# Patient Record
Sex: MALE | Race: WHITE | NOT HISPANIC OR LATINO | Employment: OTHER | URBAN - METROPOLITAN AREA
[De-identification: names, ages, dates, MRNs, and addresses within clinical notes are randomized per-mention and may not be internally consistent; named-entity substitution may affect disease eponyms.]

---

## 2017-10-02 ENCOUNTER — TRANSCRIBE ORDERS (OUTPATIENT)
Dept: ADMINISTRATIVE | Facility: HOSPITAL | Age: 67
End: 2017-10-02

## 2017-10-02 ENCOUNTER — HOSPITAL ENCOUNTER (OUTPATIENT)
Dept: RADIOLOGY | Facility: HOSPITAL | Age: 67
Discharge: HOME | End: 2017-10-02
Attending: INTERNAL MEDICINE
Payer: COMMERCIAL

## 2017-10-02 DIAGNOSIS — J40 BRONCHITIS: Primary | ICD-10-CM

## 2017-10-02 DIAGNOSIS — J18.9 PNEUMONIA OF BOTH LOWER LOBES DUE TO INFECTIOUS ORGANISM: ICD-10-CM

## 2017-10-02 PROCEDURE — 71020 HB CHEST X-RAY 2VW FRONTAL&LATL: CPT

## 2018-08-30 ENCOUNTER — OFFICE VISIT (OUTPATIENT)
Dept: HEMATOLOGY ONCOLOGY | Facility: MEDICAL CENTER | Age: 68
End: 2018-08-30
Payer: COMMERCIAL

## 2018-08-30 VITALS
SYSTOLIC BLOOD PRESSURE: 136 MMHG | HEART RATE: 67 BPM | HEIGHT: 75 IN | OXYGEN SATURATION: 97 % | WEIGHT: 266 LBS | TEMPERATURE: 97.7 F | RESPIRATION RATE: 17 BRPM | BODY MASS INDEX: 33.07 KG/M2 | DIASTOLIC BLOOD PRESSURE: 78 MMHG

## 2018-08-30 DIAGNOSIS — D69.6 THROMBOCYTOPENIA (HCC): Primary | ICD-10-CM

## 2018-08-30 PROCEDURE — 99204 OFFICE O/P NEW MOD 45 MIN: CPT | Performed by: INTERNAL MEDICINE

## 2018-08-30 RX ORDER — CHLORAL HYDRATE 500 MG
1000 CAPSULE ORAL EVERY MORNING
COMMUNITY

## 2018-08-30 RX ORDER — MULTIVIT-MIN/IRON/FOLIC ACID/K 18-600-40
CAPSULE ORAL DAILY
COMMUNITY
End: 2018-10-12

## 2018-08-30 NOTE — PROGRESS NOTES
Amy Murillo  1950  [facility] [place] [facility] [phone]  HEMATOLOGY/ONCOLOGY CONSULTATION REPORT    DISCUSSION/SUMMARY:    49-year-old male with relatively good past medical history recently found to have thrombocytopenia  The decreased platelet counts go back approximately 2 years  White count, hemoglobin level and differential have been relatively normal   Mr Sherri Stoll feels well and has no bleeding issues  Occasionally patient has more bruising than usual but otherwise patient is asymptomatic  Patient has never had a bone marrow biopsy  Previously patient had been seen by Dr Mindi Tena who felt that 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  I do not believe that any additional hematology workup is presently indicated  We discussed what patient needs to monitor for in regard to excessive bruising/bleeding  Mr Sherri Stoll will call the office if he is in need of any surgery or invasive procedure  Four days of Decadron can be tried if necessary to raise the platelet count  Patient will make sure that routine health maintenance and medical care is up-to-date  Patient is to return in 6 months with blood work before  Patient knows to call the hematology/oncology office if there are any other questions or concerns  Carefully review your medication list and verify that the list is accurate and up-to-date  Please call the hematology/oncology office if there are medications missing from the list, medications on the list that you are not currently taking or if there is a dosage or instruction that is different from how you're taking that medication      Patient goals and areas of care:  Monitor for excessive bruising/bleeding, monitor platelet count  Patient is able to self-care   ______________________________________________________________________________________    Chief Complaint   Patient presents with   Learta January Consult Thrombocytopenia     History of Present Illness:    68-year-old male referred for evaluation of thrombocytopenia  Mr Jenny Mays was unaware of any CBC abnormalities up until recently when he needed left shoulder surgery  Patient has been seen by a number of physicians to trying clarify the etiology for the thrombocytopenia  Although the specifics are not entirely clear, 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  Presently Mr Summers states feeling okay/baseline  Left shoulder pain is controlled, range of motion is okay/good  Patient denies any problems with excessive bleeding - patient believes that he gets bruises more often than before but they resolve relatively quickly  Specifically no GI or  bleeding  Appetite is good, weight is stable  No fevers, chills or sweats  No pulmonary issues  Patient continues to be otherwise active  Routine health maintenance and medical care is up-to-date  Review of Systems   Constitutional: Negative  HENT: Negative  Eyes: Negative  Respiratory: Negative  Cardiovascular: Negative  Gastrointestinal: Negative  Endocrine: Negative  Genitourinary: Negative  Musculoskeletal: Negative  Skin: Negative  Allergic/Immunologic: Negative  Neurological: Negative  Hematological: Bruises/bleeds easily  Psychiatric/Behavioral: The patient is nervous/anxious  All other systems reviewed and are negative  There is no problem list on file for this patient  No past medical history on file  Past Surgical History:   Procedure Laterality Date    APPENDECTOMY      HERNIA REPAIR Right 2013    inguinal    JOINT REPLACEMENT Right     knee    [place] REMV CATARACT EXTRACAP,INSERT LENS Left 5/4/2016    Procedure: EXTRACTION EXTRACAPSULAR CATARACT PHACO INTRAOCULAR LENS (IOL);   Surgeon: Georgina Workman MD;  Location: [facility] MAIN OR;  Service: Ophthalmology    SHOULDER ARTHROSCOPY Left     tendon repair    TONSILLECTOMY  age 1 Family History   Problem Relation Age of Onset    Heart disease Father 67        massive MI    Heart disease Sister         MI    Heart disease Brother         MI     Social History     Social History    Marital status: /Civil Union     Spouse name: N/A    Number of children: N/A    Years of education: N/A     Occupational History    Not on file  Social History Main Topics    Smoking status: Current Every Day Smoker     Packs/day: 0 50     Years: 22 00     Types: Cigarettes    Smokeless tobacco: Not on file    Alcohol use Not on file      Comment: rarely    Drug use: No    Sexual activity: Not on file     Other Topics Concern    Not on file     Social History Narrative    No narrative on file       Current Outpatient Prescriptions:     ascorbic acid (VITAMIN C) 500 mg tablet, Take 500 mg by mouth every morning , Disp: , Rfl:     b complex vitamins tablet, Take 1 tablet by mouth every morning , Disp: , Rfl:     Cholecalciferol (VITAMIN D) 2000 units CAPS, Take by mouth daily, Disp: , Rfl:     multivitamin (THERAGRAN) TABS, Take 1 tablet by mouth every morning , Disp: , Rfl:     Omega-3 Fatty Acids (FISH OIL) 1,000 mg, Take 1,000 mg by mouth daily, Disp: , Rfl:     Allergies   Allergen Reactions    Dust Mite Extract        Vitals:    08/30/18 1515   BP: 136/78   Pulse: 67   Resp: 17   Temp: 97 7 °F (36 5 °C)   SpO2: 97%     Physical Exam   Constitutional: He is oriented to person, place, and time  He appears well-developed and well-nourished  Well-nourished male, no respiratory distress   HENT:   Head: Normocephalic and atraumatic  Right Ear: External ear normal    Left Ear: External ear normal    Nose: Nose normal    Mouth/Throat: Oropharynx is clear and moist    Eyes: Conjunctivae and EOM are normal  Pupils are equal, round, and reactive to light  Neck: Normal range of motion  Neck supple     Cardiovascular: Normal rate, regular rhythm, normal heart sounds and intact distal pulses  Pulmonary/Chest: Effort normal and breath sounds normal    Abdominal: Soft  Bowel sounds are normal    +bowel sounds, nontender, slightly obese, cannot palpate liver or spleen, no rigidity or rebound   Musculoskeletal: Normal range of motion  Neurological: He is alert and oriented to person, place, and time  He has normal reflexes  Skin: Skin is warm  Good color, warm, moist, no petechiae or ecchymoses   Psychiatric: He has a normal mood and affect   His behavior is normal  Judgment and thought content normal    Extremities:   No lower extremity edema, no cords, pulses are 1+ bilaterally, no upper extremity edema, good range of motion in both upper extremities  Lymphatics:   No adenopathy in the neck, supraclavicular region, axilla and groin bilaterally    Labs:    04/27/2018 WBC = 7 0 hemoglobin = 15 6 hematocrit = 45 6 platelet = 82  38/17/9294 platelet = 466  98/60/9397 WBC = 7 7 hemoglobin = 13 9 hematocrit = 41 platelet count = 71 neutrophil = 59% lymphocytes = 29% atypical lymphocytes = 3% monocyte = 6%  03/27/2017 WBC = 7 5 hemoglobin = 14 1 hematocrit = 42 3 platelet = 58  61/14/3478 BUN = 18 creatinine = 1 05 ALT = 15 AST = 18 total bilirubin = 0 3 alkaline phosphatase = 88  One/2/17 platelet = 266  68/84/7629 MIGUEL ANGEL IFA = positive  11/24/2016 direct platelet antibody:  Anti IgG = positive  11/21/2016 platelet antibody IIB/IIIA, IA/IIA, IB/IX and HLA class I were all negative  11/21/2016 platelet = 52

## 2018-10-01 ENCOUNTER — TRANSCRIBE ORDERS (OUTPATIENT)
Dept: ADMINISTRATIVE | Facility: HOSPITAL | Age: 68
End: 2018-10-01

## 2018-10-01 ENCOUNTER — HOSPITAL ENCOUNTER (OUTPATIENT)
Dept: RADIOLOGY | Facility: HOSPITAL | Age: 68
Discharge: HOME | End: 2018-10-01
Attending: INTERNAL MEDICINE
Payer: COMMERCIAL

## 2018-10-01 ENCOUNTER — APPOINTMENT (OUTPATIENT)
Dept: LAB | Facility: HOSPITAL | Age: 68
End: 2018-10-01
Attending: INTERNAL MEDICINE
Payer: COMMERCIAL

## 2018-10-01 DIAGNOSIS — R10.32 ABDOMINAL PAIN, LEFT LOWER QUADRANT: ICD-10-CM

## 2018-10-01 DIAGNOSIS — R16.1 SPLENOMEGALY: Primary | ICD-10-CM

## 2018-10-01 DIAGNOSIS — R16.1 SPLENOMEGALY: ICD-10-CM

## 2018-10-01 LAB
ALBUMIN SERPL BCP-MCNC: 3.6 G/DL (ref 3.5–5)
ALP SERPL-CCNC: 92 U/L (ref 46–116)
ALT SERPL W P-5'-P-CCNC: 29 U/L (ref 12–78)
ANION GAP SERPL CALCULATED.3IONS-SCNC: 10 MMOL/L (ref 4–13)
AST SERPL W P-5'-P-CCNC: 22 U/L (ref 5–45)
BASOPHILS # BLD AUTO: 0.04 THOUSANDS/ΜL (ref 0–0.1)
BASOPHILS NFR BLD AUTO: 1 % (ref 0–1)
BILIRUB SERPL-MCNC: 0.9 MG/DL (ref 0.2–1)
BUN SERPL-MCNC: 21 MG/DL (ref 5–25)
CALCIUM SERPL-MCNC: 8.9 MG/DL (ref 8.3–10.1)
CHLORIDE SERPL-SCNC: 103 MMOL/L (ref 100–108)
CO2 SERPL-SCNC: 24 MMOL/L (ref 21–32)
CREAT SERPL-MCNC: 1.27 MG/DL (ref 0.6–1.3)
EOSINOPHIL # BLD AUTO: 0.16 THOUSAND/ΜL (ref 0–0.61)
EOSINOPHIL NFR BLD AUTO: 2 % (ref 0–6)
ERYTHROCYTE [DISTWIDTH] IN BLOOD BY AUTOMATED COUNT: 13.1 % (ref 11.6–15.1)
GFR SERPL CREATININE-BSD FRML MDRD: 57 ML/MIN/1.73SQ M
GLUCOSE SERPL-MCNC: 84 MG/DL (ref 65–140)
HCT VFR BLD AUTO: 46.3 % (ref 36.5–49.3)
HGB BLD-MCNC: 15.5 G/DL (ref 12–17)
IMM GRANULOCYTES # BLD AUTO: 0.03 THOUSAND/UL (ref 0–0.2)
IMM GRANULOCYTES NFR BLD AUTO: 0 % (ref 0–2)
LYMPHOCYTES # BLD AUTO: 2.97 THOUSANDS/ΜL (ref 0.6–4.47)
LYMPHOCYTES NFR BLD AUTO: 34 % (ref 14–44)
MCH RBC QN AUTO: 30.3 PG (ref 26.8–34.3)
MCHC RBC AUTO-ENTMCNC: 33.5 G/DL (ref 31.4–37.4)
MCV RBC AUTO: 90 FL (ref 82–98)
MONOCYTES # BLD AUTO: 0.86 THOUSAND/ΜL (ref 0.17–1.22)
MONOCYTES NFR BLD AUTO: 10 % (ref 4–12)
NEUTROPHILS # BLD AUTO: 4.62 THOUSANDS/ΜL (ref 1.85–7.62)
NEUTS SEG NFR BLD AUTO: 53 % (ref 43–75)
NRBC BLD AUTO-RTO: 0 /100 WBCS
PLATELET # BLD AUTO: 69 THOUSANDS/UL (ref 149–390)
PMV BLD AUTO: 10.2 FL (ref 8.9–12.7)
POTASSIUM SERPL-SCNC: 4.3 MMOL/L (ref 3.5–5.3)
PROT SERPL-MCNC: 7.7 G/DL (ref 6.4–8.2)
RBC # BLD AUTO: 5.12 MILLION/UL (ref 3.88–5.62)
SODIUM SERPL-SCNC: 137 MMOL/L (ref 136–145)
WBC # BLD AUTO: 8.68 THOUSAND/UL (ref 4.31–10.16)

## 2018-10-01 PROCEDURE — 80053 COMPREHEN METABOLIC PANEL: CPT | Performed by: INTERNAL MEDICINE

## 2018-10-01 PROCEDURE — 85025 COMPLETE CBC W/AUTO DIFF WBC: CPT | Performed by: INTERNAL MEDICINE

## 2018-10-01 PROCEDURE — 36415 COLL VENOUS BLD VENIPUNCTURE: CPT | Performed by: INTERNAL MEDICINE

## 2018-10-01 PROCEDURE — 74177 CT ABD & PELVIS W/CONTRAST: CPT

## 2018-10-01 RX ADMIN — IOHEXOL 50 ML: 240 INJECTION, SOLUTION INTRATHECAL; INTRAVASCULAR; INTRAVENOUS; ORAL at 17:03

## 2018-10-01 RX ADMIN — IOHEXOL 100 ML: 350 INJECTION, SOLUTION INTRAVENOUS at 17:03

## 2018-10-12 RX ORDER — MELATONIN
1000 EVERY MORNING
COMMUNITY

## 2018-10-12 NOTE — PRE-PROCEDURE INSTRUCTIONS
Pre-Surgery Instructions:   Medication Instructions    ascorbic acid (VITAMIN C) 500 mg tablet Patient was instructed by Physician and understands   b complex vitamins tablet Patient was instructed by Physician and understands   cholecalciferol (VITAMIN D3) 1,000 units tablet Patient was instructed by Physician and understands   Ipratropium-Albuterol (COMBIVENT RESPIMAT IN) Patient was instructed by Physician and understands   multivitamin [facility]) TABS Patient was instructed by Physician and understands   Omega-3 Fatty Acids (FISH OIL) 1,000 mg Patient was instructed by Physician and understands  Pt to follow Dr Maribeth Nolasco instructions    wife Naomie Parmardequan

## 2018-10-15 ENCOUNTER — TELEPHONE (OUTPATIENT)
Dept: HEMATOLOGY ONCOLOGY | Facility: MEDICAL CENTER | Age: 68
End: 2018-10-15

## 2018-10-15 NOTE — TELEPHONE ENCOUNTER
Platelets on 85/9/11 were 69  At last office visit in August, patient was advised to call the office if he is in need of any surgery or invasive procedure where Dr Coco Adhikari suggested four days of Decadron can be tried if necessary to raise the platelet count  Patient is having colonoscopy in 2 days  Please advise  Thanks

## 2018-10-15 NOTE — TELEPHONE ENCOUNTER
Platelets are above 50  No need for steroids  He should make GI dr aware  He is to call with any black stool/blood in stool following procedure  Also, call if increase in bruising

## 2018-10-16 ENCOUNTER — ANESTHESIA EVENT (OUTPATIENT)
Dept: GASTROENTEROLOGY | Facility: AMBULARY SURGERY CENTER | Age: 68
End: 2018-10-16
Payer: COMMERCIAL

## 2018-10-17 ENCOUNTER — HOSPITAL ENCOUNTER (OUTPATIENT)
Facility: AMBULARY SURGERY CENTER | Age: 68
Setting detail: OUTPATIENT SURGERY
Discharge: HOME | End: 2018-10-17
Attending: INTERNAL MEDICINE | Admitting: INTERNAL MEDICINE
Payer: COMMERCIAL

## 2018-10-17 ENCOUNTER — ANESTHESIA (OUTPATIENT)
Dept: GASTROENTEROLOGY | Facility: AMBULARY SURGERY CENTER | Age: 68
End: 2018-10-17
Payer: COMMERCIAL

## 2018-10-17 VITALS
OXYGEN SATURATION: 95 % | TEMPERATURE: 96.7 F | RESPIRATION RATE: 18 BRPM | WEIGHT: 260 LBS | HEART RATE: 78 BPM | BODY MASS INDEX: 31.66 KG/M2 | DIASTOLIC BLOOD PRESSURE: 67 MMHG | HEIGHT: 76 IN | SYSTOLIC BLOOD PRESSURE: 127 MMHG

## 2018-10-17 DIAGNOSIS — R19.4 CHANGE IN BOWEL HABITS: ICD-10-CM

## 2018-10-17 PROCEDURE — 88305 TISSUE EXAM BY PATHOLOGIST: CPT | Performed by: PATHOLOGY

## 2018-10-17 RX ORDER — SODIUM CHLORIDE 9 MG/ML
75 INJECTION, SOLUTION INTRAVENOUS CONTINUOUS
Status: DISCONTINUED | OUTPATIENT
Start: 2018-10-17 | End: 2018-10-17 | Stop reason: HOSPADM

## 2018-10-17 RX ORDER — PROPOFOL 10 MG/ML
INJECTION, EMULSION INTRAVENOUS CONTINUOUS PRN
Status: DISCONTINUED | OUTPATIENT
Start: 2018-10-17 | End: 2018-10-17 | Stop reason: SURG

## 2018-10-17 RX ORDER — PROPOFOL 10 MG/ML
INJECTION, EMULSION INTRAVENOUS AS NEEDED
Status: DISCONTINUED | OUTPATIENT
Start: 2018-10-17 | End: 2018-10-17 | Stop reason: SURG

## 2018-10-17 RX ADMIN — SODIUM CHLORIDE 75 ML/HR: 0.9 INJECTION, SOLUTION INTRAVENOUS at 10:11

## 2018-10-17 RX ADMIN — PROPOFOL 100 MG: 10 INJECTION, EMULSION INTRAVENOUS at 10:21

## 2018-10-17 RX ADMIN — PROPOFOL 110 MCG/KG/MIN: 10 INJECTION, EMULSION INTRAVENOUS at 10:21

## 2018-10-17 NOTE — H&P
History and Physical -  [facility]  Kellie Moore 77 y o  male MRN: [id number]    HPI: Kellie Moore is a 77y o  year old male who presents with change in bowel habit with constipation      Review of Systems    Historical Information   Past Medical History:   Diagnosis Date    Thrombocytopenia ([address] )     platelet count maintained around 90-Dr Kimberly Bansal Wears partial dentures     upper     Past Surgical History:   Procedure Laterality Date    APPENDECTOMY      CATARACT EXTRACTION Left     COLONOSCOPY      HERNIA REPAIR Right 2013    inguinal    JOINT REPLACEMENT Right     knee, left shoulder    [place] REMV CATARACT EXTRACAP,INSERT LENS Left 5/4/2016    Procedure: EXTRACTION EXTRACAPSULAR CATARACT PHACO INTRAOCULAR LENS (IOL); Surgeon: Burton August MD;  Location: [facility] MAIN OR;  Service: Ophthalmology    SHOULDER ARTHROSCOPY Left     tendon repair    TONSILLECTOMY  age 1     Social History   History   Alcohol Use No     History   Drug Use No     History   Smoking Status    Former Smoker    Packs/day: 0 50    Years: 22 00    Types: Cigarettes    Quit date: 2016   Smokeless Tobacco    Never Used     Family History   Problem Relation Age of Onset    Heart disease Father 67        massive MI    Heart disease Sister         MI    Heart disease Brother         MI    No Known Problems Mother        Meds/Allergies     Prescriptions Prior to Admission   Medication    ascorbic acid (VITAMIN C) 500 mg tablet    b complex vitamins tablet    cholecalciferol (VITAMIN D3) 1,000 units tablet    multivitamin (THERAGRAN) TABS    Omega-3 Fatty Acids (FISH OIL) 1,000 mg    Ipratropium-Albuterol (COMBIVENT RESPIMAT IN)       Allergies   Allergen Reactions    Dust Mite Extract        Objective     Blood pressure 162/78, pulse 67, temperature (!) 96 7 °F (35 9 °C), temperature source Tympanic, resp  rate 18, height 6' 4" (1 93 m), weight 118 kg (260 lb), SpO2 97 %        PHYSICAL EXAM    Gen: NAD  CV: RRR  CHEST: Clear  ABD: soft, NT/ND  EXT: no edema  Neuro: AAO      ASSESSMENT/PLAN:  This is a 77y o  year old male here for elective colonoscopy    PLAN:   Procedure:  Risk and benefit of the procedure was discussed and will proceed under conscious sedation

## 2018-10-17 NOTE — OP NOTE
OPERATIVE REPORT  PATIENT NAME: Charles Sy    :  1950  MRN: [id number]  Pt Location: [place] [address] GI ROOM 01    SURGERY DATE: 10/17/2018    Surgeon(s) and Role:     * Fanny Duenas MD - Primary    Preop Diagnosis:  Change in bowel habits [R19 4]    Post-Op Diagnosis Codes:     * Colon polyps [K63 5]     * Diverticulosis [K57 90]     * Internal hemorrhoid [K64 8]    Procedure(s) (LRB):  COLONOSCOPY (N/A) with cold snare and cold biopsy polypectomies    Last colonoscopy was 8-10 years ago    Specimen(s):  ID Type Source Tests Collected by Time Destination   1 : cold snare polyp sigmoid Tissue Large Intestine, Sigmoid Colon TISSUE EXAM Fanny Duenas MD 10/17/2018 1024    2 :  bx hepatic flexure polyp x3 Tissue Large Intestine, Hepatic Flexure TISSUE EXAM Fanny Duenas MD 10/17/2018 1030    3 : bx polyp ascending colon Tissue Large Intestine, Right/Ascending Colon TISSUE EXAM Fanny Duenas MD 10/17/2018 1036        Estimated Blood Loss:   Minimal       Anesthesia Type:   IV Sedation with Anesthesia    Operative Indications:  Change in bowel habits [R19 4]      Operative Findings:  Colonoscopy-under conscious sedation, digital rectal exam and perianal examination was done  Pediatric colonoscope was passed from anus and reach all way up to the cecum  Cecum landmark was identified with ileocecal wall and appendiceal orifice  Quality of prep was suboptimal with liquid stool throughout the colon  Lot of flushing was done  There was extensive diverticulosis with tortuous left colon  Cecum appeared normal   In the ascending colon small polyp which was removed with cold biopsy polypectomy  In the hepatic flexure, there is a 3 small to medium size polyp which I removed with cold snare and cold biopsy polypectomy  In the sigmoid colon, there is a 6 mm size sessile polyp which was removed with cold snare polypectomy  In the rectum on retroflexion there is a medium to large size internal hemorrhoid    Impression-1  Colon polyps x 5 removed with cold snare and cold biopsy polypectomies  2  Extensive diverticulosis  3  Medium to large size internal hemorrhoid    Recommendation- 1  Follow-up biopsies  2  High-fiber diet  3  Repeat colonoscopy in 3-5 years  4  Start Linzess 145 mcg p o   Q day    Complications:   None      Patient Disposition:  PACU     SIGNATURE: Donald Peres MD  DATE: October 17, 2018  TIME: 10:47 AM

## 2018-10-17 NOTE — ANESTHESIA PREPROCEDURE EVALUATION
Review of Systems/Medical History  Patient summary reviewed  Chart reviewed  No history of anesthetic complications     Cardiovascular   Pulmonary  Smoker ex-smoker  ,        GI/Hepatic            Endo/Other    Obesity    GYN       Hematology    Thrombocytopenia,    Musculoskeletal    Comment: S/p left shoulder replacement and right knee replacement      Neurology   Psychology           Physical Exam    Airway    Mallampati score: II  TM Distance: >3 FB  Neck ROM: full     Dental   upper dentures,     Cardiovascular  Rhythm: regular, Rate: normal,     Pulmonary      Other Findings  Partial upper denture      Anesthesia Plan  ASA Score- 2     Anesthesia Type- IV sedation with anesthesia with ASA Monitors  Additional Monitors:   Airway Plan:         Plan Factors-    Induction- intravenous  Postoperative Plan-     Informed Consent- Anesthetic plan and risks discussed with patient

## 2018-10-17 NOTE — ANESTHESIA POSTPROCEDURE EVALUATION
Post-Op Assessment Note      CV Status:  Stable    Mental Status:  Awake    Hydration Status:  Stable    PONV Controlled:  None    Airway Patency:  Patent    Post Op Vitals Reviewed: Yes          Staff: Anesthesiologist           BP      Temp     Pulse     Resp      SpO2

## 2019-03-20 ENCOUNTER — OFFICE VISIT (OUTPATIENT)
Dept: HEMATOLOGY ONCOLOGY | Facility: MEDICAL CENTER | Age: 69
End: 2019-03-20
Payer: COMMERCIAL

## 2019-03-20 VITALS
RESPIRATION RATE: 18 BRPM | DIASTOLIC BLOOD PRESSURE: 78 MMHG | BODY MASS INDEX: 32.51 KG/M2 | HEIGHT: 76 IN | WEIGHT: 267 LBS | TEMPERATURE: 98.6 F | SYSTOLIC BLOOD PRESSURE: 138 MMHG | HEART RATE: 70 BPM | OXYGEN SATURATION: 96 %

## 2019-03-20 DIAGNOSIS — D69.6 THROMBOCYTOPENIA (HCC): Primary | ICD-10-CM

## 2019-03-20 PROCEDURE — 99213 OFFICE O/P EST LOW 20 MIN: CPT | Performed by: INTERNAL MEDICINE

## 2019-03-20 NOTE — PROGRESS NOTES
Jordan Rothman  1950  [facility] [place] [facility] [phone]  HEMATOLOGY/ONCOLOGY CONSULTATION REPORT    DISCUSSION/SUMMARY:    20-year-old male with relatively good past medical history recently found to have thrombocytopenia  The decreased platelet counts go back approximately 2 + years  White count, hemoglobin level and differential have been relatively normal   Mr Adelbert Ormond feels well and has no bleeding issues  Occasionally patient has more bruising than usual but otherwise patient is asymptomatic  Patient has never had a bone marrow biopsy  Previously patient had been seen by Dr Valentine Brewer who felt that Mr Adelbert Ormond likely had immune thrombocytopenia  I do not believe that any additional hematology workup is presently indicated  We discussed what patient needs to monitor for in regard to excessive bruising/bleeding  Mr Adelbert Ormond will call the office if he is in need of any surgery or invasive procedure  Four days of Decadron can be tried if necessary to raise the platelet count  Patient will make sure that routine health maintenance and medical care is up-to-date  Patient is to return in 12 months with blood work before  Patient knows to call the hematology/oncology office if there are any other questions or concerns  Carefully review your medication list and verify that the list is accurate and up-to-date  Please call the hematology/oncology office if there are medications missing from the list, medications on the list that you are not currently taking or if there is a dosage or instruction that is different from how you're taking that medication      Patient goals and areas of care:  Monitor for excessive bruising/bleeding, monitor platelet count  Barriers to care:  None  Patient is able to self-care   ______________________________________________________________________________________    Chief Complaint   Patient presents with    Follow-up     Thrombocytopenia     History of Present Illness:    69-year-old male referred for evaluation of thrombocytopenia  Mr Aruna Michel was unaware of any CBC abnormalities up until recently when he needed left shoulder surgery  Patient has been seen by a number of physicians to trying clarify the etiology for the thrombocytopenia  Although the specifics are not entirely clear, 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  Presently Mr Kwaku Tai states feeling well  Patient continues to have left shoulder issues and may need additional surgery  Range of motion is decreased  No problems with bleeding although patient bruises easily  Appetite is good, weight is stable  No other GI or  issues  Patient continues to be very active  Routine health maintenance and medical care is up-to-date  Review of Systems   Constitutional: Negative  HENT: Negative  Eyes: Negative  Respiratory: Negative  Cardiovascular: Negative  Gastrointestinal: Negative  Endocrine: Negative  Genitourinary: Negative  Musculoskeletal: Negative  Skin: Negative  Allergic/Immunologic: Negative  Neurological: Negative  Hematological: Bruises/bleeds easily  Psychiatric/Behavioral: The patient is not nervous/anxious  All other systems reviewed and are negative  Patient Active Problem List   Diagnosis    Thrombocytopenia ([address] )     Past Medical History:   Diagnosis Date    Thrombocytopenia ([address] )     platelet count maintained around 90-Dr Alcides Waller Wears partial dentures     upper     Past Surgical History:   Procedure Laterality Date    APPENDECTOMY      CATARACT EXTRACTION Left     COLONOSCOPY      COLONOSCOPY N/A 10/17/2018    Procedure: COLONOSCOPY;  Surgeon: Verdell Bernheim, MD;  [facility] GI LAB;   Service: Gastroenterology    HERNIA REPAIR Right 2013    inguinal    JOINT REPLACEMENT Right     knee, left shoulder    [place] REMV CATARACT EXTRACAP,INSERT LENS Left 5/4/2016    Procedure: EXTRACTION EXTRACAPSULAR CATARACT PHACO INTRAOCULAR LENS (IOL);   Surgeon: Leonor Gillette MD;  Location: [facility] MAIN OR;  Service: Ophthalmology    SHOULDER ARTHROSCOPY Left     tendon repair    TONSILLECTOMY  age 1     Family History   Problem Relation Age of Onset    Heart disease Father 67        massive MI    Heart disease Sister         MI    Heart disease Brother         MI    No Known Problems Mother      Social History     Socioeconomic History    Marital status: /Civil Union     Spouse name: Not on file    Number of children: Not on file    Years of education: Not on file    Highest education level: Not on file   Occupational History    Not on file   Social Needs    Financial resource strain: Not on file    Food insecurity:     Worry: Not on file     Inability: Not on file    Transportation needs:     Medical: Not on file     Non-medical: Not on file   Tobacco Use    Smoking status: Former Smoker     Packs/day: 0 50     Years: 22 00     Pack years: 11 00     Types: Cigarettes     Last attempt to quit: 2016     Years since quitting: 3 2    Smokeless tobacco: Never Used   Substance and Sexual Activity    Alcohol use: No    Drug use: No    Sexual activity: Not on file   Lifestyle    Physical activity:     Days per week: Not on file     Minutes per session: Not on file    Stress: Not on file   Relationships    Social connections:     Talks on phone: Not on file     Gets together: Not on file     Attends [employer] service: Not on file     Active member of club or organization: Not on file     Attends meetings of clubs or organizations: Not on file     Relationship status: Not on file    Intimate partner violence:     Fear of current or ex partner: Not on file     Emotionally abused: Not on file     Physically abused: Not on file     Forced sexual activity: Not on file   Other Topics Concern    Not on file   Social History Narrative  Not on file       Current Outpatient Medications:     ascorbic acid (VITAMIN C) 500 mg tablet, Take 500 mg by mouth every morning , Disp: , Rfl:     b complex vitamins tablet, Take 1 tablet by mouth every morning , Disp: , Rfl:     cholecalciferol (VITAMIN D3) 1,000 units tablet, Take 1,000 Units by mouth every morning, Disp: , Rfl:     Ipratropium-Albuterol (COMBIVENT RESPIMAT IN), Inhale as needed, Disp: , Rfl:     multivitamin (THERAGRAN) TABS, Take 1 tablet by mouth every morning , Disp: , Rfl:     Omega-3 Fatty Acids (FISH OIL) 1,000 mg, Take 1,000 mg by mouth every morning  , Disp: , Rfl:     Allergies   Allergen Reactions    Dust Mite Extract        Vitals:    03/20/19 1405   BP: 138/78   Pulse: 70   Resp: 18   Temp: 98 6 °F (37 °C)   SpO2: 96%     Physical Exam   Constitutional: He is oriented to person, place, and time  He appears well-developed and well-nourished  Well-nourished male, no respiratory distress   HENT:   Head: Normocephalic and atraumatic  Right Ear: External ear normal    Left Ear: External ear normal    Nose: Nose normal    Mouth/Throat: Oropharynx is clear and moist    Eyes: Pupils are equal, round, and reactive to light  Conjunctivae and EOM are normal    Neck: Normal range of motion  Neck supple  Cardiovascular: Normal rate, regular rhythm, normal heart sounds and intact distal pulses  Pulmonary/Chest: Effort normal and breath sounds normal    Abdominal: Soft  Bowel sounds are normal    +bowel sounds, nontender, slightly obese, cannot palpate liver or spleen, no rigidity or rebound   Musculoskeletal: Normal range of motion  Neurological: He is alert and oriented to person, place, and time  He has normal reflexes  Skin: Skin is warm  Good color, warm, moist, no petechiae or ecchymoses   Psychiatric: He has a normal mood and affect   His behavior is normal  Judgment and thought content normal    Extremities:   No lower extremity edema bilaterally, no cords, pulses are 1+ bilaterally, no upper extremity edema, good range of motion in both upper extremities  Lymphatics:   No adenopathy in the neck, supraclavicular region, axilla and groin bilaterally    Labs    03/06/2019 WBC = 8 0 hemoglobin = 15 1 hematocrit = 45 MCV = 87 platelet = 96 neutrophil = 46% lymphocytes = 45% monocyte = 9%    04/27/2018 WBC = 7 0 hemoglobin = 15 6 hematocrit = 45 6 platelet = 82  89/20/1858 platelet = 328  83/96/4584 WBC = 7 7 hemoglobin = 13 9 hematocrit = 41 platelet count = 71 neutrophil = 59% lymphocytes = 29% atypical lymphocytes = 3% monocyte = 6%  03/27/2017 WBC = 7 5 hemoglobin = 14 1 hematocrit = 42 3 platelet = 58  54/91/5714 BUN = 18 creatinine = 1 05 ALT = 15 AST = 18 total bilirubin = 0 3 alkaline phosphatase = 88  01/2/17 platelet = 682  92/88/3136 MIGUEL ANGEL IFA = positive  11/21/2016 platelet = 52    Pathology    11/24/2016 direct platelet antibody:  Anti IgG = positive  11/21/2016 platelet antibody IIB/IIIA, IA/IIA, IB/IX and HLA class I were all negative

## 2019-04-12 ENCOUNTER — OFFICE VISIT (OUTPATIENT)
Dept: FAMILY MEDICINE CLINIC | Facility: CLINIC | Age: 69
End: 2019-04-12
Payer: COMMERCIAL

## 2019-04-12 VITALS
WEIGHT: 262 LBS | RESPIRATION RATE: 16 BRPM | HEART RATE: 80 BPM | SYSTOLIC BLOOD PRESSURE: 134 MMHG | HEIGHT: 76 IN | DIASTOLIC BLOOD PRESSURE: 76 MMHG | TEMPERATURE: 97.2 F | BODY MASS INDEX: 31.9 KG/M2

## 2019-04-12 DIAGNOSIS — Z13.6 SCREENING FOR CARDIOVASCULAR CONDITION: ICD-10-CM

## 2019-04-12 DIAGNOSIS — D69.6 THROMBOCYTOPENIA (HCC): Primary | ICD-10-CM

## 2019-04-12 DIAGNOSIS — Z11.59 NEED FOR HEPATITIS C SCREENING TEST: ICD-10-CM

## 2019-04-12 DIAGNOSIS — Z12.5 SCREENING FOR PROSTATE CANCER: ICD-10-CM

## 2019-04-12 DIAGNOSIS — Z23 NEED FOR VACCINATION: ICD-10-CM

## 2019-04-12 DIAGNOSIS — E66.9 OBESITY (BMI 30.0-34.9): ICD-10-CM

## 2019-04-12 PROCEDURE — 3725F SCREEN DEPRESSION PERFORMED: CPT | Performed by: FAMILY MEDICINE

## 2019-04-12 PROCEDURE — 1101F PT FALLS ASSESS-DOCD LE1/YR: CPT | Performed by: FAMILY MEDICINE

## 2019-04-12 PROCEDURE — 1160F RVW MEDS BY RX/DR IN RCRD: CPT | Performed by: FAMILY MEDICINE

## 2019-04-12 PROCEDURE — 90714 TD VACC NO PRESV 7 YRS+ IM: CPT

## 2019-04-12 PROCEDURE — 90471 IMMUNIZATION ADMIN: CPT

## 2019-04-12 PROCEDURE — 3008F BODY MASS INDEX DOCD: CPT | Performed by: FAMILY MEDICINE

## 2019-04-12 PROCEDURE — 99203 OFFICE O/P NEW LOW 30 MIN: CPT | Performed by: FAMILY MEDICINE

## 2019-04-12 PROCEDURE — 1036F TOBACCO NON-USER: CPT | Performed by: FAMILY MEDICINE

## 2019-04-30 ENCOUNTER — APPOINTMENT (OUTPATIENT)
Dept: LAB | Facility: HOSPITAL | Age: 69
End: 2019-04-30
Attending: FAMILY MEDICINE
Payer: COMMERCIAL

## 2019-04-30 ENCOUNTER — TRANSCRIBE ORDERS (OUTPATIENT)
Dept: ADMINISTRATIVE | Facility: HOSPITAL | Age: 69
End: 2019-04-30

## 2019-04-30 DIAGNOSIS — Z11.59 SCREENING EXAMINATION FOR POLIOMYELITIS: ICD-10-CM

## 2019-04-30 DIAGNOSIS — Z13.6 SCREENING FOR CARDIOVASCULAR CONDITION: ICD-10-CM

## 2019-04-30 DIAGNOSIS — Z12.5 SCREENING FOR PROSTATE CANCER: ICD-10-CM

## 2019-04-30 DIAGNOSIS — Z11.59 SCREENING EXAMINATION FOR POLIOMYELITIS: Primary | ICD-10-CM

## 2019-04-30 DIAGNOSIS — D69.6 THROMBOCYTOPENIA (HCC): ICD-10-CM

## 2019-04-30 LAB
ALBUMIN SERPL BCP-MCNC: 3.3 G/DL (ref 3.5–5)
ALP SERPL-CCNC: 88 U/L (ref 46–116)
ALT SERPL W P-5'-P-CCNC: 26 U/L (ref 12–78)
ANION GAP SERPL CALCULATED.3IONS-SCNC: 10 MMOL/L (ref 4–13)
AST SERPL W P-5'-P-CCNC: 19 U/L (ref 5–45)
BILIRUB SERPL-MCNC: 1 MG/DL (ref 0.2–1)
BUN SERPL-MCNC: 21 MG/DL (ref 5–25)
CALCIUM SERPL-MCNC: 8.7 MG/DL (ref 8.3–10.1)
CHLORIDE SERPL-SCNC: 103 MMOL/L (ref 100–108)
CHOLEST SERPL-MCNC: 180 MG/DL (ref 50–200)
CO2 SERPL-SCNC: 23 MMOL/L (ref 21–32)
CREAT SERPL-MCNC: 1.18 MG/DL (ref 0.6–1.3)
ERYTHROCYTE [DISTWIDTH] IN BLOOD BY AUTOMATED COUNT: 13.9 % (ref 11.6–15.1)
GFR SERPL CREATININE-BSD FRML MDRD: 62 ML/MIN/1.73SQ M
GLUCOSE P FAST SERPL-MCNC: 93 MG/DL (ref 65–99)
HCT VFR BLD AUTO: 46.5 % (ref 36.5–49.3)
HCV AB SER QL: NORMAL
HDLC SERPL-MCNC: 34 MG/DL (ref 40–60)
HGB BLD-MCNC: 15.5 G/DL (ref 12–17)
LDLC SERPL CALC-MCNC: 116 MG/DL (ref 0–100)
MCH RBC QN AUTO: 29.6 PG (ref 26.8–34.3)
MCHC RBC AUTO-ENTMCNC: 33.3 G/DL (ref 31.4–37.4)
MCV RBC AUTO: 89 FL (ref 82–98)
PLATELET # BLD AUTO: 74 THOUSANDS/UL (ref 149–390)
PMV BLD AUTO: 10.8 FL (ref 8.9–12.7)
POTASSIUM SERPL-SCNC: 4.3 MMOL/L (ref 3.5–5.3)
PROT SERPL-MCNC: 7.2 G/DL (ref 6.4–8.2)
PSA SERPL-MCNC: 0.5 NG/ML (ref 0–4)
RBC # BLD AUTO: 5.24 MILLION/UL (ref 3.88–5.62)
SODIUM SERPL-SCNC: 136 MMOL/L (ref 136–145)
TRIGL SERPL-MCNC: 150 MG/DL
WBC # BLD AUTO: 8.05 THOUSAND/UL (ref 4.31–10.16)

## 2019-04-30 PROCEDURE — 86803 HEPATITIS C AB TEST: CPT | Performed by: FAMILY MEDICINE

## 2019-04-30 PROCEDURE — 80061 LIPID PANEL: CPT

## 2019-04-30 PROCEDURE — 80053 COMPREHEN METABOLIC PANEL: CPT

## 2019-04-30 PROCEDURE — 36415 COLL VENOUS BLD VENIPUNCTURE: CPT | Performed by: FAMILY MEDICINE

## 2019-04-30 PROCEDURE — 85027 COMPLETE CBC AUTOMATED: CPT

## 2019-04-30 PROCEDURE — G0103 PSA SCREENING: HCPCS

## 2019-05-15 ENCOUNTER — OFFICE VISIT (OUTPATIENT)
Dept: FAMILY MEDICINE CLINIC | Facility: CLINIC | Age: 69
End: 2019-05-15
Payer: COMMERCIAL

## 2019-05-15 VITALS
HEIGHT: 76 IN | WEIGHT: 266 LBS | TEMPERATURE: 98.4 F | RESPIRATION RATE: 18 BRPM | DIASTOLIC BLOOD PRESSURE: 80 MMHG | HEART RATE: 66 BPM | SYSTOLIC BLOOD PRESSURE: 130 MMHG | BODY MASS INDEX: 32.39 KG/M2

## 2019-05-15 DIAGNOSIS — Z00.00 MEDICARE ANNUAL WELLNESS VISIT, INITIAL: Primary | ICD-10-CM

## 2019-05-15 DIAGNOSIS — D69.6 THROMBOCYTOPENIA (HCC): ICD-10-CM

## 2019-05-15 DIAGNOSIS — Z13.6 ENCOUNTER FOR ABDOMINAL AORTIC ANEURYSM (AAA) SCREENING: ICD-10-CM

## 2019-05-15 DIAGNOSIS — E78.00 ELEVATED LOW-DENSITY LIPOPROTEIN LEVEL: ICD-10-CM

## 2019-05-15 DIAGNOSIS — Z13.5 SCREENING FOR GLAUCOMA: ICD-10-CM

## 2019-05-15 PROCEDURE — G0438 PPPS, INITIAL VISIT: HCPCS | Performed by: FAMILY MEDICINE

## 2019-05-15 PROCEDURE — 1125F AMNT PAIN NOTED PAIN PRSNT: CPT | Performed by: FAMILY MEDICINE

## 2019-05-15 PROCEDURE — 3008F BODY MASS INDEX DOCD: CPT | Performed by: FAMILY MEDICINE

## 2019-05-15 PROCEDURE — 1036F TOBACCO NON-USER: CPT | Performed by: FAMILY MEDICINE

## 2019-05-15 PROCEDURE — 1170F FXNL STATUS ASSESSED: CPT | Performed by: FAMILY MEDICINE

## 2019-05-15 PROCEDURE — 1160F RVW MEDS BY RX/DR IN RCRD: CPT | Performed by: FAMILY MEDICINE

## 2019-06-18 ENCOUNTER — TELEPHONE (OUTPATIENT)
Dept: FAMILY MEDICINE CLINIC | Facility: CLINIC | Age: 69
End: 2019-06-18

## 2019-10-14 ENCOUNTER — TELEPHONE (OUTPATIENT)
Dept: HEMATOLOGY ONCOLOGY | Facility: MEDICAL CENTER | Age: 69
End: 2019-10-14

## 2019-10-14 DIAGNOSIS — D69.6 THROMBOCYTOPENIA (HCC): Primary | ICD-10-CM

## 2019-10-14 NOTE — TELEPHONE ENCOUNTER
Patient will need visit with Dr Sterling Pereira with blood work prior  I will put order in for cbc, cmp  Thanks

## 2019-10-16 ENCOUNTER — APPOINTMENT (OUTPATIENT)
Dept: LAB | Facility: HOSPITAL | Age: 69
End: 2019-10-16
Attending: INTERNAL MEDICINE
Payer: COMMERCIAL

## 2019-10-16 ENCOUNTER — TRANSCRIBE ORDERS (OUTPATIENT)
Dept: ADMINISTRATIVE | Facility: HOSPITAL | Age: 69
End: 2019-10-16

## 2019-10-16 DIAGNOSIS — D69.6 THROMBOCYTOPENIA (HCC): ICD-10-CM

## 2019-10-16 LAB
ALBUMIN SERPL BCP-MCNC: 3.4 G/DL (ref 3.5–5)
ALP SERPL-CCNC: 87 U/L (ref 46–116)
ALT SERPL W P-5'-P-CCNC: 25 U/L (ref 12–78)
ANION GAP SERPL CALCULATED.3IONS-SCNC: 8 MMOL/L (ref 4–13)
AST SERPL W P-5'-P-CCNC: 26 U/L (ref 5–45)
BASOPHILS # BLD AUTO: 0.05 THOUSANDS/ΜL (ref 0–0.1)
BASOPHILS NFR BLD AUTO: 1 % (ref 0–1)
BILIRUB SERPL-MCNC: 0.9 MG/DL (ref 0.2–1)
BUN SERPL-MCNC: 21 MG/DL (ref 5–25)
CALCIUM SERPL-MCNC: 8.7 MG/DL (ref 8.3–10.1)
CHLORIDE SERPL-SCNC: 106 MMOL/L (ref 100–108)
CO2 SERPL-SCNC: 26 MMOL/L (ref 21–32)
CREAT SERPL-MCNC: 1.13 MG/DL (ref 0.6–1.3)
EOSINOPHIL # BLD AUTO: 0 THOUSAND/ΜL (ref 0–0.61)
EOSINOPHIL NFR BLD AUTO: 0 % (ref 0–6)
ERYTHROCYTE [DISTWIDTH] IN BLOOD BY AUTOMATED COUNT: 13.8 % (ref 11.6–15.1)
GFR SERPL CREATININE-BSD FRML MDRD: 65 ML/MIN/1.73SQ M
GLUCOSE P FAST SERPL-MCNC: 90 MG/DL (ref 65–99)
HCT VFR BLD AUTO: 45.8 % (ref 36.5–49.3)
HGB BLD-MCNC: 15 G/DL (ref 12–17)
IMM GRANULOCYTES # BLD AUTO: 0.02 THOUSAND/UL (ref 0–0.2)
IMM GRANULOCYTES NFR BLD AUTO: 0 % (ref 0–2)
LYMPHOCYTES # BLD AUTO: 2.42 THOUSANDS/ΜL (ref 0.6–4.47)
LYMPHOCYTES NFR BLD AUTO: 37 % (ref 14–44)
MCH RBC QN AUTO: 29.5 PG (ref 26.8–34.3)
MCHC RBC AUTO-ENTMCNC: 32.8 G/DL (ref 31.4–37.4)
MCV RBC AUTO: 90 FL (ref 82–98)
MONOCYTES # BLD AUTO: 0.57 THOUSAND/ΜL (ref 0.17–1.22)
MONOCYTES NFR BLD AUTO: 9 % (ref 4–12)
NEUTROPHILS # BLD AUTO: 3.47 THOUSANDS/ΜL (ref 1.85–7.62)
NEUTS SEG NFR BLD AUTO: 53 % (ref 43–75)
NRBC BLD AUTO-RTO: 0 /100 WBCS
PLATELET # BLD AUTO: 72 THOUSANDS/UL (ref 149–390)
PMV BLD AUTO: 11 FL (ref 8.9–12.7)
POTASSIUM SERPL-SCNC: 4.6 MMOL/L (ref 3.5–5.3)
PROT SERPL-MCNC: 7 G/DL (ref 6.4–8.2)
RBC # BLD AUTO: 5.09 MILLION/UL (ref 3.88–5.62)
SODIUM SERPL-SCNC: 140 MMOL/L (ref 136–145)
WBC # BLD AUTO: 6.53 THOUSAND/UL (ref 4.31–10.16)

## 2019-10-16 PROCEDURE — 80053 COMPREHEN METABOLIC PANEL: CPT

## 2019-10-16 PROCEDURE — 36415 COLL VENOUS BLD VENIPUNCTURE: CPT

## 2019-10-16 PROCEDURE — 85025 COMPLETE CBC W/AUTO DIFF WBC: CPT

## 2019-10-18 ENCOUNTER — OFFICE VISIT (OUTPATIENT)
Dept: LAB | Facility: HOSPITAL | Age: 69
End: 2019-10-18
Payer: COMMERCIAL

## 2019-10-18 ENCOUNTER — TRANSCRIBE ORDERS (OUTPATIENT)
Dept: ADMINISTRATIVE | Facility: HOSPITAL | Age: 69
End: 2019-10-18

## 2019-10-18 ENCOUNTER — APPOINTMENT (OUTPATIENT)
Dept: LAB | Facility: HOSPITAL | Age: 69
End: 2019-10-18
Payer: COMMERCIAL

## 2019-10-18 DIAGNOSIS — Z01.818 PREOP TESTING: Primary | ICD-10-CM

## 2019-10-18 DIAGNOSIS — Z01.818 PREOP TESTING: ICD-10-CM

## 2019-10-18 LAB
ALBUMIN SERPL BCP-MCNC: 3.5 G/DL (ref 3.5–5)
ALP SERPL-CCNC: 93 U/L (ref 46–116)
ALT SERPL W P-5'-P-CCNC: 21 U/L (ref 12–78)
ANION GAP SERPL CALCULATED.3IONS-SCNC: 9 MMOL/L (ref 4–13)
APTT PPP: 29 SECONDS (ref 23–37)
AST SERPL W P-5'-P-CCNC: 22 U/L (ref 5–45)
BILIRUB SERPL-MCNC: 1 MG/DL (ref 0.2–1)
BUN SERPL-MCNC: 21 MG/DL (ref 5–25)
CALCIUM SERPL-MCNC: 8.4 MG/DL (ref 8.3–10.1)
CHLORIDE SERPL-SCNC: 104 MMOL/L (ref 100–108)
CO2 SERPL-SCNC: 25 MMOL/L (ref 21–32)
CREAT SERPL-MCNC: 1.17 MG/DL (ref 0.6–1.3)
ERYTHROCYTE [DISTWIDTH] IN BLOOD BY AUTOMATED COUNT: 13.9 % (ref 11.6–15.1)
GFR SERPL CREATININE-BSD FRML MDRD: 62 ML/MIN/1.73SQ M
GLUCOSE SERPL-MCNC: 93 MG/DL (ref 65–140)
HCT VFR BLD AUTO: 47.1 % (ref 36.5–49.3)
HGB BLD-MCNC: 15.5 G/DL (ref 12–17)
INR PPP: 0.96 (ref 0.91–1.09)
MCH RBC QN AUTO: 29.8 PG (ref 26.8–34.3)
MCHC RBC AUTO-ENTMCNC: 32.9 G/DL (ref 31.4–37.4)
MCV RBC AUTO: 91 FL (ref 82–98)
PLATELET # BLD AUTO: 62 THOUSANDS/UL (ref 149–390)
PMV BLD AUTO: 11.4 FL (ref 8.9–12.7)
POTASSIUM SERPL-SCNC: 3.9 MMOL/L (ref 3.5–5.3)
PROT SERPL-MCNC: 7.3 G/DL (ref 6.4–8.2)
PROTHROMBIN TIME: 10.4 SECONDS (ref 9.8–12)
RBC # BLD AUTO: 5.2 MILLION/UL (ref 3.88–5.62)
SODIUM SERPL-SCNC: 138 MMOL/L (ref 136–145)
WBC # BLD AUTO: 9.42 THOUSAND/UL (ref 4.31–10.16)

## 2019-10-18 PROCEDURE — 85610 PROTHROMBIN TIME: CPT

## 2019-10-18 PROCEDURE — 86850 RBC ANTIBODY SCREEN: CPT

## 2019-10-18 PROCEDURE — 80053 COMPREHEN METABOLIC PANEL: CPT | Performed by: SPECIALIST

## 2019-10-18 PROCEDURE — 93005 ELECTROCARDIOGRAM TRACING: CPT

## 2019-10-18 PROCEDURE — 85027 COMPLETE CBC AUTOMATED: CPT

## 2019-10-18 PROCEDURE — 86900 BLOOD TYPING SEROLOGIC ABO: CPT

## 2019-10-18 PROCEDURE — 86901 BLOOD TYPING SEROLOGIC RH(D): CPT

## 2019-10-18 PROCEDURE — 36415 COLL VENOUS BLD VENIPUNCTURE: CPT

## 2019-10-18 PROCEDURE — 85730 THROMBOPLASTIN TIME PARTIAL: CPT

## 2019-10-21 LAB
ABO GROUP BLD: NORMAL
BLD GP AB SCN SERPL QL: NEGATIVE
RH BLD: POSITIVE
SPECIMEN EXPIRATION DATE: NORMAL

## 2019-10-24 ENCOUNTER — OFFICE VISIT (OUTPATIENT)
Dept: HEMATOLOGY ONCOLOGY | Facility: MEDICAL CENTER | Age: 69
End: 2019-10-24
Payer: COMMERCIAL

## 2019-10-24 VITALS
DIASTOLIC BLOOD PRESSURE: 70 MMHG | HEIGHT: 76 IN | RESPIRATION RATE: 16 BRPM | OXYGEN SATURATION: 96 % | WEIGHT: 252 LBS | SYSTOLIC BLOOD PRESSURE: 138 MMHG | BODY MASS INDEX: 30.69 KG/M2 | HEART RATE: 66 BPM | TEMPERATURE: 96.7 F

## 2019-10-24 DIAGNOSIS — D69.6 THROMBOCYTOPENIA (HCC): Primary | ICD-10-CM

## 2019-10-24 LAB
ATRIAL RATE: 59 BPM
P AXIS: 10 DEGREES
PR INTERVAL: 174 MS
QRS AXIS: 40 DEGREES
QRSD INTERVAL: 94 MS
QT INTERVAL: 420 MS
QTC INTERVAL: 415 MS
T WAVE AXIS: 50 DEGREES
VENTRICULAR RATE: 59 BPM

## 2019-10-24 PROCEDURE — 99214 OFFICE O/P EST MOD 30 MIN: CPT | Performed by: INTERNAL MEDICINE

## 2019-10-24 PROCEDURE — 93010 ELECTROCARDIOGRAM REPORT: CPT | Performed by: INTERNAL MEDICINE

## 2019-10-24 RX ORDER — DIPHENHYDRAMINE HCL 25 MG
25 TABLET ORAL ONCE
Status: CANCELLED | OUTPATIENT
Start: 2019-11-07

## 2019-10-24 RX ORDER — ACETAMINOPHEN 325 MG/1
650 TABLET ORAL ONCE
Status: CANCELLED | OUTPATIENT
Start: 2019-11-07

## 2019-10-24 RX ORDER — SODIUM CHLORIDE 9 MG/ML
20 INJECTION, SOLUTION INTRAVENOUS ONCE
Status: CANCELLED | OUTPATIENT
Start: 2019-11-07

## 2019-10-24 NOTE — PROGRESS NOTES
Wilian Martins  1950  [address] [facility] Arndt [facility] [id number]    DISCUSSION/SUMMARY:    64-year-old male with relatively good past medical history found to have thrombocytopenia a few years ago  The decreased platelet counts go back approximately 3 + years  White count, hemoglobin level and differential have been relatively normal   Mr Serafin Yung feels well and has no bleeding issues  Occasionally patient has more bruising than usual but otherwise patient is asymptomatic  Patient has never had a bone marrow biopsy  We discussed options as far as the pending surgery  Surgery has been scheduled for November 8 2019 at [facility] (different institution)  Because the most recent platelet count is borderline, we agreed that to be more certain, patient should receive another unit of platelets (just like the prior surgical procedure)  This will be given at [facility] the afternoon before the procedure (stat CBC will be checked at [facility] prior to administering the platelets)  I feel it best that patient have a stat CBC repeated at [facility] before the procedure to demonstrate an elevated/satisfactory platelet count  Afterwards patient will follow up with Dr Jay Bradford for routine postoperative surveillance  I strongly feel it is important that patient have a preoperative repeat platelet count performed at [facility]  As discussed before, the etiology for the thrombocytopenia has never been clear  Platelet transfusions can sometimes worsen (decrease) the platelet count in patients with ITP  Otherwise, patient will continue to monitor for any signs of excessive bruising or bleeding  Patient knows to call if he winds up needing additional surgery or invasive procedures between now and next year  Patient is to return in 1 year with a CBC before        Previously we had discussed the possibility of using 4 days of Decadron to try in raise the platelet count  Even though the platelet antibodies in 2016 were positive, there is not absolute guarantee that the pulse Decadron treatment will be effective  I would not want the Decadron treatment to fail and then have to have the surgery canceled  As long as there are no complications with the platelet transfusions at [address] [place] [address], no issues with excessive bruising or bleeding and that the stat CBC checked preoperatively at [place] demonstrates a good/acceptable platelet count (> 58-56Y/EG), patient is cleared for surgery from a hematology standpoint  Mr Rj Stevenson is otherwise to return to the Hematology office in 12 months with a repeat CBC before  Patient knows to call the hematology/oncology office if there are any other questions or concerns  Carefully review your medication list and verify that the list is accurate and up-to-date  Please call the hematology/oncology office if there are medications missing from the list, medications on the list that you are not currently taking or if there is a dosage or instruction that is different from how you're taking that medication  Patient goals and areas of care:  Preop platelets, monitor for excessive bruising/bleeding  Barriers to care:  None  Patient is able to self-care   ______________________________________________________________________________________    Chief Complaint   Patient presents with    Follow-up     Thrombocytopenia     History of Present Illness:    22-year-old male previously referred for evaluation of thrombocytopenia  Mr Rj Stevenson was unaware of any CBC abnormalities up until recently when he needed left shoulder surgery  Patient has been seen by a number of physicians to trying clarify the etiology for the thrombocytopenia  Although the specifics are not entirely clear, 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 (previously seen by Dr Sonal Thompson in [place])      Mr Cash Worthington previously underwent left shoulder surgery  Patient received 1 unit of platelets before because the platelet count was borderline  The platelets shyla significantly, no surgical complications including bruising or bleeding issues  Unfortunately patient has had additional issues with the left shoulder, torn tendon and is in need of additional surgery  Patient states that he occasionally wakes up at night with severe pain and a popped left biceps muscle  Mr Michelle Mckeon states feeling otherwise okay  No problems with excessive bruising or bleeding  Patient does bruise more than what he would consider normal but bruises are not persistent and there are no signs of any bleeding  Appetite is good, weight is stable  Activities are baseline other than the left shoulder  No headaches, blurred vision or dizziness  No other GI or  issues  Routine health maintenance and medical care is up-to-date  Review of Systems   Constitutional: Negative  HENT: Negative  Eyes: Negative  Respiratory: Negative  Cardiovascular: Negative  Gastrointestinal: Negative  Endocrine: Negative  Genitourinary: Negative  Musculoskeletal: Negative  Skin: Negative  Allergic/Immunologic: Negative  Neurological: Negative  Hematological: Does not bruise/bleed easily  Bruises easily, no significant bruising, no persistent bruising, no bleeding   Psychiatric/Behavioral: The patient is not nervous/anxious  All other systems reviewed and are negative       Patient Active Problem List   Diagnosis    Thrombocytopenia ([address] )    Elevated low-density lipoprotein level     Past Medical History:   Diagnosis Date    Thrombocytopenia ([address] )     platelet count maintained around 90-Dr Ninfa Nissen Wears partial dentures     upper     Past Surgical History:   Procedure Laterality Date    APPENDECTOMY      CATARACT EXTRACTION Left     COLONOSCOPY      COLONOSCOPY N/A 10/17/2018    Procedure: COLONOSCOPY; Surgeon: Brian Israel MD;  Location: [facility]; Service: Gastroenterology    HERNIA REPAIR Right 2013    inguinal    JOINT REPLACEMENT Right     knee, left shoulder    VA REMV CATARACT EXTRACAP,INSERT LENS Left 5/4/2016    Procedure: EXTRACTION EXTRACAPSULAR CATARACT PHACO INTRAOCULAR LENS (IOL);   Surgeon: Tova Hull MD;  Location: [facility] MAIN OR;  Service: Ophthalmology    SHOULDER ARTHROSCOPY Left     tendon repair    TONSILLECTOMY  age 1     Family History   Problem Relation Age of Onset    Heart disease Father 67        massive MI    Heart disease Sister         MI    Heart disease Brother         MI    No Known Problems Mother     Mental illness Neg Hx      Social History     Socioeconomic History    Marital status: /Civil Union     Spouse name: Not on file    Number of children: Not on file    Years of education: Not on file    Highest education level: Not on file   Occupational History    Not on file   Social Needs    Financial resource strain: Not on file    Food insecurity:     Worry: Not on file     Inability: Not on file    Transportation needs:     Medical: Not on file     Non-medical: Not on file   Tobacco Use    Smoking status: Former Smoker     Packs/day: 0 50     Years: 22 00     Pack years: 11 00     Types: Cigarettes     Last attempt to quit: 2016     Years since quitting: 3 8    Smokeless tobacco: Never Used   Substance and Sexual Activity    Alcohol use: No    Drug use: No    Sexual activity: Not on file   Lifestyle    Physical activity:     Days per week: Not on file     Minutes per session: Not on file    Stress: Not on file   Relationships    Social connections:     Talks on phone: Not on file     Gets together: Not on file     Attends Yazidi service: Not on file     Active member of club or organization: Not on file     Attends meetings of clubs or organizations: Not on file     Relationship status: Not on file    Intimate partner violence: Fear of current or ex partner: Not on file     Emotionally abused: Not on file     Physically abused: Not on file     Forced sexual activity: Not on file   Other Topics Concern    Not on file   Social History Narrative    Not on file       Current Outpatient Medications:     ascorbic acid (VITAMIN C) 500 mg tablet, Take 500 mg by mouth every morning , Disp: , Rfl:     b complex vitamins tablet, Take 1 tablet by mouth every morning , Disp: , Rfl:     cholecalciferol (VITAMIN D3) 1,000 units tablet, Take 1,000 Units by mouth every morning, Disp: , Rfl:     Ipratropium-Albuterol (COMBIVENT RESPIMAT IN), Inhale as needed, Disp: , Rfl:     multivitamin (THERAGRAN) TABS, Take 1 tablet by mouth every morning , Disp: , Rfl:     Omega-3 Fatty Acids (FISH OIL) 1,000 mg, Take 1,000 mg by mouth every morning  , Disp: , Rfl:     Allergies   Allergen Reactions    Dust Mite Extract        Vitals:    10/24/19 1038   BP: 138/70   Pulse: 66   Resp: 16   Temp: (!) 96 7 °F (35 9 °C)   SpO2: 96%     Physical Exam   Constitutional: He is oriented to person, place, and time  He appears well-developed and well-nourished  Well-nourished male, no respiratory distress   HENT:   Head: Normocephalic and atraumatic  Right Ear: External ear normal    Left Ear: External ear normal    Nose: Nose normal    Mouth/Throat: Oropharynx is clear and moist    Eyes: Pupils are equal, round, and reactive to light  Conjunctivae and EOM are normal    Neck: Normal range of motion  Neck supple  Cardiovascular: Normal rate, regular rhythm, normal heart sounds and intact distal pulses  Pulmonary/Chest: Effort normal and breath sounds normal    Abdominal: Soft  Bowel sounds are normal    +bowel sounds, nontender, slightly obese, cannot palpate liver or spleen, no rigidity or rebound   Musculoskeletal: Normal range of motion  Neurological: He is alert and oriented to person, place, and time  He has normal reflexes  Skin: Skin is warm     Good color, warm, moist, no petechiae or ecchymoses   Psychiatric: He has a normal mood and affect   His behavior is normal  Judgment and thought content normal    Extremities:   No lower extremity edema bilaterally, no cords, pulses are 1+ bilaterally, no upper extremity edema, good range of motion in both upper extremities  Lymphatics:   No adenopathy in the neck, supraclavicular region, axilla and groin bilaterally    Labs            03/06/2019 WBC = 8 0 hemoglobin = 15 1 hematocrit = 45 MCV = 87 platelet = 96 neutrophil = 46% lymphocytes = 45% monocyte = 9%    04/27/2018 WBC = 7 0 hemoglobin = 15 6 hematocrit = 45 6 platelet = 82  83/36/4739 platelet = 772  86/62/9123 WBC = 7 7 hemoglobin = 13 9 hematocrit = 41 platelet count = 71 neutrophil = 59% lymphocytes = 29% atypical lymphocytes = 3% monocyte = 6%  03/27/2017 WBC = 7 5 hemoglobin = 14 1 hematocrit = 42 3 platelet = 58  63/52/0085 BUN = 18 creatinine = 1 05 ALT = 15 AST = 18 total bilirubin = 0 3 alkaline phosphatase = 88  01/2/17 platelet = 880  99/16/8036 MIGUEL ANGEL IFA = positive  11/21/2016 platelet = 52    Pathology    11/24/2016 direct platelet antibody:  Anti IgG = positive  11/21/2016 platelet antibody IIB/IIIA, IA/IIA, IB/IX and HLA class I were all negative

## 2019-10-31 ENCOUNTER — OFFICE VISIT (OUTPATIENT)
Dept: FAMILY MEDICINE CLINIC | Facility: CLINIC | Age: 69
End: 2019-10-31
Payer: COMMERCIAL

## 2019-10-31 VITALS
WEIGHT: 250 LBS | DIASTOLIC BLOOD PRESSURE: 90 MMHG | RESPIRATION RATE: 16 BRPM | SYSTOLIC BLOOD PRESSURE: 146 MMHG | OXYGEN SATURATION: 98 % | TEMPERATURE: 98.4 F | BODY MASS INDEX: 30.44 KG/M2 | HEART RATE: 78 BPM | HEIGHT: 76 IN

## 2019-10-31 DIAGNOSIS — Z01.818 PREOP GENERAL PHYSICAL EXAM: Primary | ICD-10-CM

## 2019-10-31 PROCEDURE — 99214 OFFICE O/P EST MOD 30 MIN: CPT | Performed by: FAMILY MEDICINE

## 2019-10-31 NOTE — PROGRESS NOTES
Subjective:     Monalisa Zendejas is a 68 y o  male who presents to the office today for a preoperative consultation at the request of surgeon Dr Sarabjit Rosales who plans on performing tendon repair of left shoulder on November 8  This consultation is requested for the specific conditions prompting preoperative evaluation (i e  because of potential affect on operative risk): thrombocytopenia  Planned anesthesia: general  The patient has the following known anesthesia issues: none  Patients bleeding risk: pt has hx of unspecified thrombocytopenia, but no current bleeding/bruising  Patient does not have objections to receiving blood products if needed      The following portions of the patient's history were reviewed and updated as appropriate: allergies, current medications, past family history, past medical history, past social history, past surgical history and problem list     Review of Systems   A comprehensive review of systems was negative except for: Musculoskeletal: positive for  left arm pain     Objective:     /90   Pulse 78   Temp 98 4 °F (36 9 °C)   Resp 16   Ht 6' 4" (1 93 m)   Wt 113 kg (250 lb)   SpO2 98%   BMI 30 43 kg/m²     General Appearance:    Alert, cooperative, no distress, appears stated age   Head:    Normocephalic, without obvious abnormality, atraumatic   Eyes:    PERRL, conjunctiva/corneas clear, EOM's intact, fundi     benign, both eyes        Ears:    Normal TM's and external ear canals, both ears   Nose:   Nares normal, septum midline, mucosa normal, no drainage    or sinus tenderness   Throat:   Lips, mucosa, and tongue normal; teeth and gums normal   Neck:   Supple, symmetrical, trachea midline, no adenopathy;        thyroid:  No enlargement/tenderness/nodules; no carotid    bruit or JVD   Back:     Symmetric, no curvature, ROM normal, no CVA tenderness   Lungs:     Clear to auscultation bilaterally, respirations unlabored   Chest wall:    No tenderness or deformity   Heart: Regular rate and rhythm, S1 and S2 normal, no murmur, rub   or gallop   Abdomen:     Soft, non-tender, bowel sounds active all four quadrants,     no masses, no organomegaly   Extremities:   Extremities normal, atraumatic, no cyanosis or edema   Pulses:   2+ and symmetric all extremities   Skin:   Skin color, texture, turgor normal, no rashes or lesions   Lymph nodes:   Cervical, supraclavicular, and axillary nodes normal   Neurologic:   AAO x 3       Predictors of intubation difficulty:   Morbid obesity? no   Anatomically abnormal facies? no   Prominent incisors? no   Receding mandible? no   Short, thick neck? no   Neck range of motion: normal    Cardiographics  ECG: sinus bradycardia, no change from prior  Echocardiogram: not done    Imaging  Chest x-ray: not needed       Lab Review   Office Visit on 10/18/2019   Component Date Value    Ventricular Rate 10/18/2019 59     Atrial Rate 10/18/2019 59     VT Interval 10/18/2019 174     QRSD Interval 10/18/2019 94     QT Interval 10/18/2019 420     QTC Interval 10/18/2019 415     P Axis 10/18/2019 10     QRS Axis 10/18/2019 40     T Wave Axis 10/18/2019 50    Appointment on 10/18/2019   Component Date Value    PTT 10/18/2019 29     Protime 10/18/2019 10 4     INR 10/18/2019 0 96     ABO Grouping 10/18/2019 A     Rh Factor 10/18/2019 Positive     Antibody Screen 10/18/2019 Negative     Specimen Expiration Date 10/18/2019 [id number]     WBC 10/18/2019 9 42     RBC 10/18/2019 5 20     Hemoglobin 10/18/2019 15 5     Hematocrit 10/18/2019 47 1     MCV 10/18/2019 91     MCH 10/18/2019 29 8     MCHC 10/18/2019 32 9     RDW 10/18/2019 13 9     Platelets 16/46/8248 62*    MPV 10/18/2019 11 4    Transcribe Orders on 10/18/2019   Component Date Value    Sodium 10/18/2019 138     Potassium 10/18/2019 3 9     Chloride 10/18/2019 104     CO2 10/18/2019 25     ANION GAP 10/18/2019 9     BUN 10/18/2019 21     Creatinine 10/18/2019 1 17     Glucose 10/18/2019 93     Calcium 10/18/2019 8 4     AST 10/18/2019 22     ALT 10/18/2019 21     Alkaline Phosphatase 10/18/2019 93     Total Protein 10/18/2019 7 3     Albumin 10/18/2019 3 5     Total Bilirubin 10/18/2019 1 00     eGFR 10/18/2019 62    Appointment on 10/16/2019   Component Date Value    WBC 10/16/2019 6 53     RBC 10/16/2019 5 09     Hemoglobin 10/16/2019 15 0     Hematocrit 10/16/2019 45 8     MCV 10/16/2019 90     MCH 10/16/2019 29 5     MCHC 10/16/2019 32 8     RDW 10/16/2019 13 8     MPV 10/16/2019 11 0     Platelets 14/84/6664 72*    nRBC 10/16/2019 0     Neutrophils Relative 10/16/2019 53     Immat GRANS % 10/16/2019 0     Lymphocytes Relative 10/16/2019 37     Monocytes Relative 10/16/2019 9     Eosinophils Relative 10/16/2019 0     Basophils Relative 10/16/2019 1     Neutrophils Absolute 10/16/2019 3 47     Immature Grans Absolute 10/16/2019 0 02     Lymphocytes Absolute 10/16/2019 2 42     Monocytes Absolute 10/16/2019 0 57     Eosinophils Absolute 10/16/2019 0 00     Basophils Absolute 10/16/2019 0 05     Sodium 10/16/2019 140     Potassium 10/16/2019 4 6     Chloride 10/16/2019 106     CO2 10/16/2019 26     ANION GAP 10/16/2019 8     BUN 10/16/2019 21     Creatinine 10/16/2019 1 13     Glucose, Fasting 10/16/2019 90     Calcium 10/16/2019 8 7     AST 10/16/2019 26     ALT 10/16/2019 25     Alkaline Phosphatase 10/16/2019 87     Total Protein 10/16/2019 7 0     Albumin 10/16/2019 3 4*    Total Bilirubin 10/16/2019 0 90     eGFR 10/16/2019 65         Assessment:     68 y o  male with planned surgery as above  Known risk factors for perioperative complications: thrombocytopenia    Difficulty with intubation is not anticipated  Pt is medically cleared for orthopedic surgery with management of thrombocytopenia as noted below  Plan:     1  EKG, CBC, CMP reviewed     2  Thrombocytopenia: Pt had visit with his heme-onc, Dr Mayo Amin, on 10/24/19 for history of thrombocytopenia  Recent platelet level at 72  Per Dr Jason Sapp, pt to have one unit of platelets one day prior to surgery at [place] [address]  A stat CBC to be checked prior to administering platelets  Then he should also have a stat CBC repeated on the day of surgery prior to procedure to demonstrate satisfactory platelet count (> 74-60D/NG)  As long as there are no complications with the platelet transfusion the day prior, and no issues with bleeding/bruising, patient is cleared from heme-onc standpoint  3  Change in medication regimen before surgery: not applicable, not on any medications  Hold vitamins for one week prior to surgery  4  Invasive hemodynamic monitoring perioperatively: not indicated    5  Deep vein thrombosis prophylaxis postoperatively:regimen to be chosen by surgical team

## 2019-11-06 ENCOUNTER — TRANSCRIBE ORDERS (OUTPATIENT)
Dept: HEMATOLOGY ONCOLOGY | Facility: MEDICAL CENTER | Age: 69
End: 2019-11-06

## 2019-11-06 DIAGNOSIS — D69.6 THROMBOCYTOPENIA (HCC): Primary | ICD-10-CM

## 2019-11-06 RX ORDER — SODIUM CHLORIDE 9 MG/ML
20 INJECTION, SOLUTION INTRAVENOUS ONCE
Status: CANCELLED | OUTPATIENT
Start: 2019-11-06

## 2019-11-06 RX ORDER — DIPHENHYDRAMINE HCL 25 MG
25 TABLET ORAL ONCE
Status: CANCELLED | OUTPATIENT
Start: 2019-11-06

## 2019-11-06 RX ORDER — ACETAMINOPHEN 325 MG/1
650 TABLET ORAL ONCE
Status: CANCELLED | OUTPATIENT
Start: 2019-11-06

## 2019-11-07 ENCOUNTER — HOSPITAL ENCOUNTER (OUTPATIENT)
Dept: INFUSION CENTER | Facility: HOSPITAL | Age: 69
Discharge: HOME | End: 2019-11-07
Payer: COMMERCIAL

## 2019-11-07 VITALS
DIASTOLIC BLOOD PRESSURE: 65 MMHG | RESPIRATION RATE: 20 BRPM | HEART RATE: 51 BPM | TEMPERATURE: 96.2 F | SYSTOLIC BLOOD PRESSURE: 133 MMHG | OXYGEN SATURATION: 98 %

## 2019-11-07 DIAGNOSIS — D69.6 THROMBOCYTOPENIA (HCC): Primary | ICD-10-CM

## 2019-11-07 PROCEDURE — P9035 PLATELET PHERES LEUKOREDUCED: HCPCS

## 2019-11-07 PROCEDURE — 36430 TRANSFUSION BLD/BLD COMPNT: CPT

## 2019-11-07 RX ORDER — ACETAMINOPHEN 325 MG/1
650 TABLET ORAL ONCE
Status: COMPLETED | OUTPATIENT
Start: 2019-11-07 | End: 2019-11-07

## 2019-11-07 RX ORDER — DIPHENHYDRAMINE HCL 25 MG
25 TABLET ORAL ONCE
Status: COMPLETED | OUTPATIENT
Start: 2019-11-07 | End: 2019-11-07

## 2019-11-07 RX ORDER — SODIUM CHLORIDE 9 MG/ML
20 INJECTION, SOLUTION INTRAVENOUS ONCE
Status: COMPLETED | OUTPATIENT
Start: 2019-11-07 | End: 2019-11-07

## 2019-11-07 RX ADMIN — DIPHENHYDRAMINE HCL 25 MG: 25 TABLET, COATED ORAL at 11:39

## 2019-11-07 RX ADMIN — SODIUM CHLORIDE 20 ML/HR: 0.9 INJECTION, SOLUTION INTRAVENOUS at 11:46

## 2019-11-07 RX ADMIN — ACETAMINOPHEN 650 MG: 325 TABLET, FILM COATED ORAL at 11:38

## 2019-11-07 NOTE — PLAN OF CARE
Problem: Potential for Falls  Goal: Patient will remain free of falls  Description  INTERVENTIONS:  - Assess patient frequently for physical needs  -  Identify cognitive and physical deficits and behaviors that affect risk of falls    -  Coventry fall precautions as indicated by assessment   - Educate patient/family on patient safety including physical limitations  - Instruct patient to call for assistance with activity based on assessment  - Modify environment to reduce risk of injury    Outcome: Progressing     Problem: SAFETY ADULT  Goal: Maintain or return to baseline ADL function  Description  INTERVENTIONS:  -  Assess patient's ability to carry out ADLs; assess patient's baseline for ADL function and identify physical deficits which impact ability to perform ADLs (bathing, care of mouth/teeth, toileting, grooming, dressing, etc )  - Assess/evaluate cause of self-care deficits   - Assess range of motion  - Assess patient's mobility; develop plan if impaired  - Assess patient's need for assistive devices and provide as appropriate  - Encourage maximum independence but intervene and supervise when necessary  - Involve family in performance of ADLs  - Assess for home care needs following discharge   - Consider OT consult to assist with ADL evaluation and planning for discharge  - Provide patient education as appropriate  Outcome: Progressing  Goal: Maintain or return mobility status to optimal level  Description  INTERVENTIONS:  - Assess patient's baseline mobility status (ambulation, transfers, stairs, etc )    - Identify cognitive and physical deficits and behaviors that affect mobility  - Identify mobility aids required to assist with transfers and/or ambulation (gait belt, sit-to-stand, lift, walker, cane, etc )  - Coventry fall precautions as indicated by assessment  - Record patient progress and toleration of activity level on Mobility SBAR; progress patient to next Phase/Stage  - Instruct patient to call for assistance with activity based on assessment  - Consider rehabilitation consult to assist with strengthening/weightbearing, etc   Outcome: Progressing     Problem: Knowledge Deficit  Goal: Patient/family/caregiver demonstrates understanding of disease process, treatment plan, medications, and discharge instructions  Description  Complete learning assessment and assess knowledge base    Interventions:  - Provide teaching at level of understanding  - Provide teaching via preferred learning methods  Outcome: Progressing

## 2019-11-08 ENCOUNTER — APPOINTMENT (OUTPATIENT)
Dept: LAB | Facility: HOSPITAL | Age: 69
End: 2019-11-08
Attending: INTERNAL MEDICINE
Payer: COMMERCIAL

## 2019-11-08 ENCOUNTER — TRANSCRIBE ORDERS (OUTPATIENT)
Dept: ADMINISTRATIVE | Facility: HOSPITAL | Age: 69
End: 2019-11-08

## 2019-11-08 DIAGNOSIS — D69.6 THROMBOCYTOPENIA, UNSPECIFIED (HCC): Primary | ICD-10-CM

## 2019-11-08 LAB
ABO GROUP BLD BPU: NORMAL
BASOPHILS # BLD AUTO: 0.06 THOUSANDS/ΜL (ref 0–0.1)
BASOPHILS NFR BLD AUTO: 1 % (ref 0–1)
BPU ID: NORMAL
EOSINOPHIL # BLD AUTO: 0 THOUSAND/ΜL (ref 0–0.61)
EOSINOPHIL NFR BLD AUTO: 0 % (ref 0–6)
ERYTHROCYTE [DISTWIDTH] IN BLOOD BY AUTOMATED COUNT: 13.8 % (ref 11.6–15.1)
HCT VFR BLD AUTO: 46.5 % (ref 36.5–49.3)
HGB BLD-MCNC: 15.3 G/DL (ref 12–17)
IMM GRANULOCYTES # BLD AUTO: 0.01 THOUSAND/UL (ref 0–0.2)
IMM GRANULOCYTES NFR BLD AUTO: 0 % (ref 0–2)
LYMPHOCYTES # BLD AUTO: 2.16 THOUSANDS/ΜL (ref 0.6–4.47)
LYMPHOCYTES NFR BLD AUTO: 32 % (ref 14–44)
MCH RBC QN AUTO: 30.2 PG (ref 26.8–34.3)
MCHC RBC AUTO-ENTMCNC: 32.9 G/DL (ref 31.4–37.4)
MCV RBC AUTO: 92 FL (ref 82–98)
MONOCYTES # BLD AUTO: 0.6 THOUSAND/ΜL (ref 0.17–1.22)
MONOCYTES NFR BLD AUTO: 9 % (ref 4–12)
NEUTROPHILS # BLD AUTO: 3.95 THOUSANDS/ΜL (ref 1.85–7.62)
NEUTS SEG NFR BLD AUTO: 58 % (ref 43–75)
NRBC BLD AUTO-RTO: 0 /100 WBCS
PLATELET # BLD AUTO: 105 THOUSANDS/UL (ref 149–390)
PMV BLD AUTO: 10.1 FL (ref 8.9–12.7)
RBC # BLD AUTO: 5.07 MILLION/UL (ref 3.88–5.62)
UNIT DISPENSE STATUS: NORMAL
UNIT PRODUCT CODE: NORMAL
UNIT RH: NORMAL
WBC # BLD AUTO: 6.78 THOUSAND/UL (ref 4.31–10.16)

## 2019-11-08 PROCEDURE — 36415 COLL VENOUS BLD VENIPUNCTURE: CPT | Performed by: INTERNAL MEDICINE

## 2019-11-08 PROCEDURE — 85025 COMPLETE CBC W/AUTO DIFF WBC: CPT | Performed by: INTERNAL MEDICINE

## 2020-06-24 ENCOUNTER — OFFICE VISIT (OUTPATIENT)
Dept: PODIATRY | Facility: CLINIC | Age: 70
End: 2020-06-24
Payer: COMMERCIAL

## 2020-06-24 VITALS
SYSTOLIC BLOOD PRESSURE: 136 MMHG | DIASTOLIC BLOOD PRESSURE: 82 MMHG | WEIGHT: 250 LBS | BODY MASS INDEX: 30.44 KG/M2 | HEIGHT: 76 IN | HEART RATE: 74 BPM | RESPIRATION RATE: 16 BRPM

## 2020-06-24 DIAGNOSIS — L60.0 INGROWN TOENAIL: ICD-10-CM

## 2020-06-24 DIAGNOSIS — L03.031 PARONYCHIA OF TOENAIL OF RIGHT FOOT: ICD-10-CM

## 2020-06-24 DIAGNOSIS — B35.1 ONYCHOMYCOSIS: ICD-10-CM

## 2020-06-24 DIAGNOSIS — M79.671 RIGHT FOOT PAIN: Primary | ICD-10-CM

## 2020-06-24 PROCEDURE — 99202 OFFICE O/P NEW SF 15 MIN: CPT | Performed by: PODIATRIST

## 2020-06-30 ENCOUNTER — PROCEDURE VISIT (OUTPATIENT)
Dept: PODIATRY | Facility: CLINIC | Age: 70
End: 2020-06-30
Payer: COMMERCIAL

## 2020-06-30 VITALS
DIASTOLIC BLOOD PRESSURE: 82 MMHG | RESPIRATION RATE: 16 BRPM | HEIGHT: 76 IN | SYSTOLIC BLOOD PRESSURE: 136 MMHG | HEART RATE: 74 BPM | WEIGHT: 250 LBS | BODY MASS INDEX: 30.44 KG/M2

## 2020-06-30 DIAGNOSIS — L03.031 PARONYCHIA OF TOENAIL OF RIGHT FOOT: Primary | ICD-10-CM

## 2020-06-30 DIAGNOSIS — M79.671 RIGHT FOOT PAIN: ICD-10-CM

## 2020-06-30 DIAGNOSIS — L60.0 INGROWN TOENAIL: ICD-10-CM

## 2020-06-30 PROCEDURE — 11750 EXCISION NAIL&NAIL MATRIX: CPT | Performed by: PODIATRIST

## 2020-06-30 RX ORDER — SULFAMETHOXAZOLE AND TRIMETHOPRIM 800; 160 MG/1; MG/1
1 TABLET ORAL EVERY 12 HOURS SCHEDULED
Qty: 20 TABLET | Refills: 0 | Status: SHIPPED | OUTPATIENT
Start: 2020-06-30 | End: 2020-07-10

## 2020-07-14 ENCOUNTER — OFFICE VISIT (OUTPATIENT)
Dept: PODIATRY | Facility: CLINIC | Age: 70
End: 2020-07-14
Payer: COMMERCIAL

## 2020-07-14 VITALS
HEIGHT: 76 IN | RESPIRATION RATE: 17 BRPM | DIASTOLIC BLOOD PRESSURE: 82 MMHG | BODY MASS INDEX: 30.44 KG/M2 | WEIGHT: 250 LBS | HEART RATE: 74 BPM | SYSTOLIC BLOOD PRESSURE: 136 MMHG

## 2020-07-14 DIAGNOSIS — M79.672 PAIN IN BOTH FEET: Primary | ICD-10-CM

## 2020-07-14 DIAGNOSIS — B35.1 ONYCHOMYCOSIS: ICD-10-CM

## 2020-07-14 DIAGNOSIS — S91.109A OPEN WOUND OF TOE OF RIGHT FOOT: ICD-10-CM

## 2020-07-14 DIAGNOSIS — M79.671 PAIN IN BOTH FEET: Primary | ICD-10-CM

## 2020-07-14 PROCEDURE — 99212 OFFICE O/P EST SF 10 MIN: CPT | Performed by: PODIATRIST

## 2020-07-14 RX ORDER — TERBINAFINE HYDROCHLORIDE 250 MG/1
TABLET ORAL
Qty: 15 TABLET | Refills: 0 | Status: SHIPPED | OUTPATIENT
Start: 2020-07-14 | End: 2020-08-03 | Stop reason: SDUPTHER

## 2020-07-14 NOTE — PROGRESS NOTES
Assessment/Plan:  Open wound of right toe  Healing nail avulsion site  Mycosis of left hallux nail  Pain upon ambulation  Plan  Patient educated on condition  Patient continue wound care  He will be started on empiric course of terbinafine  Diagnoses and all orders for this visit:    Pain in both feet    Onychomycosis  -     terbinafine (LamISIL) 250 mg tablet; 1 tab p o  every other day  Open wound of toe of right foot          Subjective:  Patient is doing well status post procedure  His biggest concern now is the toenail the left big toe  It hurts when he wears shoes  No history of trauma    Allergies   Allergen Reactions    Dust Mite Extract          Current Outpatient Medications:     ascorbic acid (VITAMIN C) 500 mg tablet, Take 500 mg by mouth every morning , Disp: , Rfl:     b complex vitamins tablet, Take 1 tablet by mouth every morning , Disp: , Rfl:     cholecalciferol (VITAMIN D3) 1,000 units tablet, Take 1,000 Units by mouth every morning, Disp: , Rfl:     Ipratropium-Albuterol (COMBIVENT RESPIMAT IN), Inhale as needed, Disp: , Rfl:     multivitamin (THERAGRAN) TABS, Take 1 tablet by mouth every morning , Disp: , Rfl:     Omega-3 Fatty Acids (FISH OIL) 1,000 mg, Take 1,000 mg by mouth every morning  , Disp: , Rfl:     silver sulfadiazine (SILVADENE,SSD) 1 % cream, Apply topically 2 (two) times a day, Disp: 50 g, Rfl: 1    terbinafine (LamISIL) 250 mg tablet, 1 tab p o  every other day , Disp: 15 tablet, Rfl: 0    Patient Active Problem List   Diagnosis    Thrombocytopenia (HCC)    Elevated low-density lipoprotein level          Patient ID: Kana Koch is a 65 y o  male  HPI    The following portions of the patient's history were reviewed and updated as appropriate:     family history includes Heart disease in his brother and sister; Heart disease (age of onset: 67) in his father; No Known Problems in his mother  reports that he quit smoking about 4 years ago  His smoking use included cigarettes  He has a 11 00 pack-year smoking history  He has never used smokeless tobacco  He reports that he does not drink alcohol or use drugs  Vitals:    07/14/20 1334   BP: 136/82   Pulse: 74   Resp: 17       Review of Systems      Objective:  Patient's shoes and socks removed  Foot ExamPhysical Exam   Constitutional: He is oriented to person, place, and time  He appears well-developed and well-nourished  Cardiovascular: Normal rate and regular rhythm  Musculoskeletal: He exhibits tenderness and deformity  Neurological: He is alert and oriented to person, place, and time  Skin: Skin is warm  Capillary refill takes less than 2 seconds  Right hallux demonstrates ulcerated nail bed  No evidence of infection  No evidence of nail recurrence  Left hallux demonstrates distal mycosis  Is subungual   Positive malodor noted   Psychiatric: He has a normal mood and affect  His behavior is normal  Judgment and thought content normal    Vitals reviewed

## 2020-08-03 ENCOUNTER — OFFICE VISIT (OUTPATIENT)
Dept: PODIATRY | Facility: CLINIC | Age: 70
End: 2020-08-03
Payer: COMMERCIAL

## 2020-08-03 VITALS
WEIGHT: 250 LBS | SYSTOLIC BLOOD PRESSURE: 136 MMHG | DIASTOLIC BLOOD PRESSURE: 82 MMHG | HEIGHT: 76 IN | HEART RATE: 74 BPM | RESPIRATION RATE: 17 BRPM | BODY MASS INDEX: 30.44 KG/M2

## 2020-08-03 DIAGNOSIS — S91.109A OPEN WOUND OF TOE OF RIGHT FOOT: Primary | ICD-10-CM

## 2020-08-03 DIAGNOSIS — M79.671 PAIN IN BOTH FEET: ICD-10-CM

## 2020-08-03 DIAGNOSIS — M79.672 PAIN IN BOTH FEET: ICD-10-CM

## 2020-08-03 DIAGNOSIS — B35.1 ONYCHOMYCOSIS: ICD-10-CM

## 2020-08-03 PROCEDURE — 99213 OFFICE O/P EST LOW 20 MIN: CPT | Performed by: PODIATRIST

## 2020-08-03 RX ORDER — TERBINAFINE HYDROCHLORIDE 250 MG/1
TABLET ORAL
Qty: 15 TABLET | Refills: 0 | Status: SHIPPED | OUTPATIENT
Start: 2020-08-03 | End: 2020-09-03

## 2020-08-03 NOTE — PROGRESS NOTES
Assessment/Plan:  Open wound right hallux  Status post procedure  Paronychia left hallux  Mycosis of nail  Pain upon ambulation  Plan  Foot exam performed  Patient educated on condition  Right hallux debrided  Gentian violet applied  Patient will change to Betadine wet to dry  Diagnoses and all orders for this visit:    Open wound of toe of right foot    Onychomycosis  -     terbinafine (LamISIL) 250 mg tablet; 1 tab p o  every other day  Pain in both feet          Subjective:  Patient is doing well  Only has minor ache in the right big toe  He is concerned about fungus of the left big toe  Allergies   Allergen Reactions    Dust Mite Extract          Current Outpatient Medications:     ascorbic acid (VITAMIN C) 500 mg tablet, Take 500 mg by mouth every morning , Disp: , Rfl:     b complex vitamins tablet, Take 1 tablet by mouth every morning , Disp: , Rfl:     cholecalciferol (VITAMIN D3) 1,000 units tablet, Take 1,000 Units by mouth every morning, Disp: , Rfl:     Ipratropium-Albuterol (COMBIVENT RESPIMAT IN), Inhale as needed, Disp: , Rfl:     multivitamin (THERAGRAN) TABS, Take 1 tablet by mouth every morning , Disp: , Rfl:     Omega-3 Fatty Acids (FISH OIL) 1,000 mg, Take 1,000 mg by mouth every morning  , Disp: , Rfl:     terbinafine (LamISIL) 250 mg tablet, 1 tab p o  every other day , Disp: 15 tablet, Rfl: 0    Patient Active Problem List   Diagnosis    Thrombocytopenia (HCC)    Elevated low-density lipoprotein level          Patient ID: Lynn Lee is a 65 y o  male  HPI    The following portions of the patient's history were reviewed and updated as appropriate:     family history includes Heart disease in his brother and sister; Heart disease (age of onset: 67) in his father; No Known Problems in his mother  reports that he quit smoking about 4 years ago  His smoking use included cigarettes  He has a 11 00 pack-year smoking history   He has never used smokeless tobacco  He reports that he does not drink alcohol or use drugs  Vitals:    08/03/20 0859   BP: 136/82   Pulse: 74   Resp: 17       Review of Systems      Objective:  Patient's shoes and socks removed  Foot ExamPhysical Exam        Constitutional: He is oriented to person, place, and time  He appears well-developed and well-nourished  Cardiovascular: Normal rate and regular rhythm  Musculoskeletal: He exhibits tenderness and deformity  Neurological: He is alert and oriented to person, place, and time  Skin: Skin is warm  Capillary refill takes less than 2 seconds  Right hallux demonstrates ulcerated nail bed  No evidence of infection  No evidence of nail recurrence  Left hallux demonstrates distal mycosis  Is subungual   Positive malodor noted   Psychiatric: He has a normal mood and affect  His behavior is normal  Judgment and thought content normal    Vitals reviewed

## 2020-08-31 ENCOUNTER — OFFICE VISIT (OUTPATIENT)
Dept: PODIATRY | Facility: CLINIC | Age: 70
End: 2020-08-31
Payer: COMMERCIAL

## 2020-08-31 VITALS
WEIGHT: 250 LBS | DIASTOLIC BLOOD PRESSURE: 82 MMHG | HEIGHT: 76 IN | SYSTOLIC BLOOD PRESSURE: 136 MMHG | BODY MASS INDEX: 30.44 KG/M2 | RESPIRATION RATE: 17 BRPM | HEART RATE: 74 BPM

## 2020-08-31 DIAGNOSIS — M79.672 PAIN IN BOTH FEET: Primary | ICD-10-CM

## 2020-08-31 DIAGNOSIS — S91.109A OPEN WOUND OF TOE OF RIGHT FOOT: ICD-10-CM

## 2020-08-31 DIAGNOSIS — M79.671 PAIN IN BOTH FEET: Primary | ICD-10-CM

## 2020-08-31 DIAGNOSIS — L03.031 PARONYCHIA OF TOENAIL OF RIGHT FOOT: ICD-10-CM

## 2020-08-31 DIAGNOSIS — B35.1 ONYCHOMYCOSIS: ICD-10-CM

## 2020-08-31 PROCEDURE — 99213 OFFICE O/P EST LOW 20 MIN: CPT | Performed by: PODIATRIST

## 2020-08-31 NOTE — PROGRESS NOTES
Assessment/Plan:  Open wound right hallux  Status post procedure  Paronychia left hallux  Mycosis of nail  Pain upon ambulation      Plan  Foot exam performed  Patient educated on condition  Right hallux debrided  Gentian violet applied  Patient will change to Betadine wet to dry             Diagnoses and all orders for this visit:     Open wound of toe of right foot     Onychomycosis  -     terbinafine (LamISIL) 250 mg tablet; 1 tab p o  every other day      Pain in both feet            Subjective:  Patient is doing well  Only has minor ache in the right big toe  He is concerned about fungus of the left big toe           Allergies   Allergen Reactions    Dust Mite Extract              Current Outpatient Medications:     ascorbic acid (VITAMIN C) 500 mg tablet, Take 500 mg by mouth every morning , Disp: , Rfl:     b complex vitamins tablet, Take 1 tablet by mouth every morning , Disp: , Rfl:     cholecalciferol (VITAMIN D3) 1,000 units tablet, Take 1,000 Units by mouth every morning, Disp: , Rfl:     Ipratropium-Albuterol (COMBIVENT RESPIMAT IN), Inhale as needed, Disp: , Rfl:     multivitamin (THERAGRAN) TABS, Take 1 tablet by mouth every morning , Disp: , Rfl:     Omega-3 Fatty Acids (FISH OIL) 1,000 mg, Take 1,000 mg by mouth every morning  , Disp: , Rfl:     terbinafine (LamISIL) 250 mg tablet, 1 tab p o  every other day , Disp: 15 tablet, Rfl: 0         Patient Active Problem List   Diagnosis    Thrombocytopenia (HCC)    Elevated low-density lipoprotein level             Patient ID: Sera Jonas is a 65 y o  male      HPI     The following portions of the patient's history were reviewed and updated as appropriate:      family history includes Heart disease in his brother and sister; Heart disease (age of onset: 67) in his father; No Known Problems in his mother        reports that he quit smoking about 4 years ago  His smoking use included cigarettes   He has a 11 00 pack-year smoking history  He has never used smokeless tobacco  He reports that he does not drink alcohol or use drugs          Vitals:     08/03/20 0859   BP: 136/82   Pulse: 74   Resp: 17         Review of Systems       Objective:  Patient's shoes and socks removed  Foot ExamPhysical Exam          Constitutional: He is oriented to person, place, and time  He appears well-developed and well-nourished  Cardiovascular: Normal rate and regular rhythm  Musculoskeletal: He exhibits tenderness and deformity  Neurological: He is alert and oriented to person, place, and time  Skin: Skin is warm  Capillary refill takes less than 2 seconds    Right hallux demonstrates ulcerated nail bed   No evidence of infection   No evidence of nail recurrence  Left hallux demonstrates distal mycosis   Is subungual   Positive malodor noted   Psychiatric: He has a normal mood and affect   His behavior is normal  Judgment and thought content normal    Vitals reviewed

## 2020-10-20 ENCOUNTER — TRANSCRIBE ORDERS (OUTPATIENT)
Dept: ADMINISTRATIVE | Facility: HOSPITAL | Age: 70
End: 2020-10-20

## 2020-10-20 ENCOUNTER — APPOINTMENT (OUTPATIENT)
Dept: LAB | Facility: HOSPITAL | Age: 70
End: 2020-10-20
Attending: INTERNAL MEDICINE
Payer: COMMERCIAL

## 2020-10-20 DIAGNOSIS — D69.6 THROMBOCYTOPENIA (HCC): ICD-10-CM

## 2020-10-20 LAB
BASOPHILS # BLD AUTO: 0.07 THOUSANDS/ΜL (ref 0–0.1)
BASOPHILS NFR BLD AUTO: 1 % (ref 0–1)
EOSINOPHIL # BLD AUTO: 0.19 THOUSAND/ΜL (ref 0–0.61)
EOSINOPHIL NFR BLD AUTO: 3 % (ref 0–6)
ERYTHROCYTE [DISTWIDTH] IN BLOOD BY AUTOMATED COUNT: 13.1 % (ref 11.6–15.1)
HCT VFR BLD AUTO: 47.2 % (ref 36.5–49.3)
HGB BLD-MCNC: 15.4 G/DL (ref 12–17)
IMM GRANULOCYTES # BLD AUTO: 0.02 THOUSAND/UL (ref 0–0.2)
IMM GRANULOCYTES NFR BLD AUTO: 0 % (ref 0–2)
LYMPHOCYTES # BLD AUTO: 2.21 THOUSANDS/ΜL (ref 0.6–4.47)
LYMPHOCYTES NFR BLD AUTO: 30 % (ref 14–44)
MCH RBC QN AUTO: 30 PG (ref 26.8–34.3)
MCHC RBC AUTO-ENTMCNC: 32.6 G/DL (ref 31.4–37.4)
MCV RBC AUTO: 92 FL (ref 82–98)
MONOCYTES # BLD AUTO: 0.78 THOUSAND/ΜL (ref 0.17–1.22)
MONOCYTES NFR BLD AUTO: 11 % (ref 4–12)
NEUTROPHILS # BLD AUTO: 4.13 THOUSANDS/ΜL (ref 1.85–7.62)
NEUTS SEG NFR BLD AUTO: 55 % (ref 43–75)
NRBC BLD AUTO-RTO: 0 /100 WBCS
PLATELET # BLD AUTO: 62 THOUSANDS/UL (ref 149–390)
PMV BLD AUTO: 10.3 FL (ref 8.9–12.7)
RBC # BLD AUTO: 5.14 MILLION/UL (ref 3.88–5.62)
WBC # BLD AUTO: 7.4 THOUSAND/UL (ref 4.31–10.16)

## 2020-10-20 PROCEDURE — 36415 COLL VENOUS BLD VENIPUNCTURE: CPT

## 2020-10-20 PROCEDURE — 85025 COMPLETE CBC W/AUTO DIFF WBC: CPT

## 2020-10-26 ENCOUNTER — OFFICE VISIT (OUTPATIENT)
Dept: PODIATRY | Facility: CLINIC | Age: 70
End: 2020-10-26
Payer: COMMERCIAL

## 2020-10-26 VITALS
BODY MASS INDEX: 30.44 KG/M2 | SYSTOLIC BLOOD PRESSURE: 130 MMHG | WEIGHT: 250 LBS | RESPIRATION RATE: 17 BRPM | DIASTOLIC BLOOD PRESSURE: 85 MMHG | HEIGHT: 76 IN

## 2020-10-26 DIAGNOSIS — M79.672 PAIN IN BOTH FEET: ICD-10-CM

## 2020-10-26 DIAGNOSIS — I70.209 PERIPHERAL ARTERIOSCLEROSIS (HCC): Primary | ICD-10-CM

## 2020-10-26 DIAGNOSIS — B35.1 ONYCHOMYCOSIS: ICD-10-CM

## 2020-10-26 DIAGNOSIS — M79.671 PAIN IN BOTH FEET: ICD-10-CM

## 2020-10-26 PROCEDURE — 11721 DEBRIDE NAIL 6 OR MORE: CPT | Performed by: PODIATRIST

## 2020-10-28 ENCOUNTER — DOCUMENTATION (OUTPATIENT)
Dept: HEMATOLOGY ONCOLOGY | Facility: MEDICAL CENTER | Age: 70
End: 2020-10-28

## 2020-10-28 ENCOUNTER — TELEPHONE (OUTPATIENT)
Dept: HEMATOLOGY ONCOLOGY | Facility: CLINIC | Age: 70
End: 2020-10-28

## 2020-10-29 ENCOUNTER — OFFICE VISIT (OUTPATIENT)
Dept: HEMATOLOGY ONCOLOGY | Facility: MEDICAL CENTER | Age: 70
End: 2020-10-29
Payer: COMMERCIAL

## 2020-10-29 VITALS
WEIGHT: 256 LBS | HEIGHT: 76 IN | TEMPERATURE: 96.6 F | RESPIRATION RATE: 18 BRPM | DIASTOLIC BLOOD PRESSURE: 80 MMHG | BODY MASS INDEX: 31.17 KG/M2 | SYSTOLIC BLOOD PRESSURE: 144 MMHG | OXYGEN SATURATION: 97 % | HEART RATE: 71 BPM

## 2020-10-29 DIAGNOSIS — D69.6 THROMBOCYTOPENIA (HCC): Primary | ICD-10-CM

## 2020-10-29 PROCEDURE — 99213 OFFICE O/P EST LOW 20 MIN: CPT | Performed by: INTERNAL MEDICINE

## 2020-11-24 ENCOUNTER — OFFICE VISIT (OUTPATIENT)
Dept: FAMILY MEDICINE CLINIC | Facility: CLINIC | Age: 70
End: 2020-11-24
Payer: COMMERCIAL

## 2020-11-24 ENCOUNTER — TELEPHONE (OUTPATIENT)
Dept: FAMILY MEDICINE CLINIC | Facility: CLINIC | Age: 70
End: 2020-11-24

## 2020-11-24 VITALS
HEART RATE: 71 BPM | HEIGHT: 76 IN | SYSTOLIC BLOOD PRESSURE: 160 MMHG | DIASTOLIC BLOOD PRESSURE: 90 MMHG | RESPIRATION RATE: 16 BRPM | OXYGEN SATURATION: 98 % | TEMPERATURE: 96.8 F | BODY MASS INDEX: 31.9 KG/M2 | WEIGHT: 262 LBS

## 2020-11-24 DIAGNOSIS — E66.9 OBESITY (BMI 30-39.9): ICD-10-CM

## 2020-11-24 DIAGNOSIS — I10 ESSENTIAL HYPERTENSION: Primary | ICD-10-CM

## 2020-11-24 PROCEDURE — 3008F BODY MASS INDEX DOCD: CPT | Performed by: FAMILY MEDICINE

## 2020-11-24 PROCEDURE — 1101F PT FALLS ASSESS-DOCD LE1/YR: CPT | Performed by: FAMILY MEDICINE

## 2020-11-24 PROCEDURE — 1160F RVW MEDS BY RX/DR IN RCRD: CPT | Performed by: FAMILY MEDICINE

## 2020-11-24 PROCEDURE — 1036F TOBACCO NON-USER: CPT | Performed by: FAMILY MEDICINE

## 2020-11-24 PROCEDURE — 3725F SCREEN DEPRESSION PERFORMED: CPT | Performed by: FAMILY MEDICINE

## 2020-11-24 PROCEDURE — 99214 OFFICE O/P EST MOD 30 MIN: CPT | Performed by: FAMILY MEDICINE

## 2020-11-24 PROCEDURE — 3288F FALL RISK ASSESSMENT DOCD: CPT | Performed by: FAMILY MEDICINE

## 2020-11-24 PROCEDURE — 3080F DIAST BP >= 90 MM HG: CPT | Performed by: FAMILY MEDICINE

## 2020-11-24 PROCEDURE — 3077F SYST BP >= 140 MM HG: CPT | Performed by: FAMILY MEDICINE

## 2020-11-24 RX ORDER — AMLODIPINE BESYLATE 5 MG/1
5 TABLET ORAL DAILY
Qty: 90 TABLET | Refills: 0 | Status: SHIPPED | OUTPATIENT
Start: 2020-11-24 | End: 2020-12-08 | Stop reason: SDUPTHER

## 2020-11-24 RX ORDER — FAMOTIDINE 10 MG
10 TABLET ORAL 2 TIMES DAILY
COMMUNITY
End: 2021-09-22

## 2020-11-24 RX ORDER — PENICILLIN V POTASSIUM 250 MG/1
TABLET ORAL
COMMUNITY
Start: 2020-11-16 | End: 2021-09-22

## 2020-12-08 ENCOUNTER — APPOINTMENT (OUTPATIENT)
Dept: RADIOLOGY | Facility: CLINIC | Age: 70
End: 2020-12-08
Payer: COMMERCIAL

## 2020-12-08 ENCOUNTER — OFFICE VISIT (OUTPATIENT)
Dept: FAMILY MEDICINE CLINIC | Facility: CLINIC | Age: 70
End: 2020-12-08
Payer: COMMERCIAL

## 2020-12-08 VITALS
BODY MASS INDEX: 31.78 KG/M2 | HEIGHT: 76 IN | HEART RATE: 71 BPM | RESPIRATION RATE: 16 BRPM | WEIGHT: 261 LBS | SYSTOLIC BLOOD PRESSURE: 140 MMHG | DIASTOLIC BLOOD PRESSURE: 80 MMHG | TEMPERATURE: 98.5 F | OXYGEN SATURATION: 98 %

## 2020-12-08 DIAGNOSIS — M25.511 CHRONIC RIGHT SHOULDER PAIN: ICD-10-CM

## 2020-12-08 DIAGNOSIS — E78.00 ELEVATED LOW-DENSITY LIPOPROTEIN LEVEL: ICD-10-CM

## 2020-12-08 DIAGNOSIS — I10 ESSENTIAL HYPERTENSION: Primary | ICD-10-CM

## 2020-12-08 DIAGNOSIS — G89.29 CHRONIC RIGHT SHOULDER PAIN: ICD-10-CM

## 2020-12-08 DIAGNOSIS — N52.9 VASCULOGENIC ERECTILE DYSFUNCTION, UNSPECIFIED VASCULOGENIC ERECTILE DYSFUNCTION TYPE: ICD-10-CM

## 2020-12-08 PROCEDURE — 3008F BODY MASS INDEX DOCD: CPT | Performed by: FAMILY MEDICINE

## 2020-12-08 PROCEDURE — 99214 OFFICE O/P EST MOD 30 MIN: CPT | Performed by: FAMILY MEDICINE

## 2020-12-08 PROCEDURE — 3077F SYST BP >= 140 MM HG: CPT | Performed by: FAMILY MEDICINE

## 2020-12-08 PROCEDURE — 3725F SCREEN DEPRESSION PERFORMED: CPT | Performed by: FAMILY MEDICINE

## 2020-12-08 PROCEDURE — 3288F FALL RISK ASSESSMENT DOCD: CPT | Performed by: FAMILY MEDICINE

## 2020-12-08 PROCEDURE — 1160F RVW MEDS BY RX/DR IN RCRD: CPT | Performed by: FAMILY MEDICINE

## 2020-12-08 PROCEDURE — 1036F TOBACCO NON-USER: CPT | Performed by: FAMILY MEDICINE

## 2020-12-08 PROCEDURE — 3079F DIAST BP 80-89 MM HG: CPT | Performed by: FAMILY MEDICINE

## 2020-12-08 PROCEDURE — 1101F PT FALLS ASSESS-DOCD LE1/YR: CPT | Performed by: FAMILY MEDICINE

## 2020-12-08 PROCEDURE — 73030 X-RAY EXAM OF SHOULDER: CPT

## 2020-12-08 RX ORDER — SILDENAFIL 100 MG/1
100 TABLET, FILM COATED ORAL DAILY PRN
Qty: 10 TABLET | Refills: 3 | Status: SHIPPED | OUTPATIENT
Start: 2020-12-08 | End: 2022-04-14 | Stop reason: SDUPTHER

## 2020-12-08 RX ORDER — AMLODIPINE BESYLATE 5 MG/1
5 TABLET ORAL DAILY
Qty: 90 TABLET | Refills: 1 | Status: SHIPPED | OUTPATIENT
Start: 2020-12-08 | End: 2021-03-01 | Stop reason: SDUPTHER

## 2020-12-10 ENCOUNTER — TRANSCRIBE ORDERS (OUTPATIENT)
Dept: ADMINISTRATIVE | Facility: HOSPITAL | Age: 70
End: 2020-12-10

## 2020-12-10 ENCOUNTER — APPOINTMENT (OUTPATIENT)
Dept: LAB | Facility: HOSPITAL | Age: 70
End: 2020-12-10
Attending: FAMILY MEDICINE
Payer: COMMERCIAL

## 2020-12-10 ENCOUNTER — TELEPHONE (OUTPATIENT)
Dept: FAMILY MEDICINE CLINIC | Facility: CLINIC | Age: 70
End: 2020-12-10

## 2020-12-10 DIAGNOSIS — I10 ESSENTIAL HYPERTENSION: ICD-10-CM

## 2020-12-10 DIAGNOSIS — E78.00 ELEVATED LOW-DENSITY LIPOPROTEIN LEVEL: ICD-10-CM

## 2020-12-10 PROBLEM — M19.011 PRIMARY OSTEOARTHRITIS OF RIGHT SHOULDER: Status: ACTIVE | Noted: 2020-12-10

## 2020-12-10 LAB
ALBUMIN SERPL BCP-MCNC: 3.4 G/DL (ref 3.5–5)
ALP SERPL-CCNC: 87 U/L (ref 46–116)
ALT SERPL W P-5'-P-CCNC: 19 U/L (ref 12–78)
ANION GAP SERPL CALCULATED.3IONS-SCNC: 9 MMOL/L (ref 4–13)
AST SERPL W P-5'-P-CCNC: 24 U/L (ref 5–45)
BILIRUB SERPL-MCNC: 0.7 MG/DL (ref 0.2–1)
BUN SERPL-MCNC: 25 MG/DL (ref 5–25)
CALCIUM ALBUM COR SERPL-MCNC: 9.3 MG/DL (ref 8.3–10.1)
CALCIUM SERPL-MCNC: 8.8 MG/DL (ref 8.3–10.1)
CHLORIDE SERPL-SCNC: 105 MMOL/L (ref 100–108)
CHOLEST SERPL-MCNC: 162 MG/DL (ref 50–200)
CO2 SERPL-SCNC: 22 MMOL/L (ref 21–32)
CREAT SERPL-MCNC: 1.24 MG/DL (ref 0.6–1.3)
GFR SERPL CREATININE-BSD FRML MDRD: 58 ML/MIN/1.73SQ M
GLUCOSE P FAST SERPL-MCNC: 109 MG/DL (ref 65–99)
HDLC SERPL-MCNC: 39 MG/DL
LDLC SERPL CALC-MCNC: 103 MG/DL (ref 0–100)
POTASSIUM SERPL-SCNC: 4.3 MMOL/L (ref 3.5–5.3)
PROT SERPL-MCNC: 7.3 G/DL (ref 6.4–8.2)
SODIUM SERPL-SCNC: 136 MMOL/L (ref 136–145)
TRIGL SERPL-MCNC: 98 MG/DL

## 2020-12-10 PROCEDURE — 80053 COMPREHEN METABOLIC PANEL: CPT

## 2020-12-10 PROCEDURE — 36415 COLL VENOUS BLD VENIPUNCTURE: CPT

## 2020-12-10 PROCEDURE — 80061 LIPID PANEL: CPT

## 2021-01-19 ENCOUNTER — TELEPHONE (OUTPATIENT)
Dept: HEMATOLOGY ONCOLOGY | Facility: CLINIC | Age: 71
End: 2021-01-19

## 2021-01-19 NOTE — TELEPHONE ENCOUNTER
Returned call to patient  Reviewed ok for vaccine    Instructed to reach out to PCP as well  Patient verbalized understanding

## 2021-01-19 NOTE — TELEPHONE ENCOUNTER
[id number] Constance Kelley for patient to receive vaccine per Dr Breana Alcaraz, pt should also review with PCP

## 2021-01-19 NOTE — TELEPHONE ENCOUNTER
Patient asking to confirm he is ok to get the COVID vaccine  Patient has low platelets and is concerned     Will send to Rn to confirm with MD    Best call back number [phone]

## 2021-02-05 ENCOUNTER — OFFICE VISIT (OUTPATIENT)
Dept: OBGYN CLINIC | Facility: CLINIC | Age: 71
End: 2021-02-05
Payer: COMMERCIAL

## 2021-02-05 VITALS
HEART RATE: 73 BPM | SYSTOLIC BLOOD PRESSURE: 134 MMHG | BODY MASS INDEX: 32.1 KG/M2 | WEIGHT: 263.6 LBS | DIASTOLIC BLOOD PRESSURE: 78 MMHG | HEIGHT: 76 IN

## 2021-02-05 DIAGNOSIS — G89.29 CHRONIC RIGHT SHOULDER PAIN: ICD-10-CM

## 2021-02-05 DIAGNOSIS — M19.019 SHOULDER ARTHRITIS: Primary | ICD-10-CM

## 2021-02-05 DIAGNOSIS — M25.511 CHRONIC RIGHT SHOULDER PAIN: ICD-10-CM

## 2021-02-05 PROCEDURE — 99204 OFFICE O/P NEW MOD 45 MIN: CPT | Performed by: ORTHOPAEDIC SURGERY

## 2021-02-05 NOTE — PATIENT INSTRUCTIONS
Exercises for Shoulder Abduction and Adduction   AMBULATORY CARE:   Shoulder abduction and adduction exercises  work the muscles at the back of your shoulder and your upper back  Before you exercise:  Warm up and stretch before you exercise  Walk or ride a stationary bike for 5 to 10 minutes to help you warm up  Stretching helps increase range of motion  It may also decrease muscle soreness and help prevent another injury  Your healthcare provider will tell you which of the following stretches to do:  · Crossover arm stretch:  Relax your shoulders  Hold your upper arm with the opposite hand  Pull your arm across your chest until you feel a stretch  Hold the stretch for 30 seconds  Return to the starting position  · Shoulder flexion stretch:  Stand facing a wall  Slowly walk your fingers up the wall until you feel a stretch  Hold the stretch for 30 seconds  Return to the starting position  · Sleeper stretch:  Lie on your injured side on a firm, flat surface  Bend the elbow of your injured arm 90° with your hand facing up  Use your arm that is not injured to slowly push your injured arm down  Stop when you feel a stretch at the back of your injured shoulder  Hold the stretch for 30 seconds  Slowly return to the starting position  Contact your healthcare provider if:   · You have sharp or worsening pain during exercise or at rest     · You have questions or concerns about your shoulder exercises  How to exercise with a weight:  Your healthcare provider will tell you how much weight to use  · Shoulder abduction:  Stand and hold a weight in your hand with your palm facing your body  Slowly raise your arm to the side with your thumb pointing up  Then raise your arm over your head as far as you can without pain  Hold this position for as long as directed  Do not raise your arm over your head unless your healthcare provider says it is okay            · Shoulder adduction:  Lie on your back on a firm surface  Extend your arm out to a "T " Bend your elbow so your forearm in the air  Hold a weight in your hand  Slowly raise your arm toward the ceiling and straighten your elbow  Hold this position for as long as directed  Slowly return to the starting position  How to exercise with an exercise band:   · Shoulder abduction:  Wrap the exercise band around a heavy, stable object near your foot  Grab the band with the hand of your injured shoulder  Keep your arm straight  Slowly raise your arm to the side with your thumb pointing up  Then, slowly pull the band over your head as far as you can without pain  Do not raise your arm over your head unless your healthcare provider says it is okay  Do not let your shoulder shrug  Hold this position for as long as directed  Slowly return to the starting position  · Shoulder adduction:  Wrap the exercise band around a heavy, stable object  Stand and face away from where the band is anchored  Hold each end of the band in both hands with your elbows bent  Your elbows should not be behind your body  Keep your arms parallel to the floor and slowly straighten your elbows  Hold this position for as long as directed  Slowly return to the starting position  Follow up with your physical therapist as directed:  Write down your questions so you remember to ask them at your visits  © Copyright [address] Information is for End User's use only and may not be sold, redistributed or otherwise used for commercial purposes  All illustrations and images included in CareNotes® are the copyrighted property of A D A M , Inc  or [facility] [employer]   The above information is an  only  It is not intended as medical advice for individual conditions or treatments  Talk to your doctor, nurse or pharmacist before following any medical regimen to see if it is safe and effective for you    Exercises for Internal and External Shoulder Rotation   AMBULATORY CARE:   Muscles worked during internal and external shoulder exercises:  Exercises for internal shoulder rotation work the muscles in your chest and front of your shoulder  Exercises for external shoulder rotation work the muscles in the back of your shoulder and upper back  Contact your healthcare provider if:   · You have sharp or worsening pain during exercise or at rest     · You have questions or concerns about your shoulder exercises  Before you exercise:  Warm up and stretch before you exercise  Walk or ride a stationary bike for 5 to 10 minutes to help you warm up  Stretching helps increase range of motion  It may also decrease muscle soreness and help prevent another injury  Your healthcare provider will tell you which of the following stretches to do:  · Crossover arm stretch:  Relax your shoulders  Hold your upper arm with the opposite hand  Pull your arm across your chest until you feel a stretch  Hold the stretch for 30 seconds  Return to the starting position  · Shoulder flexion stretch:  Stand facing a wall  Slowly walk your fingers up the wall until you feel a stretch  Hold the stretch for 30 seconds  Return to the starting position  · Sleeper stretch:  Lie on your injured side on a firm, flat surface  Bend the elbow of your injured arm 90° with your hand facing up  Use your arm that is not injured to slowly push your injured arm down  Stop when you feel a stretch at the back of your injured shoulder  Hold the stretch for 30 seconds  Slowly return to the starting position  How to exercise with a weight:  Your healthcare provider will tell you how much weight to exercise with  · Shoulder internal rotation:  Sit in a chair  Place a rolled up towel between your elbow and your side  Bend your elbow to 90°  Gently squeeze the towel with your elbow to prevent it from falling out  Hold the weight with your thumb pointing up   Slowly move the weight across your chest  Stop when your hand reaches your opposite arm  Hold this position for as many seconds as directed  Slowly return to the starting position  · Shoulder external rotation:  Lie on your side with your injured shoulder facing up  Bend your elbow 90°  Place a rolled up towel between your elbow and your side  Hold a weight in your hand  Gently squeeze the towel with your elbow to prevent it from falling out  Slowly rotate your arm outward, but keep your elbow bent  Stop when you feel a stretch  Hold this position for 30 seconds or as directed  Slowly return to the starting position  How to exercise with an exercise band:   · Shoulder internal rotation:  Tie one end of the exercise band to a heavy, secure object  Sit in a chair  Place a rolled up towel between your elbow and your side  Bend your elbow to 90°  Gently squeeze the towel with your elbow to prevent it from falling out  Slowly pull the band across your chest  Stop when your hand reaches your opposite arm  Hold this position for as many seconds as directed  Slowly return to the starting position  · Shoulder external rotation:  Hold one end of the exercise band on the side that is not injured  Place a rolled up towel between your elbow and your side  Bend your elbow 90°  Squeeze the towel with your elbow  Grab the end of the band and slowly turn your arm outward, but keep your elbow bent  Stop when you feel a stretch  Hold this position for 30 seconds or as directed  Slowly return to the starting position  Follow up with your physical therapist as directed:  Write down your questions so you remember to ask them at your visits  © Copyright [address] Information is for End User's use only and may not be sold, redistributed or otherwise used for commercial purposes  All illustrations and images included in CareNotes® are the copyrighted property of [employer]  or [facility] [employer]   The above information is an  only   It is not intended as medical advice for individual conditions or treatments  Talk to your doctor, nurse or pharmacist before following any medical regimen to see if it is safe and effective for you

## 2021-02-05 NOTE — PROGRESS NOTES
Assessment/Plan:  1  Shoulder arthritis     2  Chronic right shoulder pain  Ambulatory referral to Orthopedic Surgery       The patient does have arthritis of his shoulder and may also have some rotator cuff pathology as well  We will start with conservative treatment for this  He was provided home exercises for this and we also gave him voltaren gel  We also advised ice for his shoulder  He will follow-up in 4 weeks for repeat evaluation  If he fails to improve we did discuss MRI of the shoulder to rule out RTC tear  Subjective:   Irivng Sanchez is a 65 y o  male who presents today for evaluation of his right shoulder  He notes that he tore his proximal biceps about a year ago, and since that time he has had worsening shoulder pain  He notes pain about the lateral shoulder, which is worse with activity and better with rest  He notes fair strength but some limitations in  ROM  He notes good sensation of the right upper extremity  He has done some ice and OTC medications for this  He did have 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 the past and has done well with that  He does got to he gym daily and does shoulder exercises while there  Review of Systems   Constitutional: Negative  Negative for chills and fever  HENT: Negative  Negative for ear pain and sore throat  Eyes: Negative  Negative for pain and redness  Respiratory: Negative  Negative for shortness of breath and wheezing  Cardiovascular: Negative for chest pain and palpitations  Gastrointestinal: Negative  Negative for abdominal pain and blood in stool  Endocrine: Negative  Negative for polydipsia and polyuria  Genitourinary: Negative  Negative for difficulty urinating and dysuria  Musculoskeletal:        As noted in HPI   Skin: Negative  Negative for pallor and rash  Neurological: Negative  Negative for dizziness and numbness  Hematological: Negative  Negative for adenopathy   Does not bruise/bleed easily  Psychiatric/Behavioral: Negative  Negative for confusion and suicidal ideas  Past Medical History:   Diagnosis Date    Thrombocytopenia ([address] )     platelet count maintained around 90-Dr Edgar Foley Wears partial dentures     upper       Past Surgical History:   Procedure Laterality Date    APPENDECTOMY      CATARACT EXTRACTION Left     COLONOSCOPY      COLONOSCOPY N/A 10/17/2018    Procedure: COLONOSCOPY;  Surgeon: Robert Lombardi MD;  Location: [facility]; Service: Gastroenterology    HERNIA REPAIR Right 2013    inguinal    JOINT REPLACEMENT Right     knee, left shoulder    WI XCAPSL CTRC RMVL INSJ IO LENS PROSTH W/O ECP Left 2016    Procedure: EXTRACTION EXTRACAPSULAR CATARACT PHACO INTRAOCULAR LENS (IOL);   Surgeon: Margarita Walker MD;  [facility] MA[facility];  Service: Ophthalmology    SHOULDER ARTHROSCOPY Left     tendon repair    TONSILLECTOMY  age 1       Family History   Problem Relation Age of Onset    Heart disease Father 67        massive MI    Heart disease Sister         MI    Heart disease Brother         MI    No Known Problems Mother     Mental illness Neg Hx        Social History     Occupational History    Not on file   Tobacco Use    Smoking status: Former Smoker     Packs/day: 0 50     Years: 22 00     Pack years: 11 00     Types: Cigarettes     Quit date:      Years since quittin 1    Smokeless tobacco: Never Used   Substance and Sexual Activity    Alcohol use: No    Drug use: No    Sexual activity: Not on file         Current Outpatient Medications:     amLODIPine (NORVASC) 5 mg tablet, Take 1 tablet (5 mg total) by mouth daily, Disp: 90 tablet, Rfl: 1    ascorbic acid (VITAMIN C) 500 mg tablet, Take 500 mg by mouth every morning , Disp: , Rfl:     b complex vitamins tablet, Take 1 tablet by mouth every morning , Disp: , Rfl:     cholecalciferol (VITAMIN D3) 1,000 units tablet, Take 1,000 Units by mouth every morning, Disp: , Rfl:     famotidine (PEPCID) 10 mg tablet, Take 10 mg by mouth 2 (two) times a day, Disp: , Rfl:     Ipratropium-Albuterol (COMBIVENT RESPIMAT IN), Inhale as needed, Disp: , Rfl:     multivitamin (THERAGRAN) TABS, Take 1 tablet by mouth every morning , Disp: , Rfl:     Omega-3 Fatty Acids (FISH OIL) 1,000 mg, Take 1,000 mg by mouth every morning  , Disp: , Rfl:     penicillin V potassium (VEETID) 250 mg tablet, , Disp: , Rfl:     sildenafil (VIAGRA) 100 mg tablet, Take 1 tablet (100 mg total) by mouth daily as needed for erectile dysfunction (Patient not taking: Reported on 2/5/2021), Disp: 10 tablet, Rfl: 3    Allergies   Allergen Reactions    Dust Mite Extract        Objective:  Vitals:    02/05/21 1145   BP: 134/78   Pulse: 73       Right Shoulder Exam     Tenderness   The patient is experiencing no tenderness  Range of Motion   Active abduction: 150   External rotation: 50   Forward flexion: 160   Internal rotation 0 degrees: L5   Internal rotation 90 degrees: 10     Muscle Strength   Abduction: 4/5   Internal rotation: 5/5   External rotation: 5/5     Tests   Drop arm: negative    Other   Erythema: absent  Sensation: normal  Pulse: present    Comments:  Humza deformity noted  Physical Exam  Constitutional:       General: He is not in acute distress  Appearance: He is well-developed  HENT:      Head: Normocephalic and atraumatic  Eyes:      General: No scleral icterus  Conjunctiva/sclera: Conjunctivae normal    Neck:      Vascular: No JVD  Cardiovascular:      Rate and Rhythm: Normal rate  Pulmonary:      Effort: Pulmonary effort is normal  No respiratory distress  Skin:     General: Skin is warm  Neurological:      Mental Status: He is alert and oriented to person, place, and time        Coordination: Coordination normal          I have personally reviewed pertinent films in PACS and my interpretation is as follows:  Xray right shoulder: Glenohumeral joint and AC joint arthritis

## 2021-03-01 DIAGNOSIS — I10 ESSENTIAL HYPERTENSION: ICD-10-CM

## 2021-03-01 RX ORDER — AMLODIPINE BESYLATE 5 MG/1
5 TABLET ORAL DAILY
Qty: 90 TABLET | Refills: 1 | Status: SHIPPED | OUTPATIENT
Start: 2021-03-01 | End: 2021-08-30 | Stop reason: SDUPTHER

## 2021-03-18 ENCOUNTER — IMMUNIZATIONS (OUTPATIENT)
Dept: FAMILY MEDICINE CLINIC | Facility: HOSPITAL | Age: 71
End: 2021-03-18

## 2021-03-18 DIAGNOSIS — Z23 ENCOUNTER FOR IMMUNIZATION: Primary | ICD-10-CM

## 2021-03-18 PROCEDURE — 0011A SARS-COV-2 / COVID-19 MRNA VACCINE (MODERNA) 100 MCG: CPT

## 2021-03-18 PROCEDURE — 91301 SARS-COV-2 / COVID-19 MRNA VACCINE (MODERNA) 100 MCG: CPT

## 2021-03-26 ENCOUNTER — OFFICE VISIT (OUTPATIENT)
Dept: OBGYN CLINIC | Facility: CLINIC | Age: 71
End: 2021-03-26
Payer: COMMERCIAL

## 2021-03-26 VITALS
WEIGHT: 263.2 LBS | HEART RATE: 73 BPM | BODY MASS INDEX: 32.05 KG/M2 | DIASTOLIC BLOOD PRESSURE: 68 MMHG | SYSTOLIC BLOOD PRESSURE: 121 MMHG | HEIGHT: 76 IN

## 2021-03-26 DIAGNOSIS — M19.011 PRIMARY OSTEOARTHRITIS OF RIGHT SHOULDER: Primary | ICD-10-CM

## 2021-03-26 DIAGNOSIS — M75.81 ROTATOR CUFF TENDINITIS, RIGHT: ICD-10-CM

## 2021-03-26 DIAGNOSIS — S46.211D RUPTURE OF RIGHT PROXIMAL BICEPS TENDON, SUBSEQUENT ENCOUNTER: ICD-10-CM

## 2021-03-26 PROCEDURE — 1036F TOBACCO NON-USER: CPT | Performed by: ORTHOPAEDIC SURGERY

## 2021-03-26 PROCEDURE — 3074F SYST BP LT 130 MM HG: CPT | Performed by: ORTHOPAEDIC SURGERY

## 2021-03-26 PROCEDURE — 3078F DIAST BP <80 MM HG: CPT | Performed by: ORTHOPAEDIC SURGERY

## 2021-03-26 PROCEDURE — 99213 OFFICE O/P EST LOW 20 MIN: CPT | Performed by: ORTHOPAEDIC SURGERY

## 2021-03-26 PROCEDURE — 3008F BODY MASS INDEX DOCD: CPT | Performed by: ORTHOPAEDIC SURGERY

## 2021-03-26 PROCEDURE — 1160F RVW MEDS BY RX/DR IN RCRD: CPT | Performed by: ORTHOPAEDIC SURGERY

## 2021-03-26 NOTE — PROGRESS NOTES
Assessment/Plan:  1  Primary osteoarthritis of right shoulder     2  Rotator cuff tendinitis, right     3  Rupture of right proximal biceps tendon, subsequent encounter         Scribe Attestation    I,:  Job Pitt am acting as a scribe while in the presence of the attending physician :       I,:  Marcelino Miles MD personally performed the services described in this documentation    as scribed in my presence :         Michaela Huff upon examination and review the x-rays of his right shoulder from 12/8/2020 does demonstrate moderate glenohumeral joint osteoarthritis with osteophyte formation at the inferior aspect of joint  There is severe acromioclavicular joint osteoarthritis with an undersurface osteophyte at the distal clavicle  He does demonstrate good strength in all planes however there is mild weakness into abduction  I do believe that non operative treatment is most appropriate for him especially since he is not interested in surgical intervention at all  I did provide him with a physician directed exercise program to strengthen the rotator cuff that he is to incorporate into his warmup for his upper extremities  I did also encourage him to utilize the [employer] for his flies as I remark that he has a mechanical block in the shoulder due to the arthritis that will likely exacerbate pain into her shoulder with dumbbell flies  Michaela Huff verbalize understanding of all information provided to him today had no further questions  I will see him back on an as-needed basis  Subjective:   June Read is a 65 y o  male who presents to the office today for follow-up evaluation of his right shoulder  He notes that he is experiencing intermittent and mild moderate aching pain at the lateral aspect of her shoulder while eats  He states that when his shoulder abducted at approximately 90 while eating he will experience aching pain to the anterior aspect the shoulder    He states that when he has his arm overhead or down as side he is asymptomatic  He denies any gross loss of strength into the shoulder  He does have a history of a chronically torn biceps long head tendon and states that he has a history of a total shoulder arthroplasty on the contralateral side  He states that he does go the gym often the exercise and states that he is able to do  presses however remarks that dumbbell flies are quite painful  He states that he has not participated in these exercises in quite some time with the free weights  He remarks that he does utilize the [employer] as it does provide him more stability throughout his movement  Today he denies any distal paresthesias  Review of Systems   Constitutional: Negative for chills, fever and unexpected weight change  HENT: Negative for hearing loss, nosebleeds and sore throat  Eyes: Negative for pain, redness and visual disturbance  Respiratory: Negative for cough, shortness of breath and wheezing  Cardiovascular: Negative for chest pain, palpitations and leg swelling  Gastrointestinal: Negative for abdominal pain, nausea and vomiting  Endocrine: Negative for polyphagia and polyuria  Genitourinary: Negative for dysuria and hematuria  Musculoskeletal: Positive for arthralgias and myalgias  See HPI   Skin: Negative for rash and wound  Neurological: Negative for dizziness, numbness and headaches  Psychiatric/Behavioral: Negative for decreased concentration and suicidal ideas  The patient is not nervous/anxious  Past Medical History:   Diagnosis Date    Thrombocytopenia ([address] )     platelet count maintained around 90-Dr Lis Blackwood Wears partial dentures     upper       Past Surgical History:   Procedure Laterality Date    APPENDECTOMY      CATARACT EXTRACTION Left     COLONOSCOPY      COLONOSCOPY N/A 10/17/2018    Procedure: COLONOSCOPY;  Surgeon: Gary Patino MD;  Location: [facility] GI LAB;   Service: Gastroenterology   [facility] HERNIA REPAIR Right 2013    inguinal    JOINT REPLACEMENT Right     knee, left shoulder    KS XCAPSL CTRC RMVL INSJ IO LENS PROSTH W/O ECP Left 2016    Procedure: EXTRACTION EXTRACAPSULAR CATARACT PHACO INTRAOCULAR LENS (IOL);   Surgeon: Marge Joseph MD;  Location: [facility] MAIN OR;  Service: Ophthalmology    SHOULDER ARTHROSCOPY Left     tendon repair    TONSILLECTOMY  age 1       Family History   Problem Relation Age of Onset    Heart disease Father 67        massive MI    Heart disease Sister         MI    Heart disease Brother         MI    No Known Problems Mother     Mental illness Neg Hx        Social History     Occupational History    Not on file   Tobacco Use    Smoking status: Former Smoker     Packs/day: 0 50     Years: 22 00     Pack years: 11 00     Types: Cigarettes     Quit date:      Years since quittin 2    Smokeless tobacco: Never Used   Substance and Sexual Activity    Alcohol use: No    Drug use: No    Sexual activity: Not on file         Current Outpatient Medications:     amLODIPine (NORVASC) 5 mg tablet, Take 1 tablet (5 mg total) by mouth daily, Disp: 90 tablet, Rfl: 1    ascorbic acid (VITAMIN C) 500 mg tablet, Take 500 mg by mouth every morning , Disp: , Rfl:     b complex vitamins tablet, Take 1 tablet by mouth every morning , Disp: , Rfl:     cholecalciferol (VITAMIN D3) 1,000 units tablet, Take 1,000 Units by mouth every morning, Disp: , Rfl:     Diclofenac Sodium (VOLTAREN) 1 %, Apply 2 g topically 4 (four) times a day, Disp: 100 g, Rfl: 1    famotidine (PEPCID) 10 mg tablet, Take 10 mg by mouth 2 (two) times a day, Disp: , Rfl:     Ipratropium-Albuterol (COMBIVENT RESPIMAT IN), Inhale as needed, Disp: , Rfl:     multivitamin (THERAGRAN) TABS, Take 1 tablet by mouth every morning , Disp: , Rfl:     Omega-3 Fatty Acids (FISH OIL) 1,000 mg, Take 1,000 mg by mouth every morning  , Disp: , Rfl:     penicillin V potassium (VEETID) 250 mg tablet, , Disp: , Rfl:     sildenafil (VIAGRA) 100 mg tablet, Take 1 tablet (100 mg total) by mouth daily as needed for erectile dysfunction, Disp: 10 tablet, Rfl: 3    Allergies   Allergen Reactions    Dust Mite Extract        Objective:  Vitals:    03/26/21 1047   BP: 121/68   Pulse: 73       Right Shoulder Exam     Tenderness   The patient is experiencing no tenderness  Range of Motion   Active abduction: 160   External rotation: 70   Internal rotation 0 degrees: L3     Muscle Strength   Abduction: 5/5   Internal rotation: 5/5   External rotation: 5/5     Tests   Colon test: negative  Impingement: negative    Other   Erythema: absent  Scars: absent  Sensation: normal  Pulse: present            Physical Exam  Vitals signs reviewed  HENT:      Head: Normocephalic and atraumatic  Eyes:      General:         Right eye: No discharge  Left eye: No discharge  Conjunctiva/sclera: Conjunctivae normal       Pupils: Pupils are equal, round, and reactive to light  Neck:      Musculoskeletal: Normal range of motion and neck supple  Cardiovascular:      Rate and Rhythm: Normal rate  Pulmonary:      Effort: Pulmonary effort is normal  No respiratory distress  Musculoskeletal:      Comments: As noted in HPI   Skin:     General: Skin is warm and dry  Neurological:      Mental Status: He is alert and oriented to person, place, and time  Psychiatric:         Mood and Affect: Mood normal          Behavior: Behavior normal          I have personally reviewed pertinent films in PACS and my interpretation is as follows:    X-rays of the right shoulder demonstrate moderate osteoarthritis of the glenohumeral joint with osteophyte formation inferiorly of both the humeral head and glenoid  Severe acromioclavicular joint osteoarthritis with undersurface osteophyte of the distal clavicle

## 2021-03-26 NOTE — PATIENT INSTRUCTIONS
Exercises for Shoulder Abduction and Adduction   WHAT YOU NEED TO KNOW:   Shoulder abduction and adduction exercises work the muscles at the back of your shoulder and your upper back  DISCHARGE INSTRUCTIONS:   Contact your healthcare provider if:   · You have sharp or worsening pain during exercise or at rest     · You have questions or concerns about your shoulder exercises  Before you exercise:  Warm up and stretch before you exercise  Walk or ride a stationary bike for 5 to 10 minutes to help you warm up  Stretching helps increase range of motion  It may also decrease muscle soreness and help prevent another injury  Your healthcare provider will tell you which of the following stretches to do:  · Crossover arm stretch:  Relax your shoulders  Hold your upper arm with the opposite hand  Pull your arm across your chest until you feel a stretch  Hold the stretch for 30 seconds  Return to the starting position  · Shoulder flexion stretch:  Stand facing a wall  Slowly walk your fingers up the wall until you feel a stretch  Hold the stretch for 30 seconds  Return to the starting position  · Sleeper stretch:  Lie on your injured side on a firm, flat surface  Bend the elbow of your injured arm 90° with your hand facing up  Use your arm that is not injured to slowly push your injured arm down  Stop when you feel a stretch at the back of your injured shoulder  Hold the stretch for 30 seconds  Slowly return to the starting position  How to exercise with a weight:  Your healthcare provider will tell you how much weight to use  · Shoulder abduction:  Stand and hold a weight in your hand with your palm facing your body  Slowly raise your arm to the side with your thumb pointing up  Then raise your arm over your head as far as you can without pain  Hold this position for as long as directed  Do not raise your arm over your head unless your healthcare provider says it is okay            · Shoulder adduction:  Lie on your back on a firm surface  Extend your arm out to a "T " Bend your elbow so your forearm in the air  Hold a weight in your hand  Slowly raise your arm toward the ceiling and straighten your elbow  Hold this position for as long as directed  Slowly return to the starting position  How to exercise with an exercise band:   · Shoulder abduction:  Wrap the exercise band around a heavy, stable object near your foot  Grab the band with the hand of your injured shoulder  Keep your arm straight  Slowly raise your arm to the side with your thumb pointing up  Then, slowly pull the band over your head as far as you can without pain  Do not raise your arm over your head unless your healthcare provider says it is okay  Do not let your shoulder shrug  Hold this position for as long as directed  Slowly return to the starting position  · Shoulder adduction:  Wrap the exercise band around a heavy, stable object  Stand and face away from where the band is anchored  Hold each end of the band in both hands with your elbows bent  Your elbows should not be behind your body  Keep your arms parallel to the floor and slowly straighten your elbows  Hold this position for as long as directed  Slowly return to the starting position  Follow up with your physical therapist as directed:  Write down your questions so you remember to ask them at your visits  © Copyright [address] Information is for End User's use only and may not be sold, redistributed or otherwise used for commercial purposes  All illustrations and images included in CareNotes® are the copyrighted property of A D A M , Inc  or [facility] Diogenes Avila   The above information is an  only  It is not intended as medical advice for individual conditions or treatments  Talk to your doctor, nurse or pharmacist before following any medical regimen to see if it is safe and effective for you    Exercises for Internal and External Shoulder Rotation   WHAT YOU NEED TO KNOW:   Exercises for internal shoulder rotation work the muscles in your chest and front of your shoulder  Exercises for external shoulder rotation work the muscles in the back of your shoulder and upper back  DISCHARGE INSTRUCTIONS:   Contact your healthcare provider if:   · You have sharp or worsening pain during exercise or at rest     · You have questions or concerns about your shoulder exercises  Before you exercise:  Warm up and stretch before you exercise  Walk or ride a stationary bike for 5 to 10 minutes to help you warm up  Stretching helps increase range of motion  It may also decrease muscle soreness and help prevent another injury  Your healthcare provider will tell you which of the following stretches to do:  · Crossover arm stretch:  Relax your shoulders  Hold your upper arm with the opposite hand  Pull your arm across your chest until you feel a stretch  Hold the stretch for 30 seconds  Return to the starting position  · Shoulder flexion stretch:  Stand facing a wall  Slowly walk your fingers up the wall until you feel a stretch  Hold the stretch for 30 seconds  Return to the starting position  · Sleeper stretch:  Lie on your injured side on a firm, flat surface  Bend the elbow of your injured arm 90° with your hand facing up  Use your arm that is not injured to slowly push your injured arm down  Stop when you feel a stretch at the back of your injured shoulder  Hold the stretch for 30 seconds  Slowly return to the starting position  How to exercise with a weight:  Your healthcare provider will tell you how much weight to exercise with  · Shoulder internal rotation:  Sit in a chair  Place a rolled up towel between your elbow and your side  Bend your elbow to 90°  Gently squeeze the towel with your elbow to prevent it from falling out  Hold the weight with your thumb pointing up   Slowly move the weight across your chest  Stop when your hand reaches your opposite arm  Hold this position for as many seconds as directed  Slowly return to the starting position  · Shoulder external rotation:  Lie on your side with your injured shoulder facing up  Bend your elbow 90°  Place a rolled up towel between your elbow and your side  Hold a weight in your hand  Gently squeeze the towel with your elbow to prevent it from falling out  Slowly rotate your arm outward, but keep your elbow bent  Stop when you feel a stretch  Hold this position for 30 seconds or as directed  Slowly return to the starting position  How to exercise with an exercise band:   · Shoulder internal rotation:  Tie one end of the exercise band to a heavy, secure object  Sit in a chair  Place a rolled up towel between your elbow and your side  Bend your elbow to 90°  Gently squeeze the towel with your elbow to prevent it from falling out  Slowly pull the band across your chest  Stop when your hand reaches your opposite arm  Hold this position for as many seconds as directed  Slowly return to the starting position  · Shoulder external rotation:  Hold one end of the exercise band on the side that is not injured  Place a rolled up towel between your elbow and your side  Bend your elbow 90°  Squeeze the towel with your elbow  Grab the end of the band and slowly turn your arm outward, but keep your elbow bent  Stop when you feel a stretch  Hold this position for 30 seconds or as directed  Slowly return to the starting position  Follow up with your physical therapist as directed:  Write down your questions so you remember to ask them at your visits  © Copyright [address] Information is for End User's use only and may not be sold, redistributed or otherwise used for commercial purposes  All illustrations and images included in CareNotes® are the copyrighted property of [employer]  or [facility] Di[employer] Avila   The above information is an  only   It is not intended as medical advice for individual conditions or treatments  Talk to your doctor, nurse or pharmacist before following any medical regimen to see if it is safe and effective for you

## 2021-04-16 ENCOUNTER — IMMUNIZATIONS (OUTPATIENT)
Dept: FAMILY MEDICINE CLINIC | Facility: HOSPITAL | Age: 71
End: 2021-04-16

## 2021-04-16 DIAGNOSIS — Z23 ENCOUNTER FOR IMMUNIZATION: Primary | ICD-10-CM

## 2021-04-16 PROCEDURE — 91301 SARS-COV-2 / COVID-19 MRNA VACCINE (MODERNA) 100 MCG: CPT

## 2021-04-16 PROCEDURE — 0012A SARS-COV-2 / COVID-19 MRNA VACCINE (MODERNA) 100 MCG: CPT

## 2021-08-30 DIAGNOSIS — I10 ESSENTIAL HYPERTENSION: ICD-10-CM

## 2021-09-01 RX ORDER — AMLODIPINE BESYLATE 5 MG/1
5 TABLET ORAL DAILY
Qty: 30 TABLET | Refills: 0 | Status: SHIPPED | OUTPATIENT
Start: 2021-09-01 | End: 2021-09-22 | Stop reason: SDUPTHER

## 2021-09-16 ENCOUNTER — RA CDI HCC (OUTPATIENT)
Dept: OTHER | Facility: HOSPITAL | Age: 71
End: 2021-09-16

## 2021-09-16 NOTE — PROGRESS NOTES
[address]  coding opportunities          Number of diagnosis code(s) already on the problem list added to FYI fla     Chart Reviewed * (Number of) Inbasket suggestions sent to Provider: 1     Problem listed updated  Provider Accepted, (number of) suggestions accepted: 1         Number of suggestions used: 1         Patients insurance company: [address]  (Medicare Advantage and [employer])     Visit status: Patient arrived for their scheduled appointment        [facility] [address]  coding opportunities          Number of diagnosis code(s) already on the problem list added to FYI fla     Chart Reviewed * (Number of) Inbasket suggestions sent to Provider: 1     Problem listed updated   Provider Accepted, (number of) suggestions accepted: 1               Patients insurance company: [address]  (Medicare Advantage and [employer])           [facility] [address]  coding opportunities          Number of diagnosis code(s) already on the problem list added to FYI fla     Chart Reviewed * (Number of) Inbasket suggestions sent to Provider: 1   D69 6 Thrombocytopenia ([address] )    I70 209 Peripheral arteriosclerosis ([facility] [address] )  * see 10/26/20 note     If this is correct, please document and assess at your next visit 21                 Patients insurance company: [address]  (Medicare Advantage and [employer])

## 2021-09-17 PROBLEM — I70.209 UNSPECIFIED ATHEROSCLEROSIS OF NATIVE ARTERIES OF EXTREMITIES, UNSPECIFIED EXTREMITY (HCC): Status: ACTIVE | Noted: 2021-09-17

## 2021-09-22 ENCOUNTER — OFFICE VISIT (OUTPATIENT)
Dept: FAMILY MEDICINE CLINIC | Facility: CLINIC | Age: 71
End: 2021-09-22
Payer: COMMERCIAL

## 2021-09-22 VITALS
OXYGEN SATURATION: 97 % | WEIGHT: 255 LBS | SYSTOLIC BLOOD PRESSURE: 130 MMHG | BODY MASS INDEX: 31.05 KG/M2 | RESPIRATION RATE: 14 BRPM | HEIGHT: 76 IN | HEART RATE: 78 BPM | DIASTOLIC BLOOD PRESSURE: 72 MMHG | TEMPERATURE: 98.4 F

## 2021-09-22 DIAGNOSIS — Z13.6 SCREENING FOR CARDIOVASCULAR CONDITION: ICD-10-CM

## 2021-09-22 DIAGNOSIS — Z12.5 SCREENING FOR PROSTATE CANCER: ICD-10-CM

## 2021-09-22 DIAGNOSIS — E66.9 OBESITY (BMI 30.0-34.9): ICD-10-CM

## 2021-09-22 DIAGNOSIS — D69.6 THROMBOCYTOPENIA (HCC): ICD-10-CM

## 2021-09-22 DIAGNOSIS — I10 ESSENTIAL HYPERTENSION: ICD-10-CM

## 2021-09-22 DIAGNOSIS — Z00.00 WELL ADULT EXAM: Primary | ICD-10-CM

## 2021-09-22 DIAGNOSIS — E78.00 ELEVATED LOW-DENSITY LIPOPROTEIN LEVEL: ICD-10-CM

## 2021-09-22 PROBLEM — I70.209 UNSPECIFIED ATHEROSCLEROSIS OF NATIVE ARTERIES OF EXTREMITIES, UNSPECIFIED EXTREMITY (HCC): Status: RESOLVED | Noted: 2021-09-17 | Resolved: 2021-09-22

## 2021-09-22 PROCEDURE — 1101F PT FALLS ASSESS-DOCD LE1/YR: CPT | Performed by: FAMILY MEDICINE

## 2021-09-22 PROCEDURE — 3075F SYST BP GE 130 - 139MM HG: CPT | Performed by: FAMILY MEDICINE

## 2021-09-22 PROCEDURE — 3008F BODY MASS INDEX DOCD: CPT | Performed by: FAMILY MEDICINE

## 2021-09-22 PROCEDURE — 1160F RVW MEDS BY RX/DR IN RCRD: CPT | Performed by: FAMILY MEDICINE

## 2021-09-22 PROCEDURE — 3288F FALL RISK ASSESSMENT DOCD: CPT | Performed by: FAMILY MEDICINE

## 2021-09-22 PROCEDURE — 3725F SCREEN DEPRESSION PERFORMED: CPT | Performed by: FAMILY MEDICINE

## 2021-09-22 PROCEDURE — G0439 PPPS, SUBSEQ VISIT: HCPCS | Performed by: FAMILY MEDICINE

## 2021-09-22 PROCEDURE — 3078F DIAST BP <80 MM HG: CPT | Performed by: FAMILY MEDICINE

## 2021-09-22 RX ORDER — AMLODIPINE BESYLATE 5 MG/1
5 TABLET ORAL DAILY
Qty: 90 TABLET | Refills: 1 | Status: SHIPPED | OUTPATIENT
Start: 2021-09-22 | End: 2022-06-25 | Stop reason: SDUPTHER

## 2021-09-22 NOTE — PATIENT INSTRUCTIONS
Obesity   AMBULATORY CARE:   Obesity  is when your body mass index (BMI) is greater than 30  Your healthcare provider will use your height and weight to measure your BMI  The risks of obesity include  many health problems, such as injuries or physical disability  You may need tests to check for the following:  · Diabetes    · High blood pressure or high cholesterol    · Heart disease    · Gallbladder or liver disease    · Cancer of the colon, breast, prostate, liver, or kidney    · Sleep apnea    · Arthritis or gout    Seek care immediately if:   · You have a severe headache, confusion, or difficulty speaking  · You have weakness on one side of your body  · You have chest pain, sweating, or shortness of breath  Contact your healthcare provider if:   · You have symptoms of gallbladder or liver disease, such as pain in your upper abdomen  · You have knee or hip pain and discomfort while walking  · You have symptoms of diabetes, such as intense hunger and thirst, and frequent urination  · You have symptoms of sleep apnea, such as snoring or daytime sleepiness  · You have questions or concerns about your condition or care  Treatment for obesity  focuses on helping you lose weight to improve your health  Even a small decrease in BMI can reduce the risk for many health problems  Your healthcare provider will help you set a weight-loss goal   · Lifestyle changes  are the first step in treating obesity  These include making healthy food choices and getting regular physical activity  Your healthcare provider may suggest a weight-loss program that involves coaching, education, and therapy  · Medicine  may help you lose weight when it is used with a healthy diet and physical activity  · Surgery  can help you lose weight if you are very obese and have other health problems  There are several types of weight-loss surgery  Ask your healthcare provider for more information      Be successful losing weight:   · Set small, realistic goals  An example of a small goal is to walk for 20 minutes 5 days a week  Anther goal is to lose 5% of your body weight  · Tell friends, family members, and coworkers about your goals  and ask for their support  Ask a friend to lose weight with you, or join a weight-loss support group  · Identify foods or triggers that may cause you to overeat , and find ways to avoid them  Remove tempting high-calorie foods from your home and workplace  Place a bowl of fresh fruit on your kitchen counter  If stress causes you to eat, then find other ways to cope with stress  · Keep a diary to track what you eat and drink  Also write down how many minutes of physical activity you do each day  Weigh yourself once a week and record it in your diary  Eating changes: You will need to eat 500 to 1,000 fewer calories each day than you currently eat to lose 1 to 2 pounds a week  The following changes will help you cut calories:  · Eat smaller portions  Use small plates, no larger than 9 inches in diameter  Fill your plate half full of fruits and vegetables  Measure your food using measuring cups until you know what a serving size looks like  · Eat 3 meals and 1 or 2 snacks each day  Plan your meals in advance  [place] and eat at home most of the time  Eat slowly  Do not skip meals  Skipping meals can lead to overeating later in the day  This can make it harder for you to lose weight  Talk with a dietitian to help you make a meal plan and schedule that is right for you  · Eat fruits and vegetables at every meal   They are low in calories and high in fiber, which makes you feel full  Do not add butter, margarine, or cream sauce to vegetables  Use herbs to season steamed vegetables  · Eat less fat and fewer fried foods  Eat more baked or grilled chicken and fish  These protein sources are lower in calories and fat than red meat  Limit fast food   Dress your salads with olive oil and vinegar instead of bottled dressing  · Limit the amount of sugar you eat  Do not drink sugary beverages  Limit alcohol  Activity changes:  Physical activity is good for your body in many ways  It helps you burn calories and build strong muscles  It decreases stress and depression, and improves your mood  It can also help you sleep better  Talk to your healthcare provider before you begin an exercise program   · Exercise for at least 30 minutes 5 days a week  Start slowly  Set aside time each day for physical activity that you enjoy and that is convenient for you  It is best to do both weight training and an activity that increases your heart rate, such as walking, bicycling, or swimming  · Find ways to be more active  Do yard work and housecleaning  Walk up the stairs instead of using elevators  Spend your leisure time going to events that require walking, such as outdoor festivals or fairs  This extra physical activity can help you lose weight and keep it off  Follow up with your healthcare provider as directed: You may need to meet with a dietitian  Write down your questions so you remember to ask them during your visits  © Copyright [employer] 2021 Information is for End User's use only and may not be sold, redistributed or otherwise used for commercial purposes  All illustrations and images included in CareNotes® are the copyrighted property of [employer]  or [employer]  The above information is an  only  It is not intended as medical advice for individual conditions or treatments  Talk to your doctor, nurse or pharmacist before following any medical regimen to see if it is safe and effective for you

## 2021-09-22 NOTE — PROGRESS NOTES
[address]    NAME: Stewart Rubinstein  AGE: 66 y o  SEX: male  : 1950     DATE: 2021    Assessment and Plan     1  Well adult exam    2  Essential hypertension  -     Comprehensive metabolic panel; Future  -     amLODIPine (NORVASC) 5 mg tablet; Take 1 tablet (5 mg total) by mouth daily    3  Thrombocytopenia ([address] )  -     CBC; Future    4  Elevated low-density lipoprotein level    5  Obesity (BMI 30 0-34 9)    6  BMI 31 0-31 9,adult    7  Screening for cardiovascular condition  -     Comprehensive metabolic panel; Future  -     Lipid Panel with Direct LDL reflex; Future    8  Screening for prostate cancer  -     PSA, Total Screen; Future        · Patient Counseling:   · Nutrition: Stressed importance of a well balanced diet, moderation of sodium/saturated fat, caloric balance and sufficient intake of fiber  · Exercise: Stressed the importance of regular exercise with a goal of 150 minutes per week  · Dental Health: Discussed daily flossing and brushing and regular dental visits     · Immunizations reviewed: Risks and Benefits discussed  · Discussed benefits of:  Colon Cancer Screening, Prostate Cancer Screening  and Screening labs   BMI Counseling: Body mass index is 31 45 kg/m²  Discussed with patient's BMI with him  The BMI is above normal  Nutrition recommendations include reducing portion sizes  No follow-ups on file          Chief Complaint     Chief Complaint   Patient presents with    Physical Exam       History of Present Illness     Pt is here for a full physical  Pt asking if I would order for platelets for his hematologist      Well Adult Physical   Patient here for a comprehensive physical exam       Diet and Physical Activity  Diet: well balanced diet  Exercise: frequently      Depression Screen  PHQ-9 Depression Screening    PHQ-9:   Frequency of the following problems over the past two weeks: Little interest or pleasure in doing things: 0 - not at all  Feeling down, depressed, or hopeless: 0 - not at all  PHQ-2 Score: 0          General Health  Hearing: Normal:  bilateral  Vision: Cataracts  Dental: regular dental visits    Reproductive Health  No issues       The following portions of the patient's history were reviewed and updated as appropriate: allergies, current medications, past family history, past medical history, past social history, past surgical history and problem list     Review of Systems     Review of Systems   Constitutional: Negative for activity change, appetite change, chills, diaphoresis, fatigue, fever and unexpected weight change  HENT: Negative for congestion, dental problem, ear pain, mouth sores, sinus pressure, sinus pain, sore throat and trouble swallowing  Eyes: Negative for photophobia, discharge and itching  Respiratory: Negative for apnea, chest tightness and shortness of breath  Cardiovascular: Negative for chest pain, palpitations and leg swelling  Gastrointestinal: Negative for abdominal distention, abdominal pain, blood in stool, nausea and vomiting  Endocrine: Negative for cold intolerance, heat intolerance, polydipsia, polyphagia and polyuria  Genitourinary: Negative for difficulty urinating  Musculoskeletal: Negative for arthralgias  Skin: Negative for color change and wound  Neurological: Negative for dizziness, syncope, speech difficulty and headaches  Hematological: Negative for adenopathy  Psychiatric/Behavioral: Negative for agitation and behavioral problems         Past Medical History     Past Medical History:   Diagnosis Date    Thrombocytopenia ([facility] [address] )     platelet count maintained around 90-Dr Nakita Willson Wears partial dentures     upper       Past Surgical History     Past Surgical History:   Procedure Laterality Date    APPENDECTOMY      CATARACT EXTRACTION Left     COLONOSCOPY      COLONOSCOPY N/A 10/17/2018 Procedure: COLONOSCOPY;  Surgeon: Allie Renteria MD;  Location: [place] [address] GI LAB; Service: Gastroenterology    HERNIA REPAIR Right 2013    inguinal    JOINT REPLACEMENT Right     knee, left shoulder    KS XCAPSL CTRC RMVL INSJ IO LENS PROSTH W/O ECP Left 2016    Procedure: EXTRACTION EXTRACAPSULAR CATARACT PHACO INTRAOCULAR LENS (IOL); Surgeon: Peyton Goodrich MD;  Location[facility] MAIN OR;  Service: Ophthalmology    SHOULDER ARTHROSCOPY Left     tendon repair    TONSILLECTOMY  age 1       Social History     Social History     Socioeconomic History    Marital status: /Civil Union     Spouse name: Not on file    Number of children: Not on file    Years of education: Not on file    Highest education level: Not on file   Occupational History    Not on file   Tobacco Use    Smoking status: Former Smoker     Packs/day: 0 50     Years: 22 00     Pack years: 11 00     Types: Cigarettes     Quit date:      Years since quittin 7    Smokeless tobacco: Never Used   Substance and Sexual Activity    Alcohol use: No    Drug use: No    Sexual activity: Not on file   Other Topics Concern    Not on file   Social History Narrative    Not on file     Social Determinants of Health     Financial Resource Strain:     Difficulty of Paying Living Expenses:    Food Insecurity:     Worried About [address] in the Last Year:     [address] [facility] [address] in the Last Year:    Transportation Needs:     Lack of Transportation (Medical):      Lack of Transportation (Non-Medical):    Physical Activity:     Days of Exercise per Week:     Minutes of Exercise per Session:    Stress:     Feeling of Stress :    Social Connections:     Frequency of Communication with Friends and Family:     Frequency of Social Gatherings with Friends and Family:     Attends Congregational Services:     Active Member of Clubs or Organizations:     Attends Club or Organization Meetings:     Marital Status:    Intimate Partner Violence:     Fear of Current or Ex-Partner:     Emotionally Abused:     Physically Abused:     Sexually Abused:        Family History     Family History   Problem Relation Age of Onset    Heart disease Father 67        massive MI    Heart disease Sister         MI    Heart disease Brother         MI    No Known Problems Mother     Mental illness Neg Hx        Current Medications       Current Outpatient Medications:     amLODIPine (NORVASC) 5 mg tablet, Take 1 tablet (5 mg total) by mouth daily, Disp: 90 tablet, Rfl: 1    ascorbic acid (VITAMIN C) 500 mg tablet, Take 500 mg by mouth every morning , Disp: , Rfl:     b complex vitamins tablet, Take 1 tablet by mouth every morning , Disp: , Rfl:     cholecalciferol (VITAMIN D3) 1,000 units tablet, Take 1,000 Units by mouth every morning, Disp: , Rfl:     multivitamin (THERAGRAN) TABS, Take 1 tablet by mouth every morning , Disp: , Rfl:     Omega-3 Fatty Acids (FISH OIL) 1,000 mg, Take 1,000 mg by mouth every morning  , Disp: , Rfl:     sildenafil (VIAGRA) 100 mg tablet, Take 1 tablet (100 mg total) by mouth daily as needed for erectile dysfunction, Disp: 10 tablet, Rfl: 3     Allergies     Allergies   Allergen Reactions    Dust Mite Extract        Objective     /72   Pulse 78   Temp 98 4 °F (36 9 °C)   Resp 14   Ht 6' 3 5" (1 918 m)   Wt 116 kg (255 lb)   SpO2 97%   BMI 31 45 kg/m²      Physical Exam  Vitals and nursing note reviewed  Constitutional:       General: He is not in acute distress  Appearance: He is well-developed  He is not diaphoretic  HENT:      Head: Normocephalic and atraumatic  Right Ear: External ear normal       Left Ear: External ear normal       Nose: Nose normal       Mouth/Throat:      Pharynx: No oropharyngeal exudate  Eyes:      General: No scleral icterus  Right eye: No discharge  Left eye: No discharge  Pupils: Pupils are equal, round, and reactive to light     Neck: Thyroid: No thyromegaly  Cardiovascular:      Rate and Rhythm: Normal rate  Heart sounds: Normal heart sounds  No murmur heard  Pulmonary:      Effort: Pulmonary effort is normal  No respiratory distress  Breath sounds: Normal breath sounds  No wheezing  Abdominal:      General: Bowel sounds are normal  There is no distension  Palpations: Abdomen is soft  There is no mass  Tenderness: There is no abdominal tenderness  There is no guarding or rebound  Genitourinary:     Prostate: Normal    Musculoskeletal:         General: Normal range of motion  Skin:     General: Skin is warm and dry  Findings: No erythema or rash  Neurological:      Mental Status: He is alert  Coordination: Coordination normal       Deep Tendon Reflexes: Reflexes normal    Psychiatric:         Behavior: Behavior normal             Visual Acuity Screening    Right eye Left eye Both eyes   Without correction: 20/70 20/40 20/40   With correction:              Martin Morrison DO  [address]  BMI Counseling: Body mass index is 31 45 kg/m²  The BMI is above normal  Nutrition recommendations include reducing portion sizes

## 2021-09-23 ENCOUNTER — TELEPHONE (OUTPATIENT)
Dept: HEMATOLOGY ONCOLOGY | Facility: CLINIC | Age: 71
End: 2021-09-23

## 2021-09-23 ENCOUNTER — APPOINTMENT (OUTPATIENT)
Dept: LAB | Facility: HOSPITAL | Age: 71
End: 2021-09-23
Attending: FAMILY MEDICINE
Payer: COMMERCIAL

## 2021-09-23 DIAGNOSIS — Z12.5 SCREENING FOR PROSTATE CANCER: ICD-10-CM

## 2021-09-23 DIAGNOSIS — D69.6 THROMBOCYTOPENIA (HCC): ICD-10-CM

## 2021-09-23 DIAGNOSIS — I10 ESSENTIAL HYPERTENSION: ICD-10-CM

## 2021-09-23 DIAGNOSIS — Z13.6 SCREENING FOR CARDIOVASCULAR CONDITION: ICD-10-CM

## 2021-09-23 LAB
ALBUMIN SERPL BCP-MCNC: 3.6 G/DL (ref 3.5–5)
ALP SERPL-CCNC: 97 U/L (ref 46–116)
ALT SERPL W P-5'-P-CCNC: 25 U/L (ref 12–78)
ANION GAP SERPL CALCULATED.3IONS-SCNC: 10 MMOL/L (ref 4–13)
AST SERPL W P-5'-P-CCNC: 22 U/L (ref 5–45)
BASOPHILS # BLD AUTO: 0.05 THOUSANDS/ΜL (ref 0–0.1)
BASOPHILS NFR BLD AUTO: 1 % (ref 0–1)
BILIRUB SERPL-MCNC: 0.95 MG/DL (ref 0.2–1)
BUN SERPL-MCNC: 23 MG/DL (ref 5–25)
CALCIUM SERPL-MCNC: 8.5 MG/DL (ref 8.3–10.1)
CHLORIDE SERPL-SCNC: 107 MMOL/L (ref 100–108)
CHOLEST SERPL-MCNC: 173 MG/DL (ref 50–200)
CO2 SERPL-SCNC: 25 MMOL/L (ref 21–32)
CREAT SERPL-MCNC: 1.29 MG/DL (ref 0.6–1.3)
EOSINOPHIL # BLD AUTO: 0.18 THOUSAND/ΜL (ref 0–0.61)
EOSINOPHIL NFR BLD AUTO: 3 % (ref 0–6)
ERYTHROCYTE [DISTWIDTH] IN BLOOD BY AUTOMATED COUNT: 13.5 % (ref 11.6–15.1)
GFR SERPL CREATININE-BSD FRML MDRD: 55 ML/MIN/1.73SQ M
GLUCOSE P FAST SERPL-MCNC: 103 MG/DL (ref 65–99)
HCT VFR BLD AUTO: 45.4 % (ref 36.5–49.3)
HDLC SERPL-MCNC: 38 MG/DL
HGB BLD-MCNC: 14.9 G/DL (ref 12–17)
IMM GRANULOCYTES # BLD AUTO: 0.03 THOUSAND/UL (ref 0–0.2)
IMM GRANULOCYTES NFR BLD AUTO: 0 % (ref 0–2)
LDLC SERPL CALC-MCNC: 113 MG/DL (ref 0–100)
LYMPHOCYTES # BLD AUTO: 1.96 THOUSANDS/ΜL (ref 0.6–4.47)
LYMPHOCYTES NFR BLD AUTO: 27 % (ref 14–44)
MCH RBC QN AUTO: 29.7 PG (ref 26.8–34.3)
MCHC RBC AUTO-ENTMCNC: 32.8 G/DL (ref 31.4–37.4)
MCV RBC AUTO: 91 FL (ref 82–98)
MONOCYTES # BLD AUTO: 0.68 THOUSAND/ΜL (ref 0.17–1.22)
MONOCYTES NFR BLD AUTO: 10 % (ref 4–12)
NEUTROPHILS # BLD AUTO: 4.27 THOUSANDS/ΜL (ref 1.85–7.62)
NEUTS SEG NFR BLD AUTO: 59 % (ref 43–75)
NRBC BLD AUTO-RTO: 0 /100 WBCS
PLATELET # BLD AUTO: 20 THOUSANDS/UL (ref 149–390)
PMV BLD AUTO: 13.1 FL (ref 8.9–12.7)
POTASSIUM SERPL-SCNC: 4.7 MMOL/L (ref 3.5–5.3)
PROT SERPL-MCNC: 7.6 G/DL (ref 6.4–8.2)
PSA SERPL-MCNC: 0.6 NG/ML (ref 0–4)
RBC # BLD AUTO: 5.01 MILLION/UL (ref 3.88–5.62)
SODIUM SERPL-SCNC: 142 MMOL/L (ref 136–145)
TRIGL SERPL-MCNC: 109 MG/DL
WBC # BLD AUTO: 7.17 THOUSAND/UL (ref 4.31–10.16)

## 2021-09-23 PROCEDURE — 36415 COLL VENOUS BLD VENIPUNCTURE: CPT

## 2021-09-23 PROCEDURE — 80053 COMPREHEN METABOLIC PANEL: CPT

## 2021-09-23 PROCEDURE — 80061 LIPID PANEL: CPT

## 2021-09-23 PROCEDURE — 85025 COMPLETE CBC W/AUTO DIFF WBC: CPT

## 2021-09-23 PROCEDURE — G0103 PSA SCREENING: HCPCS

## 2021-09-23 NOTE — TELEPHONE ENCOUNTER
Dr Edi Steiner will review and advise  Voice mail message left with patient to call office to assess any symptoms

## 2021-09-23 NOTE — TELEPHONE ENCOUNTER
Critical Results   Call Received From [facility]   Lab Department Location [place]   Lab Study Platelet 20   Date Blood Work was Done today   Read Back of Information Done yes   Relevant Information

## 2021-09-23 NOTE — TELEPHONE ENCOUNTER
Per OV note:  55-year-old male with relatively good past medical history found to have thrombocytopenia a few years ago  The decreased platelet counts go back approximately 4 + years  White count, hemoglobin level and differential have been relatively normal   Mr Aubrie Raya feels well and has no bleeding or excessive bruising issues  Patient has never had a bone marrow biopsy        The platelet count is low but about the same as it was before (the elevated platelet count from last year was drawn after patient received platelets)  The plan is to continue to monitor    In the future, if patient is in need of any invasive procedure or surgery and there is time, patient can be treated with Decadron x 4 days to see if this helps raise the platelet count        Patient is scheduled for colonoscopy on 10/14  Will d/w dr Lashanda Perez

## 2021-09-24 ENCOUNTER — TELEPHONE (OUTPATIENT)
Dept: HEMATOLOGY ONCOLOGY | Facility: CLINIC | Age: 71
End: 2021-09-24

## 2021-09-24 ENCOUNTER — TELEPHONE (OUTPATIENT)
Dept: HEMATOLOGY ONCOLOGY | Facility: MEDICAL CENTER | Age: 71
End: 2021-09-24

## 2021-09-24 DIAGNOSIS — D69.6 THROMBOCYTOPENIA (HCC): Primary | ICD-10-CM

## 2021-09-24 NOTE — TELEPHONE ENCOUNTER
Pt called stating that he had been taking penicillin and wondered if that may have caused the low platelets   Pt will call back on Monday

## 2021-09-24 NOTE — TELEPHONE ENCOUNTER
Spoke with patient  Patient doubts plt result  "I had a tooth pulled with no issues"  Patient is going on vacation and will have CBC done upon return

## 2021-09-24 NOTE — TELEPHONE ENCOUNTER
Per Dr Martinez Shon  Patient to start decadron 40 mg PO /day times 5 days starting 10/8  CBC check on 10/13 prior to colonoscopy on 10/14  Voicemail left for patient to discuss plan

## 2021-10-04 ENCOUNTER — TELEPHONE (OUTPATIENT)
Dept: HEMATOLOGY ONCOLOGY | Facility: CLINIC | Age: 71
End: 2021-10-04

## 2021-10-04 ENCOUNTER — APPOINTMENT (OUTPATIENT)
Dept: LAB | Facility: HOSPITAL | Age: 71
End: 2021-10-04
Attending: INTERNAL MEDICINE
Payer: COMMERCIAL

## 2021-10-04 ENCOUNTER — TELEPHONE (OUTPATIENT)
Dept: HEMATOLOGY ONCOLOGY | Facility: MEDICAL CENTER | Age: 71
End: 2021-10-04

## 2021-10-04 DIAGNOSIS — D69.6 THROMBOCYTOPENIA (HCC): ICD-10-CM

## 2021-10-04 LAB
BASOPHILS # BLD AUTO: 0.04 THOUSANDS/ΜL (ref 0–0.1)
BASOPHILS NFR BLD AUTO: 1 % (ref 0–1)
EOSINOPHIL # BLD AUTO: 0.18 THOUSAND/ΜL (ref 0–0.61)
EOSINOPHIL NFR BLD AUTO: 2 % (ref 0–6)
ERYTHROCYTE [DISTWIDTH] IN BLOOD BY AUTOMATED COUNT: 13.4 % (ref 11.6–15.1)
HCT VFR BLD AUTO: 44.7 % (ref 36.5–49.3)
HGB BLD-MCNC: 14.9 G/DL (ref 12–17)
IMM GRANULOCYTES # BLD AUTO: 0.03 THOUSAND/UL (ref 0–0.2)
IMM GRANULOCYTES NFR BLD AUTO: 0 % (ref 0–2)
LYMPHOCYTES # BLD AUTO: 1.67 THOUSANDS/ΜL (ref 0.6–4.47)
LYMPHOCYTES NFR BLD AUTO: 22 % (ref 14–44)
MCH RBC QN AUTO: 29.9 PG (ref 26.8–34.3)
MCHC RBC AUTO-ENTMCNC: 33.3 G/DL (ref 31.4–37.4)
MCV RBC AUTO: 90 FL (ref 82–98)
MONOCYTES # BLD AUTO: 0.68 THOUSAND/ΜL (ref 0.17–1.22)
MONOCYTES NFR BLD AUTO: 9 % (ref 4–12)
NEUTROPHILS # BLD AUTO: 5.08 THOUSANDS/ΜL (ref 1.85–7.62)
NEUTS SEG NFR BLD AUTO: 66 % (ref 43–75)
NRBC BLD AUTO-RTO: 0 /100 WBCS
PLATELET # BLD AUTO: 8 THOUSANDS/UL (ref 149–390)
PMV BLD AUTO: 13.2 FL (ref 8.9–12.7)
RBC # BLD AUTO: 4.98 MILLION/UL (ref 3.88–5.62)
WBC # BLD AUTO: 7.68 THOUSAND/UL (ref 4.31–10.16)

## 2021-10-04 PROCEDURE — 36415 COLL VENOUS BLD VENIPUNCTURE: CPT

## 2021-10-04 PROCEDURE — 85025 COMPLETE CBC W/AUTO DIFF WBC: CPT

## 2021-10-04 RX ORDER — DEXAMETHASONE 4 MG/1
TABLET ORAL
Qty: 50 TABLET | Refills: 0 | Status: CANCELLED | OUTPATIENT
Start: 2021-10-04

## 2021-10-05 ENCOUNTER — TELEPHONE (OUTPATIENT)
Dept: HEMATOLOGY ONCOLOGY | Facility: CLINIC | Age: 71
End: 2021-10-05

## 2021-10-05 DIAGNOSIS — D69.6 THROMBOCYTOPENIA (HCC): Primary | ICD-10-CM

## 2021-10-05 RX ORDER — DEXAMETHASONE 4 MG/1
TABLET ORAL
Qty: 50 TABLET | Refills: 0 | OUTPATIENT
Start: 2021-10-05 | End: 2021-10-12

## 2021-10-08 ENCOUNTER — TELEPHONE (OUTPATIENT)
Dept: GASTROENTEROLOGY | Facility: AMBULARY SURGERY CENTER | Age: 71
End: 2021-10-08

## 2021-10-12 ENCOUNTER — TELEPHONE (OUTPATIENT)
Dept: HEMATOLOGY ONCOLOGY | Facility: CLINIC | Age: 71
End: 2021-10-12

## 2021-10-12 ENCOUNTER — APPOINTMENT (OUTPATIENT)
Dept: LAB | Facility: HOSPITAL | Age: 71
End: 2021-10-12
Payer: COMMERCIAL

## 2021-10-12 DIAGNOSIS — D69.6 THROMBOCYTOPENIA (HCC): ICD-10-CM

## 2021-10-12 DIAGNOSIS — D69.6 THROMBOCYTOPENIA (HCC): Primary | ICD-10-CM

## 2021-10-12 LAB
BASOPHILS # BLD AUTO: 0.03 THOUSANDS/ΜL (ref 0–0.1)
BASOPHILS NFR BLD AUTO: 0 % (ref 0–1)
EOSINOPHIL # BLD AUTO: 0.03 THOUSAND/ΜL (ref 0–0.61)
EOSINOPHIL NFR BLD AUTO: 0 % (ref 0–6)
ERYTHROCYTE [DISTWIDTH] IN BLOOD BY AUTOMATED COUNT: 13.7 % (ref 11.6–15.1)
HCT VFR BLD AUTO: 47.2 % (ref 36.5–49.3)
HGB BLD-MCNC: 15.5 G/DL (ref 12–17)
IMM GRANULOCYTES # BLD AUTO: 0.19 THOUSAND/UL (ref 0–0.2)
IMM GRANULOCYTES NFR BLD AUTO: 2 % (ref 0–2)
LYMPHOCYTES # BLD AUTO: 2.23 THOUSANDS/ΜL (ref 0.6–4.47)
LYMPHOCYTES NFR BLD AUTO: 18 % (ref 14–44)
MCH RBC QN AUTO: 29.5 PG (ref 26.8–34.3)
MCHC RBC AUTO-ENTMCNC: 32.8 G/DL (ref 31.4–37.4)
MCV RBC AUTO: 90 FL (ref 82–98)
MONOCYTES # BLD AUTO: 0.95 THOUSAND/ΜL (ref 0.17–1.22)
MONOCYTES NFR BLD AUTO: 8 % (ref 4–12)
NEUTROPHILS # BLD AUTO: 8.77 THOUSANDS/ΜL (ref 1.85–7.62)
NEUTS SEG NFR BLD AUTO: 72 % (ref 43–75)
NRBC BLD AUTO-RTO: 0 /100 WBCS
PLATELET # BLD AUTO: 186 THOUSANDS/UL (ref 149–390)
PMV BLD AUTO: 10.7 FL (ref 8.9–12.7)
RBC # BLD AUTO: 5.25 MILLION/UL (ref 3.88–5.62)
WBC # BLD AUTO: 12.2 THOUSAND/UL (ref 4.31–10.16)

## 2021-10-12 PROCEDURE — 85025 COMPLETE CBC W/AUTO DIFF WBC: CPT

## 2021-10-12 PROCEDURE — 36415 COLL VENOUS BLD VENIPUNCTURE: CPT

## 2021-10-14 ENCOUNTER — ANESTHESIA (OUTPATIENT)
Dept: GASTROENTEROLOGY | Facility: AMBULARY SURGERY CENTER | Age: 71
End: 2021-10-14

## 2021-10-14 ENCOUNTER — ANESTHESIA EVENT (OUTPATIENT)
Dept: GASTROENTEROLOGY | Facility: AMBULARY SURGERY CENTER | Age: 71
End: 2021-10-14

## 2021-10-14 ENCOUNTER — HOSPITAL ENCOUNTER (OUTPATIENT)
Dept: GASTROENTEROLOGY | Facility: AMBULARY SURGERY CENTER | Age: 71
Setting detail: OUTPATIENT SURGERY
Discharge: HOME | End: 2021-10-14
Attending: INTERNAL MEDICINE | Admitting: INTERNAL MEDICINE
Payer: COMMERCIAL

## 2021-10-14 VITALS
TEMPERATURE: 97.1 F | BODY MASS INDEX: 31.05 KG/M2 | OXYGEN SATURATION: 94 % | RESPIRATION RATE: 18 BRPM | HEIGHT: 76 IN | DIASTOLIC BLOOD PRESSURE: 64 MMHG | SYSTOLIC BLOOD PRESSURE: 105 MMHG | HEART RATE: 63 BPM | WEIGHT: 255 LBS

## 2021-10-14 DIAGNOSIS — Z86.010 HX OF ADENOMATOUS POLYP OF COLON: ICD-10-CM

## 2021-10-14 PROCEDURE — 45378 DIAGNOSTIC COLONOSCOPY: CPT | Performed by: INTERNAL MEDICINE

## 2021-10-14 RX ORDER — SODIUM CHLORIDE, SODIUM LACTATE, POTASSIUM CHLORIDE, CALCIUM CHLORIDE 600; 310; 30; 20 MG/100ML; MG/100ML; MG/100ML; MG/100ML
125 INJECTION, SOLUTION INTRAVENOUS CONTINUOUS
Status: DISCONTINUED | OUTPATIENT
Start: 2021-10-14 | End: 2021-10-18 | Stop reason: HOSPADM

## 2021-10-14 RX ORDER — PROPOFOL 10 MG/ML
INJECTION, EMULSION INTRAVENOUS AS NEEDED
Status: DISCONTINUED | OUTPATIENT
Start: 2021-10-14 | End: 2021-10-14

## 2021-10-14 RX ORDER — SODIUM CHLORIDE, SODIUM LACTATE, POTASSIUM CHLORIDE, CALCIUM CHLORIDE 600; 310; 30; 20 MG/100ML; MG/100ML; MG/100ML; MG/100ML
INJECTION, SOLUTION INTRAVENOUS CONTINUOUS PRN
Status: DISCONTINUED | OUTPATIENT
Start: 2021-10-14 | End: 2021-10-14

## 2021-10-14 RX ADMIN — SODIUM CHLORIDE, SODIUM LACTATE, POTASSIUM CHLORIDE, AND CALCIUM CHLORIDE: .6; .31; .03; .02 INJECTION, SOLUTION INTRAVENOUS at 09:12

## 2021-10-14 RX ADMIN — PROPOFOL 100 MG: 10 INJECTION, EMULSION INTRAVENOUS at 09:39

## 2021-10-14 RX ADMIN — PROPOFOL 20 MG: 10 INJECTION, EMULSION INTRAVENOUS at 09:33

## 2021-10-14 RX ADMIN — PROPOFOL 120 MG: 10 INJECTION, EMULSION INTRAVENOUS at 09:18

## 2021-10-14 RX ADMIN — PROPOFOL 50 MG: 10 INJECTION, EMULSION INTRAVENOUS at 09:34

## 2021-10-14 RX ADMIN — PROPOFOL 50 MG: 10 INJECTION, EMULSION INTRAVENOUS at 09:28

## 2021-10-14 RX ADMIN — PROPOFOL 60 MG: 10 INJECTION, EMULSION INTRAVENOUS at 09:23

## 2021-10-26 ENCOUNTER — TELEPHONE (OUTPATIENT)
Dept: HEMATOLOGY ONCOLOGY | Facility: MEDICAL CENTER | Age: 71
End: 2021-10-26

## 2021-10-26 ENCOUNTER — APPOINTMENT (OUTPATIENT)
Dept: LAB | Facility: HOSPITAL | Age: 71
End: 2021-10-26
Attending: INTERNAL MEDICINE
Payer: COMMERCIAL

## 2021-10-26 DIAGNOSIS — D69.6 THROMBOCYTOPENIA (HCC): ICD-10-CM

## 2021-10-26 DIAGNOSIS — D69.6 THROMBOCYTOPENIA (HCC): Primary | ICD-10-CM

## 2021-10-26 LAB
BASOPHILS # BLD AUTO: 0.04 THOUSANDS/ΜL (ref 0–0.1)
BASOPHILS NFR BLD AUTO: 1 % (ref 0–1)
EOSINOPHIL # BLD AUTO: 0.13 THOUSAND/ΜL (ref 0–0.61)
EOSINOPHIL NFR BLD AUTO: 2 % (ref 0–6)
ERYTHROCYTE [DISTWIDTH] IN BLOOD BY AUTOMATED COUNT: 13.8 % (ref 11.6–15.1)
HCT VFR BLD AUTO: 43.2 % (ref 36.5–49.3)
HGB BLD-MCNC: 14.2 G/DL (ref 12–17)
IMM GRANULOCYTES # BLD AUTO: 0.02 THOUSAND/UL (ref 0–0.2)
IMM GRANULOCYTES NFR BLD AUTO: 0 % (ref 0–2)
LYMPHOCYTES # BLD AUTO: 1.57 THOUSANDS/ΜL (ref 0.6–4.47)
LYMPHOCYTES NFR BLD AUTO: 26 % (ref 14–44)
MCH RBC QN AUTO: 29.6 PG (ref 26.8–34.3)
MCHC RBC AUTO-ENTMCNC: 32.9 G/DL (ref 31.4–37.4)
MCV RBC AUTO: 90 FL (ref 82–98)
MONOCYTES # BLD AUTO: 0.64 THOUSAND/ΜL (ref 0.17–1.22)
MONOCYTES NFR BLD AUTO: 10 % (ref 4–12)
NEUTROPHILS # BLD AUTO: 3.75 THOUSANDS/ΜL (ref 1.85–7.62)
NEUTS SEG NFR BLD AUTO: 61 % (ref 43–75)
NRBC BLD AUTO-RTO: 0 /100 WBCS
PLATELET # BLD AUTO: 98 THOUSANDS/UL (ref 149–390)
PMV BLD AUTO: 10.5 FL (ref 8.9–12.7)
RBC # BLD AUTO: 4.79 MILLION/UL (ref 3.88–5.62)
WBC # BLD AUTO: 6.15 THOUSAND/UL (ref 4.31–10.16)

## 2021-10-26 PROCEDURE — 85025 COMPLETE CBC W/AUTO DIFF WBC: CPT

## 2021-10-26 PROCEDURE — 36415 COLL VENOUS BLD VENIPUNCTURE: CPT

## 2021-10-28 ENCOUNTER — OFFICE VISIT (OUTPATIENT)
Dept: HEMATOLOGY ONCOLOGY | Facility: MEDICAL CENTER | Age: 71
End: 2021-10-28
Payer: COMMERCIAL

## 2021-10-28 VITALS
RESPIRATION RATE: 16 BRPM | SYSTOLIC BLOOD PRESSURE: 132 MMHG | WEIGHT: 257.4 LBS | DIASTOLIC BLOOD PRESSURE: 76 MMHG | OXYGEN SATURATION: 96 % | BODY MASS INDEX: 31.34 KG/M2 | HEART RATE: 77 BPM | HEIGHT: 76 IN | TEMPERATURE: 97.1 F

## 2021-10-28 DIAGNOSIS — D69.6 THROMBOCYTOPENIA (HCC): Primary | ICD-10-CM

## 2021-10-28 PROCEDURE — 99214 OFFICE O/P EST MOD 30 MIN: CPT | Performed by: INTERNAL MEDICINE

## 2021-10-28 PROCEDURE — 3008F BODY MASS INDEX DOCD: CPT | Performed by: INTERNAL MEDICINE

## 2021-10-28 PROCEDURE — 1036F TOBACCO NON-USER: CPT | Performed by: INTERNAL MEDICINE

## 2021-10-28 PROCEDURE — 3078F DIAST BP <80 MM HG: CPT | Performed by: INTERNAL MEDICINE

## 2021-10-28 PROCEDURE — 1160F RVW MEDS BY RX/DR IN RCRD: CPT | Performed by: INTERNAL MEDICINE

## 2021-10-28 PROCEDURE — 3075F SYST BP GE 130 - 139MM HG: CPT | Performed by: INTERNAL MEDICINE

## 2021-12-14 ENCOUNTER — APPOINTMENT (OUTPATIENT)
Dept: LAB | Facility: HOSPITAL | Age: 71
End: 2021-12-14
Attending: INTERNAL MEDICINE
Payer: COMMERCIAL

## 2021-12-14 LAB
BASOPHILS # BLD AUTO: 0.04 THOUSANDS/ΜL (ref 0–0.1)
BASOPHILS NFR BLD AUTO: 1 % (ref 0–1)
EOSINOPHIL # BLD AUTO: 0.34 THOUSAND/ΜL (ref 0–0.61)
EOSINOPHIL NFR BLD AUTO: 5 % (ref 0–6)
ERYTHROCYTE [DISTWIDTH] IN BLOOD BY AUTOMATED COUNT: 14.3 % (ref 11.6–15.1)
HCT VFR BLD AUTO: 44.8 % (ref 36.5–49.3)
HGB BLD-MCNC: 14.5 G/DL (ref 12–17)
IMM GRANULOCYTES # BLD AUTO: 0.01 THOUSAND/UL (ref 0–0.2)
IMM GRANULOCYTES NFR BLD AUTO: 0 % (ref 0–2)
LYMPHOCYTES # BLD AUTO: 2.46 THOUSANDS/ΜL (ref 0.6–4.47)
LYMPHOCYTES NFR BLD AUTO: 38 % (ref 14–44)
MCH RBC QN AUTO: 29.1 PG (ref 26.8–34.3)
MCHC RBC AUTO-ENTMCNC: 32.4 G/DL (ref 31.4–37.4)
MCV RBC AUTO: 90 FL (ref 82–98)
MONOCYTES # BLD AUTO: 0.81 THOUSAND/ΜL (ref 0.17–1.22)
MONOCYTES NFR BLD AUTO: 12 % (ref 4–12)
NEUTROPHILS # BLD AUTO: 2.88 THOUSANDS/ΜL (ref 1.85–7.62)
NEUTS SEG NFR BLD AUTO: 44 % (ref 43–75)
NRBC BLD AUTO-RTO: 0 /100 WBCS
PLATELET # BLD AUTO: 121 THOUSANDS/UL (ref 149–390)
PMV BLD AUTO: 10.6 FL (ref 8.9–12.7)
RBC # BLD AUTO: 4.98 MILLION/UL (ref 3.88–5.62)
WBC # BLD AUTO: 6.54 THOUSAND/UL (ref 4.31–10.16)

## 2021-12-14 PROCEDURE — 36415 COLL VENOUS BLD VENIPUNCTURE: CPT | Performed by: INTERNAL MEDICINE

## 2021-12-14 PROCEDURE — 85025 COMPLETE CBC W/AUTO DIFF WBC: CPT | Performed by: INTERNAL MEDICINE

## 2021-12-15 ENCOUNTER — HOSPITAL ENCOUNTER (OUTPATIENT)
Facility: HOSPITAL | Age: 71
Setting detail: OBSERVATION
Discharge: HOME | End: 2021-12-16
Attending: EMERGENCY MEDICINE | Admitting: FAMILY MEDICINE
Payer: COMMERCIAL

## 2021-12-15 ENCOUNTER — APPOINTMENT (EMERGENCY)
Dept: RADIOLOGY | Facility: HOSPITAL | Age: 71
End: 2021-12-15
Payer: COMMERCIAL

## 2021-12-15 DIAGNOSIS — U07.1 COVID-19: ICD-10-CM

## 2021-12-15 DIAGNOSIS — I10 ESSENTIAL HYPERTENSION: ICD-10-CM

## 2021-12-15 DIAGNOSIS — I49.3 PVC (PREMATURE VENTRICULAR CONTRACTION): ICD-10-CM

## 2021-12-15 DIAGNOSIS — R77.8 ELEVATED TROPONIN: Primary | ICD-10-CM

## 2021-12-15 PROBLEM — R79.89 ELEVATED TROPONIN: Status: ACTIVE | Noted: 2021-12-15

## 2021-12-15 LAB
2HR DELTA HS TROPONIN: 4 NG/L
4HR DELTA HS TROPONIN: 0 NG/L
ALBUMIN SERPL BCP-MCNC: 3.2 G/DL (ref 3.5–5)
ALP SERPL-CCNC: 104 U/L (ref 46–116)
ALT SERPL W P-5'-P-CCNC: 31 U/L (ref 12–78)
ANION GAP SERPL CALCULATED.3IONS-SCNC: 12 MMOL/L (ref 4–13)
APTT PPP: 34 SECONDS (ref 23–37)
AST SERPL W P-5'-P-CCNC: 20 U/L (ref 5–45)
ATRIAL RATE: 78 BPM
BASOPHILS # BLD AUTO: 0.03 THOUSANDS/ΜL (ref 0–0.1)
BASOPHILS NFR BLD AUTO: 0 % (ref 0–1)
BILIRUB SERPL-MCNC: 0.78 MG/DL (ref 0.2–1)
BILIRUB UR QL STRIP: NEGATIVE
BUN SERPL-MCNC: 24 MG/DL (ref 5–25)
CALCIUM ALBUM COR SERPL-MCNC: 9.4 MG/DL (ref 8.3–10.1)
CALCIUM SERPL-MCNC: 8.8 MG/DL (ref 8.3–10.1)
CARDIAC TROPONIN I PNL SERPL HS: 51 NG/L
CARDIAC TROPONIN I PNL SERPL HS: 51 NG/L
CARDIAC TROPONIN I PNL SERPL HS: 55 NG/L
CHLORIDE SERPL-SCNC: 107 MMOL/L (ref 100–108)
CLARITY UR: CLEAR
CO2 SERPL-SCNC: 23 MMOL/L (ref 21–32)
COLOR UR: YELLOW
CREAT SERPL-MCNC: 1.36 MG/DL (ref 0.6–1.3)
CRP SERPL QL: 19.9 MG/L
D DIMER PPP FEU-MCNC: 0.83 UG/ML FEU
EOSINOPHIL # BLD AUTO: 0.32 THOUSAND/ΜL (ref 0–0.61)
EOSINOPHIL NFR BLD AUTO: 4 % (ref 0–6)
ERYTHROCYTE [DISTWIDTH] IN BLOOD BY AUTOMATED COUNT: 14.2 % (ref 11.6–15.1)
FLUAV RNA RESP QL NAA+PROBE: NEGATIVE
FLUBV RNA RESP QL NAA+PROBE: NEGATIVE
GFR SERPL CREATININE-BSD FRML MDRD: 51 ML/MIN/1.73SQ M
GLUCOSE SERPL-MCNC: 126 MG/DL (ref 65–140)
GLUCOSE UR STRIP-MCNC: NEGATIVE MG/DL
HCT VFR BLD AUTO: 43.3 % (ref 36.5–49.3)
HGB BLD-MCNC: 14.6 G/DL (ref 12–17)
HGB UR QL STRIP.AUTO: NEGATIVE
IMM GRANULOCYTES # BLD AUTO: 0.02 THOUSAND/UL (ref 0–0.2)
IMM GRANULOCYTES NFR BLD AUTO: 0 % (ref 0–2)
INR PPP: 1.06 (ref 0.84–1.19)
KETONES UR STRIP-MCNC: NEGATIVE MG/DL
LEUKOCYTE ESTERASE UR QL STRIP: NEGATIVE
LYMPHOCYTES # BLD AUTO: 1.57 THOUSANDS/ΜL (ref 0.6–4.47)
LYMPHOCYTES NFR BLD AUTO: 19 % (ref 14–44)
MAGNESIUM SERPL-MCNC: 2 MG/DL (ref 1.6–2.6)
MCH RBC QN AUTO: 29.9 PG (ref 26.8–34.3)
MCHC RBC AUTO-ENTMCNC: 33.7 G/DL (ref 31.4–37.4)
MCV RBC AUTO: 89 FL (ref 82–98)
MONOCYTES # BLD AUTO: 0.9 THOUSAND/ΜL (ref 0.17–1.22)
MONOCYTES NFR BLD AUTO: 11 % (ref 4–12)
NEUTROPHILS # BLD AUTO: 5.25 THOUSANDS/ΜL (ref 1.85–7.62)
NEUTS SEG NFR BLD AUTO: 66 % (ref 43–75)
NITRITE UR QL STRIP: NEGATIVE
NRBC BLD AUTO-RTO: 0 /100 WBCS
NT-PROBNP SERPL-MCNC: 1417 PG/ML
P AXIS: 60 DEGREES
PH UR STRIP.AUTO: 5.5 [PH]
PLATELET # BLD AUTO: 112 THOUSANDS/UL (ref 149–390)
PMV BLD AUTO: 10.8 FL (ref 8.9–12.7)
POTASSIUM SERPL-SCNC: 4.3 MMOL/L (ref 3.5–5.3)
PR INTERVAL: 186 MS
PROT SERPL-MCNC: 6.9 G/DL (ref 6.4–8.2)
PROT UR STRIP-MCNC: NEGATIVE MG/DL
PROTHROMBIN TIME: 13.6 SECONDS (ref 11.6–14.5)
QRS AXIS: -18 DEGREES
QRSD INTERVAL: 86 MS
QT INTERVAL: 370 MS
QTC INTERVAL: 421 MS
RBC # BLD AUTO: 4.89 MILLION/UL (ref 3.88–5.62)
RSV RNA RESP QL NAA+PROBE: NEGATIVE
SARS-COV-2 RNA RESP QL NAA+PROBE: POSITIVE
SODIUM SERPL-SCNC: 142 MMOL/L (ref 136–145)
SP GR UR STRIP.AUTO: 1.02 (ref 1–1.03)
T WAVE AXIS: 20 DEGREES
TSH SERPL DL<=0.05 MIU/L-ACNC: 1.4 UIU/ML (ref 0.36–3.74)
UROBILINOGEN UR QL STRIP.AUTO: 0.2 E.U./DL
VENTRICULAR RATE: 78 BPM
WBC # BLD AUTO: 8.09 THOUSAND/UL (ref 4.31–10.16)

## 2021-12-15 PROCEDURE — 85025 COMPLETE CBC W/AUTO DIFF WBC: CPT | Performed by: EMERGENCY MEDICINE

## 2021-12-15 PROCEDURE — 81003 URINALYSIS AUTO W/O SCOPE: CPT | Performed by: EMERGENCY MEDICINE

## 2021-12-15 PROCEDURE — 99285 EMERGENCY DEPT VISIT HI MDM: CPT | Performed by: EMERGENCY MEDICINE

## 2021-12-15 PROCEDURE — 83735 ASSAY OF MAGNESIUM: CPT | Performed by: EMERGENCY MEDICINE

## 2021-12-15 PROCEDURE — 83880 ASSAY OF NATRIURETIC PEPTIDE: CPT | Performed by: EMERGENCY MEDICINE

## 2021-12-15 PROCEDURE — 93005 ELECTROCARDIOGRAM TRACING: CPT

## 2021-12-15 PROCEDURE — 85610 PROTHROMBIN TIME: CPT | Performed by: EMERGENCY MEDICINE

## 2021-12-15 PROCEDURE — 80053 COMPREHEN METABOLIC PANEL: CPT | Performed by: EMERGENCY MEDICINE

## 2021-12-15 PROCEDURE — 86140 C-REACTIVE PROTEIN: CPT | Performed by: FAMILY MEDICINE

## 2021-12-15 PROCEDURE — 93010 ELECTROCARDIOGRAM REPORT: CPT | Performed by: INTERNAL MEDICINE

## 2021-12-15 PROCEDURE — 85379 FIBRIN DEGRADATION QUANT: CPT | Performed by: EMERGENCY MEDICINE

## 2021-12-15 PROCEDURE — 99285 EMERGENCY DEPT VISIT HI MDM: CPT

## 2021-12-15 PROCEDURE — 99220 PR INITIAL OBSERVATION CARE/DAY 70 MINUTES: CPT | Performed by: FAMILY MEDICINE

## 2021-12-15 PROCEDURE — 71045 X-RAY EXAM CHEST 1 VIEW: CPT

## 2021-12-15 PROCEDURE — 85730 THROMBOPLASTIN TIME PARTIAL: CPT | Performed by: EMERGENCY MEDICINE

## 2021-12-15 PROCEDURE — 84484 ASSAY OF TROPONIN QUANT: CPT | Performed by: EMERGENCY MEDICINE

## 2021-12-15 PROCEDURE — 84443 ASSAY THYROID STIM HORMONE: CPT | Performed by: EMERGENCY MEDICINE

## 2021-12-15 PROCEDURE — 36415 COLL VENOUS BLD VENIPUNCTURE: CPT | Performed by: EMERGENCY MEDICINE

## 2021-12-15 PROCEDURE — 0241U HB NFCT DS VIR RESP RNA 4 TRGT: CPT | Performed by: EMERGENCY MEDICINE

## 2021-12-15 RX ORDER — AMLODIPINE BESYLATE 5 MG/1
5 TABLET ORAL DAILY
Status: DISCONTINUED | OUTPATIENT
Start: 2021-12-16 | End: 2021-12-16 | Stop reason: HOSPADM

## 2021-12-15 RX ORDER — ATORVASTATIN CALCIUM 40 MG/1
40 TABLET, FILM COATED ORAL
Status: DISCONTINUED | OUTPATIENT
Start: 2021-12-15 | End: 2021-12-16 | Stop reason: HOSPADM

## 2021-12-15 RX ORDER — ASPIRIN 81 MG/1
324 TABLET, CHEWABLE ORAL ONCE
Status: COMPLETED | OUTPATIENT
Start: 2021-12-15 | End: 2021-12-15

## 2021-12-15 RX ORDER — ASCORBIC ACID 500 MG
500 TABLET ORAL EVERY MORNING
Status: DISCONTINUED | OUTPATIENT
Start: 2021-12-16 | End: 2021-12-16 | Stop reason: HOSPADM

## 2021-12-15 RX ORDER — PANTOPRAZOLE SODIUM 20 MG/1
20 TABLET, DELAYED RELEASE ORAL
Status: DISCONTINUED | OUTPATIENT
Start: 2021-12-16 | End: 2021-12-16 | Stop reason: HOSPADM

## 2021-12-15 RX ORDER — CHLORAL HYDRATE 500 MG
1000 CAPSULE ORAL EVERY MORNING
Status: DISCONTINUED | OUTPATIENT
Start: 2021-12-16 | End: 2021-12-16 | Stop reason: HOSPADM

## 2021-12-15 RX ORDER — MELATONIN
1000 EVERY MORNING
Status: DISCONTINUED | OUTPATIENT
Start: 2021-12-16 | End: 2021-12-16 | Stop reason: HOSPADM

## 2021-12-15 RX ORDER — OMEPRAZOLE 10 MG/1
CAPSULE, DELAYED RELEASE ORAL DAILY
COMMUNITY

## 2021-12-15 RX ORDER — ASPIRIN 81 MG/1
81 TABLET ORAL DAILY
Status: DISCONTINUED | OUTPATIENT
Start: 2021-12-16 | End: 2021-12-16 | Stop reason: HOSPADM

## 2021-12-15 RX ADMIN — ASPIRIN 81 MG CHEWABLE TABLET 324 MG: 81 TABLET CHEWABLE at 13:29

## 2021-12-15 RX ADMIN — ATORVASTATIN CALCIUM 40 MG: 40 TABLET, FILM COATED ORAL at 22:31

## 2021-12-15 RX ADMIN — ENOXAPARIN SODIUM 30 MG: 30 INJECTION SUBCUTANEOUS at 22:06

## 2021-12-16 ENCOUNTER — APPOINTMENT (OUTPATIENT)
Dept: NON INVASIVE DIAGNOSTICS | Facility: HOSPITAL | Age: 71
End: 2021-12-16
Payer: COMMERCIAL

## 2021-12-16 ENCOUNTER — TELEPHONE (OUTPATIENT)
Dept: FAMILY MEDICINE CLINIC | Facility: CLINIC | Age: 71
End: 2021-12-16

## 2021-12-16 VITALS
BODY MASS INDEX: 31.22 KG/M2 | OXYGEN SATURATION: 93 % | HEART RATE: 48 BPM | SYSTOLIC BLOOD PRESSURE: 111 MMHG | DIASTOLIC BLOOD PRESSURE: 71 MMHG | HEIGHT: 76 IN | TEMPERATURE: 97.5 F | WEIGHT: 256.39 LBS | RESPIRATION RATE: 19 BRPM

## 2021-12-16 LAB
ANION GAP SERPL CALCULATED.3IONS-SCNC: 9 MMOL/L (ref 4–13)
BUN SERPL-MCNC: 19 MG/DL (ref 5–25)
CALCIUM SERPL-MCNC: 8.5 MG/DL (ref 8.3–10.1)
CHLORIDE SERPL-SCNC: 106 MMOL/L (ref 100–108)
CHOLEST SERPL-MCNC: 135 MG/DL
CO2 SERPL-SCNC: 24 MMOL/L (ref 21–32)
CREAT SERPL-MCNC: 1.29 MG/DL (ref 0.6–1.3)
CRP SERPL QL: 38.3 MG/L
ERYTHROCYTE [DISTWIDTH] IN BLOOD BY AUTOMATED COUNT: 14.3 % (ref 11.6–15.1)
GFR SERPL CREATININE-BSD FRML MDRD: 54 ML/MIN/1.73SQ M
GLUCOSE P FAST SERPL-MCNC: 92 MG/DL (ref 65–99)
GLUCOSE SERPL-MCNC: 92 MG/DL (ref 65–140)
HCT VFR BLD AUTO: 41.9 % (ref 36.5–49.3)
HDLC SERPL-MCNC: 32 MG/DL
HGB BLD-MCNC: 13.7 G/DL (ref 12–17)
LDLC SERPL CALC-MCNC: 86 MG/DL (ref 0–100)
MAGNESIUM SERPL-MCNC: 2 MG/DL (ref 1.6–2.6)
MCH RBC QN AUTO: 28.8 PG (ref 26.8–34.3)
MCHC RBC AUTO-ENTMCNC: 32.7 G/DL (ref 31.4–37.4)
MCV RBC AUTO: 88 FL (ref 82–98)
NONHDLC SERPL-MCNC: 103 MG/DL
PLATELET # BLD AUTO: 100 THOUSANDS/UL (ref 149–390)
PMV BLD AUTO: 10.7 FL (ref 8.9–12.7)
POTASSIUM SERPL-SCNC: 4.7 MMOL/L (ref 3.5–5.3)
RBC # BLD AUTO: 4.76 MILLION/UL (ref 3.88–5.62)
SODIUM SERPL-SCNC: 139 MMOL/L (ref 136–145)
TRIGL SERPL-MCNC: 83 MG/DL
WBC # BLD AUTO: 6.54 THOUSAND/UL (ref 4.31–10.16)

## 2021-12-16 PROCEDURE — 83735 ASSAY OF MAGNESIUM: CPT | Performed by: FAMILY MEDICINE

## 2021-12-16 PROCEDURE — 86140 C-REACTIVE PROTEIN: CPT | Performed by: FAMILY MEDICINE

## 2021-12-16 PROCEDURE — 80048 BASIC METABOLIC PNL TOTAL CA: CPT | Performed by: FAMILY MEDICINE

## 2021-12-16 PROCEDURE — 99203 OFFICE O/P NEW LOW 30 MIN: CPT | Performed by: INTERNAL MEDICINE

## 2021-12-16 PROCEDURE — 99217 PR OBSERVATION CARE DISCHARGE MANAGEMENT: CPT | Performed by: FAMILY MEDICINE

## 2021-12-16 PROCEDURE — 80061 LIPID PANEL: CPT | Performed by: FAMILY MEDICINE

## 2021-12-16 PROCEDURE — 85027 COMPLETE CBC AUTOMATED: CPT | Performed by: FAMILY MEDICINE

## 2021-12-16 RX ORDER — ATORVASTATIN CALCIUM 40 MG/1
40 TABLET, FILM COATED ORAL
Qty: 30 TABLET | Refills: 0 | Status: SHIPPED | OUTPATIENT
Start: 2021-12-16 | End: 2022-01-21 | Stop reason: SDUPTHER

## 2021-12-16 RX ORDER — METOPROLOL SUCCINATE 25 MG/1
25 TABLET, EXTENDED RELEASE ORAL DAILY
Qty: 30 TABLET | Refills: 0 | Status: SHIPPED | OUTPATIENT
Start: 2021-12-16 | End: 2022-01-21 | Stop reason: SDUPTHER

## 2021-12-16 RX ORDER — ASPIRIN 81 MG/1
81 TABLET ORAL DAILY
Qty: 30 TABLET | Refills: 0 | Status: SHIPPED | OUTPATIENT
Start: 2021-12-17 | End: 2022-01-21

## 2021-12-16 RX ORDER — ASPIRIN 81 MG/1
81 TABLET ORAL DAILY
Status: DISCONTINUED | OUTPATIENT
Start: 2021-12-16 | End: 2021-12-16 | Stop reason: SDUPTHER

## 2021-12-16 RX ADMIN — ENOXAPARIN SODIUM 30 MG: 30 INJECTION SUBCUTANEOUS at 09:08

## 2021-12-16 RX ADMIN — OMEGA-3 FATTY ACIDS CAP 1000 MG 1000 MG: 1000 CAP at 09:07

## 2021-12-16 RX ADMIN — ASPIRIN 81 MG: 81 TABLET, COATED ORAL at 09:08

## 2021-12-16 RX ADMIN — PANTOPRAZOLE SODIUM 20 MG: 20 TABLET, DELAYED RELEASE ORAL at 06:47

## 2021-12-16 RX ADMIN — Medication 1000 UNITS: at 09:08

## 2021-12-16 RX ADMIN — B-COMPLEX W/ C & FOLIC ACID TAB 1 TABLET: TAB at 09:07

## 2021-12-16 RX ADMIN — AMLODIPINE BESYLATE 5 MG: 5 TABLET ORAL at 09:08

## 2021-12-16 RX ADMIN — OXYCODONE HYDROCHLORIDE AND ACETAMINOPHEN 500 MG: 500 TABLET ORAL at 09:07

## 2021-12-17 ENCOUNTER — TELEMEDICINE (OUTPATIENT)
Dept: FAMILY MEDICINE CLINIC | Facility: CLINIC | Age: 71
End: 2021-12-17
Payer: COMMERCIAL

## 2021-12-17 DIAGNOSIS — U07.1 COVID-19 VIRUS INFECTION: Primary | ICD-10-CM

## 2021-12-17 PROBLEM — I49.3 PVC (PREMATURE VENTRICULAR CONTRACTION): Status: ACTIVE | Noted: 2021-12-17

## 2021-12-17 PROCEDURE — 99441 PR PHYS/QHP TELEPHONE EVALUATION 5-10 MIN: CPT | Performed by: FAMILY MEDICINE

## 2021-12-20 ENCOUNTER — TELEPHONE (OUTPATIENT)
Dept: FAMILY MEDICINE CLINIC | Facility: CLINIC | Age: 71
End: 2021-12-20

## 2021-12-28 ENCOUNTER — TELEPHONE (OUTPATIENT)
Dept: HEMATOLOGY ONCOLOGY | Facility: MEDICAL CENTER | Age: 71
End: 2021-12-28

## 2021-12-28 DIAGNOSIS — D69.6 THROMBOCYTOPENIA (HCC): Primary | ICD-10-CM

## 2021-12-30 ENCOUNTER — HOSPITAL ENCOUNTER (OUTPATIENT)
Dept: NON INVASIVE DIAGNOSTICS | Facility: HOSPITAL | Age: 71
Discharge: HOME | End: 2021-12-30
Payer: COMMERCIAL

## 2021-12-30 ENCOUNTER — HOSPITAL ENCOUNTER (OUTPATIENT)
Dept: RADIOLOGY | Facility: HOSPITAL | Age: 71
Discharge: HOME | End: 2021-12-30
Payer: COMMERCIAL

## 2021-12-30 VITALS
BODY MASS INDEX: 31.17 KG/M2 | HEART RATE: 66 BPM | DIASTOLIC BLOOD PRESSURE: 77 MMHG | WEIGHT: 256 LBS | SYSTOLIC BLOOD PRESSURE: 170 MMHG | HEIGHT: 76 IN

## 2021-12-30 VITALS — DIASTOLIC BLOOD PRESSURE: 74 MMHG | SYSTOLIC BLOOD PRESSURE: 136 MMHG

## 2021-12-30 DIAGNOSIS — U07.1 COVID-19: ICD-10-CM

## 2021-12-30 DIAGNOSIS — R77.8 ELEVATED TROPONIN: ICD-10-CM

## 2021-12-30 DIAGNOSIS — I10 ESSENTIAL HYPERTENSION: ICD-10-CM

## 2021-12-30 LAB
APICAL FOUR CHAMBER EJECTION FRACTION: 53 %
AV LVOT PEAK GRADIENT: 4 MMHG
AV PEAK GRADIENT: 8 MMHG
DOP CALC LVOT AREA: 3.14 CM2
DOP CALC LVOT DIAMETER: 2 CM
E WAVE DECELERATION TIME: 225 MS
LEFT ATRIUM AREA SYSTOLE SINGLE PLANE A4C: 22.5 CM2
MV E'TISSUE VEL-LAT: 11 CM/S
MV E'TISSUE VEL-SEP: 11 CM/S
MV PEAK A VEL: 0.94 M/S
MV PEAK E VEL: 77 CM/S
MV STENOSIS PRESSURE HALF TIME: 0 MS
PV PEAK GRADIENT: 3 MMHG
RA PRESSURE ESTIMATED: 8 MMHG
RIGHT ATRIUM AREA SYSTOLE A4C: 15.7 CM2
RIGHT VENTRICLE ID DIMENSION: 3.7 CM
RV PSP: 43 MMHG
SL CV LV EF: 55
TRICUSPID VALVE PEAK REGURGITATION VELOCITY: 2.95 M/S
TRICUSPID VALVE S': 0.8 CM/S
TV PEAK GRADIENT: 35 MMHG

## 2021-12-30 PROCEDURE — 93306 TTE W/DOPPLER COMPLETE: CPT | Performed by: INTERNAL MEDICINE

## 2021-12-30 PROCEDURE — 93306 TTE W/DOPPLER COMPLETE: CPT

## 2021-12-30 PROCEDURE — 93017 CV STRESS TEST TRACING ONLY: CPT

## 2021-12-30 PROCEDURE — 78452 HT MUSCLE IMAGE SPECT MULT: CPT

## 2021-12-30 PROCEDURE — G1004 CDSM NDSC: HCPCS

## 2021-12-30 PROCEDURE — A9502 TC99M TETROFOSMIN: HCPCS

## 2021-12-30 RX ADMIN — REGADENOSON 0.4 MG: 0.08 INJECTION, SOLUTION INTRAVENOUS at 10:05

## 2022-01-03 LAB
BASELINE ST DEPRESSION: 0 MM
MAX DIASTOLIC BP: 74 MMHG
MAX HEART RATE: 101 BPM
MAX PREDICTED HEART RATE: 147 BPM
MAX. SYSTOLIC BP: 146 MMHG
NUC STRESS EJECTION FRACTION: 48 %
PROTOCOL NAME: NORMAL
RATE PRESSURE PRODUCT: NORMAL
REASON FOR TERMINATION: NORMAL
SL CV REST NUCLEAR ISOTOPE DOSE: 15.9 MCI
SL CV STRESS NUCLEAR ISOTOPE DOSE: 46.1 MCI
SL CV STRESS RECOVERY BP: NORMAL MMHG
SL CV STRESS RECOVERY HR: 72 BPM
STRESS ANGINA INDEX: 0
STRESS BASELINE BP: NORMAL MMHG
STRESS BASELINE HR: 63 BPM
STRESS O2 SAT REST: 97 %
STRESS PEAK HR: 83 BPM
STRESS POST O2 SAT PEAK: 100 %
STRESS POST PEAK BP: 146 MMHG
STRESS ST DEPRESSION: 0 MM
TARGET HR FORMULA: NORMAL
TEST INDICATION: NORMAL
TIME IN EXERCISE PHASE: NORMAL

## 2022-01-03 PROCEDURE — 78452 HT MUSCLE IMAGE SPECT MULT: CPT | Performed by: INTERNAL MEDICINE

## 2022-01-03 PROCEDURE — 93018 CV STRESS TEST I&R ONLY: CPT | Performed by: INTERNAL MEDICINE

## 2022-01-03 PROCEDURE — 93016 CV STRESS TEST SUPVJ ONLY: CPT | Performed by: INTERNAL MEDICINE

## 2022-01-18 ENCOUNTER — APPOINTMENT (OUTPATIENT)
Dept: LAB | Facility: HOSPITAL | Age: 72
End: 2022-01-18
Attending: INTERNAL MEDICINE
Payer: COMMERCIAL

## 2022-01-18 ENCOUNTER — TELEPHONE (OUTPATIENT)
Dept: HEMATOLOGY ONCOLOGY | Facility: MEDICAL CENTER | Age: 72
End: 2022-01-18

## 2022-01-18 DIAGNOSIS — D69.6 THROMBOCYTOPENIA (HCC): ICD-10-CM

## 2022-01-18 NOTE — TELEPHONE ENCOUNTER
Platelets [id number],834  Patient inquiring about covid booster  D/W Dr Ashia Durham MD recommends patient receiving booster  Patient aware

## 2022-01-21 ENCOUNTER — OFFICE VISIT (OUTPATIENT)
Dept: CARDIOLOGY CLINIC | Facility: CLINIC | Age: 72
End: 2022-01-21
Payer: COMMERCIAL

## 2022-01-21 VITALS
SYSTOLIC BLOOD PRESSURE: 126 MMHG | HEIGHT: 76 IN | WEIGHT: 264 LBS | TEMPERATURE: 98.5 F | HEART RATE: 83 BPM | DIASTOLIC BLOOD PRESSURE: 70 MMHG | BODY MASS INDEX: 32.15 KG/M2 | OXYGEN SATURATION: 95 %

## 2022-01-21 DIAGNOSIS — N18.30 STAGE 3 CHRONIC KIDNEY DISEASE, UNSPECIFIED WHETHER STAGE 3A OR 3B CKD (HCC): ICD-10-CM

## 2022-01-21 DIAGNOSIS — I49.3 PVC (PREMATURE VENTRICULAR CONTRACTION): Primary | ICD-10-CM

## 2022-01-21 DIAGNOSIS — R77.8 ELEVATED TROPONIN: ICD-10-CM

## 2022-01-21 PROCEDURE — 1036F TOBACCO NON-USER: CPT | Performed by: INTERNAL MEDICINE

## 2022-01-21 PROCEDURE — 3008F BODY MASS INDEX DOCD: CPT | Performed by: INTERNAL MEDICINE

## 2022-01-21 PROCEDURE — 99214 OFFICE O/P EST MOD 30 MIN: CPT | Performed by: INTERNAL MEDICINE

## 2022-01-21 PROCEDURE — 1160F RVW MEDS BY RX/DR IN RCRD: CPT | Performed by: INTERNAL MEDICINE

## 2022-01-21 RX ORDER — ATORVASTATIN CALCIUM 40 MG/1
40 TABLET, FILM COATED ORAL
Qty: 90 TABLET | Refills: 3 | Status: SHIPPED | OUTPATIENT
Start: 2022-01-21 | End: 2022-06-24

## 2022-01-21 RX ORDER — METOPROLOL SUCCINATE 25 MG/1
25 TABLET, EXTENDED RELEASE ORAL DAILY
Qty: 90 TABLET | Refills: 3 | Status: SHIPPED | OUTPATIENT
Start: 2022-01-21

## 2022-01-21 NOTE — PROGRESS NOTES
[facility]  [address]   [place], [address]  Cardiology Consultation    Brigida Montero  [id number]  1950      Consult for:  PVCs/non-STEMI  Appreciate consult by: Merari Chacon DO    1  Stage 3 chronic kidney disease, unspecified whether stage 3a or 3b CKD (HCC)     2  Elevated troponin  atorvastatin (LIPITOR) 40 mg tablet    metoprolol succinate (TOPROL-XL) 25 mg 24 hr tablet    AMB event recorder   3  PVC (premature ventricular contraction)  metoprolol succinate (TOPROL-XL) 25 mg 24 hr tablet    AMB event recorder      Discussion/Summary:   PVCs- recently hospitalized with COVID elevated PVCs  No history of sleep apnea  Low normal EF  Plan for 3 day extended Holter monitor to evaluate PVC burden  For now we will continue on metoprolol 25 mg    Non MI troponin elevation- cannot rule out small vessel coronary artery disease  Reports active lifestyle  Possible vasculitis in the setting of COVID? Holding anti-platelet given history of thrombocytopenia  We will continue him atorvastatin 40 mg plus metoprolol plus amlodipine  Hypertension- currently well controlled on amlodipine 5 mg plus metoprolol    Idiopathic thrombocytopenia- monitor by Hematology    HPI:   49-year-old gentleman with recent hospital admission secondary to [place] and irregular heart rhythm  He was found to have frequent ventricular bigeminy  He had a nuclear stress test which was negative for acute ischemia  He was placed on low-dose beta-blocker  Since discharge he denies having major palpitations  He reports exercising daily      Past Medical History:   Diagnosis Date    Hypertension     Thrombocytopenia (HCC)     platelet count maintained around 90-Dr  Carry Loud Wears partial dentures     upper     Social History     Socioeconomic History    Marital status: /Civil Union     Spouse name: Not on file    Number of children: Not on file    Years of education: Not on file    Highest education level: Not on file   Occupational History    Not on file   Tobacco Use    Smoking status: Former Smoker     Packs/day: 0 50     Years: 22 00     Pack years: 11 00     Types: Cigarettes     Quit date:      Years since quittin 0    Smokeless tobacco: Never Used   Vaping Use    Vaping Use: Never used   Substance and Sexual Activity    Alcohol use: Never    Drug use: Never    Sexual activity: Not on file   Other Topics Concern    Not on file   Social History Narrative    Not on file     Social Determinants of Health     Financial Resource Strain: Not on file   Food Insecurity: Not on file   Transportation Needs: Not on file   Physical Activity: Not on file   Stress: Not on file   Social Connections: Not on file   Intimate Partner Violence: Not on file   Housing Stability: Not on file      Family History   Problem Relation Age of Onset    Heart disease Father 67        massive MI    Heart disease Sister         MI    Heart disease Brother         MI    No Known Problems Mother     Mental illness Neg Hx      Past Surgical History:   Procedure Laterality Date    APPENDECTOMY      CATARACT EXTRACTION Left     COLONOSCOPY      COLONOSCOPY N/A 10/17/2018    Procedure: COLONOSCOPY;  Surgeon: Siena Cox MD;  Location: [place] [address] GI LAB; Service: Gastroenterology    HERNIA REPAIR Right 2013    inguinal    JOINT REPLACEMENT Right     knee, left shoulder    MS XCAPSL CTRC RMVL INSJ IO LENS PROSTH W/O ECP Left 2016    Procedure: EXTRACTION EXTRACAPSULAR CATARACT PHACO INTRAOCULAR LENS (IOL);   Surgeon: Sarika Guzman MD;  Location: [facility] MAIN OR;  Service: Ophthalmology    SHOULDER ARTHROSCOPY Left     tendon repair    TONSILLECTOMY  age 1       Current Outpatient Medications:     amLODIPine (NORVASC) 5 mg tablet, Take 1 tablet (5 mg total) by mouth daily, Disp: 90 tablet, Rfl: 1    ascorbic acid (VITAMIN C) 500 mg tablet, Take 500 mg by mouth every morning , Disp: , Rfl:     atorvastatin (LIPITOR) 40 mg tablet, Take 1 tablet (40 mg total) by mouth daily with dinner, Disp: 90 tablet, Rfl: 3    b complex vitamins tablet, Take 1 tablet by mouth every morning , Disp: , Rfl:     cholecalciferol (VITAMIN D3) 1,000 units tablet, Take 1,000 Units by mouth every morning, Disp: , Rfl:     metoprolol succinate (TOPROL-XL) 25 mg 24 hr tablet, Take 1 tablet (25 mg total) by mouth daily, Disp: 90 tablet, Rfl: 3    multivitamin (THERAGRAN) TABS, Take 1 tablet by mouth every morning , Disp: , Rfl:     Omega-3 Fatty Acids (FISH OIL) 1,000 mg, Take 1,000 mg by mouth every morning  , Disp: , Rfl:     omeprazole (PriLOSEC) 10 mg delayed release capsule, Take by mouth daily, Disp: , Rfl:     sildenafil (VIAGRA) 100 mg tablet, Take 1 tablet (100 mg total) by mouth daily as needed for erectile dysfunction, Disp: 10 tablet, Rfl: 3  Allergies   Allergen Reactions    Dust Mite Extract Sneezing     Vitals:    01/21/22 1132   BP: 126/70   BP Location: Left arm   Patient Position: Sitting   Cuff Size: Standard   Pulse: 83   Temp: 98 5 °F (36 9 °C)   SpO2: 95%   Weight: 120 kg (264 lb)   Height: 6' 4" (1 93 m)       Review of Systems:   Review of Systems   Constitutional: Negative  HENT: Negative  Eyes: Negative  Respiratory: Negative  Cardiovascular: Positive for palpitations  Gastrointestinal: Negative  Endocrine: Negative  Genitourinary: Negative  Musculoskeletal: Negative  Skin: Negative  Allergic/Immunologic: Negative  Neurological: Negative  Hematological: Negative  Psychiatric/Behavioral: Negative  Vitals:    01/21/22 1132   BP: 126/70   BP Location: Left arm   Patient Position: Sitting   Cuff Size: Standard   Pulse: 83   Temp: 98 5 °F (36 9 °C)   SpO2: 95%   Weight: 120 kg (264 lb)   Height: 6' 4" (1 93 m)     Physical Examination:   Physical Exam  Constitutional:       General: He is not in acute distress       Appearance: He is well-developed  He is not diaphoretic  HENT:      Head: Normocephalic and atraumatic  Right Ear: External ear normal       Left Ear: External ear normal    Eyes:      General: No scleral icterus  Right eye: No discharge  Left eye: No discharge  Conjunctiva/sclera: Conjunctivae normal       Pupils: Pupils are equal, round, and reactive to light  Neck:      Thyroid: No thyromegaly  Vascular: No JVD  Trachea: No tracheal deviation  Cardiovascular:      Rate and Rhythm: Normal rate and regular rhythm  Heart sounds: No murmur heard  No friction rub  Gallop present  Pulmonary:      Effort: Pulmonary effort is normal  No respiratory distress  Breath sounds: Normal breath sounds  No stridor  No wheezing or rales  Chest:      Chest wall: No tenderness  Abdominal:      General: Bowel sounds are normal  There is no distension  Palpations: Abdomen is soft  There is no mass  Tenderness: There is no abdominal tenderness  There is no guarding or rebound  Musculoskeletal:         General: No tenderness or deformity  Normal range of motion  Cervical back: Normal range of motion and neck supple  Skin:     General: Skin is warm and dry  Coloration: Skin is not pale  Findings: No erythema or rash  Neurological:      Mental Status: He is alert and oriented to person, place, and time  Cranial Nerves: No cranial nerve deficit  Motor: No abnormal muscle tone  Coordination: Coordination normal       Deep Tendon Reflexes: Reflexes are normal and symmetric  Reflexes normal    Psychiatric:         Behavior: Behavior normal          Thought Content:  Thought content normal          Judgment: Judgment normal          Labs:     Lab Results   Component Value Date    WBC 7 38 01/18/2022    HGB 14 9 01/18/2022    HCT 44 8 01/18/2022    MCV 89 01/18/2022    RDW 14 3 01/18/2022    PLT 83 (L) 01/18/2022     BMP:  Lab Results   Component Value Date SODIUM 139 12/16/2021    K 4 7 12/16/2021     12/16/2021    CO2 24 12/16/2021    BUN 19 12/16/2021    CREATININE 1 29 12/16/2021    GLUC 92 12/16/2021    GLUF 92 12/16/2021    CALCIUM 8 5 12/16/2021    CORRECTEDCA 9 4 12/15/2021    EGFR 54 12/16/2021    MG 2 0 12/16/2021     LFT:  Lab Results   Component Value Date    AST 20 12/15/2021    ALT 31 12/15/2021    ALKPHOS 104 12/15/2021    TP 6 9 12/15/2021    ALB 3 2 (L) 12/15/2021      Lab Results   Component Value Date    OOC0LPQEURNP 1 398 12/15/2021     No results found for: HGBA1C  Lipid Profile:   Lab Results   Component Value Date    CHOLESTEROL 135 12/16/2021    HDL 32 (L) 12/16/2021    LDLCALC 86 12/16/2021    TRIG 83 12/16/2021     Lab Results   Component Value Date    CHOLESTEROL 135 12/16/2021    CHOLESTEROL 173 09/23/2021     No results found for: CKTOTAL, CKMB, CKMBINDEX, TROPONINI  Lab Results   Component Value Date    NTBNP 1,417 (H) 12/15/2021      Recent Results (from the past 672 hour(s))   Echo complete w/ contrast if indicated    Collection Time: 12/30/21  9:18 AM   Result Value Ref Range    A4C EF 53 %    LVOT diameter 2 0 cm    MV E' Tissue Velocity Septal 11 cm/s    MV E' Tissue Velocity Lateral 11 cm/s    E wave deceleration time 225 ms    MV Peak E Alberto 77 cm/s    MV Peak A Alberto 0 94 m/s    AV LVOT peak gradient 4 mmHg    RVID d 3 7 cm    TV S' 0 8 cm/s    GINA A4C 22 5 cm2    RAA A4C 15 7 cm2    AV peak gradient 8 mmHg    MV stenosis pressure 1/2 time 0 ms    TV peak gradient 35 mmHg    PV peak gradient antegrade 3 mmHg    Tricuspid valve peak regurgitation velocity 2 95 m/s    LVOT area 3 14 cm2    Est  RA pres 8 0 mmHg    Right Ventricular Peak Systolic Pressure 49 2 mmHg    LV EF 55    Stress strip    Collection Time: 12/30/21  9:46 AM   Result Value Ref Range    Protocol Name SACHIN     Time In Exercise Phase 00:04:31     MAX   SYSTOLIC  mmHg    Max Diastolic Bp 74 mmHg    Max Heart Rate 101 BPM    Max Predicted Heart Rate 147 BPM Reason for Termination Protocol Complete     Test Indication ABN TROPONIN     Target Hr Formular (220 - Age)*100%     Arrhy During Ex      ECG Interp Before Ex      ECG Interp during Ex      Ex Summary Comment      Overall Hr Response To Exercise      Overall BP Response To Exercise     NM myocardial perfusion spect (rx stress and/or rest)    Collection Time: 12/30/21 11:05 AM   Result Value Ref Range    Rest Nuclear Isotope Dose 15 90 mCi    Stress Nuclear Isotope Dose 46 10 mCi    Baseline HR 63 bpm    Baseline /74 mmHg    O2 sat rest 97 %    Stress peak HR 83 bpm    Post peak  mmHg    Rate Pressure Product 12,118 0     O2 sat peak 100 %    Recovery HR 72 bpm    Recovery /74 mmHg    Angina Index 0     Base ST Depresion (mm) 0 mm    ST Depression (mm) 0 mm    EF (%) 48 %   CBC and differential    Collection Time: 01/18/22  7:14 AM   Result Value Ref Range    WBC 7 38 4 31 - 10 16 Thousand/uL    RBC 5 02 3 88 - 5 62 Million/uL    Hemoglobin 14 9 12 0 - 17 0 g/dL    Hematocrit 44 8 36 5 - 49 3 %    MCV 89 82 - 98 fL    MCH 29 7 26 8 - 34 3 pg    MCHC 33 3 31 4 - 37 4 g/dL    RDW 14 3 11 6 - 15 1 %    MPV 10 4 8 9 - 12 7 fL    Platelets 83 (L) 886 - 390 Thousands/uL    nRBC 0 /100 WBCs    Neutrophils Relative 54 43 - 75 %    Immat GRANS % 0 0 - 2 %    Lymphocytes Relative 29 14 - 44 %    Monocytes Relative 9 4 - 12 %    Eosinophils Relative 7 (H) 0 - 6 %    Basophils Relative 1 0 - 1 %    Neutrophils Absolute 4 01 1 85 - 7 62 Thousands/µL    Immature Grans Absolute 0 02 0 00 - 0 20 Thousand/uL    Lymphocytes Absolute 2 13 0 60 - 4 47 Thousands/µL    Monocytes Absolute 0 66 0 17 - 1 22 Thousand/µL    Eosinophils Absolute 0 51 0 00 - 0 61 Thousand/µL    Basophils Absolute 0 05 0 00 - 0 10 Thousands/µL       Imaging & Testing   I have personally reviewed pertinent reports        Cardiac Testing     Results for orders placed during the hospital encounter of 12/30/21    NM myocardial perfusion spect (rx stress and/or rest)    Interpretation Summary    Perfusion: There is a left ventricular perfusion defect that is medium in size with moderate reduction in uptake present in the entire inferior and inferolateral location(s) that is partially reversible  Defect is present at stress and rest and is not present on the apical portion of rest imaging  Findings consistent with infarct with edgar-infarct ischemia although an diaphragm attenuation artifact cannot be rule out    Stress ECG: The stress ECG is negative for ischemia after pharmacologic stress  No ST deviation is noted    Stress Function: Left ventricular function post-stress is normal  Post-stress ejection fraction is 48 %  EKG: Personally reviewed  Fito Suarez MD [facility]  [phone]  Please call with any questions or suggestions    Counseling :  A description of the counseling:   Goals and Barriers:  Patient's ability to self care:  Medication side effect reviewed with patient in detail and all their questions answered  "Portions of the record may have been created with voice recognition software  Occasional wrong word or "sound a like" substitutions may have occurred due to the inherent limitations of voice recognition software  Read the chart carefully and recognize, using context, where substitutions have occurred   Please call if you have any questions  "

## 2022-01-24 ENCOUNTER — TELEPHONE (OUTPATIENT)
Dept: CARDIOLOGY CLINIC | Facility: CLINIC | Age: 72
End: 2022-01-24

## 2022-02-07 ENCOUNTER — CLINICAL SUPPORT (OUTPATIENT)
Dept: CARDIOLOGY CLINIC | Facility: CLINIC | Age: 72
End: 2022-02-07
Payer: COMMERCIAL

## 2022-02-07 ENCOUNTER — TELEPHONE (OUTPATIENT)
Dept: CARDIOLOGY CLINIC | Facility: CLINIC | Age: 72
End: 2022-02-07

## 2022-02-07 DIAGNOSIS — R77.8 ELEVATED TROPONIN: ICD-10-CM

## 2022-02-07 DIAGNOSIS — I49.3 PVC (PREMATURE VENTRICULAR CONTRACTION): ICD-10-CM

## 2022-02-07 PROCEDURE — 93244 EXT ECG>48HR<7D REV&INTERPJ: CPT | Performed by: INTERNAL MEDICINE

## 2022-02-08 ENCOUNTER — TELEPHONE (OUTPATIENT)
Dept: HEMATOLOGY ONCOLOGY | Facility: MEDICAL CENTER | Age: 72
End: 2022-02-08

## 2022-02-08 ENCOUNTER — APPOINTMENT (OUTPATIENT)
Dept: LAB | Facility: HOSPITAL | Age: 72
End: 2022-02-08
Attending: INTERNAL MEDICINE
Payer: COMMERCIAL

## 2022-02-08 ENCOUNTER — TELEPHONE (OUTPATIENT)
Dept: HEMATOLOGY ONCOLOGY | Facility: CLINIC | Age: 72
End: 2022-02-08

## 2022-02-08 DIAGNOSIS — D69.6 THROMBOCYTOPENIA (HCC): Primary | ICD-10-CM

## 2022-02-08 DIAGNOSIS — D69.6 THROMBOCYTOPENIA (HCC): ICD-10-CM

## 2022-02-08 LAB
BASOPHILS # BLD AUTO: 0.04 THOUSANDS/ΜL (ref 0–0.1)
BASOPHILS NFR BLD AUTO: 1 % (ref 0–1)
EOSINOPHIL # BLD AUTO: 0.2 THOUSAND/ΜL (ref 0–0.61)
EOSINOPHIL NFR BLD AUTO: 3 % (ref 0–6)
ERYTHROCYTE [DISTWIDTH] IN BLOOD BY AUTOMATED COUNT: 14 % (ref 11.6–15.1)
HCT VFR BLD AUTO: 42.6 % (ref 36.5–49.3)
HGB BLD-MCNC: 14 G/DL (ref 12–17)
IMM GRANULOCYTES # BLD AUTO: 0.01 THOUSAND/UL (ref 0–0.2)
IMM GRANULOCYTES NFR BLD AUTO: 0 % (ref 0–2)
LYMPHOCYTES # BLD AUTO: 2.28 THOUSANDS/ΜL (ref 0.6–4.47)
LYMPHOCYTES NFR BLD AUTO: 31 % (ref 14–44)
MCH RBC QN AUTO: 29.5 PG (ref 26.8–34.3)
MCHC RBC AUTO-ENTMCNC: 32.9 G/DL (ref 31.4–37.4)
MCV RBC AUTO: 90 FL (ref 82–98)
MONOCYTES # BLD AUTO: 0.88 THOUSAND/ΜL (ref 0.17–1.22)
MONOCYTES NFR BLD AUTO: 12 % (ref 4–12)
NEUTROPHILS # BLD AUTO: 4.02 THOUSANDS/ΜL (ref 1.85–7.62)
NEUTS SEG NFR BLD AUTO: 53 % (ref 43–75)
NRBC BLD AUTO-RTO: 0 /100 WBCS
PLATELET # BLD AUTO: 34 THOUSANDS/UL (ref 149–390)
PMV BLD AUTO: 11.9 FL (ref 8.9–12.7)
RBC # BLD AUTO: 4.74 MILLION/UL (ref 3.88–5.62)
WBC # BLD AUTO: 7.43 THOUSAND/UL (ref 4.31–10.16)

## 2022-02-08 PROCEDURE — 85025 COMPLETE CBC W/AUTO DIFF WBC: CPT

## 2022-02-08 PROCEDURE — 36415 COLL VENOUS BLD VENIPUNCTURE: CPT

## 2022-02-08 NOTE — TELEPHONE ENCOUNTER
Patient would like their lab results     Person calling in  Name if not patient  Patient   Lab Draw Date  02/08   Lab Draw Place    Call Back Number  [phone]

## 2022-02-22 ENCOUNTER — APPOINTMENT (OUTPATIENT)
Dept: LAB | Facility: HOSPITAL | Age: 72
End: 2022-02-22
Attending: INTERNAL MEDICINE
Payer: COMMERCIAL

## 2022-02-22 ENCOUNTER — TELEPHONE (OUTPATIENT)
Dept: HEMATOLOGY ONCOLOGY | Facility: MEDICAL CENTER | Age: 72
End: 2022-02-22

## 2022-02-22 ENCOUNTER — TELEPHONE (OUTPATIENT)
Dept: CARDIOLOGY CLINIC | Facility: CLINIC | Age: 72
End: 2022-02-22

## 2022-02-22 DIAGNOSIS — D69.6 THROMBOCYTOPENIA (HCC): ICD-10-CM

## 2022-02-22 LAB
BASOPHILS # BLD AUTO: 0.05 THOUSANDS/ΜL (ref 0–0.1)
BASOPHILS NFR BLD AUTO: 1 % (ref 0–1)
EOSINOPHIL # BLD AUTO: 0.14 THOUSAND/ΜL (ref 0–0.61)
EOSINOPHIL NFR BLD AUTO: 2 % (ref 0–6)
ERYTHROCYTE [DISTWIDTH] IN BLOOD BY AUTOMATED COUNT: 14.2 % (ref 11.6–15.1)
HCT VFR BLD AUTO: 43.3 % (ref 36.5–49.3)
HGB BLD-MCNC: 14.1 G/DL (ref 12–17)
IMM GRANULOCYTES # BLD AUTO: 0.01 THOUSAND/UL (ref 0–0.2)
IMM GRANULOCYTES NFR BLD AUTO: 0 % (ref 0–2)
LYMPHOCYTES # BLD AUTO: 2.55 THOUSANDS/ΜL (ref 0.6–4.47)
LYMPHOCYTES NFR BLD AUTO: 30 % (ref 14–44)
MCH RBC QN AUTO: 29.4 PG (ref 26.8–34.3)
MCHC RBC AUTO-ENTMCNC: 32.6 G/DL (ref 31.4–37.4)
MCV RBC AUTO: 90 FL (ref 82–98)
MONOCYTES # BLD AUTO: 0.95 THOUSAND/ΜL (ref 0.17–1.22)
MONOCYTES NFR BLD AUTO: 11 % (ref 4–12)
NEUTROPHILS # BLD AUTO: 4.91 THOUSANDS/ΜL (ref 1.85–7.62)
NEUTS SEG NFR BLD AUTO: 56 % (ref 43–75)
NRBC BLD AUTO-RTO: 0 /100 WBCS
PLATELET # BLD AUTO: 13 THOUSANDS/UL (ref 149–390)
RBC # BLD AUTO: 4.79 MILLION/UL (ref 3.88–5.62)
WBC # BLD AUTO: 8.61 THOUSAND/UL (ref 4.31–10.16)

## 2022-02-22 PROCEDURE — 36415 COLL VENOUS BLD VENIPUNCTURE: CPT

## 2022-02-22 PROCEDURE — 85025 COMPLETE CBC W/AUTO DIFF WBC: CPT

## 2022-02-23 ENCOUNTER — OFFICE VISIT (OUTPATIENT)
Dept: HEMATOLOGY ONCOLOGY | Facility: MEDICAL CENTER | Age: 72
End: 2022-02-23
Payer: COMMERCIAL

## 2022-02-23 VITALS
BODY MASS INDEX: 31.54 KG/M2 | RESPIRATION RATE: 18 BRPM | OXYGEN SATURATION: 97 % | HEART RATE: 70 BPM | SYSTOLIC BLOOD PRESSURE: 152 MMHG | TEMPERATURE: 98 F | HEIGHT: 76 IN | WEIGHT: 259 LBS | DIASTOLIC BLOOD PRESSURE: 88 MMHG

## 2022-02-23 DIAGNOSIS — Z86.2 HISTORY OF ITP: ICD-10-CM

## 2022-02-23 DIAGNOSIS — D69.6 THROMBOCYTOPENIA (HCC): Primary | ICD-10-CM

## 2022-02-23 PROCEDURE — 99214 OFFICE O/P EST MOD 30 MIN: CPT | Performed by: INTERNAL MEDICINE

## 2022-02-23 PROCEDURE — 3008F BODY MASS INDEX DOCD: CPT | Performed by: INTERNAL MEDICINE

## 2022-02-23 PROCEDURE — 1036F TOBACCO NON-USER: CPT | Performed by: INTERNAL MEDICINE

## 2022-02-23 PROCEDURE — 1160F RVW MEDS BY RX/DR IN RCRD: CPT | Performed by: INTERNAL MEDICINE

## 2022-02-23 NOTE — PROGRESS NOTES
Deya Rather  1950  [facility] [id number] [id number]    DISCUSSION/SUMMARY:    64-year-old male with relatively good past medical history found to have thrombocytopenia a few years ago  The decreased platelet counts go back approximately 4 + years  White count, hemoglobin level and differential have been relatively normal   CBC parameters are trended below  The platelet count has trended down significantly over the past few months  Most recent platelet count of 34E is obviously concerning  Patient has some bruising but no bleeding  We discussed options  We discussed options including restarting the Decadron, starting Nplate, start Promacta and possibly and eventual splenectomy  Patient also understands that there are other options that can be tried (IV Rituxan (rituximab))  The below information comes from UpToDate            Patient has elected to begin the [address]  Starting dose is 50 mg a day  Blood work will be checked every 2 weeks, patient will return to the office in 4 weeks, possibly earlier if issues  Mr Renato Torres knows to call the hematology/oncology office if there are any other questions or concerns  Carefully review your medication list and verify that the list is accurate and up-to-date  Please call the hematology/oncology office if there are medications missing from the list, medications on the list that you are not currently taking or if there is a dosage or instruction that is different from how you're taking that medication  Patient goals and areas of care:  Start Promacta  Barriers to care:  None  Patient is able to self-care   ______________________________________________________________________________________    Chief Complaint   Patient presents with    Follow-up     ITP     History of Present Illness:    64-year-old male previously referred for evaluation of thrombocytopenia  Mr Tyler Sykes was unaware of any CBC abnormalities up until recently when he needed left shoulder surgery  Patient has been seen by a number of physicians to trying clarify the etiology for the thrombocytopenia  Although the specifics are not entirely clear, 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 (previously seen by Dr Marta Jain in [place])  Mr Jayjay Richardson previously underwent left shoulder surgery  Patient received 1 unit of platelets before because the platelet count was borderline  The platelets shyla significantly, no surgical complications including bruising or bleeding issues  Patient recently underwent colonoscopy with Decadron treatment before  Recent blood work demonstrated a continuing trending downward platelet count  Patient was brought back to the office earlier  Mr Tyler Sykes states feeling okay, baseline  No recent fevers or signs of infection  Patient bruises somewhat on his upper extremities but not excessively, no GI or  bleeding  Activities are baseline  Routine health maintenance and medical care is up-to-date  Patient has received the COVID vaccine, not the booster  Review of Systems   Constitutional: Negative  HENT: Negative  Eyes: Negative  Respiratory: Negative  Cardiovascular: Negative  Gastrointestinal: Negative  Endocrine: Negative  Genitourinary: Negative  Musculoskeletal: Negative  Skin: Negative  Allergic/Immunologic: Negative  Neurological: Negative  Hematological: Does not bruise/bleed easily  Bruises easily, no significant bruising, no persistent bruising, no bleeding   Psychiatric/Behavioral: The patient is not nervous/anxious  All other systems reviewed and are negative  Patient Active Problem List   Diagnosis    Thrombocytopenia ([address] )    Elevated low-density lipoprotein level    BMI 31 0-31 9,adult    Obesity (BMI 30-39  9)    Essential hypertension    Vasculogenic erectile dysfunction    Primary osteoarthritis of right shoulder    COVID-19    Elevated troponin    PVC (premature ventricular contraction)    Stage 3 chronic kidney disease, unspecified whether stage 3a or 3b CKD (HCC)     Past Medical History:   Diagnosis Date    Hypertension     Thrombocytopenia (HCC)     platelet count maintained around 90-Dr  German Mert Wears partial dentures     upper     Past Surgical History:   Procedure Laterality Date    APPENDECTOMY      CATARACT EXTRACTION Left     COLONOSCOPY      COLONOSCOPY N/A 10/17/2018    Procedure: COLONOSCOPY;  Surgeon: Minh Rodríguez MD;  Location: [facility] GI LAB; Service: Gastroenterology    HERNIA REPAIR Right 2013    inguinal    JOINT REPLACEMENT Right     knee, left shoulder    MN XCAPSL CTRC RMVL INSJ IO LENS PROSTH W/O ECP Left 2016    Procedure: EXTRACTION EXTRACAPSULAR CATARACT PHACO INTRAOCULAR LENS (IOL);   Surgeon: Marga Bradley MD;  Location: [facility] MAIN OR;  Service: Ophthalmology    SHOULDER ARTHROSCOPY Left     tendon repair    TONSILLECTOMY  age 1     Family History   Problem Relation Age of Onset    Heart disease Father 67        massive MI    Heart disease Sister         MI    Heart disease Brother         MI    No Known Problems Mother     Mental illness Neg Hx      Social History     Socioeconomic History    Marital status: /Civil Union     Spouse name: Not on file    Number of children: Not on file    Years of education: Not on file    Highest education level: Not on file   Occupational History    Not on file   Tobacco Use    Smoking status: Former Smoker     Packs/day: 0 50     Years: 22 00     Pack years: 11 00     Types: Cigarettes     Quit date:      Years since quittin 1    Smokeless tobacco: Never Used   Vaping Use    Vaping Use: Never used   Substance and Sexual Activity    Alcohol use: Never    Drug use: Never    Sexual activity: Not on file   Other Topics Concern    Not on file   Social History Narrative    Not on file     Social Determinants of Health     Financial Resource Strain: Not on file   Food Insecurity: Not on file   Transportation Needs: Not on file   Physical Activity: Not on file   Stress: Not on file   Social Connections: Not on file   Intimate Partner Violence: Not on file   Housing Stability: Not on file       Current Outpatient Medications:     amLODIPine (NORVASC) 5 mg tablet, Take 1 tablet (5 mg total) by mouth daily, Disp: 90 tablet, Rfl: 1    ascorbic acid (VITAMIN C) 500 mg tablet, Take 500 mg by mouth every morning , Disp: , Rfl:     atorvastatin (LIPITOR) 40 mg tablet, Take 1 tablet (40 mg total) by mouth daily with dinner, Disp: 90 tablet, Rfl: 3    b complex vitamins tablet, Take 1 tablet by mouth every morning , Disp: , Rfl:     cholecalciferol (VITAMIN D3) 1,000 units tablet, Take 1,000 Units by mouth every morning, Disp: , Rfl:     metoprolol succinate (TOPROL-XL) 25 mg 24 hr tablet, Take 1 tablet (25 mg total) by mouth daily, Disp: 90 tablet, Rfl: 3    multivitamin (THERAGRAN) TABS, Take 1 tablet by mouth every morning , Disp: , Rfl:     Omega-3 Fatty Acids (FISH OIL) 1,000 mg, Take 1,000 mg by mouth every morning  , Disp: , Rfl:     omeprazole (PriLOSEC) 10 mg delayed release capsule, Take by mouth daily, Disp: , Rfl:     sildenafil (VIAGRA) 100 mg tablet, Take 1 tablet (100 mg total) by mouth daily as needed for erectile dysfunction, Disp: 10 tablet, Rfl: 3    Allergies   Allergen Reactions    Dust Mite Extract Sneezing       Vitals:    02/23/22 1530   BP: 152/88   Pulse: 70   Resp: 18   Temp: 98 °F (36 7 °C)   SpO2: 97%     Physical Exam  Constitutional:       Appearance: He is well-developed  Comments: Well-nourished male, no respiratory distress   HENT:      Head: Normocephalic and atraumatic        Right Ear: External ear normal       Left Ear: External ear normal       Nose: Nose normal    Eyes:      Conjunctiva/sclera: Conjunctivae normal       Pupils: Pupils are equal, round, and reactive to light  Cardiovascular:      Rate and Rhythm: Normal rate and regular rhythm  Heart sounds: Normal heart sounds  Pulmonary:      Effort: Pulmonary effort is normal       Breath sounds: Normal breath sounds  Abdominal:      General: Bowel sounds are normal       Palpations: Abdomen is soft  Comments: +bowel sounds, nontender, slightly obese, cannot palpate liver or spleen, no rigidity or rebound   Musculoskeletal:         General: Normal range of motion  Cervical back: Normal range of motion and neck supple  Skin:     General: Skin is warm  Comments: Good color, warm, moist, well tanned, few upper extremity purpura   Neurological:      Mental Status: He is alert and oriented to person, place, and time  Deep Tendon Reflexes: Reflexes are normal and symmetric  Psychiatric:         Behavior: Behavior normal          Thought Content:  Thought content normal          Judgment: Judgment normal      Extremities:   No lower extremity edema bilaterally, no cords, pulses are 1+ bilaterally, no upper extremity edema, good range of motion in both upper extremities  Lymphatics:   No adenopathy in the neck, supraclavicular region, axilla and groin bilaterally    Labs        12/16/2021 BUN = 19 creatinine = 1 29    03/06/2019 WBC = 8 0 hemoglobin = 15 1 hematocrit = 45 MCV = 87 platelet = 96 neutrophil = 46% lymphocytes = 45% monocyte = 9%  04/27/2018 WBC = 7 0 hemoglobin = 15 6 hematocrit = 45 6 platelet = 82  03/01/4003 platelet = 925  58/77/8633 WBC = 7 7 hemoglobin = 13 9 hematocrit = 41 platelet count = 71 neutrophil = 59% lymphocytes = 29% atypical lymphocytes = 3% monocyte = 6%  03/27/2017 WBC = 7 5 hemoglobin = 14 1 hematocrit = 42 3 platelet = 58  59/67/9642 BUN = 18 creatinine = 1 05 ALT = 15 AST = 18 total bilirubin = 0 3 alkaline phosphatase = 88  01/2/17 platelet = 477  17/08/1265 MIGUEL ANGEL IFA = positive  11/21/2016 platelet = 46    Cardiology        Pathology    11/24/2016 direct platelet antibody:  Anti IgG = positive  11/21/2016 platelet antibody IIB/IIIA, IA/IIA, IB/IX and HLA class I were all negative

## 2022-02-24 ENCOUNTER — DOCUMENTATION (OUTPATIENT)
Dept: HEMATOLOGY ONCOLOGY | Facility: CLINIC | Age: 72
End: 2022-02-24

## 2022-02-24 DIAGNOSIS — D69.6 THROMBOCYTOPENIA (HCC): Primary | ICD-10-CM

## 2022-02-24 NOTE — PROGRESS NOTES
Received request for patient to start on Promacta 50MG daily  Guadalupe Ax has been submitted via cover my meds and is pending [place]: [place]    (help desk: [phone])    BIN:       [id number]  PCN:      [id number]  ID:          [id number]  GRP:      [id number]      Patient has also had funding provided for him  Patient has been enrolled with [facility] ID#  [id number]  CARD# [id number]  PCN# [id number]  GRP# [id number]  BIN# [id number]  AMT $ 10,000  EFF 1-25-22  THRU 1-24-23    [facility]    UPDATE[facility]  [facility]

## 2022-02-24 NOTE — PROGRESS NOTES
2-23-22  Rcvd new oral chemo start PROMACTA // Belkis Stinson is required //     Patient has been enrolled with [facility] ID#  [id number]  CARD# [id number]  PCN# [id number]  GRP# [id number]  BIN# [id number]  AMT $ 10,000  EFF 1-25-22  THRU 1-24-23    Epic Noted

## 2022-03-02 ENCOUNTER — TELEPHONE (OUTPATIENT)
Dept: HEMATOLOGY ONCOLOGY | Facility: MEDICAL CENTER | Age: 72
End: 2022-03-02

## 2022-03-02 NOTE — TELEPHONE ENCOUNTER
Spoke with patient  [facility]  Patient tolerating medicine   Patient instructed to call with issues

## 2022-03-04 ENCOUNTER — OFFICE VISIT (OUTPATIENT)
Dept: CARDIOLOGY CLINIC | Facility: CLINIC | Age: 72
End: 2022-03-04
Payer: COMMERCIAL

## 2022-03-04 VITALS
HEIGHT: 76 IN | DIASTOLIC BLOOD PRESSURE: 58 MMHG | HEART RATE: 74 BPM | BODY MASS INDEX: 31.78 KG/M2 | SYSTOLIC BLOOD PRESSURE: 110 MMHG | OXYGEN SATURATION: 96 % | TEMPERATURE: 97.4 F | WEIGHT: 261 LBS

## 2022-03-04 DIAGNOSIS — I49.3 PVC (PREMATURE VENTRICULAR CONTRACTION): Primary | ICD-10-CM

## 2022-03-04 DIAGNOSIS — I25.10 CAD IN NATIVE ARTERY: ICD-10-CM

## 2022-03-04 DIAGNOSIS — I21.19 INFERIOR MI (HCC): ICD-10-CM

## 2022-03-04 DIAGNOSIS — Z86.2 HISTORY OF ITP: ICD-10-CM

## 2022-03-04 PROCEDURE — 99214 OFFICE O/P EST MOD 30 MIN: CPT | Performed by: INTERNAL MEDICINE

## 2022-03-04 NOTE — PROGRESS NOTES
[facility]  [address]   [place], [address]  Cardiology Follow-up    Natalyase Ghosh  [id number]  1950      Consult for:  PVCs/non-STEMI  Appreciate consult by: Rose Resendez DO    1  PVC (premature ventricular contraction)     2  Inferior MI ([address] )  Lipid Panel with Direct LDL reflex   3  CAD in native artery        Discussion/Summary:   Prior infarction/CAD- discussed in detail with patient  His nuclear stress test shows a relatively fixed inferior defect  He denies having active chest pain  Suspicion for prior remote myocardial infarction  He has a history of thrombocytopenia  We will continue optimal medical therapy  Unable to place on anti-platelet therapy  He is currently on atorvastatin 40 mg  Will target an LDL below 70  Continue metoprolol 25 mg  If he has active chest pain consideration for cardiac catheterization  However given his thrombocytopenia he is not a candidate for stents or anti-platelet therapy currently  PVCs- 5% PVC burden  PVC secondary to prior ischemic event? Low suspicion for sleep disordered breathing  Continue metoprolol 5 mg daily    Hypertension- currently well controlled on amlodipine 5 mg plus metoprolol    Idiopathic thrombocytopenia- started on therapy by heme oncology    HPI:   68-year-old gentleman with recent hospital admission secondary to COVID and irregular heart rhythm  He was found to have frequent ventricular bigeminy  He had a nuclear stress test which was negative for acute ischemia  He was placed on low-dose beta-blocker  Since discharge he denies having major palpitations  He reports exercising daily  03/04/2022:  He continues to have no symptoms of chest pain or significant palpitations  I reviewed with him his previous nuclear stress test   His event monitor was reviewed  He has a low PVC burden        Past Medical History:   Diagnosis Date    Hypertension     Thrombocytopenia [address] [address] ) platelet count maintained around 90-Dr Hien Avalos    Wears partial dentures     upper     Social History     Socioeconomic History    Marital status: /Civil Union     Spouse name: Not on file    Number of children: Not on file    Years of education: Not on file    Highest education level: Not on file   Occupational History    Not on file   Tobacco Use    Smoking status: Former Smoker     Packs/day: 0 50     Years: 22 00     Pack years: 11 00     Types: Cigarettes     Quit date:      Years since quittin 1    Smokeless tobacco: Never Used   Vaping Use    Vaping Use: Never used   Substance and Sexual Activity    Alcohol use: Never    Drug use: Never    Sexual activity: Not on file   Other Topics Concern    Not on file   Social History Narrative    Not on file     Social Determinants of Health     Financial Resource Strain: Not on file   Food Insecurity: Not on file   Transportation Needs: Not on file   Physical Activity: Not on file   Stress: Not on file   Social Connections: Not on file   Intimate Partner Violence: Not on file   Housing Stability: Not on file      Family History   Problem Relation Age of Onset    Heart disease Father 67        massive MI    Heart disease Sister         MI    Heart disease Brother         MI    No Known Problems Mother     Mental illness Neg Hx      Past Surgical History:   Procedure Laterality Date    APPENDECTOMY      CATARACT EXTRACTION Left     COLONOSCOPY      COLONOSCOPY N/A 10/17/2018    Procedure: COLONOSCOPY;  Surgeon: Minh Rodríguez MD;  Location: [place] [address] GI LAB; Service: Gastroenterology    HERNIA REPAIR Right 2013    inguinal    JOINT REPLACEMENT Right     knee, left shoulder    NH XCAPSL CTRC RMVL INSJ IO LENS PROSTH W/O ECP Left 2016    Procedure: EXTRACTION EXTRACAPSULAR CATARACT PHACO INTRAOCULAR LENS (IOL);   Surgeon: Marga Bradley MD;  Location: [facility] MAIN OR;  Service: Ophthalmology    SHOULDER ARTHROSCOPY Left tendon repair    TONSILLECTOMY  age 1       Current Outpatient Medications:     amLODIPine (NORVASC) 5 mg tablet, Take 1 tablet (5 mg total) by mouth daily, Disp: 90 tablet, Rfl: 1    ascorbic acid (VITAMIN C) 500 mg tablet, Take 500 mg by mouth every morning , Disp: , Rfl:     atorvastatin (LIPITOR) 40 mg tablet, Take 1 tablet (40 mg total) by mouth daily with dinner, Disp: 90 tablet, Rfl: 3    b complex vitamins tablet, Take 1 tablet by mouth every morning , Disp: , Rfl:     cholecalciferol (VITAMIN D3) 1,000 units tablet, Take 1,000 Units by mouth every morning, Disp: , Rfl:     eltrombopag (PROMACTA) 50 MG tablet, Take 1 tablet (50 mg total) by mouth daily Administer on an empty stomach, 1 hour before or 2 hours after a meal , Disp: 60 tablet, Rfl: 5    metoprolol succinate (TOPROL-XL) 25 mg 24 hr tablet, Take 1 tablet (25 mg total) by mouth daily, Disp: 90 tablet, Rfl: 3    multivitamin (THERAGRAN) TABS, Take 1 tablet by mouth every morning , Disp: , Rfl:     Omega-3 Fatty Acids (FISH OIL) 1,000 mg, Take 1,000 mg by mouth every morning  , Disp: , Rfl:     omeprazole (PriLOSEC) 10 mg delayed release capsule, Take by mouth daily, Disp: , Rfl:     sildenafil (VIAGRA) 100 mg tablet, Take 1 tablet (100 mg total) by mouth daily as needed for erectile dysfunction, Disp: 10 tablet, Rfl: 3  Allergies   Allergen Reactions    Dust Mite Extract Sneezing     Vitals:    03/04/22 0959   BP: 110/58   BP Location: Right arm   Patient Position: Sitting   Cuff Size: Large   Pulse: 74   Temp: (!) 97 4 °F (36 3 °C)   SpO2: 96%   Weight: 118 kg (261 lb)   Height: 6' 4" (1 93 m)       Review of Systems:   Review of Systems   Constitutional: Negative  HENT: Negative  Eyes: Negative  Respiratory: Negative  Cardiovascular: Positive for palpitations  Gastrointestinal: Negative  Endocrine: Negative  Genitourinary: Negative  Musculoskeletal: Negative  Skin: Negative  Allergic/Immunologic: Negative  Neurological: Negative  Hematological: Negative  Psychiatric/Behavioral: Negative  Vitals:    03/04/22 0959   BP: 110/58   BP Location: Right arm   Patient Position: Sitting   Cuff Size: Large   Pulse: 74   Temp: (!) 97 4 °F (36 3 °C)   SpO2: 96%   Weight: 118 kg (261 lb)   Height: 6' 4" (1 93 m)     Physical Examination:   Physical Exam  Constitutional:       General: He is not in acute distress  Appearance: He is well-developed  He is not diaphoretic  HENT:      Head: Normocephalic and atraumatic  Right Ear: External ear normal       Left Ear: External ear normal    Eyes:      General: No scleral icterus  Right eye: No discharge  Left eye: No discharge  Conjunctiva/sclera: Conjunctivae normal       Pupils: Pupils are equal, round, and reactive to light  Neck:      Thyroid: No thyromegaly  Vascular: No JVD  Trachea: No tracheal deviation  Cardiovascular:      Rate and Rhythm: Normal rate and regular rhythm  Heart sounds: No murmur heard  No friction rub  Gallop present  Pulmonary:      Effort: Pulmonary effort is normal  No respiratory distress  Breath sounds: Normal breath sounds  No stridor  No wheezing or rales  Chest:      Chest wall: No tenderness  Abdominal:      General: Bowel sounds are normal  There is no distension  Palpations: Abdomen is soft  There is no mass  Tenderness: There is no abdominal tenderness  There is no guarding or rebound  Musculoskeletal:         General: No tenderness or deformity  Normal range of motion  Cervical back: Normal range of motion and neck supple  Skin:     General: Skin is warm and dry  Coloration: Skin is not pale  Findings: No erythema or rash  Neurological:      Mental Status: He is alert and oriented to person, place, and time  Cranial Nerves: No cranial nerve deficit  Motor: No abnormal muscle tone        Coordination: Coordination normal       Deep Tendon Reflexes: Reflexes are normal and symmetric  Reflexes normal    Psychiatric:         Behavior: Behavior normal          Thought Content:  Thought content normal          Judgment: Judgment normal          Labs:     Lab Results   Component Value Date    WBC 8 61 02/22/2022    HGB 14 1 02/22/2022    HCT 43 3 02/22/2022    MCV 90 02/22/2022    RDW 14 2 02/22/2022    PLT 13 (LL) 02/22/2022     BMP:  Lab Results   Component Value Date    SODIUM 139 12/16/2021    K 4 7 12/16/2021     12/16/2021    CO2 24 12/16/2021    BUN 19 12/16/2021    CREATININE 1 29 12/16/2021    GLUC 92 12/16/2021    GLUF 92 12/16/2021    CALCIUM 8 5 12/16/2021    CORRECTEDCA 9 4 12/15/2021    EGFR 54 12/16/2021    MG 2 0 12/16/2021     LFT:  Lab Results   Component Value Date    AST 20 12/15/2021    ALT 31 12/15/2021    ALKPHOS 104 12/15/2021    TP 6 9 12/15/2021    ALB 3 2 (L) 12/15/2021      Lab Results   Component Value Date    AOB9XFTRLGVI 1 398 12/15/2021     No results found for: HGBA1C  Lipid Profile:   Lab Results   Component Value Date    CHOLESTEROL 135 12/16/2021    HDL 32 (L) 12/16/2021    LDLCALC 86 12/16/2021    TRIG 83 12/16/2021     Lab Results   Component Value Date    CHOLESTEROL 135 12/16/2021    CHOLESTEROL 173 09/23/2021     No results found for: CKTOTAL, CKMB, CKMBINDEX, TROPONINI  Lab Results   Component Value Date    NTBNP 1,417 (H) 12/15/2021      Recent Results (from the past 672 hour(s))   CBC and differential    Collection Time: 02/08/22  9:12 AM   Result Value Ref Range    WBC 7 43 4 31 - 10 16 Thousand/uL    RBC 4 74 3 88 - 5 62 Million/uL    Hemoglobin 14 0 12 0 - 17 0 g/dL    Hematocrit 42 6 36 5 - 49 3 %    MCV 90 82 - 98 fL    MCH 29 5 26 8 - 34 3 pg    MCHC 32 9 31 4 - 37 4 g/dL    RDW 14 0 11 6 - 15 1 %    MPV 11 9 8 9 - 12 7 fL    Platelets 34 (LL) 311 - 390 Thousands/uL    nRBC 0 /100 WBCs    Neutrophils Relative 53 43 - 75 %    Immat GRANS % 0 0 - 2 %    Lymphocytes Relative 31 14 - 44 % Monocytes Relative 12 4 - 12 %    Eosinophils Relative 3 0 - 6 %    Basophils Relative 1 0 - 1 %    Neutrophils Absolute 4 02 1 85 - 7 62 Thousands/µL    Immature Grans Absolute 0 01 0 00 - 0 20 Thousand/uL    Lymphocytes Absolute 2 28 0 60 - 4 47 Thousands/µL    Monocytes Absolute 0 88 0 17 - 1 22 Thousand/µL    Eosinophils Absolute 0 20 0 00 - 0 61 Thousand/µL    Basophils Absolute 0 04 0 00 - 0 10 Thousands/µL   CBC and differential    Collection Time: 02/22/22 11:42 AM   Result Value Ref Range    WBC 8 61 4 31 - 10 16 Thousand/uL    RBC 4 79 3 88 - 5 62 Million/uL    Hemoglobin 14 1 12 0 - 17 0 g/dL    Hematocrit 43 3 36 5 - 49 3 %    MCV 90 82 - 98 fL    MCH 29 4 26 8 - 34 3 pg    MCHC 32 6 31 4 - 37 4 g/dL    RDW 14 2 11 6 - 15 1 %    Platelets 13 (LL) 239 - 390 Thousands/uL    nRBC 0 /100 WBCs    Neutrophils Relative 56 43 - 75 %    Immat GRANS % 0 0 - 2 %    Lymphocytes Relative 30 14 - 44 %    Monocytes Relative 11 4 - 12 %    Eosinophils Relative 2 0 - 6 %    Basophils Relative 1 0 - 1 %    Neutrophils Absolute 4 91 1 85 - 7 62 Thousands/µL    Immature Grans Absolute 0 01 0 00 - 0 20 Thousand/uL    Lymphocytes Absolute 2 55 0 60 - 4 47 Thousands/µL    Monocytes Absolute 0 95 0 17 - 1 22 Thousand/µL    Eosinophils Absolute 0 14 0 00 - 0 61 Thousand/µL    Basophils Absolute 0 05 0 00 - 0 10 Thousands/µL       Imaging & Testing   I have personally reviewed pertinent reports  Cardiac Testing     Results for orders placed during the hospital encounter of 12/30/21    NM myocardial perfusion spect (rx stress and/or rest)    Interpretation Summary    Perfusion: There is a left ventricular perfusion defect that is medium in size with moderate reduction in uptake present in the entire inferior and inferolateral location(s) that is partially reversible  Defect is present at stress and rest and is not present on the apical portion of rest imaging     Findings consistent with infarct with edgar-infarct ischemia although an diaphragm attenuation artifact cannot be rule out    Stress ECG: The stress ECG is negative for ischemia after pharmacologic stress  No ST deviation is noted    Stress Function: Left ventricular function post-stress is normal  Post-stress ejection fraction is 48 %  EKG: Personally reviewed  Samuel Shine MD [facility]  [phone]  Please call with any questions or suggestions    Counseling :  A description of the counseling:   Goals and Barriers:  Patient's ability to self care:  Medication side effect reviewed with patient in detail and all their questions answered  "Portions of the record may have been created with voice recognition software  Occasional wrong word or "sound a like" substitutions may have occurred due to the inherent limitations of voice recognition software  Read the chart carefully and recognize, using context, where substitutions have occurred   Please call if you have any questions  "

## 2022-03-10 ENCOUNTER — TELEPHONE (OUTPATIENT)
Dept: HEMATOLOGY ONCOLOGY | Facility: MEDICAL CENTER | Age: 72
End: 2022-03-10

## 2022-03-10 ENCOUNTER — APPOINTMENT (OUTPATIENT)
Dept: LAB | Facility: HOSPITAL | Age: 72
End: 2022-03-10
Attending: INTERNAL MEDICINE
Payer: COMMERCIAL

## 2022-03-10 LAB
BASOPHILS # BLD AUTO: 0.06 THOUSANDS/ΜL (ref 0–0.1)
BASOPHILS NFR BLD AUTO: 1 % (ref 0–1)
EOSINOPHIL # BLD AUTO: 0.25 THOUSAND/ΜL (ref 0–0.61)
EOSINOPHIL NFR BLD AUTO: 4 % (ref 0–6)
ERYTHROCYTE [DISTWIDTH] IN BLOOD BY AUTOMATED COUNT: 14.1 % (ref 11.6–15.1)
HCT VFR BLD AUTO: 43.7 % (ref 36.5–49.3)
HGB BLD-MCNC: 14.1 G/DL (ref 12–17)
IMM GRANULOCYTES # BLD AUTO: 0.01 THOUSAND/UL (ref 0–0.2)
IMM GRANULOCYTES NFR BLD AUTO: 0 % (ref 0–2)
LYMPHOCYTES # BLD AUTO: 1.86 THOUSANDS/ΜL (ref 0.6–4.47)
LYMPHOCYTES NFR BLD AUTO: 28 % (ref 14–44)
MCH RBC QN AUTO: 29 PG (ref 26.8–34.3)
MCHC RBC AUTO-ENTMCNC: 32.3 G/DL (ref 31.4–37.4)
MCV RBC AUTO: 90 FL (ref 82–98)
MONOCYTES # BLD AUTO: 0.76 THOUSAND/ΜL (ref 0.17–1.22)
MONOCYTES NFR BLD AUTO: 11 % (ref 4–12)
NEUTROPHILS # BLD AUTO: 3.78 THOUSANDS/ΜL (ref 1.85–7.62)
NEUTS SEG NFR BLD AUTO: 56 % (ref 43–75)
NRBC BLD AUTO-RTO: 0 /100 WBCS
PLATELET # BLD AUTO: 63 THOUSANDS/UL (ref 149–390)
PMV BLD AUTO: 11.4 FL (ref 8.9–12.7)
RBC # BLD AUTO: 4.86 MILLION/UL (ref 3.88–5.62)
WBC # BLD AUTO: 6.72 THOUSAND/UL (ref 4.31–10.16)

## 2022-03-10 PROCEDURE — 36415 COLL VENOUS BLD VENIPUNCTURE: CPT | Performed by: INTERNAL MEDICINE

## 2022-03-10 PROCEDURE — 85025 COMPLETE CBC W/AUTO DIFF WBC: CPT | Performed by: INTERNAL MEDICINE

## 2022-03-23 ENCOUNTER — APPOINTMENT (OUTPATIENT)
Dept: LAB | Facility: HOSPITAL | Age: 72
End: 2022-03-23
Attending: INTERNAL MEDICINE
Payer: COMMERCIAL

## 2022-03-24 ENCOUNTER — OFFICE VISIT (OUTPATIENT)
Dept: HEMATOLOGY ONCOLOGY | Facility: MEDICAL CENTER | Age: 72
End: 2022-03-24
Payer: COMMERCIAL

## 2022-03-24 VITALS
OXYGEN SATURATION: 97 % | HEIGHT: 76 IN | BODY MASS INDEX: 31.9 KG/M2 | DIASTOLIC BLOOD PRESSURE: 60 MMHG | RESPIRATION RATE: 16 BRPM | SYSTOLIC BLOOD PRESSURE: 106 MMHG | TEMPERATURE: 97.2 F | WEIGHT: 262 LBS | HEART RATE: 59 BPM

## 2022-03-24 DIAGNOSIS — M25.541 ARTHRALGIA OF BOTH HANDS: ICD-10-CM

## 2022-03-24 DIAGNOSIS — M25.542 ARTHRALGIA OF BOTH HANDS: ICD-10-CM

## 2022-03-24 DIAGNOSIS — D69.3 CHRONIC ITP (IDIOPATHIC THROMBOCYTOPENIA) (HCC): Primary | ICD-10-CM

## 2022-03-24 PROCEDURE — 3008F BODY MASS INDEX DOCD: CPT | Performed by: PHYSICIAN ASSISTANT

## 2022-03-24 PROCEDURE — 1160F RVW MEDS BY RX/DR IN RCRD: CPT | Performed by: PHYSICIAN ASSISTANT

## 2022-03-24 PROCEDURE — 99215 OFFICE O/P EST HI 40 MIN: CPT | Performed by: PHYSICIAN ASSISTANT

## 2022-03-24 PROCEDURE — 1036F TOBACCO NON-USER: CPT | Performed by: PHYSICIAN ASSISTANT

## 2022-03-24 NOTE — PROGRESS NOTES
[address] [facility]ATOLOGY ONCOLOGY Tatum Santiago  [address] [facility] [address]  Hematology Ambulatory Follow-Up  Kellie Moore, 1950, [id number]  3/24/2022      Assessment and Plan   1  Chronic ITP (idiopathic thrombocytopenia) (HCC)  This is a 68-year-old man with history of assumed chronic ITP as the other patients cell lines are within normal limits  Patient was started on Promacta after hitting a michelle of 13,000 per unit L  Patient has done remarkably well  Promacta after only one week had elevated platelet count above the 50,000 goal   Patient will remain on this dose for the time being  Patient notes some arthralgias + MCP joint swelling intermittently,  see 2  Below    Regimen:  Promacta 50 mg PO daily  - Basic metabolic panel; Future  - Creatinine, serum; Future  - Comprehensive metabolic panel; Future  - CBC and differential; Future    2  Arthralgia of both hands  I reviewed the PI for Promacta  While I did not see arthralgias or joint swelling as a side effect, given the causal relationship between the start of the arthralgias and localized swelling and the start of the medication, I have not ruled out this as a cause  Additionally, the patient started on other medications  Typically swelling related to medication effect would have to do with peripheral edema including the lower limbs as well as the hands  I find it interesting that in this gentleman, swelling is only in his hands and it possibly to back and forth  Patient is not aware of family history of rheumatic conditions  However, with the patient's activities and weightlifting, it is possible that this is arthritis considering the associated stiffness  Briefly discussed that if this arthralgias gets in the way with his quality of life, reducing the medication to 25 mg daily may be an option  Patient has responded well to Promacta thus far        The patient is scheduled for follow-up in approximately 4 weeks with Dr Barbie Hobson  Patient voiced agreement and understanding to the above  Patient advised to call the Hematology/Oncology office with any questions and concerns regarding the above  Barrier(s) to care: None  The patient is able to self care  Brittni Mccall PA-C  Medical Oncology/Hematology  [id number]    Subjective     Chief Complaint   Patient presents with    Follow-up     Thrombocytopenia       History of present illness: This is a 49-year-old male with past medical history of hypertension, stage 3 kidney disease and chronic thrombocytopenia who presents to the Hematology Clinic when requiring left shoulder surgery due to thrombocytopenia  Patient has seen multiple hematologic physicians including Dr Spenser Herman  Exact etiology was not found  Patient underwent left shoulder surgery received 1 unit of platelets  Platelet count shyla significantly and there is no surgical complications  Patient also underwent colonoscopy with Decadron treatment prior  More recently, since the end of December 2021 patient's platelet count has been slowly decreasing  Patient's michelle was on 2/22/22 with a platelet count of 28002 per unit L  Patient started on Promacta on 3/2/22 from home start specialty pharmacy  Component      Latest Ref Rng & Units 2/22/2022 3/10/2022 3/23/2022          11:42 AM  7:38 AM  7:20 AM   Platelet Count      641 - 390 Thousands/uL 13 (LL) 63 (L) 95 (L)     Interval history:  Patient notes that over his MCP joints bilaterally he will have occasional swelling  Patient also notes hypertrophy of the joint  Patient is fairly active exercising every day lifting weights  Furthermore, patient does over 100 minutes of cardiovascular activity a week    Patient notes that prior to starting this medication- Promacta - as well as other medications from cardiologist's of which the patient was unsure of the name even when asked to review his medication list     Review of Systems   Constitutional: Negative for activity change, appetite change, fatigue and fever  HENT: Negative for nosebleeds  Respiratory: Negative for cough, choking and shortness of breath  Cardiovascular: Negative for chest pain, palpitations and leg swelling  Gastrointestinal: Negative for abdominal distention, abdominal pain, anal bleeding, blood in stool, constipation, diarrhea, nausea and vomiting  Endocrine: Negative for cold intolerance  Genitourinary: Negative for hematuria  Musculoskeletal: Positive for arthralgias  Negative for myalgias  Skin: Negative for color change, pallor and rash  Allergic/Immunologic: Negative for immunocompromised state  Neurological: Negative for headaches  Hematological: Negative for adenopathy  Does not bruise/bleed easily  All other systems reviewed and are negative  Patient Active Problem List   Diagnosis    Thrombocytopenia ([address] )    Elevated low-density lipoprotein level    BMI 31 0-31 9,adult    Obesity (BMI 30-39  9)    Essential hypertension    Vasculogenic erectile dysfunction    Primary osteoarthritis of right shoulder    COVID-19    Elevated troponin    PVC (premature ventricular contraction)    Stage 3 chronic kidney disease, unspecified whether stage 3a or 3b CKD (HCC)    History of ITP     Past Medical History:   Diagnosis Date    Hypertension     Thrombocytopenia (HCC)     platelet count maintained around 90-Dr Priestjuan Sharp Wears partial dentures     upper     Past Surgical History:   Procedure Laterality Date    APPENDECTOMY      CATARACT EXTRACTION Left     COLONOSCOPY      COLONOSCOPY N/A 10/17/2018    Procedure: COLONOSCOPY;  Surgeon: Tiffany Zaragoza MD;  Location: [place] [address] [facility];   Service: Gastroenterology    HERNIA REPAIR Right 2013    inguinal    JOINT REPLACEMENT Right     knee, left shoulder    CO XCAPSL CTRC RMVL INSJ IO LENS PROSTH W/O ECP Left 5/4/2016 Procedure: EXTRACTION EXTRACAPSULAR CATARACT PHACO INTRAOCULAR LENS (IOL);   Surgeon: Subha Alegria MD;  [facility] MAIN OR;  Service: Ophthalmology    SHOULDER ARTHROSCOPY Left     tendon repair    TONSILLECTOMY  age 1     Family History   Problem Relation Age of Onset    Heart disease Father 67        massive MI    Heart disease Sister         MI    Heart disease Brother         MI    No Known Problems Mother     Mental illness Neg Hx      Social History     Socioeconomic History    Marital status: /Civil Union     Spouse name: None    Number of children: None    Years of education: None    Highest education level: None   Occupational History    None   Tobacco Use    Smoking status: Former Smoker     Packs/day: 0 50     Years: 22 00     Pack years: 11 00     Types: Cigarettes     Quit date:      Years since quittin 2    Smokeless tobacco: Never Used   Vaping Use    Vaping Use: Never used   Substance and Sexual Activity    Alcohol use: Never    Drug use: Never    Sexual activity: None   Other Topics Concern    None   Social History Narrative    None     Social Determinants of Health     Financial Resource Strain: Not on file   Food Insecurity: Not on file   Transportation Needs: Not on file   Physical Activity: Not on file   Stress: Not on file   Social Connections: Not on file   Intimate Partner Violence: Not on file   Housing Stability: Not on file       Current Outpatient Medications:     amLODIPine (NORVASC) 5 mg tablet, Take 1 tablet (5 mg total) by mouth daily, Disp: 90 tablet, Rfl: 1    ascorbic acid (VITAMIN C) 500 mg tablet, Take 500 mg by mouth every morning , Disp: , Rfl:     atorvastatin (LIPITOR) 40 mg tablet, Take 1 tablet (40 mg total) by mouth daily with dinner, Disp: 90 tablet, Rfl: 3    b complex vitamins tablet, Take 1 tablet by mouth every morning , Disp: , Rfl:     cholecalciferol (VITAMIN D3) 1,000 units tablet, Take 1,000 Units by mouth every morning, Disp: , Rfl:     eltrombopag (PROMACTA) 50 MG tablet, Take 1 tablet (50 mg total) by mouth daily Administer on an empty stomach, 1 hour before or 2 hours after a meal , Disp: 60 tablet, Rfl: 5    metoprolol succinate (TOPROL-XL) 25 mg 24 hr tablet, Take 1 tablet (25 mg total) by mouth daily, Disp: 90 tablet, Rfl: 3    multivitamin (THERAGRAN) TABS, Take 1 tablet by mouth every morning , Disp: , Rfl:     Omega-3 Fatty Acids (FISH OIL) 1,000 mg, Take 1,000 mg by mouth every morning  , Disp: , Rfl:     omeprazole (PriLOSEC) 10 mg delayed release capsule, Take by mouth daily, Disp: , Rfl:     sildenafil (VIAGRA) 100 mg tablet, Take 1 tablet (100 mg total) by mouth daily as needed for erectile dysfunction, Disp: 10 tablet, Rfl: 3  Allergies   Allergen Reactions    Dust Mite Extract Sneezing       Objective   /60 (BP Location: Left arm, Patient Position: Sitting, Cuff Size: Large)   Pulse 59   Temp (!) 97 2 °F (36 2 °C) (Temporal)   Resp 16   Ht 6' 4" (1 93 m)   Wt 119 kg (262 lb)   SpO2 97%   BMI 31 89 kg/m²    Physical Exam  Constitutional:       General: He is not in acute distress  Appearance: He is well-developed  HENT:      Head: Normocephalic and atraumatic  Mouth/Throat:      Pharynx: No oropharyngeal exudate  Eyes:      General: No scleral icterus  Conjunctiva/sclera: Conjunctivae normal       Pupils: Pupils are equal, round, and reactive to light  Cardiovascular:      Rate and Rhythm: Normal rate and regular rhythm  Heart sounds: No murmur heard  Pulmonary:      Effort: Pulmonary effort is normal  No respiratory distress  Breath sounds: Normal breath sounds  Abdominal:      General: Bowel sounds are normal  There is no distension  Palpations: Abdomen is soft  Tenderness: There is no abdominal tenderness  Musculoskeletal:         General: No tenderness  Cervical back: Normal range of motion     Lymphadenopathy:      Cervical: No cervical adenopathy  Skin:     Coloration: Skin is not pale  Findings: No erythema or rash  Neurological:      Mental Status: He is alert and oriented to person, place, and time  Cranial Nerves: No cranial nerve deficit  Result Review  Labs: Ancillary Orders on 03/18/2022   Component Date Value Ref Range Status    WBC 03/23/2022 7 69  4 31 - 10 16 Thousand/uL Final    RBC 03/23/2022 4 95  3 88 - 5 62 Million/uL Final    Hemoglobin 03/23/2022 14 2  12 0 - 17 0 g/dL Final    Hematocrit 03/23/2022 44 6  36 5 - 49 3 % Final    MCV 03/23/2022 90  82 - 98 fL Final    MCH 03/23/2022 28 7  26 8 - 34 3 pg Final    MCHC 03/23/2022 31 8  31 4 - 37 4 g/dL Final    RDW 03/23/2022 14 2  11 6 - 15 1 % Final    MPV 03/23/2022 11 0  8 9 - 12 7 fL Final    Platelets 56/16/7069 95* 149 - 390 Thousands/uL Final    Manual Review of Smear PerformedResults verified by repeat    nRBC 03/23/2022 0  /100 WBCs Final    Neutrophils Relative 03/23/2022 61  43 - 75 % Final    Immat GRANS % 03/23/2022 0  0 - 2 % Final    Lymphocytes Relative 03/23/2022 25  14 - 44 % Final    Monocytes Relative 03/23/2022 10  4 - 12 % Final    Eosinophils Relative 03/23/2022 3  0 - 6 % Final    Basophils Relative 03/23/2022 1  0 - 1 % Final    Neutrophils Absolute 03/23/2022 4 69  1 85 - 7 62 Thousands/µL Final    Immature Grans Absolute 03/23/2022 0 01  0 00 - 0 20 Thousand/uL Final    Lymphocytes Absolute 03/23/2022 1 93  0 60 - 4 47 Thousands/µL Final    Monocytes Absolute 03/23/2022 0 78  0 17 - 1 22 Thousand/µL Final    Eosinophils Absolute 03/23/2022 0 23  0 00 - 0 61 Thousand/µL Final    Basophils Absolute 03/23/2022 0 05  0 00 - 0 10 Thousands/µL Final       Please note: This report has been generated by a voice recognition software system  Therefore there may be syntax, spelling, and/or grammatical errors  Please call if you have any questions

## 2022-03-28 ENCOUNTER — TELEPHONE (OUTPATIENT)
Dept: CARDIOLOGY CLINIC | Facility: CLINIC | Age: 72
End: 2022-03-28

## 2022-03-28 DIAGNOSIS — I21.19 INFERIOR MI (HCC): Primary | ICD-10-CM

## 2022-03-28 NOTE — TELEPHONE ENCOUNTER
Pt states he has noticed swelling in hands for about a week  Pt states no weight change that he has been the same weight for 30years  States he feels pain in joints  States bp has been in range but he feels his heart rate has been lower ranging from 50-60   He wonders if it could be from atorvastatin

## 2022-04-06 RX ORDER — ROSUVASTATIN CALCIUM 5 MG/1
5 TABLET, COATED ORAL DAILY
Qty: 90 TABLET | Refills: 1 | Status: SHIPPED | OUTPATIENT
Start: 2022-04-06

## 2022-04-14 ENCOUNTER — OFFICE VISIT (OUTPATIENT)
Dept: FAMILY MEDICINE CLINIC | Facility: CLINIC | Age: 72
End: 2022-04-14
Payer: COMMERCIAL

## 2022-04-14 ENCOUNTER — TELEPHONE (OUTPATIENT)
Dept: HEMATOLOGY ONCOLOGY | Facility: MEDICAL CENTER | Age: 72
End: 2022-04-14

## 2022-04-14 VITALS
RESPIRATION RATE: 18 BRPM | BODY MASS INDEX: 31.78 KG/M2 | WEIGHT: 261 LBS | HEART RATE: 71 BPM | OXYGEN SATURATION: 95 % | TEMPERATURE: 100 F | HEIGHT: 76 IN | DIASTOLIC BLOOD PRESSURE: 68 MMHG | SYSTOLIC BLOOD PRESSURE: 130 MMHG

## 2022-04-14 DIAGNOSIS — R60.0 HAND EDEMA: Primary | ICD-10-CM

## 2022-04-14 DIAGNOSIS — N52.9 VASCULOGENIC ERECTILE DYSFUNCTION, UNSPECIFIED VASCULOGENIC ERECTILE DYSFUNCTION TYPE: ICD-10-CM

## 2022-04-14 DIAGNOSIS — M79.642 BILATERAL HAND PAIN: ICD-10-CM

## 2022-04-14 DIAGNOSIS — D69.6 THROMBOCYTOPENIA (HCC): Primary | ICD-10-CM

## 2022-04-14 DIAGNOSIS — M79.641 BILATERAL HAND PAIN: ICD-10-CM

## 2022-04-14 PROCEDURE — 99213 OFFICE O/P EST LOW 20 MIN: CPT | Performed by: FAMILY MEDICINE

## 2022-04-14 RX ORDER — SILDENAFIL 100 MG/1
100 TABLET, FILM COATED ORAL DAILY PRN
Qty: 10 TABLET | Refills: 3 | Status: SHIPPED | OUTPATIENT
Start: 2022-04-14

## 2022-04-14 NOTE — PROGRESS NOTES
Assessment/Plan:    1  Hand edema  -     TSH, 3rd generation; Future  -     RF Screen w/ Reflex to Titer; Future  -     Cyclic citrul peptide antibody, IgG; Future  -     C-reactive protein; Future  -     [facility] Comprehensive Panel; Future    2  Bilateral hand pain  -     TSH, 3rd generation; Future  -     RF Screen w/ Reflex to Titer; Future  -     Cyclic citrul peptide antibody, IgG; Future  -     C-reactive protein; Future  -     [facility] Comprehensive Panel; Future    3  Vasculogenic erectile dysfunction, unspecified vasculogenic erectile dysfunction type  -     sildenafil (VIAGRA) 100 mg tablet; Take 1 tablet (100 mg total) by mouth daily as needed for erectile dysfunction  Pt is on norvasc which causes edema apparently promacta also  Pt already had his promacta decreased  I will l;et him sit on that for now and if that works great if not we will take away the norvasc  If that works great  If not then we have to diog a little deeper  Pt describes al;most what sounds like carpel tunnel minus the swelling  He ios going to take a pic for me when it happens next        There are no Patient Instructions on file for this visit  No follow-ups on file  Subjective:      Patient ID: Verba Koyanagi is a 66 y o  male  Chief Complaint   Patient presents with    Hand Swelling     sas/cma       Pt is here for a same day appt for swelling in his hands    Pt states he was started on atorvastatin and metoprolol by cardio  Pt states he also has a platelet problem - pt states he was started on promacta  And he is on BP meds    Pt states something is making hsi hands swell and his joints are very painful  States this only happens when he lies down in bed  Does not have it now, not there all day long  First three digits will get numb and painful  This has been going on for 4 weeks - possibly when he started the meds for platelets  Pt states they are swollen like his hand is hard to close    States they get red and his joints are big  And the fingers and hand swells  Feet do not swell      Pt states he c      The following portions of the patient's history were reviewed and updated as appropriate: allergies, current medications, past family history, past medical history, past social history, past surgical history and problem list     Review of Systems   Constitutional: Negative for activity change, appetite change, chills, diaphoresis, fatigue, fever and unexpected weight change  HENT: Negative for congestion, dental problem, ear pain, mouth sores, sinus pressure, sinus pain, sore throat and trouble swallowing  Eyes: Negative for photophobia, discharge and itching  Respiratory: Negative for apnea, chest tightness and shortness of breath  Cardiovascular: Negative for chest pain, palpitations and leg swelling  Gastrointestinal: Negative for abdominal distention, abdominal pain, blood in stool, nausea and vomiting  Endocrine: Negative for cold intolerance, heat intolerance, polydipsia, polyphagia and polyuria  Genitourinary: Negative for difficulty urinating  Musculoskeletal: Positive for arthralgias  Hand swelling   Skin: Negative for color change and wound  Neurological: Negative for dizziness, syncope, speech difficulty and headaches  Hematological: Negative for adenopathy  Psychiatric/Behavioral: Negative for agitation and behavioral problems  Current Outpatient Medications   Medication Sig Dispense Refill    amLODIPine (NORVASC) 5 mg tablet Take 1 tablet (5 mg total) by mouth daily 90 tablet 1    ascorbic acid (VITAMIN C) 500 mg tablet Take 500 mg by mouth every morning   atorvastatin (LIPITOR) 40 mg tablet Take 1 tablet (40 mg total) by mouth daily with dinner 90 tablet 3    b complex vitamins tablet Take 1 tablet by mouth every morning        cholecalciferol (VITAMIN D3) 1,000 units tablet Take 1,000 Units by mouth every morning      eltrombopag (PROMACTA) 25 mg tablet Take 1 tablet (25 mg total) by mouth daily Administer on an empty stomach, 1 hour before or 2 hours after a meal  30 tablet 5    eltrombopag (PROMACTA) 50 MG tablet Take 1 tablet (50 mg total) by mouth daily Administer on an empty stomach, 1 hour before or 2 hours after a meal  60 tablet 5    metoprolol succinate (TOPROL-XL) 25 mg 24 hr tablet Take 1 tablet (25 mg total) by mouth daily 90 tablet 3    multivitamin (THERAGRAN) TABS Take 1 tablet by mouth every morning   Omega-3 Fatty Acids (FISH OIL) 1,000 mg Take 1,000 mg by mouth every morning        omeprazole (PriLOSEC) 10 mg delayed release capsule Take by mouth daily      rosuvastatin (CRESTOR) 5 mg tablet Take 1 tablet (5 mg total) by mouth daily 90 tablet 1    sildenafil (VIAGRA) 100 mg tablet Take 1 tablet (100 mg total) by mouth daily as needed for erectile dysfunction 10 tablet 3     No current facility-administered medications for this visit  Objective:    /68   Pulse 71   Temp 100 °F (37 8 °C)   Resp 18   Ht 6' 4" (1 93 m)   Wt 118 kg (261 lb)   SpO2 95%   BMI 31 77 kg/m²        Physical Exam  Vitals and nursing note reviewed  Constitutional:       General: He is not in acute distress  Appearance: He is well-developed  He is not diaphoretic  HENT:      Head: Normocephalic and atraumatic  Right Ear: External ear normal       Left Ear: External ear normal       Nose: Nose normal       Mouth/Throat:      Pharynx: No oropharyngeal exudate  Eyes:      General: No scleral icterus  Right eye: No discharge  Left eye: No discharge  Pupils: Pupils are equal, round, and reactive to light  Neck:      Thyroid: No thyromegaly  Cardiovascular:      Rate and Rhythm: Normal rate  Heart sounds: Normal heart sounds  No murmur heard  Pulmonary:      Effort: Pulmonary effort is normal  No respiratory distress  Breath sounds: Normal breath sounds  No wheezing     Abdominal:      General: Bowel sounds are normal  There is no distension  Palpations: Abdomen is soft  There is no mass  Tenderness: There is no abdominal tenderness  There is no guarding or rebound  Musculoskeletal:         General: Normal range of motion  Skin:     General: Skin is warm and dry  Findings: No erythema or rash  Neurological:      Mental Status: He is alert        Coordination: Coordination normal       Deep Tendon Reflexes: Reflexes normal    Psychiatric:         Behavior: Behavior normal                 Maila Rico DO

## 2022-04-14 NOTE — TELEPHONE ENCOUNTER
Discussed symptoms with Dr Massimo Grossman  Last plt level 95,000  Promacta dose to be reduced to 25mg daily  Will pend script to Dr Keys November to sign  Patient aware

## 2022-04-20 ENCOUNTER — TELEPHONE (OUTPATIENT)
Dept: HEMATOLOGY ONCOLOGY | Facility: CLINIC | Age: 72
End: 2022-04-20

## 2022-04-21 ENCOUNTER — LAB (OUTPATIENT)
Dept: LAB | Facility: HOSPITAL | Age: 72
End: 2022-04-21
Payer: COMMERCIAL

## 2022-04-21 DIAGNOSIS — R60.0 HAND EDEMA: ICD-10-CM

## 2022-04-21 DIAGNOSIS — M79.642 BILATERAL HAND PAIN: ICD-10-CM

## 2022-04-21 DIAGNOSIS — D69.3 CHRONIC ITP (IDIOPATHIC THROMBOCYTOPENIA) (HCC): ICD-10-CM

## 2022-04-21 DIAGNOSIS — M79.641 BILATERAL HAND PAIN: ICD-10-CM

## 2022-04-21 LAB
ALBUMIN SERPL BCP-MCNC: 3.3 G/DL (ref 3.5–5)
ALP SERPL-CCNC: 107 U/L (ref 46–116)
ALT SERPL W P-5'-P-CCNC: 26 U/L (ref 12–78)
ANION GAP SERPL CALCULATED.3IONS-SCNC: 9 MMOL/L (ref 4–13)
AST SERPL W P-5'-P-CCNC: 23 U/L (ref 5–45)
BILIRUB SERPL-MCNC: 0.75 MG/DL (ref 0.2–1)
BUN SERPL-MCNC: 25 MG/DL (ref 5–25)
CALCIUM ALBUM COR SERPL-MCNC: 9 MG/DL (ref 8.3–10.1)
CALCIUM SERPL-MCNC: 8.4 MG/DL (ref 8.3–10.1)
CHLORIDE SERPL-SCNC: 106 MMOL/L (ref 100–108)
CO2 SERPL-SCNC: 22 MMOL/L (ref 21–32)
CREAT SERPL-MCNC: 1.23 MG/DL (ref 0.6–1.3)
CRP SERPL QL: 8.2 MG/L
GFR SERPL CREATININE-BSD FRML MDRD: 57 ML/MIN/1.73SQ M
GLUCOSE P FAST SERPL-MCNC: 111 MG/DL (ref 65–99)
POTASSIUM SERPL-SCNC: 4.4 MMOL/L (ref 3.5–5.3)
PROT SERPL-MCNC: 7.3 G/DL (ref 6.4–8.2)
SODIUM SERPL-SCNC: 137 MMOL/L (ref 136–145)
TSH SERPL DL<=0.05 MIU/L-ACNC: 1.89 UIU/ML (ref 0.45–4.5)

## 2022-04-21 PROCEDURE — 86038 ANTINUCLEAR ANTIBODIES: CPT

## 2022-04-21 PROCEDURE — 86200 CCP ANTIBODY: CPT

## 2022-04-21 PROCEDURE — 86140 C-REACTIVE PROTEIN: CPT

## 2022-04-21 PROCEDURE — 84443 ASSAY THYROID STIM HORMONE: CPT

## 2022-04-21 PROCEDURE — 86235 NUCLEAR ANTIGEN ANTIBODY: CPT

## 2022-04-21 PROCEDURE — 80053 COMPREHEN METABOLIC PANEL: CPT

## 2022-04-21 PROCEDURE — 86225 DNA ANTIBODY NATIVE: CPT

## 2022-04-21 PROCEDURE — 86430 RHEUMATOID FACTOR TEST QUAL: CPT

## 2022-04-21 PROCEDURE — 83516 IMMUNOASSAY NONANTIBODY: CPT

## 2022-04-22 ENCOUNTER — OFFICE VISIT (OUTPATIENT)
Dept: HEMATOLOGY ONCOLOGY | Facility: MEDICAL CENTER | Age: 72
End: 2022-04-22
Payer: COMMERCIAL

## 2022-04-22 VITALS
TEMPERATURE: 97.6 F | HEART RATE: 64 BPM | RESPIRATION RATE: 20 BRPM | BODY MASS INDEX: 31.83 KG/M2 | HEIGHT: 76 IN | WEIGHT: 261.4 LBS | OXYGEN SATURATION: 97 % | DIASTOLIC BLOOD PRESSURE: 66 MMHG | SYSTOLIC BLOOD PRESSURE: 124 MMHG

## 2022-04-22 DIAGNOSIS — Z86.2 HISTORY OF ITP: ICD-10-CM

## 2022-04-22 DIAGNOSIS — D69.6 THROMBOCYTOPENIA (HCC): Primary | ICD-10-CM

## 2022-04-22 LAB
CCP AB SER IA-ACNC: 1.8
DSDNA AB SER-ACNC: 159 IU/ML (ref 0–9)
ENA RNP AB SER-ACNC: <0.2 AI (ref 0–0.9)
ENA SCL70 AB SER-ACNC: <0.2 AI (ref 0–0.9)
ENA SM AB SER-ACNC: <0.2 AI (ref 0–0.9)
ENA SS-A AB SER-ACNC: <0.2 AI (ref 0–0.9)
ENA SS-B AB SER-ACNC: <0.2 AI (ref 0–0.9)
RHEUMATOID FACT SER QL LA: NEGATIVE

## 2022-04-22 PROCEDURE — 3008F BODY MASS INDEX DOCD: CPT | Performed by: INTERNAL MEDICINE

## 2022-04-22 PROCEDURE — 1160F RVW MEDS BY RX/DR IN RCRD: CPT | Performed by: INTERNAL MEDICINE

## 2022-04-22 PROCEDURE — 99214 OFFICE O/P EST MOD 30 MIN: CPT | Performed by: INTERNAL MEDICINE

## 2022-04-22 PROCEDURE — 1036F TOBACCO NON-USER: CPT | Performed by: INTERNAL MEDICINE

## 2022-04-22 NOTE — PROGRESS NOTES
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  Because of a recent persistent downward platelet trend with associated excessive bruising, patient was started on Promacta  Mr Brenda Broussard has had some arthritic side effects, not clear if it is the Promacta or another medication started around the same time  PCP has ordered a rheumatologic workup  Patient is to decrease the Promacta to 25 mg a day - the new medication dose is in the mail  The plan will be to monitor the platelet trend  Patient will monitor for any worsening swelling or arthritic complaints in the hands  In the meantime, patient will continue with a CBC every 2 weeks  Mr Brenda Broussard will continue to monitor for excessive bruising/bleeding  Patient is to return to the office in 6 weeks  Mr Brenda Broussard knows to call the hematology/oncology office if there are any other questions or concerns  Carefully review your medication list and verify that the list is accurate and up-to-date  Please call the hematology/oncology office if there are medications missing from the list, medications on the list that you are not currently taking or if there is a dosage or instruction that is different from how you're taking that medication  Patient goals and areas of care:  Continue with Promacta but lower dose  Barriers to care:  None  Patient is able to self-care   ______________________________________________________________________________________    Chief Complaint   Patient presents with    Follow-up     ITP     History of Present Illness:    76-year-old male previously referred for evaluation of thrombocytopenia    Mr Brenda Broussard was unaware of any CBC abnormalities up until recently when he needed left shoulder surgery  Patient has been seen by a number of physicians to trying clarify the etiology for the thrombocytopenia  Although the specifics are not entirely clear, 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 ([facility] [place] [facility]  Mr Estella Okeefe previously underwent left shoulder surgery  Patient received 1 unit of platelets before because the platelet count was borderline  The platelets shyla significantly, no surgical complications including bruising or bleeding issues  Patient recently underwent colonoscopy with Decadron treatment before  More recently, patient has platelet count trended downward  Patient had bruising issues  Options were discussed and patient began Promacta 50 mg a day  At the same time, patient had started other medications  Mr Mykel Salamanca more recently developed body aches especially in the wrists and hands  Patient would also wake up in the morning with swelling in the hands  Patient returns for follow-up  Mr Mykel Salamanca states feeling okay, about the same as before  Still with the swelling and arthritic issues in the hands  Activities are otherwise close to normal   No problems with excessive bruising or bleeding  No other problems with the Promacta  Appetite is good, weight is stable  No  or GI issues  No respiratory issues  Patient has received the COVID vaccine, not the booster  Review of Systems   Constitutional: Negative  HENT: Negative  Eyes: Negative  Respiratory: Negative  Cardiovascular: Negative  Gastrointestinal: Negative  Endocrine: Negative  Genitourinary: Negative  Musculoskeletal: Positive for arthralgias  Skin: Negative  Allergic/Immunologic: Negative  Neurological: Negative  Hematological: Does not bruise/bleed easily  Psychiatric/Behavioral: The patient is not nervous/anxious  All other systems reviewed and are negative       Patient Active Problem List Diagnosis    Thrombocytopenia (HCC)    Elevated low-density lipoprotein level    BMI 31 0-31 9,adult    Obesity (BMI 30-39  9)    Essential hypertension    Vasculogenic erectile dysfunction    Primary osteoarthritis of right shoulder    COVID-19    Elevated troponin    PVC (premature ventricular contraction)    Stage 3 chronic kidney disease, unspecified whether stage 3a or 3b CKD (HCC)    History of ITP     Past Medical History:   Diagnosis Date    Hypertension     Thrombocytopenia (HCC)     platelet count maintained around 90-Dr  Marely Lozada Wears partial dentures     upper     Past Surgical History:   Procedure Laterality Date    APPENDECTOMY      CATARACT EXTRACTION Left     COLONOSCOPY      COLONOSCOPY N/A 10/17/2018    Procedure: COLONOSCOPY;  Surgeon: Nena Vega MD;  Location: [place] [address] GI LAB; Service: Gastroenterology    HERNIA REPAIR Right 2013    inguinal    JOINT REPLACEMENT Right     knee, left shoulder    [place] XCAPSL CTRC RMVL INSJ IO LENS PROSTH W/O ECP Left 2016    Procedure: EXTRACTION EXTRACAPSULAR CATARACT PHACO INTRAOCULAR LENS (IOL);   Surgeon: Coby Laureano MD;  Location[facility] MAIN OR;  Service: Ophthalmology    SHOULDER ARTHROSCOPY Left     tendon repair    TONSILLECTOMY  age 1     Family History   Problem Relation Age of Onset    Heart disease Father 67        massive MI    Heart disease Sister         MI    Heart disease Brother         MI    No Known Problems Mother     Mental illness Neg Hx      Social History     Socioeconomic History    Marital status: /Civil Union     Spouse name: Not on file    Number of children: Not on file    Years of education: Not on file    Highest education level: Not on file   Occupational History    Not on file   Tobacco Use    Smoking status: Former Smoker     Packs/day: 0 50     Years: 22 00     Pack years: 11 00     Types: Cigarettes     Quit date:      Years since quittin 3    Smokeless tobacco: Never Used   Vaping Use    Vaping Use: Never used   Substance and Sexual Activity    Alcohol use: Never    Drug use: Never    Sexual activity: Not on file   Other Topics Concern    Not on file   Social History Narrative    Not on file     Social Determinants of Health     Financial Resource Strain: Not on file   Food Insecurity: Not on file   Transportation Needs: Not on file   Physical Activity: Not on file   Stress: Not on file   Social Connections: Not on file   Intimate Partner Violence: Not on file   Housing Stability: Not on file       Current Outpatient Medications:     amLODIPine (NORVASC) 5 mg tablet, Take 1 tablet (5 mg total) by mouth daily, Disp: 90 tablet, Rfl: 1    ascorbic acid (VITAMIN C) 500 mg tablet, Take 500 mg by mouth every morning , Disp: , Rfl:     atorvastatin (LIPITOR) 40 mg tablet, Take 1 tablet (40 mg total) by mouth daily with dinner, Disp: 90 tablet, Rfl: 3    b complex vitamins tablet, Take 1 tablet by mouth every morning , Disp: , Rfl:     cholecalciferol (VITAMIN D3) 1,000 units tablet, Take 1,000 Units by mouth every morning, Disp: , Rfl:     eltrombopag (PROMACTA) 25 mg tablet, Take 1 tablet (25 mg total) by mouth daily Administer on an empty stomach, 1 hour before or 2 hours after a meal , Disp: 30 tablet, Rfl: 5    eltrombopag (PROMACTA) 50 MG tablet, Take 1 tablet (50 mg total) by mouth daily Administer on an empty stomach, 1 hour before or 2 hours after a meal  (Patient taking differently: Take 25 mg by mouth daily Administer on an empty stomach, 1 hour before or 2 hours after a meal  ), Disp: 60 tablet, Rfl: 5    metoprolol succinate (TOPROL-XL) 25 mg 24 hr tablet, Take 1 tablet (25 mg total) by mouth daily, Disp: 90 tablet, Rfl: 3    multivitamin (THERAGRAN) TABS, Take 1 tablet by mouth every morning , Disp: , Rfl:     Omega-3 Fatty Acids (FISH OIL) 1,000 mg, Take 1,000 mg by mouth every morning  , Disp: , Rfl:     omeprazole (PriLOSEC) 10 mg delayed release capsule, Take by mouth daily, Disp: , Rfl:     rosuvastatin (CRESTOR) 5 mg tablet, Take 1 tablet (5 mg total) by mouth daily, Disp: 90 tablet, Rfl: 1    sildenafil (VIAGRA) 100 mg tablet, Take 1 tablet (100 mg total) by mouth daily as needed for erectile dysfunction, Disp: 10 tablet, Rfl: 3    Allergies   Allergen Reactions    Dust Mite Extract Sneezing       Vitals:    04/22/22 1240   BP: 124/66   Pulse: 64   Resp: 20   Temp: 97 6 °F (36 4 °C)   SpO2: 97%     Physical Exam  Constitutional:       Appearance: He is well-developed  Comments: Well-nourished male, no respiratory distress   HENT:      Head: Normocephalic and atraumatic  Right Ear: External ear normal       Left Ear: External ear normal       Nose: Nose normal    Eyes:      Conjunctiva/sclera: Conjunctivae normal       Pupils: Pupils are equal, round, and reactive to light  Cardiovascular:      Rate and Rhythm: Normal rate and regular rhythm  Heart sounds: Normal heart sounds  Pulmonary:      Effort: Pulmonary effort is normal       Breath sounds: Normal breath sounds  Abdominal:      General: Bowel sounds are normal       Palpations: Abdomen is soft  Comments: +bowel sounds, nontender, slightly obese, cannot palpate liver or spleen, no rigidity or rebound   Musculoskeletal:         General: Normal range of motion  Cervical back: Normal range of motion and neck supple  Skin:     General: Skin is warm  Comments: Warm, moist, good color, no petechiae or ecchymoses   Neurological:      Mental Status: He is alert and oriented to person, place, and time  Deep Tendon Reflexes: Reflexes are normal and symmetric  Psychiatric:         Behavior: Behavior normal          Thought Content:  Thought content normal          Judgment: Judgment normal      Extremities:   No lower extremity edema bilaterally, no cords, pulses are 1+ bilaterally, no upper extremity edema, good range of motion in both upper extremities  Lymphatics:   No adenopathy in the neck, supraclavicular region, axilla and groin bilaterally    Labs        04/21/2022 BUN = 25 creatinine = 1 23 calcium = 8 4 LFTs WNL    Cardiology        Pathology    11/24/2016 direct platelet antibody:  Anti IgG = positive  11/21/2016 platelet antibody IIB/IIIA, IA/IIA, IB/IX and HLA class I were all negative

## 2022-04-22 NOTE — RESULT ENCOUNTER NOTE
The labs show a high CRP - This could however be your osteoarthritis as well    I have not gotten a pic yet of the hand swollen so is it safe to guess it stopped doing it

## 2022-04-23 LAB
ANA HOMOGEN TITR SER: ABNORMAL {TITER}
ANA TITR SER IF: POSITIVE {TITER}
SL AMB NOTE:: ABNORMAL

## 2022-04-26 LAB
Lab: NORMAL
MISCELLANEOUS LAB TEST RESULT: NORMAL

## 2022-04-26 NOTE — RESULT ENCOUNTER NOTE
Spoke with pt about results advised of the findings    Suggested Rheumatology, pt would like to hold offf for now, he will get back to me in two weeks

## 2022-05-05 ENCOUNTER — TELEPHONE (OUTPATIENT)
Dept: FAMILY MEDICINE CLINIC | Facility: CLINIC | Age: 72
End: 2022-05-05

## 2022-05-05 NOTE — TELEPHONE ENCOUNTER
The promacta is written by [facility] so he would have tyo ask them about stopping that med  I think pt was supposed to send me a pic after our last appt when and if his hand swelled again  At this point stay on the promacta and stop the Norvasc and see if that stops the swelling    But only one change should be done at a time

## 2022-05-05 NOTE — TELEPHONE ENCOUNTER
Advised patient to stop the Norvasc  Advised he will need to talk to Dr Cristin Cash before he can make any changes to his Promacta  Only one change at a time  Advised that patient needs to send a picture the next time he has swelling so that Dr Aleksandra Porter can see what is going on  Patient wants to know what he can do or should do for the pain as it  was out of this world? Please advise    Missy Joe MA

## 2022-05-05 NOTE — TELEPHONE ENCOUNTER
Patient states he stopped the two medications from the cardiologist  He didn't know the names, but one was a statin  He now wants to know if he should stop the Promacta or his bp medications for 2wks to see if that helps with the swelling? He said that the pain from the swelling in unbearable  Please advise    Sarah Simpson MA

## 2022-05-12 ENCOUNTER — TELEPHONE (OUTPATIENT)
Dept: HEMATOLOGY ONCOLOGY | Facility: CLINIC | Age: 72
End: 2022-05-12

## 2022-05-12 ENCOUNTER — APPOINTMENT (OUTPATIENT)
Dept: LAB | Facility: HOSPITAL | Age: 72
End: 2022-05-12
Payer: COMMERCIAL

## 2022-05-12 ENCOUNTER — TELEPHONE (OUTPATIENT)
Dept: HEMATOLOGY ONCOLOGY | Facility: MEDICAL CENTER | Age: 72
End: 2022-05-12

## 2022-05-12 DIAGNOSIS — D69.6 THROMBOCYTOPENIA (HCC): Primary | ICD-10-CM

## 2022-05-12 NOTE — TELEPHONE ENCOUNTER
Spoke with patient  Patient will take promacta 50 mg Mon/Wed/Fri and 25mg the rest of the days  CBC recheck in one month

## 2022-05-17 DIAGNOSIS — D69.6 THROMBOCYTOPENIA (HCC): ICD-10-CM

## 2022-06-06 ENCOUNTER — APPOINTMENT (OUTPATIENT)
Dept: LAB | Facility: HOSPITAL | Age: 72
End: 2022-06-06
Payer: COMMERCIAL

## 2022-06-06 DIAGNOSIS — D69.6 THROMBOCYTOPENIA (HCC): ICD-10-CM

## 2022-06-08 ENCOUNTER — TELEPHONE (OUTPATIENT)
Dept: NEUROLOGY | Facility: CLINIC | Age: 72
End: 2022-06-08

## 2022-06-08 ENCOUNTER — OFFICE VISIT (OUTPATIENT)
Dept: HEMATOLOGY ONCOLOGY | Facility: MEDICAL CENTER | Age: 72
End: 2022-06-08
Payer: COMMERCIAL

## 2022-06-08 ENCOUNTER — TELEPHONE (OUTPATIENT)
Dept: HEMATOLOGY ONCOLOGY | Facility: MEDICAL CENTER | Age: 72
End: 2022-06-08

## 2022-06-08 VITALS
HEIGHT: 76 IN | BODY MASS INDEX: 31.17 KG/M2 | DIASTOLIC BLOOD PRESSURE: 62 MMHG | WEIGHT: 256 LBS | TEMPERATURE: 98.7 F | HEART RATE: 60 BPM | OXYGEN SATURATION: 97 % | RESPIRATION RATE: 18 BRPM | SYSTOLIC BLOOD PRESSURE: 120 MMHG

## 2022-06-08 DIAGNOSIS — D69.6 THROMBOCYTOPENIA (HCC): Primary | ICD-10-CM

## 2022-06-08 DIAGNOSIS — R20.0 NUMBNESS: ICD-10-CM

## 2022-06-08 PROCEDURE — 99214 OFFICE O/P EST MOD 30 MIN: CPT | Performed by: INTERNAL MEDICINE

## 2022-06-08 NOTE — TELEPHONE ENCOUNTER
Patient calling to schedule new patient appointment for numbness in hands  No testing done  Triage intake sent

## 2022-06-08 NOTE — PROGRESS NOTES
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  Because of a recent persistent downward platelet trend with associated excessive bruising, patient was started on Promacta  Because of side effects, the dose has been manipulated a number of times  Mr Osbaldo Richardson feels well from a hematology standpoint  The recent platelet count is good/acceptable  We discussed options including subcu Nplate  The plan is to continue with the Promacta 50 mg on Monday, Wednesday and Friday and 25 mg on all other days  Patient has been referred to neurology for evaluation  Patient will continue with the CBC every month  Patient is to continue to monitor for excessive bruising/bleeding  Mr Osbaldo Richardson is to return to the office in 6 weeks  Patient will call if he is scheduled for any invasive procedure or surgery  Mr Osbaldo Richardson knows to call the hematology/oncology office if there are any other questions or concerns  Carefully review your medication list and verify that the list is accurate and up-to-date  Please call the hematology/oncology office if there are medications missing from the list, medications on the list that you are not currently taking or if there is a dosage or instruction that is different from how you're taking that medication  Patient goals and areas of care:  Continue with Promacta at the manipulated dose  Barriers to care:  None  Patient is able to self-care   ______________________________________________________________________________________    Chief Complaint   Patient presents with    Follow-up    ITP     History of Present Illness:    68-year-old male previously referred for evaluation of thrombocytopenia    Mr Osbaldo Richardson was unaware of any CBC abnormalities up until recently when he needed left shoulder surgery  Patient has been seen by a number of physicians to trying clarify the etiology for the thrombocytopenia  Although the specifics are not entirely clear, 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 (previously seen by Dr Camila Mccrary in [place])  Mr Jayesh Almaraz previously underwent left shoulder surgery  Patient received 1 unit of platelets before because the platelet count was borderline  The platelets shyla significantly, no surgical complications including bruising or bleeding issues  Patient recently underwent colonoscopy with Decadron treatment before  Options were recently discussed with the patient and Mr Jayesh Almaraz began [place]  The platelet count came up nicely but patient had problems with swelling and numbness in the 1st 3 digits bilaterally  The Promacta was decreased but the platelet count dropped  Patient is now on 25 mg on Tuesday, Thursday, Saturday and Sunday and 50 mg on Monday, Wednesday and Friday  Mr Jayesh Almaraz states feeling well from a hematology standpoint  No problems with excessive bruising or bleeding  Patient is able to go to the gym every day, activities are baseline  Patient states that the numbness, tingling and swelling started when he began the Promacta  Patient has received the COVID vaccine, not the booster  Review of Systems   Constitutional: Negative  HENT: Negative  Eyes: Negative  Respiratory: Negative  Cardiovascular: Negative  Gastrointestinal: Negative  Endocrine: Negative  Genitourinary: Negative  Musculoskeletal: Positive for arthralgias  Skin: Negative  Allergic/Immunologic: Negative  Neurological: Positive for numbness  Hematological: Does not bruise/bleed easily  Psychiatric/Behavioral: The patient is not nervous/anxious  All other systems reviewed and are negative       Patient Active Problem List   Diagnosis    Thrombocytopenia (HCC)    Elevated low-density lipoprotein level    BMI 31 0-31 9,adult    Obesity (BMI 30-39  9)    Essential hypertension    Vasculogenic erectile dysfunction    Primary osteoarthritis of right shoulder    COVID-19    Elevated troponin    PVC (premature ventricular contraction)    Stage 3 chronic kidney disease, unspecified whether stage 3a or 3b CKD (HCC)    History of ITP     Past Medical History:   Diagnosis Date    Hypertension     Thrombocytopenia (HCC)     platelet count maintained around 90-Dr Kathya Hernandez Wears partial dentures     upper     Past Surgical History:   Procedure Laterality Date    APPENDECTOMY      CATARACT EXTRACTION Left     COLONOSCOPY      COLONOSCOPY N/A 10/17/2018    Procedure: COLONOSCOPY;  Surgeon: Charlyn Baumgarten, MD;  Location: [facility] GI LAB; Service: Gastroenterology    HERNIA REPAIR Right 2013    inguinal    JOINT REPLACEMENT Right     knee, left shoulder    [place] XCAPSL CTRC RMVL INSJ IO LENS PROSTH W/O ECP Left 2016    Procedure: EXTRACTION EXTRACAPSULAR CATARACT PHACO INTRAOCULAR LENS (IOL);   Surgeon: Nery Burgos MD;  Location: [facility] MAIN OR;  Service: Ophthalmology    SHOULDER ARTHROSCOPY Left     tendon repair    TONSILLECTOMY  age 1     Family History   Problem Relation Age of Onset    Heart disease Father 67        massive MI    Heart disease Sister         MI    Heart disease Brother         MI    No Known Problems Mother     Mental illness Neg Hx      Social History     Socioeconomic History    Marital status: /Civil Union     Spouse name: Not on file    Number of children: Not on file    Years of education: Not on file    Highest education level: Not on file   Occupational History    Not on file   Tobacco Use    Smoking status: Former Smoker     Packs/day: 0 50     Years: 22 00     Pack years: 11 00     Types: Cigarettes     Quit date:      Years since quittin 4    Smokeless tobacco: Never Used Vaping Use    Vaping Use: Never used   Substance and Sexual Activity    Alcohol use: Never    Drug use: Never    Sexual activity: Not on file   Other Topics Concern    Not on file   Social History Narrative    Not on file     Social Determinants of Health     Financial Resource Strain: Not on file   Food Insecurity: Not on file   Transportation Needs: Not on file   Physical Activity: Not on file   Stress: Not on file   Social Connections: Not on file   Intimate Partner Violence: Not on file   Housing Stability: Not on file       Current Outpatient Medications:     amLODIPine (NORVASC) 5 mg tablet, Take 1 tablet (5 mg total) by mouth daily, Disp: 90 tablet, Rfl: 1    ascorbic acid (VITAMIN C) 500 mg tablet, Take 500 mg by mouth every morning , Disp: , Rfl:     atorvastatin (LIPITOR) 40 mg tablet, Take 1 tablet (40 mg total) by mouth daily with dinner, Disp: 90 tablet, Rfl: 3    b complex vitamins tablet, Take 1 tablet by mouth every morning , Disp: , Rfl:     cholecalciferol (VITAMIN D3) 1,000 units tablet, Take 1,000 Units by mouth every morning, Disp: , Rfl:     eltrombopag (PROMACTA) 25 mg tablet, Take 25 mg Tuesday, Thursday, Saturday and Sunday Administer on an empty stomach, 1 hour before or 2 hours after a meal , Disp: 16 tablet, Rfl: 5    eltrombopag (PROMACTA) 50 MG tablet, Take 50 mg Mon/wed/Friday Administer on an empty stomach, 1 hour before or 2 hours after a meal , Disp: 12 tablet, Rfl: 5    metoprolol succinate (TOPROL-XL) 25 mg 24 hr tablet, Take 1 tablet (25 mg total) by mouth daily, Disp: 90 tablet, Rfl: 3    multivitamin (THERAGRAN) TABS, Take 1 tablet by mouth every morning , Disp: , Rfl:     sildenafil (VIAGRA) 100 mg tablet, Take 1 tablet (100 mg total) by mouth daily as needed for erectile dysfunction, Disp: 10 tablet, Rfl: 3    Omega-3 Fatty Acids (FISH OIL) 1,000 mg, Take 1,000 mg by mouth every morning   (Patient not taking: Reported on 6/8/2022), Disp: , Rfl:    omeprazole (PriLOSEC) 10 mg delayed release capsule, Take by mouth daily (Patient not taking: Reported on 6/8/2022), Disp: , Rfl:     rosuvastatin (CRESTOR) 5 mg tablet, Take 1 tablet (5 mg total) by mouth daily (Patient not taking: Reported on 6/8/2022), Disp: 90 tablet, Rfl: 1    Allergies   Allergen Reactions    Dust Mite Extract Sneezing       Vitals:    06/08/22 1437   BP: 120/62   Pulse: 60   Resp: 18   Temp: 98 7 °F (37 1 °C)   SpO2: 97%     Physical Exam  Constitutional:       Appearance: He is well-developed  Comments: Well-nourished male, no respiratory distress   HENT:      Head: Normocephalic and atraumatic  Right Ear: External ear normal       Left Ear: External ear normal       Nose: Nose normal    Eyes:      Conjunctiva/sclera: Conjunctivae normal       Pupils: Pupils are equal, round, and reactive to light  Cardiovascular:      Rate and Rhythm: Normal rate and regular rhythm  Heart sounds: Normal heart sounds  Pulmonary:      Effort: Pulmonary effort is normal       Breath sounds: Normal breath sounds  Abdominal:      General: Bowel sounds are normal       Palpations: Abdomen is soft  Comments: +bowel sounds, nontender, slightly obese, cannot palpate liver or spleen, no rigidity or rebound   Musculoskeletal:         General: Normal range of motion  Cervical back: Normal range of motion and neck supple  Skin:     General: Skin is warm  Comments: Warm, moist, good color, no petechiae or ecchymoses   Neurological:      Mental Status: He is alert and oriented to person, place, and time  Deep Tendon Reflexes: Reflexes are normal and symmetric  Psychiatric:         Behavior: Behavior normal          Thought Content:  Thought content normal          Judgment: Judgment normal      Extremities:  No lower extremity edema bilaterally, no cords, pulses are 1+, strength in both upper extremity seems to be about the same as before and equal bilaterally, no obvious/significant swelling  Lymphatics:   No adenopathy in the neck, supraclavicular region, axilla and groin bilaterally    Labs        04/21/2022 BUN = 25 creatinine = 1 23 calcium = 8 4 LFTs WNL    Cardiology        Pathology    11/24/2016 direct platelet antibody:  Anti IgG = positive  11/21/2016 platelet antibody IIB/IIIA, IA/IIA, IB/IX and HLA class I were all negative

## 2022-06-09 NOTE — TELEPHONE ENCOUNTER
Per provider, called patient and explained triage   Patient verbalized understanding   Referral closed

## 2022-06-19 ENCOUNTER — HOSPITAL ENCOUNTER (EMERGENCY)
Facility: HOSPITAL | Age: 72
Discharge: HOME | End: 2022-06-19
Attending: EMERGENCY MEDICINE
Payer: COMMERCIAL

## 2022-06-19 ENCOUNTER — APPOINTMENT (EMERGENCY)
Dept: RADIOLOGY | Facility: HOSPITAL | Age: 72
End: 2022-06-19
Payer: COMMERCIAL

## 2022-06-19 VITALS
DIASTOLIC BLOOD PRESSURE: 52 MMHG | BODY MASS INDEX: 31.65 KG/M2 | RESPIRATION RATE: 20 BRPM | OXYGEN SATURATION: 93 % | HEART RATE: 76 BPM | TEMPERATURE: 98.1 F | SYSTOLIC BLOOD PRESSURE: 98 MMHG | WEIGHT: 260 LBS

## 2022-06-19 DIAGNOSIS — J40 BRONCHITIS: ICD-10-CM

## 2022-06-19 DIAGNOSIS — B33.8 RSV (RESPIRATORY SYNCYTIAL VIRUS INFECTION): Primary | ICD-10-CM

## 2022-06-19 LAB
2HR DELTA HS TROPONIN: 12 NG/L
ALBUMIN SERPL BCP-MCNC: 3.9 G/DL (ref 3.5–5)
ALP SERPL-CCNC: 116 U/L (ref 46–116)
ALT SERPL W P-5'-P-CCNC: 28 U/L (ref 12–78)
ANION GAP SERPL CALCULATED.3IONS-SCNC: 13 MMOL/L (ref 4–13)
APTT PPP: 36 SECONDS (ref 23–37)
AST SERPL W P-5'-P-CCNC: 23 U/L (ref 5–45)
BACTERIA UR QL AUTO: ABNORMAL /HPF
BASOPHILS # BLD AUTO: 0.04 THOUSANDS/ΜL (ref 0–0.1)
BASOPHILS NFR BLD AUTO: 0 % (ref 0–1)
BILIRUB SERPL-MCNC: 0.9 MG/DL (ref 0.2–1)
BILIRUB UR QL STRIP: NEGATIVE
BUN SERPL-MCNC: 24 MG/DL (ref 5–25)
CALCIUM SERPL-MCNC: 8.9 MG/DL (ref 8.3–10.1)
CARDIAC TROPONIN I PNL SERPL HS: 10 NG/L
CARDIAC TROPONIN I PNL SERPL HS: 22 NG/L
CHLORIDE SERPL-SCNC: 102 MMOL/L (ref 100–108)
CLARITY UR: CLEAR
CO2 SERPL-SCNC: 24 MMOL/L (ref 21–32)
COLOR UR: YELLOW
CREAT SERPL-MCNC: 1.22 MG/DL (ref 0.6–1.3)
EOSINOPHIL # BLD AUTO: 0.07 THOUSAND/ΜL (ref 0–0.61)
EOSINOPHIL NFR BLD AUTO: 1 % (ref 0–6)
ERYTHROCYTE [DISTWIDTH] IN BLOOD BY AUTOMATED COUNT: 14.1 % (ref 11.6–15.1)
FLUAV RNA RESP QL NAA+PROBE: NEGATIVE
FLUBV RNA RESP QL NAA+PROBE: NEGATIVE
GFR SERPL CREATININE-BSD FRML MDRD: 58 ML/MIN/1.73SQ M
GLUCOSE SERPL-MCNC: 121 MG/DL (ref 65–140)
GLUCOSE UR STRIP-MCNC: NEGATIVE MG/DL
HCT VFR BLD AUTO: 48.6 % (ref 36.5–49.3)
HGB BLD-MCNC: 16.3 G/DL (ref 12–17)
HGB UR QL STRIP.AUTO: ABNORMAL
HYALINE CASTS #/AREA URNS LPF: ABNORMAL /LPF
IMM GRANULOCYTES # BLD AUTO: 0.05 THOUSAND/UL (ref 0–0.2)
IMM GRANULOCYTES NFR BLD AUTO: 0 % (ref 0–2)
INR PPP: 0.98 (ref 0.84–1.19)
KETONES UR STRIP-MCNC: NEGATIVE MG/DL
LEUKOCYTE ESTERASE UR QL STRIP: NEGATIVE
LYMPHOCYTES # BLD AUTO: 1.42 THOUSANDS/ΜL (ref 0.6–4.47)
LYMPHOCYTES NFR BLD AUTO: 10 % (ref 14–44)
MAGNESIUM SERPL-MCNC: 1.6 MG/DL (ref 1.6–2.6)
MCH RBC QN AUTO: 29.5 PG (ref 26.8–34.3)
MCHC RBC AUTO-ENTMCNC: 33.5 G/DL (ref 31.4–37.4)
MCV RBC AUTO: 88 FL (ref 82–98)
MONOCYTES # BLD AUTO: 0.44 THOUSAND/ΜL (ref 0.17–1.22)
MONOCYTES NFR BLD AUTO: 3 % (ref 4–12)
NEUTROPHILS # BLD AUTO: 12.05 THOUSANDS/ΜL (ref 1.85–7.62)
NEUTS SEG NFR BLD AUTO: 86 % (ref 43–75)
NITRITE UR QL STRIP: NEGATIVE
NON-SQ EPI CELLS URNS QL MICRO: ABNORMAL /HPF
NRBC BLD AUTO-RTO: 0 /100 WBCS
NT-PROBNP SERPL-MCNC: 290 PG/ML
PH UR STRIP.AUTO: 5.5 [PH]
PLATELET # BLD AUTO: 63 THOUSANDS/UL (ref 149–390)
PMV BLD AUTO: 12.1 FL (ref 8.9–12.7)
POTASSIUM SERPL-SCNC: 4.3 MMOL/L (ref 3.5–5.3)
PROT SERPL-MCNC: 8 G/DL (ref 6.4–8.2)
PROT UR STRIP-MCNC: ABNORMAL MG/DL
PROTHROMBIN TIME: 12.8 SECONDS (ref 11.6–14.5)
RBC # BLD AUTO: 5.53 MILLION/UL (ref 3.88–5.62)
RBC #/AREA URNS AUTO: ABNORMAL /HPF
RSV RNA RESP QL NAA+PROBE: POSITIVE
SARS-COV-2 RNA RESP QL NAA+PROBE: NEGATIVE
SODIUM SERPL-SCNC: 139 MMOL/L (ref 136–145)
SP GR UR STRIP.AUTO: >=1.03 (ref 1–1.03)
UROBILINOGEN UR QL STRIP.AUTO: 0.2 E.U./DL
WBC # BLD AUTO: 14.07 THOUSAND/UL (ref 4.31–10.16)
WBC #/AREA URNS AUTO: ABNORMAL /HPF

## 2022-06-19 PROCEDURE — 96374 THER/PROPH/DIAG INJ IV PUSH: CPT

## 2022-06-19 PROCEDURE — 85025 COMPLETE CBC W/AUTO DIFF WBC: CPT | Performed by: EMERGENCY MEDICINE

## 2022-06-19 PROCEDURE — 99285 EMERGENCY DEPT VISIT HI MDM: CPT

## 2022-06-19 PROCEDURE — 71045 X-RAY EXAM CHEST 1 VIEW: CPT

## 2022-06-19 PROCEDURE — 80053 COMPREHEN METABOLIC PANEL: CPT | Performed by: EMERGENCY MEDICINE

## 2022-06-19 PROCEDURE — 0241U HB NFCT DS VIR RESP RNA 4 TRGT: CPT | Performed by: EMERGENCY MEDICINE

## 2022-06-19 PROCEDURE — 81001 URINALYSIS AUTO W/SCOPE: CPT | Performed by: EMERGENCY MEDICINE

## 2022-06-19 PROCEDURE — 83735 ASSAY OF MAGNESIUM: CPT | Performed by: EMERGENCY MEDICINE

## 2022-06-19 PROCEDURE — 36415 COLL VENOUS BLD VENIPUNCTURE: CPT | Performed by: EMERGENCY MEDICINE

## 2022-06-19 PROCEDURE — 99285 EMERGENCY DEPT VISIT HI MDM: CPT | Performed by: EMERGENCY MEDICINE

## 2022-06-19 PROCEDURE — 85610 PROTHROMBIN TIME: CPT | Performed by: EMERGENCY MEDICINE

## 2022-06-19 PROCEDURE — 85730 THROMBOPLASTIN TIME PARTIAL: CPT | Performed by: EMERGENCY MEDICINE

## 2022-06-19 PROCEDURE — 84484 ASSAY OF TROPONIN QUANT: CPT | Performed by: EMERGENCY MEDICINE

## 2022-06-19 PROCEDURE — 83880 ASSAY OF NATRIURETIC PEPTIDE: CPT | Performed by: EMERGENCY MEDICINE

## 2022-06-19 RX ORDER — PREDNISONE 20 MG/1
40 TABLET ORAL DAILY
Qty: 10 TABLET | Refills: 0 | Status: SHIPPED | OUTPATIENT
Start: 2022-06-19 | End: 2022-06-24

## 2022-06-19 RX ORDER — METHYLPREDNISOLONE SODIUM SUCCINATE 125 MG/2ML
60 INJECTION, POWDER, LYOPHILIZED, FOR SOLUTION INTRAMUSCULAR; INTRAVENOUS ONCE
Status: COMPLETED | OUTPATIENT
Start: 2022-06-19 | End: 2022-06-19

## 2022-06-19 RX ORDER — IPRATROPIUM BROMIDE AND ALBUTEROL SULFATE 2.5; .5 MG/3ML; MG/3ML
3 SOLUTION RESPIRATORY (INHALATION) ONCE
Status: COMPLETED | OUTPATIENT
Start: 2022-06-19 | End: 2022-06-19

## 2022-06-19 RX ORDER — ALBUTEROL SULFATE 90 UG/1
2 AEROSOL, METERED RESPIRATORY (INHALATION) EVERY 4 HOURS PRN
Qty: 18 G | Refills: 0 | Status: SHIPPED | OUTPATIENT
Start: 2022-06-19

## 2022-06-19 RX ORDER — AZITHROMYCIN 250 MG/1
TABLET, FILM COATED ORAL
Qty: 6 TABLET | Refills: 0 | Status: SHIPPED | OUTPATIENT
Start: 2022-06-19 | End: 2022-06-24

## 2022-06-19 RX ADMIN — IPRATROPIUM BROMIDE AND ALBUTEROL SULFATE 3 ML: 2.5; .5 SOLUTION RESPIRATORY (INHALATION) at 18:51

## 2022-06-19 RX ADMIN — METHYLPREDNISOLONE SODIUM SUCCINATE 60 MG: 125 INJECTION, POWDER, FOR SOLUTION INTRAMUSCULAR; INTRAVENOUS at 18:50

## 2022-06-19 NOTE — ED PROCEDURE NOTE
PROCEDURE  ECG 12 Lead Documentation Only    Date/Time: 6/19/2022 5:28 PM  Performed by: Robert Hernandez DO  Authorized by: Robert Hernandez DO     ECG reviewed by me, the ED Provider: yes    Patient location:  ED  Interpretation:     Interpretation: normal    Rate:     ECG rate:  88    ECG rate assessment: normal    Rhythm:     Rhythm: sinus rhythm    Ectopy:     Ectopy: none    ST segments:     ST segments:  Normal  T waves:     T waves: normal           Robert Hernandez DO  06/19/22 1729

## 2022-06-20 NOTE — ED NOTES
Patient ambulated in hallway with oxygen saturation device on,O2 sat noted to be 92-93% ambulating  ER MD informed       Mark Monahan RN  06/19/22 2048

## 2022-06-20 NOTE — ED PROVIDER NOTES
History  Chief Complaint   Patient presents with    Shortness of Breath     Sudden sob and shaking started about lunch time, Pt  States he can't breath when down"     Patient presents for evaluation of sudden onset shortness of breath and shaking starting around lunchtime  Patient states that he has been fighting a cold last few days he got from his grandchild  Denies any fever  Denies any chest pain  Patient having cough and congestion  History provided by:  Patient   used: No    Shortness of Breath  Associated symptoms: cough    Associated symptoms: no abdominal pain, no chest pain, no ear pain, no fever, no rash, no sore throat and no vomiting        Prior to Admission Medications   Prescriptions Last Dose Informant Patient Reported? Taking? Omega-3 Fatty Acids (FISH OIL) 1,000 mg  Self Yes No   Sig: Take 1,000 mg by mouth every morning     Patient not taking: Reported on 6/8/2022   amLODIPine (NORVASC) 5 mg tablet  Self No No   Sig: Take 1 tablet (5 mg total) by mouth daily   ascorbic acid (VITAMIN C) 500 mg tablet  Self Yes No   Sig: Take 500 mg by mouth every morning  atorvastatin (LIPITOR) 40 mg tablet  Self No No   Sig: Take 1 tablet (40 mg total) by mouth daily with dinner   b complex vitamins tablet  Self Yes No   Sig: Take 1 tablet by mouth every morning     cholecalciferol (VITAMIN D3) 1,000 units tablet  Self Yes No   Sig: Take 1,000 Units by mouth every morning   eltrombopag (PROMACTA) 25 mg tablet  Self No No   Sig: Take 25 mg Tuesday, Thursday, Saturday and Sunday Administer on an empty stomach, 1 hour before or 2 hours after a meal    eltrombopag (PROMACTA) 50 MG tablet  Self No No   Sig: Take 50 mg Mon/wed/Friday Administer on an empty stomach, 1 hour before or 2 hours after a meal    metoprolol succinate (TOPROL-XL) 25 mg 24 hr tablet  Self No No   Sig: Take 1 tablet (25 mg total) by mouth daily   multivitamin (THERAGRAN) TABS  Self Yes No   Sig: Take 1 tablet by mouth every morning  omeprazole (PriLOSEC) 10 mg delayed release capsule  Self Yes No   Sig: Take by mouth daily   Patient not taking: Reported on 6/8/2022   rosuvastatin (CRESTOR) 5 mg tablet  Self No No   Sig: Take 1 tablet (5 mg total) by mouth daily   Patient not taking: Reported on 6/8/2022   sildenafil (VIAGRA) 100 mg tablet  Self No No   Sig: Take 1 tablet (100 mg total) by mouth daily as needed for erectile dysfunction      Facility-Administered Medications: None       Past Medical History:   Diagnosis Date    Hypertension     Thrombocytopenia (HCC)     platelet count maintained around 90-Dr Wilmer Ramos Wears partial dentures     upper       Past Surgical History:   Procedure Laterality Date    APPENDECTOMY      CATARACT EXTRACTION Left     COLONOSCOPY      COLONOSCOPY N/A 10/17/2018    Procedure: COLONOSCOPY;  Surgeon: Timo Guidry MD;  Location: [facility] GI LAB; Service: Gastroenterology    HERNIA REPAIR Right 2013    inguinal    JOINT REPLACEMENT Right     knee, left shoulder    ME XCAPSL CTRC RMVL INSJ IO LENS PROSTH W/O ECP Left 5/4/2016    Procedure: EXTRACTION EXTRACAPSULAR CATARACT PHACO INTRAOCULAR LENS (IOL); Surgeon: Berry Rutledge MD;  Location: [facility] MAIN OR;  Service: Ophthalmology    SHOULDER ARTHROSCOPY Left     tendon repair    TONSILLECTOMY  age 1       Family History   Problem Relation Age of Onset    Heart disease Father 67        massive MI    Heart disease Sister         MI    Heart disease Brother         MI    No Known Problems Mother     Mental illness Neg Hx      I have reviewed and agree with the history as documented      E-Cigarette/Vaping    E-Cigarette Use Never User      E-Cigarette/Vaping Substances    Nicotine No     THC No     CBD No     Flavoring No     Other No     Unknown No      Social History     Tobacco Use    Smoking status: Former Smoker     Packs/day: 0 50     Years: 22 00     Pack years: 11 00     Types: Cigarettes     Quit date: 2010     Years since quittin 4    Smokeless tobacco: Never Used   Vaping Use    Vaping Use: Never used   Substance Use Topics    Alcohol use: Never    Drug use: Never       Review of Systems   Constitutional: Negative for chills and fever  HENT: Positive for congestion  Negative for ear pain and sore throat  Eyes: Negative for pain and visual disturbance  Respiratory: Positive for cough and shortness of breath  Cardiovascular: Negative for chest pain and palpitations  Gastrointestinal: Negative for abdominal pain and vomiting  Genitourinary: Negative for dysuria and hematuria  Musculoskeletal: Negative for arthralgias and back pain  Skin: Negative for color change and rash  Neurological: Negative for seizures and syncope  All other systems reviewed and are negative  Physical Exam  Physical Exam  Vitals and nursing note reviewed  Constitutional:       General: He is in acute distress  HENT:      Head: Atraumatic  Right Ear: External ear normal       Left Ear: External ear normal       Nose: Congestion present  Mouth/Throat:      Mouth: Mucous membranes are moist       Pharynx: Oropharynx is clear  Eyes:      General: No scleral icterus  Conjunctiva/sclera: Conjunctivae normal    Cardiovascular:      Rate and Rhythm: Normal rate and regular rhythm  Pulses: Normal pulses  Pulmonary:      Effort: Respiratory distress present  Breath sounds: Wheezing and rhonchi present  No rales  Comments: Mild respiratory distress and tachypnea  Abdominal:      General: Abdomen is flat  Bowel sounds are normal  There is no distension  Palpations: Abdomen is soft  Tenderness: There is no abdominal tenderness  There is no guarding or rebound  Musculoskeletal:         General: No deformity  Normal range of motion  Skin:     Capillary Refill: Capillary refill takes less than 2 seconds  Findings: No rash     Neurological:      General: No focal deficit present  Mental Status: He is alert and oriented to person, place, and time           Vital Signs  ED Triage Vitals [06/19/22 1707]   Temperature Pulse Respirations Blood Pressure SpO2   98 1 °F (36 7 °C) 100 (!) 32 (!) 189/97 93 %      Temp Source Heart Rate Source Patient Position - Orthostatic VS BP Location FiO2 (%)   Oral Monitor Lying Left arm --      Pain Score       No Pain           Vitals:    06/19/22 1900 06/19/22 1930 06/19/22 2000 06/19/22 2030   BP: 123/59 123/65 101/55 98/52   Pulse: 91 95 75 76   Patient Position - Orthostatic VS:  Sitting           Visual Acuity      ED Medications  Medications   methylPREDNISolone sodium succinate (Solu-MEDROL) injection 60 mg (60 mg Intravenous Given 6/19/22 1850)   ipratropium-albuterol (DUO-NEB) 0 5-2 5 mg/3 mL inhalation solution 3 mL (3 mL Nebulization Given 6/19/22 1851)       Diagnostic Studies  Results Reviewed     Procedure Component Value Units Date/Time    HS Troponin I 2hr [[id number]]  (Normal) Collected: 06/19/22 1940    Lab Status: Final result Specimen: Blood from Line, Venous Updated: 06/19/22 2008     hs TnI 2hr 22 ng/L      Delta 2hr hsTnI 12 ng/L     CBC and differential [[id number]]  (Abnormal) Collected: 06/19/22 1746    Lab Status: Final result Specimen: Blood from Arm, Left Updated: 06/19/22 1836     WBC 14 07 Thousand/uL      RBC 5 53 Million/uL      Hemoglobin 16 3 g/dL      Hematocrit 48 6 %      MCV 88 fL      MCH 29 5 pg      MCHC 33 5 g/dL      RDW 14 1 %      MPV 12 1 fL      Platelets 63 Thousands/uL      nRBC 0 /100 WBCs      Neutrophils Relative 86 %      Immat GRANS % 0 %      Lymphocytes Relative 10 %      Monocytes Relative 3 %      Eosinophils Relative 1 %      Basophils Relative 0 %      Neutrophils Absolute 12 05 Thousands/µL      Immature Grans Absolute 0 05 Thousand/uL      Lymphocytes Absolute 1 42 Thousands/µL      Monocytes Absolute 0 44 Thousand/µL      Eosinophils Absolute 0 07 Thousand/µL      Basophils Absolute 0 04 Thousands/µL     COVID/FLU/RSV - 2 hour TAT [[id number]]  (Abnormal) Collected: 06/19/22 1746    Lab Status: Final result Specimen: Nares from Nose Updated: 06/19/22 1836     SARS-CoV-2 Negative     INFLUENZA A PCR Negative     INFLUENZA B PCR Negative     RSV PCR Positive    Narrative:      FOR PEDIATRIC PATIENTS - copy/paste COVID Guidelines URL to browser: [email or web address]  [email or web address]    SARS-CoV-2 assay is a Nucleic Acid Amplification assay intended for the  qualitative detection of nucleic acid from SARS-CoV-2 in nasopharyngeal  swabs  Results are for the presumptive identification of SARS-CoV-2 RNA  Positive results are indicative of infection with SARS-CoV-2, the virus  causing COVID-19, but do not rule out bacterial infection or co-infection  with other viruses  Laboratories within the [place] and its  territories are required to report all positive results to the appropriate  public health authorities  Negative results do not preclude SARS-CoV-2  infection and should not be used as the sole basis for treatment or other  patient management decisions  Negative results must be combined with  clinical observations, patient history, and epidemiological information  This test has not been FDA cleared or approved  This test has been authorized by FDA under an Emergency Use Authorization  (EUA)  This test is only authorized for the duration of time the  declaration that circumstances exist justifying the authorization of the  emergency use of an in vitro diagnostic tests for detection of SARS-CoV-2  virus and/or diagnosis of COVID-19 infection under section 564(b)(1) of  the Act, 21 U  S C  026STT-2(J)(2), unless the authorization is terminated  or revoked sooner  The test has been validated but independent review by FDA  and CLIA is pending  Test performed using [employer]: This RT-PCR assay targets N2,  a region unique to SARS-CoV-2  A conserved region in the E-gene was chosen  for pan-Sarbecovirus detection which includes SARS-CoV-2      HS Troponin 0hr (reflex protocol) [[id number]]  (Normal) Collected: 06/19/22 1746    Lab Status: Final result Specimen: Blood from Arm, Left Updated: 06/19/22 1820     hs TnI 0hr 10 ng/L     NT-BNP PRO [[id number]]  (Abnormal) Collected: 06/19/22 1746    Lab Status: Final result Specimen: Blood from Arm, Left Updated: 06/19/22 1820     NT-proBNP 290 pg/mL     Magnesium [[id number]]  (Normal) Collected: 06/19/22 1746    Lab Status: Final result Specimen: Blood from Arm, Left Updated: 06/19/22 1820     Magnesium 1 6 mg/dL     Comprehensive metabolic panel [[id number]] Collected: 06/19/22 1746    Lab Status: Final result Specimen: Blood from Arm, Left Updated: 06/19/22 1819     Sodium 139 mmol/L      Potassium 4 3 mmol/L      Chloride 102 mmol/L      CO2 24 mmol/L      ANION GAP 13 mmol/L      BUN 24 mg/dL      Creatinine 1 22 mg/dL      Glucose 121 mg/dL      Calcium 8 9 mg/dL      AST 23 U/L      ALT 28 U/L      Alkaline Phosphatase 116 U/L      Total Protein 8 0 g/dL      Albumin 3 9 g/dL      Total Bilirubin 0 90 mg/dL      eGFR 58 ml/min/1 73sq m     Narrative:      [place] guidelines for Chronic Kidney Disease (CKD):     Stage 1 with normal or high GFR (GFR > 90 mL/min/1 73 square meters)    Stage 2 Mild CKD (GFR = 60-89 mL/min/1 73 square meters)    Stage 3A Moderate CKD (GFR = 45-59 mL/min/1 73 square meters)    Stage 3B Moderate CKD (GFR = 30-44 mL/min/1 73 square meters)    Stage 4 Severe CKD (GFR = 15-29 mL/min/1 73 square meters)    Stage 5 End Stage CKD (GFR <15 mL/min/1 73 square meters)  Note: GFR calculation is accurate only with a steady state creatinine    Urine Microscopic [[id number]]  (Abnormal) Collected: 06/19/22 1745    Lab Status: Final result Specimen: Urine, Clean Catch Updated: 06/19/22 1813     RBC, UA 1-2 /hpf      WBC, UA 1-2 /hpf      Epithelial Cells None Seen /hpf      Bacteria, UA None Seen /hpf      Hyaline Casts, UA 0-1 /lpf     Protime-INR [[id number]]  (Normal) Collected: 06/19/22 1746    Lab Status: Final result Specimen: Blood from Arm, Left Updated: 06/19/22 1810     Protime 12 8 seconds      INR 0 98    APTT [[id number]]  (Normal) Collected: 06/19/22 1746    Lab Status: Final result Specimen: Blood from Arm, Left Updated: 06/19/22 1810     PTT 36 seconds     UA (URINE) with reflex to Scope [[id number]]  (Abnormal) Collected: 06/19/22 1745    Lab Status: Final result Specimen: Urine, Clean Catch Updated: 06/19/22 1758     Color, UA Yellow     Clarity, UA Clear     Specific Gravity, UA >=1 030     pH, UA 5 5     Leukocytes, UA Negative     Nitrite, UA Negative     Protein,  (2+) mg/dl      Glucose, UA Negative mg/dl      Ketones, UA Negative mg/dl      Urobilinogen, UA 0 2 E U /dl      Bilirubin, UA Negative     Blood, UA Trace-lysed                 XR chest 1 view portable   Final Result by Moraima Sinha MD (06/20 0825)      Chronic interstitial changes at the lung bases  No focal airspace consolidation  Workstation performed: [id number]                    Procedures  Procedures         ED Course             HEART Risk Score    Flowsheet Row Most Recent Value   Heart Score Risk Calculator    History 0 Filed at: 06/19/2022 2029   ECG 0 Filed at: 06/19/2022 2029   Age 2 Filed at: 06/19/2022 2029   Risk Factors 1 Filed at: 06/19/2022 2029   Troponin 1 Filed at: 06/19/2022 2029   HEART Score 4 Filed at: 06/19/2022 2029                                      MDM  Number of Diagnoses or Management Options  Bronchitis  RSV (respiratory syncytial virus infection)  Diagnosis management comments: Pulse ox 93% on room air indicating adequate oxygenation  CXR:  Chronic interstitial changes, NAD as read by me      Patient given a breathing treatment in the ER as his symptoms improved  There is no longer tachypneic    He was maintaining his oxygen saturation even with ambulation  Will treat for acute bronchitis secondary to RSV  Amount and/or Complexity of Data Reviewed  Clinical lab tests: ordered and reviewed  Tests in the radiology section of CPT®: ordered and reviewed  Decide to obtain previous medical records or to obtain history from someone other than the patient: yes  Review and summarize past medical records: yes  Independent visualization of images, tracings, or specimens: yes    Patient Progress  Patient progress: improved      Disposition  Final diagnoses:   RSV (respiratory syncytial virus infection)   Bronchitis     Time reflects when diagnosis was documented in both MDM as applicable and the Disposition within this note     Time User Action Codes Description Comment    6/19/2022  8:03 PM Scott Mejia [B33 8] RSV (respiratory syncytial virus infection)     6/19/2022  8:03 PM [place], [address] [place] [address] Bronchitis       ED Disposition     ED Disposition   Discharge    Condition   Stable    Date/Time   Sun Jun 19, 2022  8:22 PM    Comment   Linus Yates discharge to home/self care                 Follow-up Information     Follow up With Specialties Details Why Contact Info Additional Information    Sancho Bar,  Family Medicine, Wound Care In 1 week  [address] [address]  [address] [facility] Emergency Department Emergency Medicine  If symptoms worsen [address] [facility] [address]  [address] [address] Emergency Department, [place], [place], [address]          Discharge Medication List as of 6/19/2022  8:22 PM      START taking these medications    Details   albuterol (PROVENTIL HFA,VENTOLIN HFA) 90 mcg/act inhaler Inhale 2 puffs every 4 (four) hours as needed for wheezing or shortness of breath, Starting Sun 6/19/2022, Normal      azithromycin (ZITHROMAX) 250 mg tablet Take 2 tablets today then 1 tablet daily x 4 days, Normal predniSONE 20 mg tablet Take 2 tablets (40 mg total) by mouth daily for 5 days, Starting Sun 6/19/2022, Until Fri 6/24/2022, Normal         CONTINUE these medications which have NOT CHANGED    Details   amLODIPine (NORVASC) 5 mg tablet Take 1 tablet (5 mg total) by mouth daily, Starting Wed 9/22/2021, Normal      ascorbic acid (VITAMIN C) 500 mg tablet Take 500 mg by mouth every morning , Historical Med      atorvastatin (LIPITOR) 40 mg tablet Take 1 tablet (40 mg total) by mouth daily with dinner, Starting Fri 1/21/2022, Normal      b complex vitamins tablet Take 1 tablet by mouth every morning , Historical Med      cholecalciferol (VITAMIN D3) 1,000 units tablet Take 1,000 Units by mouth every morning, Historical Med      !! eltrombopag (PROMACTA) 25 mg tablet Take 25 mg Tuesday, Thursday, Saturday and Sunday Administer on an empty stomach, 1 hour before or 2 hours after a meal , Normal      !! eltrombopag (PROMACTA) 50 MG tablet Take 50 mg Mon/wed/Friday Administer on an empty stomach, 1 hour before or 2 hours after a meal , Normal      metoprolol succinate (TOPROL-XL) 25 mg 24 hr tablet Take 1 tablet (25 mg total) by mouth daily, Starting Fri 1/21/2022, Normal      multivitamin (THERAGRAN) TABS Take 1 tablet by mouth every morning , Historical Med      Omega-3 Fatty Acids (FISH OIL) 1,000 mg Take 1,000 mg by mouth every morning  , Historical Med      omeprazole (PriLOSEC) 10 mg delayed release capsule Take by mouth daily, Historical Med      rosuvastatin (CRESTOR) 5 mg tablet Take 1 tablet (5 mg total) by mouth daily, Starting Wed 4/6/2022, Normal      sildenafil (VIAGRA) 100 mg tablet Take 1 tablet (100 mg total) by mouth daily as needed for erectile dysfunction, Starting Thu 4/14/2022, Normal       !! - Potential duplicate medications found  Please discuss with provider  No discharge procedures on file      PDMP Review     None          ED Provider  Electronically Signed by           Antonio Batista DO  06/20/22 1407

## 2022-06-24 ENCOUNTER — OFFICE VISIT (OUTPATIENT)
Dept: FAMILY MEDICINE CLINIC | Facility: CLINIC | Age: 72
End: 2022-06-24
Payer: COMMERCIAL

## 2022-06-24 VITALS
SYSTOLIC BLOOD PRESSURE: 122 MMHG | BODY MASS INDEX: 30.32 KG/M2 | HEART RATE: 88 BPM | RESPIRATION RATE: 18 BRPM | HEIGHT: 76 IN | OXYGEN SATURATION: 95 % | DIASTOLIC BLOOD PRESSURE: 76 MMHG | WEIGHT: 249 LBS | TEMPERATURE: 98 F

## 2022-06-24 DIAGNOSIS — R93.89 ABNORMAL CHEST X-RAY: ICD-10-CM

## 2022-06-24 DIAGNOSIS — I10 ESSENTIAL HYPERTENSION: ICD-10-CM

## 2022-06-24 DIAGNOSIS — B33.8 RSV INFECTION: Primary | ICD-10-CM

## 2022-06-24 DIAGNOSIS — D69.6 THROMBOCYTOPENIA (HCC): ICD-10-CM

## 2022-06-24 PROCEDURE — 3725F SCREEN DEPRESSION PERFORMED: CPT | Performed by: NURSE PRACTITIONER

## 2022-06-24 PROCEDURE — 1101F PT FALLS ASSESS-DOCD LE1/YR: CPT | Performed by: NURSE PRACTITIONER

## 2022-06-24 PROCEDURE — 3288F FALL RISK ASSESSMENT DOCD: CPT | Performed by: NURSE PRACTITIONER

## 2022-06-24 PROCEDURE — 99214 OFFICE O/P EST MOD 30 MIN: CPT | Performed by: NURSE PRACTITIONER

## 2022-06-24 PROCEDURE — 1036F TOBACCO NON-USER: CPT | Performed by: NURSE PRACTITIONER

## 2022-06-24 PROCEDURE — 3074F SYST BP LT 130 MM HG: CPT | Performed by: NURSE PRACTITIONER

## 2022-06-24 PROCEDURE — 3078F DIAST BP <80 MM HG: CPT | Performed by: NURSE PRACTITIONER

## 2022-06-24 PROCEDURE — 3008F BODY MASS INDEX DOCD: CPT | Performed by: NURSE PRACTITIONER

## 2022-06-24 PROCEDURE — 1160F RVW MEDS BY RX/DR IN RCRD: CPT | Performed by: NURSE PRACTITIONER

## 2022-06-24 RX ORDER — PREDNISONE 10 MG/1
TABLET ORAL
Qty: 18 TABLET | Refills: 0 | Status: SHIPPED | OUTPATIENT
Start: 2022-06-24 | End: 2022-07-18

## 2022-06-24 NOTE — PATIENT INSTRUCTIONS
Take prednisone with food in morning and do not take any NSAID's while taking prednisone  Supportive care discussed and advised  Advised to RTO for any worsening and no improvement  Follow up for no improvement and worsening of conditions  Patient advised and educated when to see immediate medical care  Prednisone (By mouth)   Prednisone (PRED-ni-sone)  Treats many diseases and conditions, especially problems related to inflammation  This medicine is a corticosteroid  Brand Name(s): Graciela, predniSONE Intensol   There may be other brand names for this medicine  When This Medicine Should Not Be Used: This medicine is not right for everyone  Do not use if you had an allergic reaction to prednisone or if you are pregnant  How to Use This Medicine:   Liquid, Tablet, Delayed Release Tablet  Take your medicine as directed  Your dose may need to be changed several times to find what works best for you  It is best to take this medicine with food or milk  Swallow the delayed-release tablet whole  Do not crush, break, or chew it  Measure the oral liquid medicine with a marked measuring spoon, oral syringe, or medicine cup  Missed dose: Take a dose as soon as you remember  If it is almost time for your next dose, wait until then and take a regular dose  Do not take extra medicine to make up for a missed dose  Store the medicine in a closed container at room temperature, away from heat, moisture, and direct light  Do not freeze the oral liquid  Drugs and Foods to Avoid:   Ask your doctor or pharmacist before using any other medicine, including over-the-counter medicines, vitamins, and herbal products    Tell your doctor if you use any of the following:  Aminoglutethimide, amphotericin B, carbamazepine, cholestyramine, cyclosporine, digoxin, isoniazid, ketoconazole, phenobarbital, phenytoin, or rifampin  Blood thinner, such as warfarin  NSAID pain or arthritis medicine, such as aspirin, diclofenac, ibuprofen, naproxen, celecoxib  Diuretic (water pill)  Diabetes medicine  Macrolide antibiotic, such as azithromycin, clarithromycin, erythromycin  Estrogen, including birth control pills or hormone replacement therapy  This medicine may interfere with vaccines  Ask your doctor before you get a flu shot or any other vaccines  Warnings While Using This Medicine: It is not safe to take this medicine during pregnancy  It could harm an unborn baby  Tell your doctor right away if you become pregnant  Tell your doctor if you are breastfeeding or if you have kidney problems, heart failure, high blood pressure, a recent heart attack, diabetes, glaucoma, osteoporosis, or thyroid problems  Tell your doctor about any infection you have  Also tell your doctor if you have had mental or emotional problems (such as depression) or stomach or bowel problems (such as an ulcer or diverticulitis)  This medicine may cause the following problems:  Mood or behavior changes  Higher blood pressure, retaining water, changes in salt or potassium levels in your body  Cataracts or glaucoma (with long-term use)  Weak bones or osteoporosis (with long-term use)  Slow growth in children (with long-term use)  Muscle problems (with high doses, especially if you have myasthenia gravis or similar nerve and muscle problems)  Do not stop using this medicine suddenly  Your doctor will need to slowly decrease your dose before you stop it completely  This medicine could cause you to get infections more easily  Tell your doctor right away if you are exposed to chicken pox, measles, or other serious infection  Tell your doctor if you had a serious infection in the past, such as tuberculosis or herpes  Tell your doctor about any extra stress or anxiety in your life  Your dose might need to be changed for a short time  Tell any doctor or dentist who treats you that you are using this medicine  This medicine may affect certain medical test results    Keep all medicine out of the reach of children  Never share your medicine with anyone  Possible Side Effects While Using This Medicine:   Call your doctor right away if you notice any of these side effects: Allergic reaction: Itching or hives, swelling in your face or hands, swelling or tingling in your mouth or throat, chest tightness, trouble breathing  Dark freckles, skin color changes, coldness, weakness, tiredness, nausea, vomiting, weight loss  Depression, unusual thoughts, feelings, or behaviors, trouble sleeping  Fever, chills, cough, sore throat, and body aches  Muscle pain or weakness  Rapid weight gain, swelling in your hands, ankles, or feet  Severe stomach pain, nausea, vomiting, or red or black stools  Skin changes or growths  Trouble seeing, eye pain, headache  If you notice these less serious side effects, talk with your doctor: Increased appetite  Round, puffy face  Weight gain around your neck, upper back, breast, face, or waist  If you notice other side effects that you think are caused by this medicine, tell your doctor  Call your doctor for medical advice about side effects  You may report side effects to FDA at 7-829-FDA-3442    © Copyright [employer] 2022 Information is for End User's use only and may not be sold, redistributed or otherwise used for commercial purposes  The above information is an  only  It is not intended as medical advice for individual conditions or treatments  Talk to your doctor, nurse or pharmacist before following any medical regimen to see if it is safe and effective for you

## 2022-06-24 NOTE — PROGRESS NOTES
Assessment/Plan:    1  RSV infection  -     predniSONE 10 mg tablet; Take 3 pills/d for 3 days, then 2 pills/d for 3 days then 1 pill/d for 3 days then stop    2  Abnormal chest x-ray  -     Ambulatory Referral to Pulmonology; Future    3  Essential hypertension  Comments:  stable    4  Thrombocytopenia ([facility] [address] )  Comments:  managed by oncologist and complaint with treatment          Patient Instructions: Take prednisone with food in morning and do not take any NSAID's while taking prednisone  Supportive care discussed and advised  Advised to RTO for any worsening and no improvement  Follow up for no improvement and worsening of conditions  Patient advised and educated when to see immediate medical care  Return if symptoms worsen or fail to improve  Future Appointments   Date Time Provider Jorge Wilson   7/18/2022 11:45 AM Malia Bazan DO [facility]   7/25/2022  2:30 PM Ariane Carty PA-C [facility]   9/7/2022  2:20 PM Julissa Hall MD [facility]           Subjective:      Patient ID: Kendra Bowens is a 74 y o  male  Chief Complaint   Patient presents with    Follow-up     ER f/u RSV nm lpn         Vitals:  /76   Pulse 88   Temp 98 °F (36 7 °C)   Resp 18   Ht 6' 4" (1 93 m)   Wt 113 kg (249 lb)   SpO2 95%   BMI 30 31 kg/m²     HPI  Patient went to ER on 6/19/2022 for sob  Denies any fever, chills and chest pain  Was started on z-pack and prednisone  Patient finished z-pack and took last dose of prednisone and stated that his SOB is better but not resolved yet and wants to extend prednisone  Denies fever and chills  Chest xray done which showed chronic interstitial changes at lung bases which are noticed in 2017 xray and used to smoke and quit about 15 years and worked in construction and 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   Complaint with medications for chronic illnesses and tolerating it well         PHQ-2/9 Depression Screening    Little interest or pleasure in doing things: 0 - not at all  Feeling down, depressed, or hopeless: 0 - not at all  PHQ-2 Score: 0  PHQ-2 Interpretation: Negative depression screen             The following portions of the patient's history were reviewed and updated as appropriate: allergies, current medications, past family history, past medical history, past social history, past surgical history and problem list       Review of Systems   Constitutional: Negative for chills, diaphoresis, fatigue, fever and unexpected weight change  HENT: Negative for congestion, dental problem, drooling, ear discharge, ear pain, facial swelling, hearing loss, mouth sores, nosebleeds, postnasal drip, rhinorrhea, sinus pressure, sinus pain, sneezing, sore throat, tinnitus, trouble swallowing and voice change  Respiratory: Positive for chest tightness and shortness of breath  Negative for cough and wheezing  As noted in HPI     Cardiovascular: Negative  Gastrointestinal: Negative for abdominal pain, constipation, diarrhea, nausea and vomiting  Skin: Negative  Neurological: Negative for dizziness, light-headedness and headaches  Objective:    Social History     Tobacco Use   Smoking Status Former Smoker    Packs/day: 0 50    Years: 22 00    Pack years: 11 00    Types: Cigarettes    Quit date:     Years since quittin 4   Smokeless Tobacco Never Used       Allergies: Allergies   Allergen Reactions    Dust Mite Extract Sneezing         Current Outpatient Medications   Medication Sig Dispense Refill    albuterol (PROVENTIL HFA,VENTOLIN HFA) 90 mcg/act inhaler Inhale 2 puffs every 4 (four) hours as needed for wheezing or shortness of breath 18 g 0    amLODIPine (NORVASC) 5 mg tablet Take 1 tablet (5 mg total) by mouth daily 90 tablet 1    ascorbic acid (VITAMIN C) 500 mg tablet Take 500 mg by mouth every morning   b complex vitamins tablet Take 1 tablet by mouth every morning        cholecalciferol (VITAMIN D3) 1,000 units tablet Take 1,000 Units by mouth every morning      eltrombopag (PROMACTA) 25 mg tablet Take 25 mg Tuesday, Thursday, Saturday and Sunday Administer on an empty stomach, 1 hour before or 2 hours after a meal  16 tablet 5    eltrombopag (PROMACTA) 50 MG tablet Take 50 mg Mon/wed/Friday Administer on an empty stomach, 1 hour before or 2 hours after a meal  12 tablet 5    metoprolol succinate (TOPROL-XL) 25 mg 24 hr tablet Take 1 tablet (25 mg total) by mouth daily 90 tablet 3    multivitamin (THERAGRAN) TABS Take 1 tablet by mouth every morning   predniSONE 10 mg tablet Take 3 pills/d for 3 days, then 2 pills/d for 3 days then 1 pill/d for 3 days then stop 18 tablet 0    predniSONE 20 mg tablet Take 2 tablets (40 mg total) by mouth daily for 5 days 10 tablet 0    sildenafil (VIAGRA) 100 mg tablet Take 1 tablet (100 mg total) by mouth daily as needed for erectile dysfunction 10 tablet 3    Omega-3 Fatty Acids (FISH OIL) 1,000 mg Take 1,000 mg by mouth every morning   (Patient not taking: No sig reported)      omeprazole (PriLOSEC) 10 mg delayed release capsule Take by mouth daily (Patient not taking: No sig reported)      rosuvastatin (CRESTOR) 5 mg tablet Take 1 tablet (5 mg total) by mouth daily (Patient not taking: No sig reported) 90 tablet 1     No current facility-administered medications for this visit  Physical Exam  Vitals reviewed  Constitutional:       Appearance: Normal appearance  He is well-developed  HENT:      Head: Normocephalic  Right Ear: Tympanic membrane, ear canal and external ear normal       Left Ear: Tympanic membrane, ear canal and external ear normal       Nose: Nose normal       Right Sinus: No maxillary sinus tenderness or frontal sinus tenderness  Left Sinus: No maxillary sinus tenderness or frontal sinus tenderness  Mouth/Throat:      Mouth: No oral lesions        Pharynx: No oropharyngeal exudate or posterior oropharyngeal erythema  Cardiovascular:      Rate and Rhythm: Normal rate and regular rhythm  Heart sounds: Normal heart sounds  Pulmonary:      Effort: Pulmonary effort is normal       Breath sounds: Normal breath sounds  Musculoskeletal:         General: Normal range of motion  Cervical back: Neck supple  Lymphadenopathy:      Cervical:      Right cervical: No superficial or posterior cervical adenopathy  Left cervical: No superficial or posterior cervical adenopathy  Skin:     General: Skin is warm and dry  Neurological:      Mental Status: He is alert and oriented to person, place, and time  Psychiatric:         Behavior: Behavior normal          Thought Content:  Thought content normal          Judgment: Judgment normal                      Fallon Heraclios, CRNP

## 2022-06-25 DIAGNOSIS — I10 ESSENTIAL HYPERTENSION: ICD-10-CM

## 2022-06-25 RX ORDER — AMLODIPINE BESYLATE 5 MG/1
5 TABLET ORAL DAILY
Qty: 90 TABLET | Refills: 1 | Status: SHIPPED | OUTPATIENT
Start: 2022-06-25

## 2022-07-07 ENCOUNTER — APPOINTMENT (OUTPATIENT)
Dept: LAB | Facility: HOSPITAL | Age: 72
End: 2022-07-07
Payer: COMMERCIAL

## 2022-07-07 LAB
BASOPHILS # BLD AUTO: 0.06 THOUSANDS/ΜL (ref 0–0.1)
BASOPHILS NFR BLD AUTO: 1 % (ref 0–1)
EOSINOPHIL # BLD AUTO: 0.27 THOUSAND/ΜL (ref 0–0.61)
EOSINOPHIL NFR BLD AUTO: 3 % (ref 0–6)
ERYTHROCYTE [DISTWIDTH] IN BLOOD BY AUTOMATED COUNT: 14.6 % (ref 11.6–15.1)
HCT VFR BLD AUTO: 42.6 % (ref 36.5–49.3)
HGB BLD-MCNC: 13.8 G/DL (ref 12–17)
IMM GRANULOCYTES # BLD AUTO: 0.05 THOUSAND/UL (ref 0–0.2)
IMM GRANULOCYTES NFR BLD AUTO: 1 % (ref 0–2)
LYMPHOCYTES # BLD AUTO: 1.87 THOUSANDS/ΜL (ref 0.6–4.47)
LYMPHOCYTES NFR BLD AUTO: 20 % (ref 14–44)
MCH RBC QN AUTO: 29.3 PG (ref 26.8–34.3)
MCHC RBC AUTO-ENTMCNC: 32.4 G/DL (ref 31.4–37.4)
MCV RBC AUTO: 90 FL (ref 82–98)
MONOCYTES # BLD AUTO: 1.07 THOUSAND/ΜL (ref 0.17–1.22)
MONOCYTES NFR BLD AUTO: 11 % (ref 4–12)
NEUTROPHILS # BLD AUTO: 6.12 THOUSANDS/ΜL (ref 1.85–7.62)
NEUTS SEG NFR BLD AUTO: 64 % (ref 43–75)
NRBC BLD AUTO-RTO: 0 /100 WBCS
PLATELET # BLD AUTO: 165 THOUSANDS/UL (ref 149–390)
PMV BLD AUTO: 10.7 FL (ref 8.9–12.7)
RBC # BLD AUTO: 4.71 MILLION/UL (ref 3.88–5.62)
WBC # BLD AUTO: 9.44 THOUSAND/UL (ref 4.31–10.16)

## 2022-07-07 PROCEDURE — 36415 COLL VENOUS BLD VENIPUNCTURE: CPT

## 2022-07-07 PROCEDURE — 85025 COMPLETE CBC W/AUTO DIFF WBC: CPT

## 2022-07-08 ENCOUNTER — TELEPHONE (OUTPATIENT)
Dept: HEMATOLOGY ONCOLOGY | Facility: MEDICAL CENTER | Age: 72
End: 2022-07-08

## 2022-07-08 NOTE — TELEPHONE ENCOUNTER
Call from patient  Plt count up to 165 but patient was on course of prednisone for RSV  Will update Dr Lau

## 2022-07-11 ENCOUNTER — RA CDI HCC (OUTPATIENT)
Dept: OTHER | Facility: HOSPITAL | Age: 72
End: 2022-07-11

## 2022-07-11 NOTE — PROGRESS NOTES
[facility]  coding opportunities       Chart reviewed, no opportunity found:   [address]        Patients Insurance     Medicare Insurance: [employer]

## 2022-07-18 ENCOUNTER — OFFICE VISIT (OUTPATIENT)
Dept: FAMILY MEDICINE CLINIC | Facility: CLINIC | Age: 72
End: 2022-07-18
Payer: COMMERCIAL

## 2022-07-18 VITALS
TEMPERATURE: 97.7 F | HEART RATE: 60 BPM | DIASTOLIC BLOOD PRESSURE: 78 MMHG | HEIGHT: 76 IN | BODY MASS INDEX: 30.32 KG/M2 | WEIGHT: 249 LBS | OXYGEN SATURATION: 97 % | SYSTOLIC BLOOD PRESSURE: 136 MMHG | RESPIRATION RATE: 16 BRPM

## 2022-07-18 DIAGNOSIS — E78.00 ELEVATED LOW-DENSITY LIPOPROTEIN LEVEL: ICD-10-CM

## 2022-07-18 DIAGNOSIS — D69.6 THROMBOCYTOPENIA (HCC): ICD-10-CM

## 2022-07-18 DIAGNOSIS — I10 ESSENTIAL HYPERTENSION: ICD-10-CM

## 2022-07-18 DIAGNOSIS — R39.9 LOWER URINARY TRACT SYMPTOMS (LUTS): Primary | ICD-10-CM

## 2022-07-18 DIAGNOSIS — N18.30 STAGE 3 CHRONIC KIDNEY DISEASE, UNSPECIFIED WHETHER STAGE 3A OR 3B CKD (HCC): ICD-10-CM

## 2022-07-18 PROCEDURE — 3078F DIAST BP <80 MM HG: CPT | Performed by: FAMILY MEDICINE

## 2022-07-18 PROCEDURE — 3075F SYST BP GE 130 - 139MM HG: CPT | Performed by: FAMILY MEDICINE

## 2022-07-18 PROCEDURE — 99214 OFFICE O/P EST MOD 30 MIN: CPT | Performed by: FAMILY MEDICINE

## 2022-07-18 PROCEDURE — 1003F LEVEL OF ACTIVITY ASSESS: CPT | Performed by: FAMILY MEDICINE

## 2022-07-18 RX ORDER — TAMSULOSIN HYDROCHLORIDE 0.4 MG/1
0.4 CAPSULE ORAL
Qty: 90 CAPSULE | Refills: 3 | Status: SHIPPED | OUTPATIENT
Start: 2022-07-18

## 2022-07-18 NOTE — PROGRESS NOTES
Assessment/Plan:    1  Lower urinary tract symptoms (LUTS)  Assessment & Plan:  Pt will be trialed on flomax - BISI reveals a normal sized prostate with no nodules    Orders:  -     tamsulosin (FLOMAX) 0 4 mg; Take 1 capsule (0 4 mg total) by mouth daily with dinner    2  Elevated low-density lipoprotein level  Assessment & Plan:  Pt states he stopped the statin on his own because the script stated it had calcium on it      3  Essential hypertension  Assessment & Plan:  stable      4  Thrombocytopenia (HCC)    5  Stage 3 chronic kidney disease, unspecified whether stage 3a or 3b CKD (HCC)      BMI Counseling: Body mass index is 30 31 kg/m²  The BMI is above normal  Nutrition recommendations include decreasing portion sizes  Exercise recommendations include moderate physical activity 150 minutes/week  No pharmacotherapy was ordered  Rationale for BMI follow-up plan is due to patient being overweight or obese  There are no Patient Instructions on file for this visit  Return for Next scheduled follow up  Subjective:      Patient ID: Dimple Hammans is a 74 y o  male  Chief Complaint   Patient presents with    Follow-up     Mz chano       Pt is here for abn ED follow up from over a month ago  Pt was seen here already and was sent to pulmonary and he cant be some for some time and his here to follow up    Pt was in the ed and he was dx with RSV    Pt is asking what he can take for weak urinary stream      Pt states his niece is a doctor and asked how much water is he drinking        Pt is not having residual symptoms from the RSV which is what he went to the ed for      The following portions of the patient's history were reviewed and updated as appropriate: allergies, current medications, past family history, past medical history, past social history, past surgical history and problem list     Review of Systems   Constitutional: Negative for activity change, appetite change, chills, diaphoresis, fatigue, fever and unexpected weight change  HENT: Negative for congestion, dental problem, ear pain, mouth sores, sinus pressure, sinus pain, sore throat and trouble swallowing  Eyes: Negative for photophobia, discharge and itching  Respiratory: Negative for apnea, chest tightness and shortness of breath  Cardiovascular: Negative for chest pain, palpitations and leg swelling  Gastrointestinal: Negative for abdominal distention, abdominal pain, blood in stool, nausea and vomiting  Endocrine: Negative for cold intolerance, heat intolerance, polydipsia, polyphagia and polyuria  Genitourinary: Negative for difficulty urinating  Weak urinary stream   Musculoskeletal: Negative for arthralgias  Skin: Negative for color change and wound  Neurological: Negative for dizziness, syncope, speech difficulty and headaches  Hematological: Negative for adenopathy  Psychiatric/Behavioral: Negative for agitation and behavioral problems  Current Outpatient Medications   Medication Sig Dispense Refill    albuterol (PROVENTIL HFA,VENTOLIN HFA) 90 mcg/act inhaler Inhale 2 puffs every 4 (four) hours as needed for wheezing or shortness of breath 18 g 0    amLODIPine (NORVASC) 5 mg tablet Take 1 tablet (5 mg total) by mouth daily 90 tablet 1    ascorbic acid (VITAMIN C) 500 mg tablet Take 500 mg by mouth every morning   b complex vitamins tablet Take 1 tablet by mouth every morning        cholecalciferol (VITAMIN D3) 1,000 units tablet Take 1,000 Units by mouth every morning      eltrombopag (PROMACTA) 25 mg tablet Take 25 mg Tuesday, Thursday, Saturday and Sunday Administer on an empty stomach, 1 hour before or 2 hours after a meal  16 tablet 5    eltrombopag (PROMACTA) 50 MG tablet Take 50 mg Mon/wed/Friday Administer on an empty stomach, 1 hour before or 2 hours after a meal  12 tablet 5    metoprolol succinate (TOPROL-XL) 25 mg 24 hr tablet Take 1 tablet (25 mg total) by mouth daily 90 tablet 3  multivitamin (THERAGRAN) TABS Take 1 tablet by mouth every morning   Omega-3 Fatty Acids (FISH OIL) 1,000 mg Take 1,000 mg by mouth every morning      sildenafil (VIAGRA) 100 mg tablet Take 1 tablet (100 mg total) by mouth daily as needed for erectile dysfunction 10 tablet 3    tamsulosin (FLOMAX) 0 4 mg Take 1 capsule (0 4 mg total) by mouth daily with dinner 90 capsule 3    omeprazole (PriLOSEC) 10 mg delayed release capsule Take by mouth daily (Patient not taking: No sig reported)      rosuvastatin (CRESTOR) 5 mg tablet Take 1 tablet (5 mg total) by mouth daily (Patient not taking: No sig reported) 90 tablet 1     No current facility-administered medications for this visit  Objective:    /78   Pulse 60   Temp 97 7 °F (36 5 °C)   Resp 16   Ht 6' 4" (1 93 m)   Wt 113 kg (249 lb)   SpO2 97%   BMI 30 31 kg/m²        Physical Exam  Vitals and nursing note reviewed  Constitutional:       General: He is not in acute distress  Appearance: He is well-developed  He is not diaphoretic  HENT:      Head: Normocephalic and atraumatic  Right Ear: External ear normal       Left Ear: External ear normal       Nose: Nose normal       Mouth/Throat:      Pharynx: No oropharyngeal exudate  Eyes:      General: No scleral icterus  Right eye: No discharge  Left eye: No discharge  Pupils: Pupils are equal, round, and reactive to light  Neck:      Thyroid: No thyromegaly  Cardiovascular:      Rate and Rhythm: Normal rate  Heart sounds: Normal heart sounds  No murmur heard  Pulmonary:      Effort: Pulmonary effort is normal  No respiratory distress  Breath sounds: Normal breath sounds  No wheezing  Abdominal:      General: Bowel sounds are normal  There is no distension  Palpations: Abdomen is soft  There is no mass  Tenderness: There is no abdominal tenderness  There is no guarding or rebound     Genitourinary:     Prostate: Normal  Musculoskeletal:         General: Normal range of motion  Skin:     General: Skin is warm and dry  Findings: No erythema or rash  Neurological:      Mental Status: He is alert        Coordination: Coordination normal       Deep Tendon Reflexes: Reflexes normal    Psychiatric:         Behavior: Behavior normal                 Durwood Cast, DO

## 2022-07-25 ENCOUNTER — APPOINTMENT (OUTPATIENT)
Dept: LAB | Facility: HOSPITAL | Age: 72
End: 2022-07-25
Payer: COMMERCIAL

## 2022-07-25 ENCOUNTER — OFFICE VISIT (OUTPATIENT)
Dept: HEMATOLOGY ONCOLOGY | Facility: MEDICAL CENTER | Age: 72
End: 2022-07-25
Payer: COMMERCIAL

## 2022-07-25 VITALS
BODY MASS INDEX: 30.69 KG/M2 | DIASTOLIC BLOOD PRESSURE: 82 MMHG | RESPIRATION RATE: 18 BRPM | HEART RATE: 67 BPM | HEIGHT: 76 IN | WEIGHT: 252 LBS | OXYGEN SATURATION: 98 % | SYSTOLIC BLOOD PRESSURE: 144 MMHG | TEMPERATURE: 97.6 F

## 2022-07-25 DIAGNOSIS — Z71.85 VACCINE COUNSELING: ICD-10-CM

## 2022-07-25 DIAGNOSIS — D69.6 THROMBOCYTOPENIA (HCC): ICD-10-CM

## 2022-07-25 DIAGNOSIS — D69.3 CHRONIC ITP (IDIOPATHIC THROMBOCYTOPENIA) (HCC): Primary | ICD-10-CM

## 2022-07-25 LAB
BASOPHILS # BLD AUTO: 0.08 THOUSANDS/ΜL (ref 0–0.1)
BASOPHILS NFR BLD AUTO: 1 % (ref 0–1)
EOSINOPHIL # BLD AUTO: 0.44 THOUSAND/ΜL (ref 0–0.61)
EOSINOPHIL NFR BLD AUTO: 5 % (ref 0–6)
ERYTHROCYTE [DISTWIDTH] IN BLOOD BY AUTOMATED COUNT: 15 % (ref 11.6–15.1)
HCT VFR BLD AUTO: 43.4 % (ref 36.5–49.3)
HGB BLD-MCNC: 14.1 G/DL (ref 12–17)
IMM GRANULOCYTES # BLD AUTO: 0.04 THOUSAND/UL (ref 0–0.2)
IMM GRANULOCYTES NFR BLD AUTO: 1 % (ref 0–2)
LYMPHOCYTES # BLD AUTO: 1.91 THOUSANDS/ΜL (ref 0.6–4.47)
LYMPHOCYTES NFR BLD AUTO: 23 % (ref 14–44)
MCH RBC QN AUTO: 29.1 PG (ref 26.8–34.3)
MCHC RBC AUTO-ENTMCNC: 32.5 G/DL (ref 31.4–37.4)
MCV RBC AUTO: 90 FL (ref 82–98)
MONOCYTES # BLD AUTO: 0.85 THOUSAND/ΜL (ref 0.17–1.22)
MONOCYTES NFR BLD AUTO: 10 % (ref 4–12)
NEUTROPHILS # BLD AUTO: 4.86 THOUSANDS/ΜL (ref 1.85–7.62)
NEUTS SEG NFR BLD AUTO: 60 % (ref 43–75)
NRBC BLD AUTO-RTO: 0 /100 WBCS
PLATELET # BLD AUTO: 220 THOUSANDS/UL (ref 149–390)
PMV BLD AUTO: 10.9 FL (ref 8.9–12.7)
RBC # BLD AUTO: 4.85 MILLION/UL (ref 3.88–5.62)
WBC # BLD AUTO: 8.18 THOUSAND/UL (ref 4.31–10.16)

## 2022-07-25 PROCEDURE — 1160F RVW MEDS BY RX/DR IN RCRD: CPT | Performed by: PHYSICIAN ASSISTANT

## 2022-07-25 PROCEDURE — 85025 COMPLETE CBC W/AUTO DIFF WBC: CPT

## 2022-07-25 PROCEDURE — 99214 OFFICE O/P EST MOD 30 MIN: CPT | Performed by: PHYSICIAN ASSISTANT

## 2022-07-25 PROCEDURE — 36415 COLL VENOUS BLD VENIPUNCTURE: CPT

## 2022-07-25 NOTE — PROGRESS NOTES
[address] [facility]ATOLOGY [facility]Y [facility]  [address] [address]  Hematology Ambulatory Follow-Up  Jordan Rothman, 1950, [id number]  7/25/2022      Assessment and Plan   1  Chronic ITP (idiopathic thrombocytopenia) (HCC)  This is a 77-year-old man with history of chronic ITP  Patient started Promacta after platelet count [place] 72075 per unit L  At present, patient's platelet count is around 200,000 per unit L, very normal   This has gone up approximately 80,000 points from previous testing  Patient will continue with routine monitoring  Patient has follow-up appointment with Dr Barbie Hobson is in six weeks  Regimen:  Promacta 50 mg PO daily x 3 times weekly  Promacta 25 mg PO daily x 4 times weekly  - CBC and differential; Standing  - CBC and differential      2  Vaccine counseling  Patient got COVID vaccine and platelet count dropped to 8000 per unit L  Patient was started on steroids and count eventually rebounded  Obviously the patient is concerned about additional vaccines  We discussed that when the patient decides that it is time for him to get a shingles vaccine or flu shot that he is to go for blood work the day of or the day before vaccination and then again three days after  Patient is to observe for blood-loss, petechiae, etc       The patient is scheduled for follow-up in approximately 6 weeks with Dr Olayinka Mixon  Patient voiced agreement and understanding to the above  Patient advised to call the Hematology/Oncology office with any questions and concerns regarding the above  Barrier(s) to care: None  The patient is able to self care  Brittni Mccall PA-C  Medical Oncology/Hematology  [address]    Subjective     Chief Complaint   Patient presents with    Follow-up     Chronic ITP (idiopathic thrombocytopenia) ([facility] [address] )       History of present illness:    This is a 77-year-old male with past medical history of hypertension, stage 3 kidney disease and chronic thrombocytopenia who presents to the Hematology Clinic when requiring left shoulder surgery due to thrombocytopenia      Patient has seen multiple hematologic physicians including Dr Mariya Hood  Exact etiology was not found      Patient underwent left shoulder surgery received 1 unit of platelets  Platelet count shyla significantly and there is no surgical complications  Patient also underwent colonoscopy with Decadron treatment prior      More recently, since the end of December 2021 patient's platelet count has been slowly decreasing  Patient's michelle was on 2/22/22 with a platelet count of 20869 per unit L  Patient started on Promacta on 3/2/22 [facility]  Interval history: neuropathy in the 1st and 3rd finger  Review of Systems   Constitutional: Negative for activity change, appetite change, fatigue and fever  HENT: Negative for nosebleeds  Respiratory: Negative for cough, choking and shortness of breath  Cardiovascular: Negative for chest pain, palpitations and leg swelling  Gastrointestinal: Negative for abdominal distention, abdominal pain, anal bleeding, blood in stool, constipation, diarrhea, nausea and vomiting  Endocrine: Negative for cold intolerance  Genitourinary: Negative for hematuria  Musculoskeletal: Negative for myalgias  Skin: Negative for color change, pallor and rash  Allergic/Immunologic: Negative for immunocompromised state  Neurological: Negative for headaches  Hematological: Negative for adenopathy  Does not bruise/bleed easily  All other systems reviewed and are negative  Patient Active Problem List   Diagnosis    Thrombocytopenia ([address] )    Elevated low-density lipoprotein level    BMI 31 0-31 9,adult    Obesity (BMI 30-39  9)    Essential hypertension    Vasculogenic erectile dysfunction    Primary osteoarthritis of right shoulder    COVID-19  Elevated troponin    PVC (premature ventricular contraction)    Stage 3 chronic kidney disease, unspecified whether stage 3a or 3b CKD (HCC)    History of ITP    Numbness    Lower urinary tract symptoms (LUTS)     Past Medical History:   Diagnosis Date    Hypertension     Thrombocytopenia (HCC)     platelet count maintained around 90-Dr Judi Cary Wears partial dentures     upper     Past Surgical History:   Procedure Laterality Date    APPENDECTOMY      CATARACT EXTRACTION Left     COLONOSCOPY      COLONOSCOPY N/A 10/17/2018    Procedure: COLONOSCOPY;  Surgeon: Benji Juan MD;  Location[facility]; Service: Gastroenterology    HERNIA REPAIR Right 2013    inguinal    JOINT REPLACEMENT Right     knee, left shoulder    CT XCAPSL CTRC RMVL INSJ IO LENS PROSTH W/O ECP Left 2016    Procedure: EXTRACTION EXTRACAPSULAR CATARACT PHACO INTRAOCULAR LENS (IOL);   Surgeon: Marina Goodman MD;  Location: [facility] MAIN OR;  Service: Ophthalmology    SHOULDER ARTHROSCOPY Left     tendon repair    TONSILLECTOMY  age 1     Family History   Problem Relation Age of Onset    Heart disease Father 67        massive MI    Heart disease Sister         MI    Heart disease Brother         MI    No Known Problems Mother     Mental illness Neg Hx      Social History     Socioeconomic History    Marital status: /Civil Union     Spouse name: None    Number of children: None    Years of education: None    Highest education level: None   Occupational History    None   Tobacco Use    Smoking status: Former Smoker     Packs/day: 0 50     Years: 22 00     Pack years: 11 00     Types: Cigarettes     Quit date:      Years since quittin 5    Smokeless tobacco: Never Used   Vaping Use    Vaping Use: Never used   Substance and Sexual Activity    Alcohol use: Never    Drug use: Never    Sexual activity: None   Other Topics Concern    None   Social History Narrative    None     Social Determinants of Health     Financial Resource Strain: Not on file   Food Insecurity: Not on file   Transportation Needs: Not on file   Physical Activity: Not on file   Stress: Not on file   Social Connections: Not on file   Intimate Partner Violence: Not on file   Housing Stability: Not on file       Current Outpatient Medications:     albuterol (PROVENTIL HFA,VENTOLIN HFA) 90 mcg/act inhaler, Inhale 2 puffs every 4 (four) hours as needed for wheezing or shortness of breath, Disp: 18 g, Rfl: 0    amLODIPine (NORVASC) 5 mg tablet, Take 1 tablet (5 mg total) by mouth daily, Disp: 90 tablet, Rfl: 1    ascorbic acid (VITAMIN C) 500 mg tablet, Take 500 mg by mouth every morning , Disp: , Rfl:     b complex vitamins tablet, Take 1 tablet by mouth every morning , Disp: , Rfl:     cholecalciferol (VITAMIN D3) 1,000 units tablet, Take 1,000 Units by mouth every morning, Disp: , Rfl:     eltrombopag (PROMACTA) 25 mg tablet, Take 25 mg Tuesday, Thursday, Saturday and Sunday Administer on an empty stomach, 1 hour before or 2 hours after a meal , Disp: 16 tablet, Rfl: 5    eltrombopag (PROMACTA) 50 MG tablet, Take 50 mg Mon/wed/Friday Administer on an empty stomach, 1 hour before or 2 hours after a meal , Disp: 12 tablet, Rfl: 5    metoprolol succinate (TOPROL-XL) 25 mg 24 hr tablet, Take 1 tablet (25 mg total) by mouth daily, Disp: 90 tablet, Rfl: 3    multivitamin (THERAGRAN) TABS, Take 1 tablet by mouth every morning , Disp: , Rfl:     Omega-3 Fatty Acids (FISH OIL) 1,000 mg, Take 1,000 mg by mouth every morning, Disp: , Rfl:     sildenafil (VIAGRA) 100 mg tablet, Take 1 tablet (100 mg total) by mouth daily as needed for erectile dysfunction, Disp: 10 tablet, Rfl: 3    tamsulosin (FLOMAX) 0 4 mg, Take 1 capsule (0 4 mg total) by mouth daily with dinner, Disp: 90 capsule, Rfl: 3    omeprazole (PriLOSEC) 10 mg delayed release capsule, Take by mouth daily (Patient not taking: No sig reported), Disp: , Rfl:    rosuvastatin (CRESTOR) 5 mg tablet, Take 1 tablet (5 mg total) by mouth daily (Patient not taking: No sig reported), Disp: 90 tablet, Rfl: 1  Allergies   Allergen Reactions    Dust Mite Extract Sneezing       Objective   /82 (BP Location: Right arm, Patient Position: Sitting, Cuff Size: Adult)   Pulse 67   Temp 97 6 °F (36 4 °C) (Temporal)   Resp 18   Ht 6' 4" (1 93 m)   Wt 114 kg (252 lb)   SpO2 98%   BMI 30 67 kg/m²    Physical Exam  Constitutional:       General: He is not in acute distress  Appearance: He is well-developed  HENT:      Head: Normocephalic and atraumatic  Mouth/Throat:      Pharynx: No oropharyngeal exudate  Eyes:      General: No scleral icterus  Conjunctiva/sclera: Conjunctivae normal       Pupils: Pupils are equal, round, and reactive to light  Cardiovascular:      Rate and Rhythm: Normal rate and regular rhythm  Heart sounds: No murmur heard  Pulmonary:      Effort: Pulmonary effort is normal  No respiratory distress  Breath sounds: Normal breath sounds  Abdominal:      General: Bowel sounds are normal  There is no distension  Palpations: Abdomen is soft  Tenderness: There is no abdominal tenderness  Musculoskeletal:         General: No tenderness  Cervical back: Normal range of motion  Lymphadenopathy:      Cervical: No cervical adenopathy  Skin:     Coloration: Skin is not pale  Findings: No erythema or rash  Neurological:      Mental Status: He is alert and oriented to person, place, and time  Cranial Nerves: No cranial nerve deficit           Result Review  Labs:  Appointment on 07/25/2022   Component Date Value Ref Range Status    WBC 07/25/2022 8 18  4 31 - 10 16 Thousand/uL Final    RBC 07/25/2022 4 85  3 88 - 5 62 Million/uL Final    Hemoglobin 07/25/2022 14 1  12 0 - 17 0 g/dL Final    Hematocrit 07/25/2022 43 4  36 5 - 49 3 % Final    MCV 07/25/2022 90  82 - 98 fL Final    MCH 07/25/2022 29 1  26 8 - 34 3 pg Final    MCHC 07/25/2022 32 5  31 4 - 37 4 g/dL Final    RDW 07/25/2022 15 0  11 6 - 15 1 % Final    MPV 07/25/2022 10 9  8 9 - 12 7 fL Final    Platelets 27/18/7648 220  149 - 390 Thousands/uL Final    nRBC 07/25/2022 0  /100 WBCs Final    Neutrophils Relative 07/25/2022 60  43 - 75 % Final    Immat GRANS % 07/25/2022 1  0 - 2 % Final    Lymphocytes Relative 07/25/2022 23  14 - 44 % Final    Monocytes Relative 07/25/2022 10  4 - 12 % Final    Eosinophils Relative 07/25/2022 5  0 - 6 % Final    Basophils Relative 07/25/2022 1  0 - 1 % Final    Neutrophils Absolute 07/25/2022 4 86  1 85 - 7 62 Thousands/µL Final    Immature Grans Absolute 07/25/2022 0 04  0 00 - 0 20 Thousand/uL Final    Lymphocytes Absolute 07/25/2022 1 91  0 60 - 4 47 Thousands/µL Final    Monocytes Absolute 07/25/2022 0 85  0 17 - 1 22 Thousand/µL Final    Eosinophils Absolute 07/25/2022 0 44  0 00 - 0 61 Thousand/µL Final    Basophils Absolute 07/25/2022 0 08  0 00 - 0 10 Thousands/µL Final       Please note: This report has been generated by a voice recognition software system  Therefore there may be syntax, spelling, and/or grammatical errors  Please call if you have any questions

## 2022-08-29 ENCOUNTER — TELEPHONE (OUTPATIENT)
Dept: HEMATOLOGY ONCOLOGY | Facility: MEDICAL CENTER | Age: 72
End: 2022-08-29

## 2022-08-29 ENCOUNTER — APPOINTMENT (OUTPATIENT)
Dept: LAB | Facility: HOSPITAL | Age: 72
End: 2022-08-29
Payer: COMMERCIAL

## 2022-08-29 LAB
ERYTHROCYTE [DISTWIDTH] IN BLOOD BY AUTOMATED COUNT: 14.6 % (ref 11.6–15.1)
HCT VFR BLD AUTO: 44 % (ref 36.5–49.3)
HGB BLD-MCNC: 14.5 G/DL (ref 12–17)
MCH RBC QN AUTO: 29.4 PG (ref 26.8–34.3)
MCHC RBC AUTO-ENTMCNC: 33 G/DL (ref 31.4–37.4)
MCV RBC AUTO: 89 FL (ref 82–98)
PLATELET # BLD AUTO: 101 THOUSANDS/UL (ref 149–390)
PMV BLD AUTO: 11.3 FL (ref 8.9–12.7)
RBC # BLD AUTO: 4.94 MILLION/UL (ref 3.88–5.62)
WBC # BLD AUTO: 7.82 THOUSAND/UL (ref 4.31–10.16)

## 2022-08-29 PROCEDURE — 36415 COLL VENOUS BLD VENIPUNCTURE: CPT | Performed by: PHYSICIAN ASSISTANT

## 2022-08-29 PROCEDURE — 85025 COMPLETE CBC W/AUTO DIFF WBC: CPT | Performed by: PHYSICIAN ASSISTANT

## 2022-08-29 NOTE — TELEPHONE ENCOUNTER
----- Message from Dominick Mclaughlin PA-C sent at 8/29/2022  9:04 AM EDT -----  Have pt go for CBC with platelet prior to follow up on 9/6/2022:    PLT count dropped to 101, would like to be sure it is not continuing to drop on the current dose of medication  Thanks

## 2022-09-06 ENCOUNTER — APPOINTMENT (OUTPATIENT)
Dept: LAB | Facility: HOSPITAL | Age: 72
End: 2022-09-06
Payer: COMMERCIAL

## 2022-09-06 DIAGNOSIS — D69.6 THROMBOCYTOPENIA (HCC): ICD-10-CM

## 2022-09-06 DIAGNOSIS — I21.19 INFERIOR MI (HCC): ICD-10-CM

## 2022-09-06 LAB
BASOPHILS # BLD AUTO: 0.04 THOUSANDS/ΜL (ref 0–0.1)
BASOPHILS NFR BLD AUTO: 1 % (ref 0–1)
CHOLEST SERPL-MCNC: 136 MG/DL
EOSINOPHIL # BLD AUTO: 0.36 THOUSAND/ΜL (ref 0–0.61)
EOSINOPHIL NFR BLD AUTO: 5 % (ref 0–6)
ERYTHROCYTE [DISTWIDTH] IN BLOOD BY AUTOMATED COUNT: 14.7 % (ref 11.6–15.1)
HCT VFR BLD AUTO: 44.1 % (ref 36.5–49.3)
HDLC SERPL-MCNC: 31 MG/DL
HGB BLD-MCNC: 14.4 G/DL (ref 12–17)
IMM GRANULOCYTES # BLD AUTO: 0.02 THOUSAND/UL (ref 0–0.2)
IMM GRANULOCYTES NFR BLD AUTO: 0 % (ref 0–2)
LDLC SERPL CALC-MCNC: 87 MG/DL (ref 0–100)
LYMPHOCYTES # BLD AUTO: 1.66 THOUSANDS/ΜL (ref 0.6–4.47)
LYMPHOCYTES NFR BLD AUTO: 22 % (ref 14–44)
MCH RBC QN AUTO: 29.2 PG (ref 26.8–34.3)
MCHC RBC AUTO-ENTMCNC: 32.7 G/DL (ref 31.4–37.4)
MCV RBC AUTO: 90 FL (ref 82–98)
MONOCYTES # BLD AUTO: 0.74 THOUSAND/ΜL (ref 0.17–1.22)
MONOCYTES NFR BLD AUTO: 10 % (ref 4–12)
NEUTROPHILS # BLD AUTO: 4.86 THOUSANDS/ΜL (ref 1.85–7.62)
NEUTS SEG NFR BLD AUTO: 62 % (ref 43–75)
NRBC BLD AUTO-RTO: 0 /100 WBCS
PLATELET # BLD AUTO: 89 THOUSANDS/UL (ref 149–390)
PMV BLD AUTO: 11.6 FL (ref 8.9–12.7)
RBC # BLD AUTO: 4.93 MILLION/UL (ref 3.88–5.62)
TRIGL SERPL-MCNC: 88 MG/DL
WBC # BLD AUTO: 7.68 THOUSAND/UL (ref 4.31–10.16)

## 2022-09-06 PROCEDURE — 85025 COMPLETE CBC W/AUTO DIFF WBC: CPT

## 2022-09-06 PROCEDURE — 36415 COLL VENOUS BLD VENIPUNCTURE: CPT

## 2022-09-06 PROCEDURE — 80061 LIPID PANEL: CPT

## 2022-09-07 ENCOUNTER — OFFICE VISIT (OUTPATIENT)
Dept: HEMATOLOGY ONCOLOGY | Facility: MEDICAL CENTER | Age: 72
End: 2022-09-07
Payer: COMMERCIAL

## 2022-09-07 VITALS
BODY MASS INDEX: 30.81 KG/M2 | OXYGEN SATURATION: 93 % | HEIGHT: 76 IN | DIASTOLIC BLOOD PRESSURE: 70 MMHG | SYSTOLIC BLOOD PRESSURE: 152 MMHG | HEART RATE: 75 BPM | RESPIRATION RATE: 16 BRPM | TEMPERATURE: 97.8 F | WEIGHT: 253 LBS

## 2022-09-07 DIAGNOSIS — D69.6 THROMBOCYTOPENIA (HCC): ICD-10-CM

## 2022-09-07 DIAGNOSIS — Z86.2 HISTORY OF ITP: Primary | ICD-10-CM

## 2022-09-07 PROCEDURE — 99214 OFFICE O/P EST MOD 30 MIN: CPT | Performed by: INTERNAL MEDICINE

## 2022-09-07 NOTE — PROGRESS NOTES
Leartis Kayser  1950  [facility] [id number] [phone]    DISCUSSION/SUMMARY:    73-year-old male with relatively good past medical history found to have thrombocytopenia approximately 5 years ago  Workup was consistent with ITP  Because of a recent persistent downward platelet trend with associated excessive bruising, patient was started on Promacta  Because of side effects, the dose has been manipulated a number of times  Recent CBC parameters are okay/acceptable, platelet count is decreased but stable and acceptable also  The plan is to continue the Promacta 50 mg on Monday, Wednesday and Friday and 25 mg on all other days  Patient goes for monthly blood work  Mr Mya Serna can return to the office in 3 months, afterwards if no issues, the interval can be pushed out  Patient will call if he is scheduled for any type of invasive procedure or surgery  When patient received the COVID vaccine last year, his platelet count drop significantly (< 10,000)  It is difficult to connect the 2 but patient is very reluctant to receive any boosters  Patient has not been infected by COVID  The plan is to hold off on any COVID booster shots for now but patient can get the flu vaccine from a hematology standpoint  Mr Mya Serna knows to call the hematology/oncology office if there are any other questions or concerns  Carefully review your medication list and verify that the list is accurate and up-to-date  Please call the hematology/oncology office if there are medications missing from the list, medications on the list that you are not currently taking or if there is a dosage or instruction that is different from how you're taking that medication      Patient goals and areas of care:  Continue with Promacta   Barriers to care:  None  Patient is able to self-care   ______________________________________________________________________________________    Chief Complaint   Patient presents with    Follow-up    ITP     History of Present Illness:    20-year-old male previously referred for evaluation of thrombocytopenia  Mr Katie Carlisle was unaware of any CBC abnormalities up until recently when he needed left shoulder surgery  Patient has been seen by a number of physicians to trying clarify the etiology for the thrombocytopenia  Although the specifics are not entirely clear, 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 (previously seen by Dr Mary Mackey in [place])  Mr Katie Carlisle previously underwent left shoulder surgery  Patient received 1 unit of platelets before because the platelet count was borderline  The platelets shyla significantly, no surgical complications including bruising or bleeding issues  Patient recently underwent colonoscopy with Decadron treatment before  Options were recently discussed with the patient and Mr Katie Carlisle began [place]  The platelet count came up nicely but patient had problems with swelling and numbness in the 1st 3 digits bilaterally  The Promacta was decreased but the platelet count dropped  Patient is now on 25 mg on Tuesday, Thursday, Saturday and Sunday and 50 mg on Monday, Wednesday and Friday  Mr Katie Carlisle states feeling well  Patient seems to be getting used to the medication  Swelling and decreased ability to move the fingers is less/better than before  No problems with excessive bruising or bleeding  No other medical issues  Patient continues to be active  Routine health maintenance and medical care is up-to-date  Patient has received the COVID vaccine, not the booster  Review of Systems   Constitutional: Negative  HENT: Negative  Eyes: Negative  Respiratory: Negative  Cardiovascular: Negative  Gastrointestinal: Negative  Endocrine: Negative  Genitourinary: Negative  Musculoskeletal: Positive for arthralgias  Skin: Negative  Allergic/Immunologic: Negative  Neurological: Negative for numbness  Hematological: Does not bruise/bleed easily  Psychiatric/Behavioral: The patient is not nervous/anxious  All other systems reviewed and are negative  Patient Active Problem List   Diagnosis    Thrombocytopenia ([address] )    Elevated low-density lipoprotein level    BMI 31 0-31 9,adult    Obesity (BMI 30-39  9)    Essential hypertension    Vasculogenic erectile dysfunction    Primary osteoarthritis of right shoulder    COVID-19    Elevated troponin    PVC (premature ventricular contraction)    Stage 3 chronic kidney disease, unspecified whether stage 3a or 3b CKD (HCC)    History of ITP    Numbness    Lower urinary tract symptoms (LUTS)     Past Medical History:   Diagnosis Date    Hypertension     Thrombocytopenia (HCC)     platelet count maintained around 90-Dr Yang Goodson Wears partial dentures     upper     Past Surgical History:   Procedure Laterality Date    APPENDECTOMY      CATARACT EXTRACTION Left     COLONOSCOPY      COLONOSCOPY N/A 10/17/2018    Procedure: COLONOSCOPY;  Surgeon: Ingrid Max MD;  Location: [place] [id number] [facility]; Service: Gastroenterology    HERNIA REPAIR Right 2013    inguinal    JOINT REPLACEMENT Right     knee, left shoulder    NV XCAPSL CTRC RMVL INSJ IO LENS PROSTH W/O ECP Left 5/4/2016    Procedure: EXTRACTION EXTRACAPSULAR CATARACT PHACO INTRAOCULAR LENS (IOL);   Surgeon: Regi Wesley MD;  [facility] MAIN OR;  Service: Ophthalmology    SHOULDER ARTHROSCOPY Left     tendon repair    TONSILLECTOMY  age 1     Family History   Problem Relation Age of Onset    Heart disease Father 67        massive MI    Heart disease Sister         MI    Heart disease Brother         MI    No Known Problems Mother     Mental illness Neg Hx      Social History     Socioeconomic History    Marital status: /Civil Union Spouse name: Not on file    Number of children: Not on file    Years of education: Not on file    Highest education level: Not on file   Occupational History    Not on file   Tobacco Use    Smoking status: Former Smoker     Packs/day: 0 50     Years: 22 00     Pack years: 11 00     Types: Cigarettes     Quit date:      Years since quittin 6    Smokeless tobacco: Never Used   Vaping Use    Vaping Use: Never used   Substance and Sexual Activity    Alcohol use: Never    Drug use: Never    Sexual activity: Not on file   Other Topics Concern    Not on file   Social History Narrative    Not on file     Social Determinants of Health     Financial Resource Strain: Not on file   Food Insecurity: Not on file   Transportation Needs: Not on file   Physical Activity: Not on file   Stress: Not on file   Social Connections: Not on file   Intimate Partner Violence: Not on file   Housing Stability: Not on file       Current Outpatient Medications:     albuterol (PROVENTIL HFA,VENTOLIN HFA) 90 mcg/act inhaler, Inhale 2 puffs every 4 (four) hours as needed for wheezing or shortness of breath, Disp: 18 g, Rfl: 0    amLODIPine (NORVASC) 5 mg tablet, Take 1 tablet (5 mg total) by mouth daily, Disp: 90 tablet, Rfl: 1    ascorbic acid (VITAMIN C) 500 mg tablet, Take 500 mg by mouth every morning , Disp: , Rfl:     b complex vitamins tablet, Take 1 tablet by mouth every morning , Disp: , Rfl:     cholecalciferol (VITAMIN D3) 1,000 units tablet, Take 1,000 Units by mouth every morning, Disp: , Rfl:     eltrombopag (PROMACTA) 25 mg tablet, Take 25 mg Tuesday, Thursday, Saturday and  Administer on an empty stomach, 1 hour before or 2 hours after a meal , Disp: 16 tablet, Rfl: 5    eltrombopag (PROMACTA) 50 MG tablet, Take 50 mg Mon/wed/Friday Administer on an empty stomach, 1 hour before or 2 hours after a meal , Disp: 12 tablet, Rfl: 5    metoprolol succinate (TOPROL-XL) 25 mg 24 hr tablet, Take 1 tablet (25 mg total) by mouth daily, Disp: 90 tablet, Rfl: 3    multivitamin (THERAGRAN) TABS, Take 1 tablet by mouth every morning , Disp: , Rfl:     Omega-3 Fatty Acids (FISH OIL) 1,000 mg, Take 1,000 mg by mouth every morning, Disp: , Rfl:     omeprazole (PriLOSEC) 10 mg delayed release capsule, Take by mouth daily (Patient not taking: No sig reported), Disp: , Rfl:     rosuvastatin (CRESTOR) 5 mg tablet, Take 1 tablet (5 mg total) by mouth daily (Patient not taking: No sig reported), Disp: 90 tablet, Rfl: 1    sildenafil (VIAGRA) 100 mg tablet, Take 1 tablet (100 mg total) by mouth daily as needed for erectile dysfunction, Disp: 10 tablet, Rfl: 3    tamsulosin (FLOMAX) 0 4 mg, Take 1 capsule (0 4 mg total) by mouth daily with dinner, Disp: 90 capsule, Rfl: 3    Allergies   Allergen Reactions    Dust Mite Extract Sneezing       Vitals:    09/07/22 1424   BP: 152/70   Pulse: 75   Resp: 16   Temp: 97 8 °F (36 6 °C)   SpO2: 93%     Physical Exam  Constitutional:       Appearance: He is well-developed  Comments: Well-nourished male, no respiratory distress   HENT:      Head: Normocephalic and atraumatic  Right Ear: External ear normal       Left Ear: External ear normal       Nose: Nose normal    Eyes:      Conjunctiva/sclera: Conjunctivae normal       Pupils: Pupils are equal, round, and reactive to light  Cardiovascular:      Rate and Rhythm: Normal rate and regular rhythm  Heart sounds: Normal heart sounds  Pulmonary:      Effort: Pulmonary effort is normal       Breath sounds: Normal breath sounds  Abdominal:      General: Bowel sounds are normal       Palpations: Abdomen is soft  Comments: +bowel sounds, nontender, slightly obese, cannot palpate liver or spleen, no rigidity or rebound   Musculoskeletal:         General: Normal range of motion  Cervical back: Normal range of motion and neck supple  Skin:     General: Skin is warm        Comments: Warm, moist, good color, no petechiae or ecchymoses   Neurological:      Mental Status: He is alert and oriented to person, place, and time  Deep Tendon Reflexes: Reflexes are normal and symmetric  Psychiatric:         Behavior: Behavior normal          Thought Content:  Thought content normal          Judgment: Judgment normal      Extremities:  No lower extremity edema bilaterally, no cords, pulses are 1+, strength in both upper extremity seems to be about the same as before and equal bilaterally, no obvious/significant swelling  Lymphatics:   No adenopathy in the neck, supraclavicular region, axilla and groin bilaterally    Labs        04/21/2022 BUN = 25 creatinine = 1 23 calcium = 8 4 LFTs WNL    Cardiology        Pathology    11/24/2016 direct platelet antibody:  Anti IgG = positive  11/21/2016 platelet antibody IIB/IIIA, IA/IIA, IB/IX and HLA class I were all negative

## 2022-09-12 ENCOUNTER — CONSULT (OUTPATIENT)
Dept: PULMONOLOGY | Facility: MEDICAL CENTER | Age: 72
End: 2022-09-12
Payer: COMMERCIAL

## 2022-09-12 VITALS
BODY MASS INDEX: 30.66 KG/M2 | RESPIRATION RATE: 12 BRPM | TEMPERATURE: 96.6 F | DIASTOLIC BLOOD PRESSURE: 76 MMHG | SYSTOLIC BLOOD PRESSURE: 142 MMHG | WEIGHT: 251.8 LBS | HEART RATE: 70 BPM | HEIGHT: 76 IN

## 2022-09-12 DIAGNOSIS — J45.20 MILD INTERMITTENT ASTHMA WITHOUT COMPLICATION: ICD-10-CM

## 2022-09-12 DIAGNOSIS — D69.6 THROMBOCYTOPENIA (HCC): ICD-10-CM

## 2022-09-12 DIAGNOSIS — R93.89 ABNORMAL CHEST X-RAY: Primary | ICD-10-CM

## 2022-09-12 PROCEDURE — 94010 BREATHING CAPACITY TEST: CPT | Performed by: INTERNAL MEDICINE

## 2022-09-12 PROCEDURE — 99204 OFFICE O/P NEW MOD 45 MIN: CPT | Performed by: INTERNAL MEDICINE

## 2022-09-12 RX ORDER — ALBUTEROL SULFATE 90 UG/1
2 AEROSOL, METERED RESPIRATORY (INHALATION) EVERY 4 HOURS PRN
Qty: 18 G | Refills: 5 | Status: SHIPPED | OUTPATIENT
Start: 2022-09-12

## 2022-09-12 NOTE — ASSESSMENT & PLAN NOTE
Georgina Galicia has chronic ITP and is taking Promacta  He does follow with Dr Taco Henderson  Not having any hemoptysis  Last CBC done on September 6 showed platelet count to be 89,000  He states his platelet count did drop down to 8000 after he had COVID vaccine

## 2022-09-12 NOTE — ASSESSMENT & PLAN NOTE
I suspect change may have some mild intermittent asthma  If he gets a respiratory tract infection often have some wheezing occasion have a wheeze when he is not sick  Does have some seasonal allergies  He has used albuterol inhaler on a so clean with RSV infection this helped  I gave her a renewal on his albuterol inhaler  Today spirometry shows normal lung volumes without any airflow obstruction  If he feels he ever needs a nebulizer for his wheezing he can contact our office and I will order this for him    Right now he felt he did not need one

## 2022-09-12 NOTE — ASSESSMENT & PLAN NOTE
I reviewed chest x-ray from June 19th of this year when he was sick with RC infection and prior x-rays past few years  No definite evidence of interstitial lung disease  There was reported maybe some slight increased prominence of lower lobe markings but I do not suspect he has underlying interstitial lung disease  I did review CT scan of abdomen done in October 2018 and there is no sign of interstitial lung disease at that time  He does not have any chronic cough  Not having any shortness of breath  No need for follow-up chest x-ray or even CT scan of chest this is time unless he starts developing signs of regular shortness of breath or chronic cough  He does not have chronic cough at this time  I told him to contact my office if he starts having any persistent shortness of breath or chronic cough    He does have a pulse oximeter at home and does monitor his pulse oximetry periodically  States usually he is 95-96% sometimes goes up to 97%    Sometimes with exertion may go down to 93%

## 2022-09-12 NOTE — PROGRESS NOTES
Assessment/Plan:       Problem List Items Addressed This Visit        Respiratory    Mild intermittent asthma without complication     I suspect change may have some mild intermittent asthma  If he gets a respiratory tract infection often have some wheezing occasion have a wheeze when he is not sick  Does have some seasonal allergies  He has used albuterol inhaler on a so clean with RSV infection this helped  I gave her a renewal on his albuterol inhaler  Today spirometry shows normal lung volumes without any airflow obstruction  If he feels he ever needs a nebulizer for his wheezing he can contact our office and I will order this for him  Right now he felt he did not need one         Relevant Medications    albuterol (Ventolin HFA) 90 mcg/act inhaler       Other    Thrombocytopenia (HCC)     Marilu Benedict has chronic ITP and is taking Promacta  He does follow with Dr Yudy Morrow  Not having any hemoptysis  Last CBC done on September 6 showed platelet count to be 89,000  He states his platelet count did drop down to 8000 after he had COVID vaccine  Abnormal chest x-ray - Primary     I reviewed chest x-ray from June 19th of this year when he was sick with RC infection and prior x-rays past few years  No definite evidence of interstitial lung disease  There was reported maybe some slight increased prominence of lower lobe markings but I do not suspect he has underlying interstitial lung disease  I did review CT scan of abdomen done in October 2018 and there is no sign of interstitial lung disease at that time  He does not have any chronic cough  Not having any shortness of breath  No need for follow-up chest x-ray or even CT scan of chest this is time unless he starts developing signs of regular shortness of breath or chronic cough  He does not have chronic cough at this time      I told him to contact my office if he starts having any persistent shortness of breath or chronic cough    He does have a pulse oximeter at home and does monitor his pulse oximetry periodically  States usually he is 95-96% sometimes goes up to 97%  Sometimes with exertion may go down to 93%         Relevant Orders    POCT spirometry (Completed)            Plan for follow up:  Return if symptoms worsen or fail to improve  All questions are answered to the patient's satisfaction and understanding  He verbalizes understanding  He is encouraged to call with any further questions or concerns  Portions of the record may have been created with voice recognition software  Occasional wrong word or "sound a like" substitutions may have occurred due to the inherent limitations of voice recognition software  Read the chart carefully and recognize, using context, where substitutions have occurred  a    Electronically Signed by Iliana Olguin DO    ______________________________________________________________________    Chief Complaint:  He is doing okay  Not having shortness of breath  Here for evaluation of abnormal chest x-ray    Patient ID: Chiquita Kang is a 74 y o  y o  male has a past medical history of Hypertension, Thrombocytopenia ([address] ), and Wears partial dentures  9/12/2022  Patient presents today for initial visit for evaluation of abnormal chest x-ray      HPI       Chiquita Kang had chest x-ray done on June 19th of this year which reported some chronic interstitial changes at lung bases  No other significant abnormalities  There is evidence of a left total shoulder arthroplasty surgery  He had this chest x-ray done when he presented to the ER on 06/19/2022 complaining of sudden shortness of breath and shakiness started around lunchtime  He been some upper respiratory tract symptoms such as nasal congestion few days prior to that  He was noted to have wheezing and rhonchi present  His room air O2 saturation was 93% and he was afebrile    He was given dose of IV Solu-Medrol and nebulizer treatment with improvement and discharged home with 5 day Z-Zeke and prednisone 40 mg daily for 5 days  A nasal swab done during that visit to ER was negative for influenza or COVID  Was positive for RSV  He did have grandson who had URI symptoms at that time and he was exposed to him  His symptoms did improve afterwards  Does not get any wheezing now  Only time he has wheezing is sometimes when he has respiratory tract infection  Also has some mild seasonal allergies and will take allergy medication as needed  No history of asthma  No history of COPD  He does not get short of breath going up and down a flight of stairs or during his usual activities  Did have CT scan of his chest done of his abdomen and pelvis October 2018 which I reviewed  There is some minimal atelectasis at the lung bases but no evidence of any pulmonary fibrosis  He does not have any chronic cough and does not get short of breath with activity  He does go to gym 3 times a week  He denies any loud snoring or excessive daytime somnolence  Velasquezpako Nevilles quit smoking in 2010 and smoked 1/2 pack of cigarettes per day for 22 years  He does have a rescue albuterol inhaler he can use periodically    Does have chronic idiopathic thrombocytopenia     At 1 point his platelet count was 38,826 and he was started on Promacta  Last CBC done 09/06/2022 showed platelet count of 57045  His hemoglobin is 14 4 white blood cell count was 7 68  His platelet count was 96060 on February of 2022 of this year    Does have hypertension and is on metoprolol XL 25 mg daily and amlodipine 5 mg daily  Also has hyperlipidemia for which she takes Crestor 5 mcg daily    In December of 2021 he did have COVID infection  He did present with some lightheadedness dizziness and irregular heartbeat  Was noted to have some PVCs and was felt that he may have some viral myositis from the COVID infection  Was started on Toprol-XL 25 mg daily    Echocardiogram done December 30th showed LV systolic function be normal ejection fraction of 55%  Estimated right ventricular systolic pressure was 43  There was mild mitral regurgitation  His nasal swab for COVID was positive on 12/15/2021  There is perhaps some very minimal faint bibasilar infiltrates seen at the time      Occupational/Exposure history:  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      Review of Systems   Constitutional: Negative for chills, fever and unexpected weight change  HENT: Positive for congestion  Negative for rhinorrhea and sore throat  Eyes: Negative for discharge and redness  Respiratory: Negative for cough and shortness of breath  Cardiovascular: Negative for chest pain, palpitations and leg swelling  Gastrointestinal: Negative for abdominal distention, abdominal pain and nausea  Endocrine: Negative for polydipsia and polyphagia  Genitourinary: Negative for dysuria  Musculoskeletal: Negative for joint swelling and myalgias  Skin: Negative for rash  Neurological: Negative for light-headedness  Psychiatric/Behavioral: Negative for decreased concentration  Social history: He reports that he quit smoking about 12 years ago  His smoking use included cigarettes  He has a 11 00 pack-year smoking history  He has never used smokeless tobacco  He reports that he does not drink alcohol and does not use drugs  Past surgical history:   Past Surgical History:   Procedure Laterality Date    APPENDECTOMY      CATARACT EXTRACTION Left     COLONOSCOPY      COLONOSCOPY N/A 10/17/2018    Procedure: COLONOSCOPY;  Surgeon: Johnna White MD;  Location: [place] [address] [facility];   Service: Gastroenterology    HERNIA REPAIR Right 2013    inguinal    JOINT REPLACEMENT Right     knee, left shoulder    KNEE SURGERY Left     Had left knee surgery for football injury    OR XCAPSL CTRC RMVL INSJ IO LENS PROSTH W/O ECP Left 05/04/2016    Procedure: EXTRACTION EXTRACAPSULAR CATARACT PHACO INTRAOCULAR LENS (IOL); Surgeon: Kushal Bro MD;  Location: [facility] MAIN OR;  Service: Ophthalmology    SHOULDER ARTHROSCOPY Left     tendon repair    TONSILLECTOMY  age 1     Family history:   Family History   Problem Relation Age of Onset    Heart disease Father 67        massive MI    Heart disease Sister         MI    Heart disease Brother         MI    No Known Problems Mother     Mental illness Neg Hx        Immunization History   Administered Date(s) Administered    COVID-19 MODERNA VACC 0 5 ML IM 03/18/2021, 04/16/2021    INFLUENZA 10/22/2020    Influenza Split High Dose Preservative Free IM 01/31/2020    Pneumococcal Conjugate 13-Valent 10/30/2018    Pneumococcal Polysaccharide PPV23 01/31/2020    TD (adult) Preservative Free 04/12/2019     Current Outpatient Medications   Medication Sig Dispense Refill    albuterol (Ventolin HFA) 90 mcg/act inhaler Inhale 2 puffs every 4 (four) hours as needed for wheezing or shortness of breath 18 g 5    albuterol (PROVENTIL HFA,VENTOLIN HFA) 90 mcg/act inhaler Inhale 2 puffs every 4 (four) hours as needed for wheezing or shortness of breath 18 g 0    amLODIPine (NORVASC) 5 mg tablet Take 1 tablet (5 mg total) by mouth daily 90 tablet 1    ascorbic acid (VITAMIN C) 500 mg tablet Take 500 mg by mouth every morning   b complex vitamins tablet Take 1 tablet by mouth every morning        cholecalciferol (VITAMIN D3) 1,000 units tablet Take 1,000 Units by mouth every morning      eltrombopag (PROMACTA) 25 mg tablet Take 25 mg Tuesday, Thursday, Saturday and Sunday Administer on an empty stomach, 1 hour before or 2 hours after a meal  16 tablet 5    eltrombopag (PROMACTA) 50 MG tablet Take 50 mg Mon/wed/Friday Administer on an empty stomach, 1 hour before or 2 hours after a meal  12 tablet 5    metoprolol succinate (TOPROL-XL) 25 mg 24 hr tablet Take 1 tablet (25 mg total) by mouth daily 90 tablet 3    multivitamin [facility]) TABS Take 1 tablet by mouth every morning   Omega-3 Fatty Acids (FISH OIL) 1,000 mg Take 1,000 mg by mouth every morning      omeprazole (PriLOSEC) 10 mg delayed release capsule Take by mouth daily (Patient not taking: No sig reported)      rosuvastatin (CRESTOR) 5 mg tablet Take 1 tablet (5 mg total) by mouth daily (Patient not taking: No sig reported) 90 tablet 1    sildenafil (VIAGRA) 100 mg tablet Take 1 tablet (100 mg total) by mouth daily as needed for erectile dysfunction 10 tablet 3    tamsulosin (FLOMAX) 0 4 mg Take 1 capsule (0 4 mg total) by mouth daily with dinner 90 capsule 3     No current facility-administered medications for this visit  Allergies: Dust mite extract    Objective:  Vitals:    09/12/22 1052   BP: 142/76   Pulse: 70   Resp: 12   Temp: (!) 96 6 °F (35 9 °C)   TempSrc: Temporal   Weight: 114 kg (251 lb 12 8 oz)   Height: 6' 4" (1 93 m)        Room air O2 saturation at rest 96%    Wt Readings from Last 3 Encounters:   09/12/22 114 kg (251 lb 12 8 oz)   09/07/22 115 kg (253 lb)   07/25/22 114 kg (252 lb)     Body mass index is 30 65 kg/m²  Physical Exam  Vitals reviewed  Constitutional:       General: He is not in acute distress  Appearance: Normal appearance  He is well-developed  HENT:      Head: Normocephalic  Nose: Nose normal       Mouth/Throat:      Mouth: Mucous membranes are moist       Pharynx: Oropharynx is clear  No oropharyngeal exudate  Eyes:      Conjunctiva/sclera: Conjunctivae normal       Pupils: Pupils are equal, round, and reactive to light  Neck:      Vascular: No JVD  Trachea: No tracheal deviation  Cardiovascular:      Rate and Rhythm: Normal rate and regular rhythm  Heart sounds: Normal heart sounds  Pulmonary:      Effort: Pulmonary effort is normal       Comments: Lung sounds are clear  No wheezes, crackles or rhonchi  Abdominal:      General: There is no distension  Palpations: Abdomen is soft  Tenderness: There is no abdominal tenderness  There is no guarding  Musculoskeletal:      Cervical back: Neck supple  Comments: No edema, cyanosis or clubbing   Lymphadenopathy:      Cervical: No cervical adenopathy  Skin:     General: Skin is warm and dry  Findings: No rash  Neurological:      Mental Status: He is alert and oriented to person, place, and time  Psychiatric:         Behavior: Behavior normal          Thought Content: Thought content normal          Lab Review:   Lab Results   Component Value Date    K 4 3 06/19/2022     06/19/2022    CO2 24 06/19/2022    BUN 24 06/19/2022    CREATININE 1 22 06/19/2022    CALCIUM 8 9 06/19/2022     Lab Results   Component Value Date    WBC 7 68 09/06/2022    HGB 14 4 09/06/2022    HCT 44 1 09/06/2022    MCV 90 09/06/2022    PLT 89 (L) 09/06/2022       Diagnostics:  I have personally reviewed pertinent films in PACS  Chest x-ray:  Chest x-ray done on 06/19/2022 was reviewed  There is questionable slight increased interstitial prominence at bases with no significant change compared to older x-rays done in 2017  I did review CT scan of abdomen done 10/01/2018 and on that CT scan there was no evidence of any pulmonary fibrosis      Pulse oximetry testing:  Room air O2 saturation at rest was 95% and after ambulating up and down a flight of stairs and 100 ft lowest O2 saturation was 91%  This quickly recovered back up to 96%        Office Spirometry Results: done today    FVC - 4 53 L   85%  FEV1 - 3 21 L  82%  FEV1/FVC% - 71%    Lung volumes are normal   Normal spirometry

## 2022-09-12 NOTE — PATIENT INSTRUCTIONS
I prescribed a Ventolin inhaler you can use 2 puffs every 4 hours as needed for wheezing or shortness of breath    If you start having any difficulty breathing contact our office  Our back telephone number is [phone]    At this time I do not think you need a CT scan of your chest but if you start having difficulty breathing that will be something we can consider    Your breathing test - spirometry is normal today    Contact our office if you have any problems    If you feel like you ever needs a nebulizer I can order this for your

## 2022-09-15 ENCOUNTER — OFFICE VISIT (OUTPATIENT)
Dept: CARDIOLOGY CLINIC | Facility: CLINIC | Age: 72
End: 2022-09-15
Payer: COMMERCIAL

## 2022-09-15 VITALS
DIASTOLIC BLOOD PRESSURE: 72 MMHG | BODY MASS INDEX: 30.67 KG/M2 | WEIGHT: 251.9 LBS | HEIGHT: 76 IN | TEMPERATURE: 98.2 F | SYSTOLIC BLOOD PRESSURE: 130 MMHG | OXYGEN SATURATION: 98 % | HEART RATE: 65 BPM

## 2022-09-15 DIAGNOSIS — Z86.2 HISTORY OF ITP: ICD-10-CM

## 2022-09-15 DIAGNOSIS — I25.10 CAD IN NATIVE ARTERY: Primary | ICD-10-CM

## 2022-09-15 DIAGNOSIS — I49.3 PVC (PREMATURE VENTRICULAR CONTRACTION): ICD-10-CM

## 2022-09-15 DIAGNOSIS — I21.19 INFERIOR MI (HCC): ICD-10-CM

## 2022-09-15 PROCEDURE — 99214 OFFICE O/P EST MOD 30 MIN: CPT | Performed by: INTERNAL MEDICINE

## 2022-09-15 PROCEDURE — 1160F RVW MEDS BY RX/DR IN RCRD: CPT | Performed by: INTERNAL MEDICINE

## 2022-09-15 PROCEDURE — 3075F SYST BP GE 130 - 139MM HG: CPT | Performed by: INTERNAL MEDICINE

## 2022-09-15 PROCEDURE — 3078F DIAST BP <80 MM HG: CPT | Performed by: INTERNAL MEDICINE

## 2022-09-15 RX ORDER — ATORVASTATIN CALCIUM 40 MG/1
40 TABLET, FILM COATED ORAL
Qty: 90 TABLET | Refills: 1 | Status: SHIPPED | OUTPATIENT
Start: 2022-09-15

## 2022-09-15 NOTE — PROGRESS NOTES
[facility]  [address] [facility] [address]  [address]   [place], [address]  Cardiology Follow-up    America Garrett  [id number]  1950      Consult for:  PVCs/non-STEMI  Appreciate consult by: Alina Kessler DO    1  CAD in native artery  Lipoprotein NMR   2  Inferior MI (HCC)  Lipoprotein NMR   3  PVC (premature ventricular contraction)     4  History of ITP        Discussion/Summary:   Suspected prior infarction/CAD- showed pictures of prior nuclear stress test in detail  Patient denies having prior any symptoms- surprised of results  nuclear stress test shows a relatively fixed inferior defect  Suspicion for prior remote myocardial infarction  However, echo with normal EF at rest  Cannot complete rule out false positive/diaphagmatic attenuation  He has a history of thrombocytopenia and been asymptomatic  Did not take rosuvastatin but will take atorvastatin  Will target an LDL below 70  Continue metoprolol 25 mg  If he has active chest pain consideration for cardiac catheterization vs coronary CT to better evaluate  However given his thrombocytopenia he is not optimal for stents or anti-platelet therapy and conservative therapy is best     PVCs- 5% PVC burden  PVC secondary to prior ischemic event? Low suspicion for sleep disordered breathing  Continue metoprolol 25 mg daily    Hypertension- currently well controlled on amlodipine 5 mg plus metoprolol    Idiopathic thrombocytopenia- started on therapy by heme oncology    HPI:   25-year-old gentleman with recent hospital admission secondary to COVID and irregular heart rhythm  He was found to have frequent ventricular bigeminy  He had a nuclear stress test which was negative for acute ischemia  He was placed on low-dose beta-blocker  Since discharge he denies having major palpitations  He reports exercising daily  03/04/2022:  He continues to have no symptoms of chest pain or significant palpitations    I reviewed with him his previous nuclear stress test   His event monitor was reviewed  He has a low PVC burden  09/15/2022: He is still exercising vigorously without any major chest pain or shortness of breath  Denies having palpitations  He does not take the statin medication as he felt it was interacting with his hematology medication    I reviewed with the patient his prior nuclear stress test        Past Medical History:   Diagnosis Date    Hypertension     Thrombocytopenia (HCC)     platelet count maintained around 90-Dr Nasrin Vasquez    Wears partial dentures     upper     Social History     Socioeconomic History    Marital status: /Civil Union     Spouse name: Not on file    Number of children: Not on file    Years of education: Not on file    Highest education level: Not on file   Occupational History    Not on file   Tobacco Use    Smoking status: Former Smoker     Packs/day: 0 50     Years: 22 00     Pack years: 11 00     Types: Cigarettes     Quit date:      Years since quittin 7    Smokeless tobacco: Never Used   Vaping Use    Vaping Use: Never used   Substance and Sexual Activity    Alcohol use: Never    Drug use: Never    Sexual activity: Not on file   Other Topics Concern    Not on file   Social History Narrative    Not on file     Social Determinants of Health     Financial Resource Strain: Not on file   Food Insecurity: Not on file   Transportation Needs: Not on file   Physical Activity: Not on file   Stress: Not on file   Social Connections: Not on file   Intimate Partner Violence: Not on file   Housing Stability: Not on file      Family History   Problem Relation Age of Onset    Heart disease Father 67        massive MI    Heart disease Sister         MI    Heart disease Brother         MI    No Known Problems Mother     Mental illness Neg Hx      Past Surgical History:   Procedure Laterality Date    APPENDECTOMY      CATARACT EXTRACTION Left     COLONOSCOPY      COLONOSCOPY N/A 10/17/2018    Procedure: COLONOSCOPY;  Surgeon: Alexandro Morrison MD;  Location: [place] [address] [facility]; Service: Gastroenterology    HERNIA REPAIR Right 2013    inguinal    JOINT REPLACEMENT Right     knee, left shoulder    KNEE SURGERY Left     Had left knee surgery for football injury    WI XCAPSL CTRC RMVL INSJ IO LENS PROSTH W/O ECP Left 05/04/2016    Procedure: EXTRACTION EXTRACAPSULAR CATARACT PHACO INTRAOCULAR LENS (IOL);   Surgeon: Teresa Ballard MD;  Location: [facility] MAIN OR;  Service: Ophthalmology    SHOULDER ARTHROSCOPY Left     tendon repair    TONSILLECTOMY  age 1       Current Outpatient Medications:     albuterol (PROVENTIL HFA,VENTOLIN HFA) 90 mcg/act inhaler, Inhale 2 puffs every 4 (four) hours as needed for wheezing or shortness of breath, Disp: 18 g, Rfl: 0    albuterol (Ventolin HFA) 90 mcg/act inhaler, Inhale 2 puffs every 4 (four) hours as needed for wheezing or shortness of breath, Disp: 18 g, Rfl: 5    amLODIPine (NORVASC) 5 mg tablet, Take 1 tablet (5 mg total) by mouth daily, Disp: 90 tablet, Rfl: 1    ascorbic acid (VITAMIN C) 500 mg tablet, Take 500 mg by mouth every morning , Disp: , Rfl:     b complex vitamins tablet, Take 1 tablet by mouth every morning , Disp: , Rfl:     cholecalciferol (VITAMIN D3) 1,000 units tablet, Take 1,000 Units by mouth every morning, Disp: , Rfl:     eltrombopag (PROMACTA) 25 mg tablet, Take 25 mg Tuesday, Thursday, Saturday and Sunday Administer on an empty stomach, 1 hour before or 2 hours after a meal , Disp: 16 tablet, Rfl: 5    eltrombopag (PROMACTA) 50 MG tablet, Take 50 mg Mon/wed/Friday Administer on an empty stomach, 1 hour before or 2 hours after a meal , Disp: 12 tablet, Rfl: 5    metoprolol succinate (TOPROL-XL) 25 mg 24 hr tablet, Take 1 tablet (25 mg total) by mouth daily, Disp: 90 tablet, Rfl: 3    multivitamin (THERAGRAN) TABS, Take 1 tablet by mouth every morning , Disp: , Rfl:     tamsulosin (FLOMAX) 0 4 mg, Take 1 capsule (0 4 mg total) by mouth daily with dinner, Disp: 90 capsule, Rfl: 3    Omega-3 Fatty Acids (FISH OIL) 1,000 mg, Take 1,000 mg by mouth every morning (Patient not taking: Reported on 9/15/2022), Disp: , Rfl:     omeprazole (PriLOSEC) 10 mg delayed release capsule, Take by mouth daily (Patient not taking: Reported on 9/15/2022), Disp: , Rfl:     rosuvastatin (CRESTOR) 5 mg tablet, Take 1 tablet (5 mg total) by mouth daily (Patient not taking: Reported on 9/15/2022), Disp: 90 tablet, Rfl: 1    sildenafil (VIAGRA) 100 mg tablet, Take 1 tablet (100 mg total) by mouth daily as needed for erectile dysfunction (Patient not taking: Reported on 9/15/2022), Disp: 10 tablet, Rfl: 3  Allergies   Allergen Reactions    Dust Mite Extract Sneezing     Vitals:    09/15/22 1019   BP: 130/72   BP Location: Right arm   Patient Position: Sitting   Cuff Size: Large   Pulse: 65   Temp: 98 2 °F (36 8 °C)   TempSrc: Tympanic   SpO2: 98%   Weight: 114 kg (251 lb 14 4 oz)   Height: 6' 4" (1 93 m)       Review of Systems:   Review of Systems   Constitutional: Negative  HENT: Negative  Eyes: Negative  Respiratory: Negative  Cardiovascular: Negative for palpitations  Gastrointestinal: Negative  Endocrine: Negative  Genitourinary: Negative  Musculoskeletal: Negative  Skin: Negative  Allergic/Immunologic: Negative  Neurological: Negative  Hematological: Negative  Psychiatric/Behavioral: Negative  Vitals:    09/15/22 1019   BP: 130/72   BP Location: Right arm   Patient Position: Sitting   Cuff Size: Large   Pulse: 65   Temp: 98 2 °F (36 8 °C)   TempSrc: Tympanic   SpO2: 98%   Weight: 114 kg (251 lb 14 4 oz)   Height: 6' 4" (1 93 m)     Physical Examination:   Physical Exam  Constitutional:       General: He is not in acute distress  Appearance: He is well-developed  He is not diaphoretic  HENT:      Head: Normocephalic and atraumatic        Right Ear: External ear normal       Left Ear: External ear normal    Eyes:      General: No scleral icterus  Right eye: No discharge  Left eye: No discharge  Conjunctiva/sclera: Conjunctivae normal       Pupils: Pupils are equal, round, and reactive to light  Neck:      Thyroid: No thyromegaly  Vascular: No JVD  Trachea: No tracheal deviation  Cardiovascular:      Rate and Rhythm: Normal rate and regular rhythm  Heart sounds: No murmur heard  No friction rub  Gallop present  Pulmonary:      Effort: Pulmonary effort is normal  No respiratory distress  Breath sounds: Normal breath sounds  No stridor  No wheezing or rales  Chest:      Chest wall: No tenderness  Abdominal:      General: Bowel sounds are normal  There is no distension  Palpations: Abdomen is soft  There is no mass  Tenderness: There is no abdominal tenderness  There is no guarding or rebound  Musculoskeletal:         General: No tenderness or deformity  Normal range of motion  Cervical back: Normal range of motion and neck supple  Skin:     General: Skin is warm and dry  Coloration: Skin is not pale  Findings: No erythema or rash  Neurological:      Mental Status: He is alert and oriented to person, place, and time  Cranial Nerves: No cranial nerve deficit  Motor: No abnormal muscle tone  Coordination: Coordination normal       Deep Tendon Reflexes: Reflexes are normal and symmetric  Reflexes normal    Psychiatric:         Behavior: Behavior normal          Thought Content:  Thought content normal          Judgment: Judgment normal          Labs:     Lab Results   Component Value Date    WBC 7 68 09/06/2022    HGB 14 4 09/06/2022    HCT 44 1 09/06/2022    MCV 90 09/06/2022    RDW 14 7 09/06/2022    PLT 89 (L) 09/06/2022     BMP:  Lab Results   Component Value Date    SODIUM 139 06/19/2022    K 4 3 06/19/2022     06/19/2022    CO2 24 06/19/2022    BUN 24 06/19/2022    CREATININE 1 22 06/19/2022    GLUC 121 06/19/2022    GLUF 111 (H) 04/21/2022    CALCIUM 8 9 06/19/2022    CORRECTEDCA 9 0 04/21/2022    EGFR 58 06/19/2022    MG 1 6 06/19/2022     LFT:  Lab Results   Component Value Date    AST 23 06/19/2022    ALT 28 06/19/2022    ALKPHOS 116 06/19/2022    TP 8 0 06/19/2022    ALB 3 9 06/19/2022      Lab Results   Component Value Date    HEN5OPAUFLBI 1 891 04/21/2022     No results found for: HGBA1C  Lipid Profile:   Lab Results   Component Value Date    CHOLESTEROL 136 09/06/2022    HDL 31 (L) 09/06/2022    LDLCALC 87 09/06/2022    TRIG 88 09/06/2022     Lab Results   Component Value Date    CHOLESTEROL 136 09/06/2022    CHOLESTEROL 135 12/16/2021     No results found for: CKTOTAL, CKMB, CKMBINDEX, TROPONINI  Lab Results   Component Value Date    NTBNP 290 (H) 06/19/2022      Recent Results (from the past 672 hour(s))   CBC and differential    Collection Time: 08/29/22  7:23 AM   Result Value Ref Range    WBC 7 82 4 31 - 10 16 Thousand/uL    RBC 4 94 3 88 - 5 62 Million/uL    Hemoglobin 14 5 12 0 - 17 0 g/dL    Hematocrit 44 0 36 5 - 49 3 %    MCV 89 82 - 98 fL    MCH 29 4 26 8 - 34 3 pg    MCHC 33 0 31 4 - 37 4 g/dL    RDW 14 6 11 6 - 15 1 %    MPV 11 3 8 9 - 12 7 fL    Platelets 159 (L) 172 - 390 Thousands/uL   CBC and differential    Collection Time: 09/06/22  7:37 AM   Result Value Ref Range    WBC 7 68 4 31 - 10 16 Thousand/uL    RBC 4 93 3 88 - 5 62 Million/uL    Hemoglobin 14 4 12 0 - 17 0 g/dL    Hematocrit 44 1 36 5 - 49 3 %    MCV 90 82 - 98 fL    MCH 29 2 26 8 - 34 3 pg    MCHC 32 7 31 4 - 37 4 g/dL    RDW 14 7 11 6 - 15 1 %    MPV 11 6 8 9 - 12 7 fL    Platelets 89 (L) 975 - 390 Thousands/uL    nRBC 0 /100 WBCs    Neutrophils Relative 62 43 - 75 %    Immat GRANS % 0 0 - 2 %    Lymphocytes Relative 22 14 - 44 %    Monocytes Relative 10 4 - 12 %    Eosinophils Relative 5 0 - 6 %    Basophils Relative 1 0 - 1 %    Neutrophils Absolute 4 86 1 85 - 7 62 Thousands/µL    Immature Grans Absolute 0 02 0 00 - 0 20 Thousand/uL    Lymphocytes Absolute 1 66 0 60 - 4 47 Thousands/µL    Monocytes Absolute 0 74 0 17 - 1 22 Thousand/µL    Eosinophils Absolute 0 36 0 00 - 0 61 Thousand/µL    Basophils Absolute 0 04 0 00 - 0 10 Thousands/µL   Lipid Panel with Direct LDL reflex    Collection Time: 09/06/22  7:37 AM   Result Value Ref Range    Cholesterol 136 See Comment mg/dL    Triglycerides 88 See Comment mg/dL    HDL, Direct 31 (L) >=40 mg/dL    LDL Calculated 87 0 - 100 mg/dL       Imaging & Testing   I have personally reviewed pertinent reports  Cardiac Testing     Results for orders placed during the hospital encounter of 12/30/21    NM myocardial perfusion spect (rx stress and/or rest)    Interpretation Summary    Perfusion: There is a left ventricular perfusion defect that is medium in size with moderate reduction in uptake present in the entire inferior and inferolateral location(s) that is partially reversible  Defect is present at stress and rest and is not present on the apical portion of rest imaging  Findings consistent with infarct with edgar-infarct ischemia although an diaphragm attenuation artifact cannot be rule out    Stress ECG: The stress ECG is negative for ischemia after pharmacologic stress  No ST deviation is noted    Stress Function: Left ventricular function post-stress is normal  Post-stress ejection fraction is 48 %  EKG: Personally reviewed  Johanna Lott MD [facility]  [phone]  Please call with any questions or suggestions    Counseling :  A description of the counseling:   Goals and Barriers:  Patient's ability to self care:  Medication side effect reviewed with patient in detail and all their questions answered  "Portions of the record may have been created with voice recognition software  Occasional wrong word or "sound a like" substitutions may have occurred due to the inherent limitations of voice recognition software   Read the chart carefully and recognize, using context, where substitutions have occurred   Please call if you have any questions  "

## 2022-09-21 DIAGNOSIS — I10 ESSENTIAL HYPERTENSION: ICD-10-CM

## 2022-09-22 RX ORDER — AMLODIPINE BESYLATE 5 MG/1
TABLET ORAL
Qty: 90 TABLET | Refills: 1 | Status: SHIPPED | OUTPATIENT
Start: 2022-09-22

## 2022-10-05 ENCOUNTER — APPOINTMENT (OUTPATIENT)
Dept: LAB | Facility: HOSPITAL | Age: 72
End: 2022-10-05
Payer: COMMERCIAL

## 2022-10-05 ENCOUNTER — TELEPHONE (OUTPATIENT)
Dept: HEMATOLOGY ONCOLOGY | Facility: MEDICAL CENTER | Age: 72
End: 2022-10-05

## 2022-10-05 DIAGNOSIS — D69.6 THROMBOCYTOPENIA (HCC): Primary | ICD-10-CM

## 2022-10-05 NOTE — TELEPHONE ENCOUNTER
Plt count of 56 d/w  [address]  No change in promacta at this time   Repeat CBC in 3 weeks  Voicemail left for patient with instructions and to call my TEAMs

## 2022-10-26 ENCOUNTER — APPOINTMENT (OUTPATIENT)
Dept: LAB | Facility: HOSPITAL | Age: 72
End: 2022-10-26

## 2022-10-26 ENCOUNTER — TELEPHONE (OUTPATIENT)
Dept: HEMATOLOGY ONCOLOGY | Facility: MEDICAL CENTER | Age: 72
End: 2022-10-26

## 2022-10-26 DIAGNOSIS — D69.6 THROMBOCYTOPENIA (HCC): Primary | ICD-10-CM

## 2022-10-26 DIAGNOSIS — D69.6 THROMBOCYTOPENIA (HCC): ICD-10-CM

## 2022-10-26 NOTE — TELEPHONE ENCOUNTER
Plt count of 31 d/W Dr Marcella Richter  Patient remains on promacta 50 mg Mon-Wed-Fri and 25 mg all the other days  Patient denies any signs of bleeding  recheck CBC in one week order placed  Patient will call with any signs of bleeding in the interim  Patient verbalizes understanding

## 2022-11-02 ENCOUNTER — TELEPHONE (OUTPATIENT)
Dept: OTHER | Facility: OTHER | Age: 72
End: 2022-11-02

## 2022-11-02 ENCOUNTER — TELEPHONE (OUTPATIENT)
Dept: HEMATOLOGY ONCOLOGY | Facility: MEDICAL CENTER | Age: 72
End: 2022-11-02

## 2022-11-02 ENCOUNTER — APPOINTMENT (OUTPATIENT)
Dept: LAB | Facility: HOSPITAL | Age: 72
End: 2022-11-02

## 2022-11-02 DIAGNOSIS — D69.6 THROMBOCYTOPENIA (HCC): Primary | ICD-10-CM

## 2022-11-02 DIAGNOSIS — D69.6 THROMBOCYTOPENIA (HCC): ICD-10-CM

## 2022-11-02 LAB
BASOPHILS # BLD AUTO: 0.05 THOUSANDS/ÂΜL (ref 0–0.1)
BASOPHILS NFR BLD AUTO: 1 % (ref 0–1)
EOSINOPHIL # BLD AUTO: 0.24 THOUSAND/ÂΜL (ref 0–0.61)
EOSINOPHIL NFR BLD AUTO: 3 % (ref 0–6)
ERYTHROCYTE [DISTWIDTH] IN BLOOD BY AUTOMATED COUNT: 13.7 % (ref 11.6–15.1)
HCT VFR BLD AUTO: 44.1 % (ref 36.5–49.3)
HGB BLD-MCNC: 14.9 G/DL (ref 12–17)
IMM GRANULOCYTES # BLD AUTO: 0.03 THOUSAND/UL (ref 0–0.2)
IMM GRANULOCYTES NFR BLD AUTO: 0 % (ref 0–2)
LYMPHOCYTES # BLD AUTO: 2.33 THOUSANDS/ÂΜL (ref 0.6–4.47)
LYMPHOCYTES NFR BLD AUTO: 28 % (ref 14–44)
MCH RBC QN AUTO: 30.3 PG (ref 26.8–34.3)
MCHC RBC AUTO-ENTMCNC: 33.8 G/DL (ref 31.4–37.4)
MCV RBC AUTO: 90 FL (ref 82–98)
MONOCYTES # BLD AUTO: 0.87 THOUSAND/ÂΜL (ref 0.17–1.22)
MONOCYTES NFR BLD AUTO: 10 % (ref 4–12)
NEUTROPHILS # BLD AUTO: 4.87 THOUSANDS/ÂΜL (ref 1.85–7.62)
NEUTS SEG NFR BLD AUTO: 58 % (ref 43–75)
NRBC BLD AUTO-RTO: 0 /100 WBCS
PLATELET # BLD AUTO: 33 THOUSANDS/UL (ref 149–390)
PMV BLD AUTO: 13.6 FL (ref 8.9–12.7)
RBC # BLD AUTO: 4.92 MILLION/UL (ref 3.88–5.62)
WBC # BLD AUTO: 8.39 THOUSAND/UL (ref 4.31–10.16)

## 2022-11-02 NOTE — TELEPHONE ENCOUNTER
Patient with critical lab value  Platelets-33  Provider Jose Gu made aware  Dr Aditya Rico states he will have his nurse contact the patient

## 2022-11-02 NOTE — TELEPHONE ENCOUNTER
Plt 33,000  No change in promacta  Recheck CBC in 2 weeks  Voicemail left for patient with instructions and to call my TEAMs number to review

## 2022-11-16 ENCOUNTER — APPOINTMENT (OUTPATIENT)
Dept: LAB | Facility: HOSPITAL | Age: 72
End: 2022-11-16

## 2022-11-16 ENCOUNTER — TELEPHONE (OUTPATIENT)
Dept: HEMATOLOGY ONCOLOGY | Facility: MEDICAL CENTER | Age: 72
End: 2022-11-16

## 2022-11-16 DIAGNOSIS — D69.6 THROMBOCYTOPENIA (HCC): ICD-10-CM

## 2022-11-16 LAB
BASOPHILS # BLD AUTO: 0.05 THOUSANDS/ÂΜL (ref 0–0.1)
BASOPHILS NFR BLD AUTO: 1 % (ref 0–1)
EOSINOPHIL # BLD AUTO: 0.26 THOUSAND/ÂΜL (ref 0–0.61)
EOSINOPHIL NFR BLD AUTO: 3 % (ref 0–6)
ERYTHROCYTE [DISTWIDTH] IN BLOOD BY AUTOMATED COUNT: 14 % (ref 11.6–15.1)
HCT VFR BLD AUTO: 44.6 % (ref 36.5–49.3)
HGB BLD-MCNC: 14.7 G/DL (ref 12–17)
IMM GRANULOCYTES # BLD AUTO: 0.03 THOUSAND/UL (ref 0–0.2)
IMM GRANULOCYTES NFR BLD AUTO: 0 % (ref 0–2)
LYMPHOCYTES # BLD AUTO: 1.96 THOUSANDS/ÂΜL (ref 0.6–4.47)
LYMPHOCYTES NFR BLD AUTO: 24 % (ref 14–44)
MCH RBC QN AUTO: 29.6 PG (ref 26.8–34.3)
MCHC RBC AUTO-ENTMCNC: 33 G/DL (ref 31.4–37.4)
MCV RBC AUTO: 90 FL (ref 82–98)
MONOCYTES # BLD AUTO: 0.85 THOUSAND/ÂΜL (ref 0.17–1.22)
MONOCYTES NFR BLD AUTO: 10 % (ref 4–12)
NEUTROPHILS # BLD AUTO: 5.05 THOUSANDS/ÂΜL (ref 1.85–7.62)
NEUTS SEG NFR BLD AUTO: 62 % (ref 43–75)
PLATELET # BLD AUTO: 33 THOUSANDS/UL (ref 149–390)
PMV BLD AUTO: 14 FL (ref 8.9–12.7)
RBC # BLD AUTO: 4.97 MILLION/UL (ref 3.88–5.62)
WBC # BLD AUTO: 8.2 THOUSAND/UL (ref 4.31–10.16)

## 2022-11-19 DIAGNOSIS — R77.8 ELEVATED TROPONIN: ICD-10-CM

## 2022-11-19 DIAGNOSIS — I49.3 PVC (PREMATURE VENTRICULAR CONTRACTION): ICD-10-CM

## 2022-11-21 RX ORDER — METOPROLOL SUCCINATE 25 MG/1
25 TABLET, EXTENDED RELEASE ORAL DAILY
Qty: 90 TABLET | Refills: 3 | Status: SHIPPED | OUTPATIENT
Start: 2022-11-21

## 2022-11-30 ENCOUNTER — TELEPHONE (OUTPATIENT)
Dept: HEMATOLOGY ONCOLOGY | Facility: MEDICAL CENTER | Age: 72
End: 2022-11-30

## 2022-11-30 ENCOUNTER — APPOINTMENT (OUTPATIENT)
Dept: LAB | Facility: HOSPITAL | Age: 72
End: 2022-11-30

## 2022-11-30 DIAGNOSIS — D69.6 THROMBOCYTOPENIA (HCC): ICD-10-CM

## 2022-11-30 LAB
BASOPHILS # BLD AUTO: 0.06 THOUSANDS/ÂΜL (ref 0–0.1)
BASOPHILS NFR BLD AUTO: 1 % (ref 0–1)
EOSINOPHIL # BLD AUTO: 0.29 THOUSAND/ÂΜL (ref 0–0.61)
EOSINOPHIL NFR BLD AUTO: 3 % (ref 0–6)
ERYTHROCYTE [DISTWIDTH] IN BLOOD BY AUTOMATED COUNT: 13.9 % (ref 11.6–15.1)
HCT VFR BLD AUTO: 43.8 % (ref 36.5–49.3)
HGB BLD-MCNC: 14.4 G/DL (ref 12–17)
IMM GRANULOCYTES # BLD AUTO: 0.03 THOUSAND/UL (ref 0–0.2)
IMM GRANULOCYTES NFR BLD AUTO: 0 % (ref 0–2)
LYMPHOCYTES # BLD AUTO: 1.91 THOUSANDS/ÂΜL (ref 0.6–4.47)
LYMPHOCYTES NFR BLD AUTO: 22 % (ref 14–44)
MCH RBC QN AUTO: 29.6 PG (ref 26.8–34.3)
MCHC RBC AUTO-ENTMCNC: 32.9 G/DL (ref 31.4–37.4)
MCV RBC AUTO: 90 FL (ref 82–98)
MONOCYTES # BLD AUTO: 0.86 THOUSAND/ÂΜL (ref 0.17–1.22)
MONOCYTES NFR BLD AUTO: 10 % (ref 4–12)
NEUTROPHILS # BLD AUTO: 5.36 THOUSANDS/ÂΜL (ref 1.85–7.62)
NEUTS SEG NFR BLD AUTO: 64 % (ref 43–75)
NRBC BLD AUTO-RTO: 0 /100 WBCS
PLATELET # BLD AUTO: 22 THOUSANDS/UL (ref 149–390)
RBC # BLD AUTO: 4.86 MILLION/UL (ref 3.88–5.62)
WBC # BLD AUTO: 8.51 THOUSAND/UL (ref 4.31–10.16)

## 2022-12-06 DIAGNOSIS — D69.6 THROMBOCYTOPENIA (HCC): ICD-10-CM

## 2022-12-07 ENCOUNTER — OFFICE VISIT (OUTPATIENT)
Dept: HEMATOLOGY ONCOLOGY | Facility: MEDICAL CENTER | Age: 72
End: 2022-12-07

## 2022-12-07 VITALS
HEART RATE: 78 BPM | OXYGEN SATURATION: 95 % | RESPIRATION RATE: 18 BRPM | BODY MASS INDEX: 30.93 KG/M2 | WEIGHT: 254 LBS | HEIGHT: 76 IN | TEMPERATURE: 97.7 F | DIASTOLIC BLOOD PRESSURE: 70 MMHG | SYSTOLIC BLOOD PRESSURE: 130 MMHG

## 2022-12-07 DIAGNOSIS — D69.6 THROMBOCYTOPENIA (HCC): Primary | ICD-10-CM

## 2022-12-07 DIAGNOSIS — U07.1 COVID-19: ICD-10-CM

## 2022-12-07 NOTE — PROGRESS NOTES
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  Because of a recent persistent downward platelet trend with associated excessive bruising, patient was started on Promacta  Because of side effects, the dose has been manipulated a number of times  Patient initially started with 50 mg a day -subsequently began to have arthritic complaints  The dose was changed to Promacta 50 mg on Monday, Wednesday and Friday and 25 mg on all other days  Patient continues to feel well, bruises easily but no extensive bruising or bleeding  CBC parameters are trending below - platelets have once again dropped consistently  Patient is very active at home, working out at [employer], 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 - there is a risk for serious bruising/bleeding  We rediscussed options  Patient is willing to go back to 50 mg a day  We will continue with CBCs every 4 weeks  Patient states having sufficient medication at home  Mr Tom Parks is to return in 3 months  Patient will call if he has worsening body aches that are not tolerable  When patient received the COVID vaccine last year, his platelet count drop significantly (< 10,000)  It is difficult to connect the 2 but patient is very reluctant to receive any boosters  Patient subsequently contracted COVID - uncomplicated illness  The plan is to hold off on any COVID booster shots for now but patient can get the flu vaccine from a hematology standpoint  Mr Tom Parks knows to call the hematology/oncology office if there are any other questions or concerns  Carefully review your medication list and verify that the list is accurate and up-to-date   Please call the hematology/oncology office if there are medications missing from the list, medications on the list that you are not currently taking or if there is a dosage or instruction that is different from how you're taking that medication  Patient goals and areas of care:  Continue with Promacta, 50 mg a day  Barriers to care:  None  Patient is able to self-care   ______________________________________________________________________________________    Chief Complaint   Patient presents with   • Follow-up     History of Present Illness:    56-year-old male previously referred for evaluation of thrombocytopenia  Mr Niraj Yoon was unaware of any CBC abnormalities up until recently when he needed left shoulder surgery  Patient has been seen by a number of physicians to trying clarify the etiology for the thrombocytopenia  Although the specifics are not entirely clear, 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 (previously seen by Dr Chintan Walter in [place])  Mr Niraj Yoon previously underwent left shoulder surgery  Patient received 1 unit of platelets before because the platelet count was borderline  The platelets shyla significantly, no surgical complications including bruising or bleeding issues  Patient recently underwent colonoscopy with Decadron treatment before  Mr Niraj Yoon has been on Promacta  Patient states feeling okay, baseline  Patient bruises easily but denies any excessive bruising or bleeding  Appetite is good, weight is stable  No respiratory issues  Patient continues to be very active  No arthritic complaints or pain control issues  No other problems with the Promacta  Review of Systems   Constitutional: Negative  HENT: Negative  Eyes: Negative  Respiratory: Negative  Cardiovascular: Negative  Gastrointestinal: Negative  Endocrine: Negative  Genitourinary: Negative  Musculoskeletal: Positive for arthralgias  Skin: Negative  Allergic/Immunologic: Negative      Neurological: Negative for numbness  Hematological: Does not bruise/bleed easily  Psychiatric/Behavioral: The patient is not nervous/anxious  All other systems reviewed and are negative  Patient Active Problem List   Diagnosis   • Thrombocytopenia (HCC)   • Elevated low-density lipoprotein level   • BMI 31 0-31 9,adult   • Obesity (BMI 30-39  9)   • Essential hypertension   • Vasculogenic erectile dysfunction   • Primary osteoarthritis of right shoulder   • COVID-19   • Elevated troponin   • PVC (premature ventricular contraction)   • Stage 3 chronic kidney disease, unspecified whether stage 3a or 3b CKD (HCC)   • History of ITP   • Numbness   • Lower urinary tract symptoms (LUTS)   • Abnormal chest x-ray   • Mild intermittent asthma without complication     Past Medical History:   Diagnosis Date   • Hypertension    • Thrombocytopenia (HCC)     platelet count maintained around 90-Dr Donnell Correa   • Wears partial dentures     upper     Past Surgical History:   Procedure Laterality Date   • APPENDECTOMY     • CATARACT EXTRACTION Left    • COLONOSCOPY     • COLONOSCOPY N/A 10/17/2018    Procedure: COLONOSCOPY;  Surgeon: Verdell Bernheim, MD;  Location: [place] [address] GI LAB; Service: Gastroenterology   • HERNIA REPAIR Right 2013    inguinal   • JOINT REPLACEMENT Right     knee, left shoulder   • KNEE SURGERY Left     Had left knee surgery for football injury   • OK XCAPSL CTRC RMVL INSJ IO LENS PROSTH W/O ECP Left 05/04/2016    Procedure: EXTRACTION EXTRACAPSULAR CATARACT PHACO INTRAOCULAR LENS (IOL);   Surgeon: Megan Walter MD;  Location: [facility] MAIN OR;  Service: Ophthalmology   • SHOULDER ARTHROSCOPY Left     tendon repair   • TONSILLECTOMY  age 1     Family History   Problem Relation Age of Onset   • Heart disease Father 67        massive MI   • Heart disease Sister         MI   • Heart disease Brother         MI   • No Known Problems Mother    • Mental illness Neg Hx      Social History     Socioeconomic History   • Marital status: /Civil [employer]     Spouse name: Not on file   • Number of children: Not on file   • Years of education: Not on file   • Highest education level: Not on file   Occupational History   • Not on file   Tobacco Use   • Smoking status: Former     Packs/day: 0 50     Years: 22 00     Pack years: 11 00     Types: Cigarettes     Quit date:      Years since quittin 9   • Smokeless tobacco: Never   Vaping Use   • Vaping Use: Never used   Substance and Sexual Activity   • Alcohol use: Never   • Drug use: Never   • Sexual activity: Not on file   Other Topics Concern   • Not on file   Social History Narrative   • Not on file     Social Determinants of Health     Financial Resource Strain: Not on file   Food Insecurity: Not on file   Transportation Needs: Not on file   Physical Activity: Not on file   Stress: Not on file   Social Connections: Not on file   Intimate Partner Violence: Not on file   Housing Stability: Not on file       Current Outpatient Medications:   •  albuterol (PROVENTIL HFA,VENTOLIN HFA) 90 mcg/act inhaler, Inhale 2 puffs every 4 (four) hours as needed for wheezing or shortness of breath, Disp: 18 g, Rfl: 0  •  albuterol (Ventolin HFA) 90 mcg/act inhaler, Inhale 2 puffs every 4 (four) hours as needed for wheezing or shortness of breath, Disp: 18 g, Rfl: 5  •  amLODIPine (NORVASC) 5 mg tablet, TAKE 1 TABLET DAILY, Disp: 90 tablet, Rfl: 1  •  ascorbic acid (VITAMIN C) 500 mg tablet, Take 500 mg by mouth every morning , Disp: , Rfl:   •  atorvastatin (LIPITOR) 40 mg tablet, Take 1 tablet (40 mg total) by mouth daily at bedtime, Disp: 90 tablet, Rfl: 1  •  b complex vitamins tablet, Take 1 tablet by mouth every morning , Disp: , Rfl:   •  cholecalciferol (VITAMIN D3) 1,000 units tablet, Take 1,000 Units by mouth every morning, Disp: , Rfl:   •  eltrombopag (PROMACTA) 25 mg tablet, Take 25 mg Tuesday, Thursday, Saturday and  Administer on an empty stomach, 1 hour before or 2 hours after a meal , Disp: 16 tablet, Rfl: 5  •  eltrombopag (Promacta) 50 MG tablet, Take 1 tablet (50 mg) on Mon, Wed, & Friday  Administer on an empty stomach, 1 hour before or 2 hours after a meal , Disp: 14 tablet, Rfl: 10  •  metoprolol succinate (TOPROL-XL) 25 mg 24 hr tablet, TAKE 1 TABLET (25 MG TOTAL) BY MOUTH DAILY, Disp: 90 tablet, Rfl: 3  •  multivitamin (THERAGRAN) TABS, Take 1 tablet by mouth every morning , Disp: , Rfl:   •  tamsulosin (FLOMAX) 0 4 mg, Take 1 capsule (0 4 mg total) by mouth daily with dinner, Disp: 90 capsule, Rfl: 3  •  Omega-3 Fatty Acids (FISH OIL) 1,000 mg, Take 1,000 mg by mouth every morning (Patient not taking: Reported on 9/15/2022), Disp: , Rfl:   •  omeprazole (PriLOSEC) 10 mg delayed release capsule, Take by mouth daily (Patient not taking: Reported on 9/15/2022), Disp: , Rfl:   •  sildenafil (VIAGRA) 100 mg tablet, Take 1 tablet (100 mg total) by mouth daily as needed for erectile dysfunction (Patient not taking: Reported on 9/15/2022), Disp: 10 tablet, Rfl: 3    Allergies   Allergen Reactions   • Dust Mite Extract Sneezing       Vitals:    12/07/22 1304   Resp: 18   Temp: 97 7 °F (36 5 °C)     Physical Exam  Constitutional:       Appearance: He is well-developed  Comments: Well-nourished male, no respiratory distress   HENT:      Head: Normocephalic and atraumatic  Right Ear: External ear normal       Left Ear: External ear normal       Nose: Nose normal    Eyes:      Conjunctiva/sclera: Conjunctivae normal       Pupils: Pupils are equal, round, and reactive to light  Cardiovascular:      Rate and Rhythm: Normal rate and regular rhythm  Heart sounds: Normal heart sounds  Pulmonary:      Effort: Pulmonary effort is normal       Breath sounds: Normal breath sounds  Abdominal:      General: Bowel sounds are normal       Palpations: Abdomen is soft        Comments: +bowel sounds, nontender, slightly obese, cannot palpate liver or spleen, no rigidity or rebound Musculoskeletal:         General: Normal range of motion  Cervical back: Normal range of motion and neck supple  Skin:     General: Skin is warm  Comments: Warm, moist, good color, no petechiae or ecchymoses   Neurological:      Mental Status: He is alert and oriented to person, place, and time  Deep Tendon Reflexes: Reflexes are normal and symmetric  Psychiatric:         Behavior: Behavior normal          Thought Content:  Thought content normal          Judgment: Judgment normal      Extremities:  No lower extremity edema bilaterally, no cords, pulses are 1+, strength in both upper extremity seems to be about the same as before and equal bilaterally, no obvious/significant swelling  Lymphatics:   No adenopathy in the neck, supraclavicular region, axilla and groin bilaterally    Labs          Cardiology        Pathology    11/24/2016 direct platelet antibody:  Anti IgG = positive  11/21/2016 platelet antibody IIB/IIIA, IA/IIA, IB/IX and HLA class I were all negative

## 2022-12-11 DIAGNOSIS — I25.10 CAD IN NATIVE ARTERY: Primary | ICD-10-CM

## 2022-12-12 RX ORDER — ATORVASTATIN CALCIUM 40 MG/1
TABLET, FILM COATED ORAL
Qty: 90 TABLET | Refills: 1 | Status: SHIPPED | OUTPATIENT
Start: 2022-12-12

## 2022-12-14 ENCOUNTER — APPOINTMENT (OUTPATIENT)
Dept: LAB | Facility: HOSPITAL | Age: 72
End: 2022-12-14

## 2022-12-14 ENCOUNTER — TELEPHONE (OUTPATIENT)
Dept: HEMATOLOGY ONCOLOGY | Facility: MEDICAL CENTER | Age: 72
End: 2022-12-14

## 2022-12-14 DIAGNOSIS — D69.6 THROMBOCYTOPENIA (HCC): ICD-10-CM

## 2022-12-14 LAB
BASOPHILS # BLD AUTO: 0.06 THOUSANDS/ÂΜL (ref 0–0.1)
BASOPHILS NFR BLD AUTO: 1 % (ref 0–1)
EOSINOPHIL # BLD AUTO: 0.26 THOUSAND/ÂΜL (ref 0–0.61)
EOSINOPHIL NFR BLD AUTO: 3 % (ref 0–6)
ERYTHROCYTE [DISTWIDTH] IN BLOOD BY AUTOMATED COUNT: 13.9 % (ref 11.6–15.1)
HCT VFR BLD AUTO: 43 % (ref 36.5–49.3)
HGB BLD-MCNC: 14.2 G/DL (ref 12–17)
IMM GRANULOCYTES # BLD AUTO: 0.03 THOUSAND/UL (ref 0–0.2)
IMM GRANULOCYTES NFR BLD AUTO: 0 % (ref 0–2)
LYMPHOCYTES # BLD AUTO: 1.77 THOUSANDS/ÂΜL (ref 0.6–4.47)
LYMPHOCYTES NFR BLD AUTO: 23 % (ref 14–44)
MCH RBC QN AUTO: 29.4 PG (ref 26.8–34.3)
MCHC RBC AUTO-ENTMCNC: 33 G/DL (ref 31.4–37.4)
MCV RBC AUTO: 89 FL (ref 82–98)
MONOCYTES # BLD AUTO: 0.75 THOUSAND/ÂΜL (ref 0.17–1.22)
MONOCYTES NFR BLD AUTO: 10 % (ref 4–12)
NEUTROPHILS # BLD AUTO: 4.71 THOUSANDS/ÂΜL (ref 1.85–7.62)
NEUTS SEG NFR BLD AUTO: 63 % (ref 43–75)
NRBC BLD AUTO-RTO: 0 /100 WBCS
PLATELET # BLD AUTO: 38 THOUSANDS/UL (ref 149–390)
PMV BLD AUTO: 13 FL (ref 8.9–12.7)
RBC # BLD AUTO: 4.83 MILLION/UL (ref 3.88–5.62)
WBC # BLD AUTO: 7.58 THOUSAND/UL (ref 4.31–10.16)

## 2022-12-15 ENCOUNTER — TELEPHONE (OUTPATIENT)
Dept: HEMATOLOGY ONCOLOGY | Facility: MEDICAL CENTER | Age: 72
End: 2022-12-15

## 2022-12-15 NOTE — TELEPHONE ENCOUNTER
Discussed swelling of hands with Dr Debbie Dobbs  Lowered dose of promacta was not effective  If the higher dose is causing intolerable side effects then treatment should be changed to nplate injections  Patient would like to take slowly increase promacta to daily   Dr Debbie Dobbs ok with plan  Will discuss with pharmacist at [facility]

## 2022-12-15 NOTE — TELEPHONE ENCOUNTER
Spoke with patient  Patient has 5 25 mg tabs left  Patient will start with 50 mg 4 times a week with 25 mg 3 times a week then 50 mg 5 times a week alternating with 25 mg 2 times per week  The 50 mg 6 times per week wit 25 1 time per week then go to 50 mg daily  Patient will call with issues

## 2022-12-27 DIAGNOSIS — I10 ESSENTIAL HYPERTENSION: ICD-10-CM

## 2022-12-27 RX ORDER — AMLODIPINE BESYLATE 5 MG/1
5 TABLET ORAL DAILY
Qty: 90 TABLET | Refills: 1 | Status: SHIPPED | OUTPATIENT
Start: 2022-12-27

## 2023-01-04 ENCOUNTER — APPOINTMENT (OUTPATIENT)
Dept: LAB | Facility: HOSPITAL | Age: 73
End: 2023-01-04

## 2023-01-04 DIAGNOSIS — D69.6 THROMBOCYTOPENIA (HCC): ICD-10-CM

## 2023-01-04 LAB
BASOPHILS # BLD AUTO: 0.04 THOUSANDS/ÂΜL (ref 0–0.1)
BASOPHILS NFR BLD AUTO: 1 % (ref 0–1)
EOSINOPHIL # BLD AUTO: 0.3 THOUSAND/ÂΜL (ref 0–0.61)
EOSINOPHIL NFR BLD AUTO: 4 % (ref 0–6)
ERYTHROCYTE [DISTWIDTH] IN BLOOD BY AUTOMATED COUNT: 13.9 % (ref 11.6–15.1)
HCT VFR BLD AUTO: 41.2 % (ref 36.5–49.3)
HGB BLD-MCNC: 13.9 G/DL (ref 12–17)
IMM GRANULOCYTES # BLD AUTO: 0.02 THOUSAND/UL (ref 0–0.2)
IMM GRANULOCYTES NFR BLD AUTO: 0 % (ref 0–2)
LYMPHOCYTES # BLD AUTO: 1.68 THOUSANDS/ÂΜL (ref 0.6–4.47)
LYMPHOCYTES NFR BLD AUTO: 21 % (ref 14–44)
MCH RBC QN AUTO: 29.9 PG (ref 26.8–34.3)
MCHC RBC AUTO-ENTMCNC: 33.7 G/DL (ref 31.4–37.4)
MCV RBC AUTO: 89 FL (ref 82–98)
MONOCYTES # BLD AUTO: 0.78 THOUSAND/ÂΜL (ref 0.17–1.22)
MONOCYTES NFR BLD AUTO: 10 % (ref 4–12)
NEUTROPHILS # BLD AUTO: 5.25 THOUSANDS/ÂΜL (ref 1.85–7.62)
NEUTS SEG NFR BLD AUTO: 64 % (ref 43–75)
NRBC BLD AUTO-RTO: 0 /100 WBCS
PLATELET # BLD AUTO: 57 THOUSANDS/UL (ref 149–390)
PMV BLD AUTO: 12 FL (ref 8.9–12.7)
RBC # BLD AUTO: 4.65 MILLION/UL (ref 3.88–5.62)
WBC # BLD AUTO: 8.07 THOUSAND/UL (ref 4.31–10.16)

## 2023-01-05 ENCOUNTER — OFFICE VISIT (OUTPATIENT)
Dept: FAMILY MEDICINE CLINIC | Facility: CLINIC | Age: 73
End: 2023-01-05

## 2023-01-05 VITALS
OXYGEN SATURATION: 97 % | SYSTOLIC BLOOD PRESSURE: 108 MMHG | HEART RATE: 59 BPM | HEIGHT: 76 IN | TEMPERATURE: 98.1 F | WEIGHT: 251 LBS | BODY MASS INDEX: 30.56 KG/M2 | DIASTOLIC BLOOD PRESSURE: 60 MMHG

## 2023-01-05 DIAGNOSIS — J01.00 ACUTE NON-RECURRENT MAXILLARY SINUSITIS: Primary | ICD-10-CM

## 2023-01-05 DIAGNOSIS — I10 ESSENTIAL HYPERTENSION: ICD-10-CM

## 2023-01-05 DIAGNOSIS — D69.6 THROMBOCYTOPENIA (HCC): ICD-10-CM

## 2023-01-05 DIAGNOSIS — H61.23 BILATERAL IMPACTED CERUMEN: ICD-10-CM

## 2023-01-05 DIAGNOSIS — N18.30 STAGE 3 CHRONIC KIDNEY DISEASE, UNSPECIFIED WHETHER STAGE 3A OR 3B CKD (HCC): ICD-10-CM

## 2023-01-05 RX ORDER — AMOXICILLIN 875 MG/1
875 TABLET, COATED ORAL 2 TIMES DAILY
Qty: 20 TABLET | Refills: 0 | Status: SHIPPED | OUTPATIENT
Start: 2023-01-05 | End: 2023-01-15

## 2023-01-05 NOTE — ASSESSMENT & PLAN NOTE
Lab Results   Component Value Date    EGFR 58 06/19/2022    EGFR 57 04/21/2022    EGFR 54 12/16/2021    CREATININE 1 22 06/19/2022    CREATININE 1 23 04/21/2022    CREATININE 1 29 12/16/2021     Reviewed labs, creatinine stable, avoid nephrotoxic agents

## 2023-01-05 NOTE — PROGRESS NOTES
Assessment/Plan:    1  Acute non-recurrent maxillary sinusitis  -     amoxicillin (AMOXIL) 875 mg tablet; Take 1 tablet (875 mg total) by mouth 2 (two) times a day for 10 days    2  Thrombocytopenia ([address] )  Assessment & Plan:  Management per hematology       3  Stage 3 chronic kidney disease, unspecified whether stage 3a or 3b CKD [facility])  Assessment & Plan:  Lab Results   Component Value Date    EGFR 58 06/19/2022    EGFR 57 04/21/2022    EGFR 54 12/16/2021    CREATININE 1 22 06/19/2022    CREATININE 1 23 04/21/2022    CREATININE 1 29 12/16/2021     Reviewed labs, creatinine stable, avoid nephrotoxic agents       4  Essential hypertension  Assessment & Plan:  Stable on current medications        5  Bilateral impacted cerumen  Comments:  recommended he use Debrox OTC to soften wax, return for lavage if needed            There are no Patient Instructions on file for this visit  Return if symptoms worsen or fail to improve  Subjective:      Patient ID: Carie Bruce is a 74 y o  male  Chief Complaint   Patient presents with   • Cough   • Headache   • Sore Throat     Symptoms started x 1 month ago, L  Salgado/LPN       HPI    The following portions of the patient's history were reviewed and updated as appropriate: allergies, current medications, past family history, past medical history, past social history, past surgical history and problem list     Review of Systems   Constitutional: Negative for chills, fatigue and fever  HENT: Positive for congestion, postnasal drip, sinus pressure and sore throat  Negative for ear pain and rhinorrhea  Respiratory: Negative for cough, shortness of breath and wheezing  Cardiovascular: Negative for chest pain  Gastrointestinal: Negative for abdominal pain, diarrhea, nausea and vomiting  Musculoskeletal: Negative for arthralgias  Skin: Negative for rash  Neurological: Negative for headaches           Current Outpatient Medications   Medication Sig Dispense Refill   • albuterol (PROVENTIL HFA,VENTOLIN HFA) 90 mcg/act inhaler Inhale 2 puffs every 4 (four) hours as needed for wheezing or shortness of breath 18 g 0   • albuterol (Ventolin HFA) 90 mcg/act inhaler Inhale 2 puffs every 4 (four) hours as needed for wheezing or shortness of breath 18 g 5   • amLODIPine (NORVASC) 5 mg tablet Take 1 tablet (5 mg total) by mouth daily 90 tablet 1   • amoxicillin (AMOXIL) 875 mg tablet Take 1 tablet (875 mg total) by mouth 2 (two) times a day for 10 days 20 tablet 0   • ascorbic acid (VITAMIN C) 500 mg tablet Take 500 mg by mouth every morning  • atorvastatin (LIPITOR) 40 mg tablet TAKE 1 TABLET DAILY AT BEDTIME 90 tablet 1   • b complex vitamins tablet Take 1 tablet by mouth every morning  • cholecalciferol (VITAMIN D3) 1,000 units tablet Take 1,000 Units by mouth every morning     • eltrombopag (PROMACTA) 50 MG tablet Take 1 tablet (50 mg total) by mouth daily Administer on an empty stomach, 1 hour before or 2 hours after a meal  30 tablet 11   • metoprolol succinate (TOPROL-XL) 25 mg 24 hr tablet TAKE 1 TABLET (25 MG TOTAL) BY MOUTH DAILY 90 tablet 3   • multivitamin (THERAGRAN) TABS Take 1 tablet by mouth every morning  • tamsulosin (FLOMAX) 0 4 mg Take 1 capsule (0 4 mg total) by mouth daily with dinner 90 capsule 3   • sildenafil (VIAGRA) 100 mg tablet Take 1 tablet (100 mg total) by mouth daily as needed for erectile dysfunction (Patient not taking: Reported on 9/15/2022) 10 tablet 3     No current facility-administered medications for this visit  Objective:    /60   Pulse 59   Temp 98 1 °F (36 7 °C) (Temporal)   Ht 6' 4" (1 93 m)   Wt 114 kg (251 lb)   SpO2 97%   BMI 30 55 kg/m²        Physical Exam  Vitals and nursing note reviewed  Constitutional:       Appearance: Normal appearance  He is well-developed  HENT:      Right Ear: There is impacted cerumen  Left Ear: There is impacted cerumen  Nose: Congestion present        Right Turbinates: Swollen  Left Turbinates: Swollen  Right Sinus: Maxillary sinus tenderness present  Left Sinus: Maxillary sinus tenderness present  Mouth/Throat:      Pharynx: Oropharynx is clear  Eyes:      Conjunctiva/sclera: Conjunctivae normal    Cardiovascular:      Rate and Rhythm: Normal rate and regular rhythm  Pulses: Normal pulses  Heart sounds: Normal heart sounds  No murmur heard  Pulmonary:      Effort: Pulmonary effort is normal       Breath sounds: Normal breath sounds  Lymphadenopathy:      Cervical: No cervical adenopathy  Skin:     General: Skin is warm and dry  Neurological:      Mental Status: He is alert     Psychiatric:         Mood and Affect: Mood normal          Behavior: Behavior normal                 KELVIN Price

## 2023-02-01 ENCOUNTER — TELEPHONE (OUTPATIENT)
Dept: HEMATOLOGY ONCOLOGY | Facility: MEDICAL CENTER | Age: 73
End: 2023-02-01

## 2023-02-01 ENCOUNTER — APPOINTMENT (OUTPATIENT)
Dept: LAB | Facility: HOSPITAL | Age: 73
End: 2023-02-01

## 2023-02-01 DIAGNOSIS — D69.6 THROMBOCYTOPENIA (HCC): ICD-10-CM

## 2023-02-01 LAB
BASOPHILS # BLD AUTO: 0.06 THOUSANDS/ÂΜL (ref 0–0.1)
BASOPHILS NFR BLD AUTO: 1 % (ref 0–1)
EOSINOPHIL # BLD AUTO: 0.31 THOUSAND/ÂΜL (ref 0–0.61)
EOSINOPHIL NFR BLD AUTO: 4 % (ref 0–6)
ERYTHROCYTE [DISTWIDTH] IN BLOOD BY AUTOMATED COUNT: 14.1 % (ref 11.6–15.1)
HCT VFR BLD AUTO: 41.3 % (ref 36.5–49.3)
HGB BLD-MCNC: 13.8 G/DL (ref 12–17)
IMM GRANULOCYTES # BLD AUTO: 0.03 THOUSAND/UL (ref 0–0.2)
IMM GRANULOCYTES NFR BLD AUTO: 0 % (ref 0–2)
LYMPHOCYTES # BLD AUTO: 1.87 THOUSANDS/ÂΜL (ref 0.6–4.47)
LYMPHOCYTES NFR BLD AUTO: 22 % (ref 14–44)
MCH RBC QN AUTO: 29.8 PG (ref 26.8–34.3)
MCHC RBC AUTO-ENTMCNC: 33.4 G/DL (ref 31.4–37.4)
MCV RBC AUTO: 89 FL (ref 82–98)
MONOCYTES # BLD AUTO: 0.84 THOUSAND/ÂΜL (ref 0.17–1.22)
MONOCYTES NFR BLD AUTO: 10 % (ref 4–12)
NEUTROPHILS # BLD AUTO: 5.33 THOUSANDS/ÂΜL (ref 1.85–7.62)
NEUTS SEG NFR BLD AUTO: 63 % (ref 43–75)
NRBC BLD AUTO-RTO: 0 /100 WBCS
PLATELET # BLD AUTO: 37 THOUSANDS/UL (ref 149–390)
PMV BLD AUTO: 12.2 FL (ref 8.9–12.7)
RBC # BLD AUTO: 4.63 MILLION/UL (ref 3.88–5.62)
WBC # BLD AUTO: 8.44 THOUSAND/UL (ref 4.31–10.16)

## 2023-02-01 NOTE — TELEPHONE ENCOUNTER
Received message from patient to discuss recent CBC result  Patient is on promacta 50 mg daily  Patient has f/u 3/8/2023 and will continue monthly CBC checks  Patient will call with any signs of bleeding or other issues

## 2023-02-17 ENCOUNTER — DOCUMENTATION (OUTPATIENT)
Dept: HEMATOLOGY ONCOLOGY | Facility: CLINIC | Age: 73
End: 2023-02-17

## 2023-02-17 NOTE — PROGRESS NOTES
2-16-23  Email from [employer] requesting additional funding for [facility] [employer]  ID# [id number]  CARD# [id number]  BIN# [id number]  PCN# [id number]  [facility]# [id number]  AMT$ [id number]

## 2023-02-20 ENCOUNTER — TELEPHONE (OUTPATIENT)
Dept: HEMATOLOGY ONCOLOGY | Facility: MEDICAL CENTER | Age: 73
End: 2023-02-20

## 2023-02-20 DIAGNOSIS — Z86.2 HISTORY OF ITP: Primary | ICD-10-CM

## 2023-02-20 RX ORDER — DEXAMETHASONE 4 MG/1
TABLET ORAL
Qty: 40 TABLET | Refills: 0 | Status: SHIPPED | OUTPATIENT
Start: 2023-02-20

## 2023-02-20 NOTE — TELEPHONE ENCOUNTER
Call from patient  Patient needs 3 teeth pulled  Will discuss with Dr Partha Alvarez and get back to patient

## 2023-02-20 NOTE — TELEPHONE ENCOUNTER
Discussed teeth extraction with Dr Lupillo Perea  Plt count should be 80 or greater for procedure   Count is presently 37,000  Patient is on promacta 50 mg daily  Patient to begin decadron 40 mg daily for 4 days per MD  Repeat CBC on Monday  Patient verbalizes understanding

## 2023-02-27 ENCOUNTER — APPOINTMENT (OUTPATIENT)
Dept: LAB | Facility: HOSPITAL | Age: 73
End: 2023-02-27

## 2023-02-27 DIAGNOSIS — D69.6 THROMBOCYTOPENIA (HCC): ICD-10-CM

## 2023-02-27 LAB
BASOPHILS # BLD AUTO: 0.02 THOUSANDS/ÂΜL (ref 0–0.1)
BASOPHILS NFR BLD AUTO: 0 % (ref 0–1)
EOSINOPHIL # BLD AUTO: 0.02 THOUSAND/ÂΜL (ref 0–0.61)
EOSINOPHIL NFR BLD AUTO: 0 % (ref 0–6)
ERYTHROCYTE [DISTWIDTH] IN BLOOD BY AUTOMATED COUNT: 14.6 % (ref 11.6–15.1)
HCT VFR BLD AUTO: 44.8 % (ref 36.5–49.3)
HGB BLD-MCNC: 14.7 G/DL (ref 12–17)
IMM GRANULOCYTES # BLD AUTO: 0.13 THOUSAND/UL (ref 0–0.2)
IMM GRANULOCYTES NFR BLD AUTO: 1 % (ref 0–2)
LYMPHOCYTES # BLD AUTO: 2.47 THOUSANDS/ÂΜL (ref 0.6–4.47)
LYMPHOCYTES NFR BLD AUTO: 20 % (ref 14–44)
MCH RBC QN AUTO: 29.6 PG (ref 26.8–34.3)
MCHC RBC AUTO-ENTMCNC: 32.8 G/DL (ref 31.4–37.4)
MCV RBC AUTO: 90 FL (ref 82–98)
MONOCYTES # BLD AUTO: 1.29 THOUSAND/ÂΜL (ref 0.17–1.22)
MONOCYTES NFR BLD AUTO: 10 % (ref 4–12)
NEUTROPHILS # BLD AUTO: 8.66 THOUSANDS/ÂΜL (ref 1.85–7.62)
NEUTS SEG NFR BLD AUTO: 69 % (ref 43–75)
NRBC BLD AUTO-RTO: 0 /100 WBCS
PLATELET # BLD AUTO: 245 THOUSANDS/UL (ref 149–390)
PMV BLD AUTO: 11.1 FL (ref 8.9–12.7)
RBC # BLD AUTO: 4.97 MILLION/UL (ref 3.88–5.62)
WBC # BLD AUTO: 12.59 THOUSAND/UL (ref 4.31–10.16)

## 2023-03-08 ENCOUNTER — OFFICE VISIT (OUTPATIENT)
Dept: HEMATOLOGY ONCOLOGY | Facility: MEDICAL CENTER | Age: 73
End: 2023-03-08

## 2023-03-08 VITALS
HEIGHT: 76 IN | SYSTOLIC BLOOD PRESSURE: 120 MMHG | BODY MASS INDEX: 30.56 KG/M2 | DIASTOLIC BLOOD PRESSURE: 70 MMHG | OXYGEN SATURATION: 97 % | WEIGHT: 251 LBS | HEART RATE: 66 BPM | RESPIRATION RATE: 18 BRPM

## 2023-03-08 DIAGNOSIS — D69.6 THROMBOCYTOPENIA (HCC): Primary | ICD-10-CM

## 2023-03-08 NOTE — PROGRESS NOTES
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  Because of a recent persistent downward platelet trend with associated excessive bruising, patient was started on Promacta  Because of side effects, the dose has been manipulated a number of times  Patient is presently on 50 mg a day, tolerating it, no severe arthritic complaints  The platelet count is actually higher than usual but this is because of a recent steroid taper  Patient had 3 teeth removed recently, no bleeding complications  Mr Isabela Neves understands that the platelet count will likely trend back downward to his baseline  Patient will continue to monitor for excessive bruising/bleeding  Patient is to return in 4 months  CBCs are being performed every 6 weeks  When patient received the COVID vaccine last year, his platelet count drop significantly (< 10,000)  It is difficult to connect the 2 but patient is very reluctant to receive any boosters  Patient subsequently contracted COVID - uncomplicated illness  The plan is to hold off on any COVID booster shots for now but patient can get the flu vaccine from a hematology standpoint  Routine health maintenance medical care is up-to-date  Mr Isabela Neves knows to call the hematology/oncology office if there are any other questions or concerns  Carefully review your medication list and verify that the list is accurate and up-to-date  Please call the hematology/oncology office if there are medications missing from the list, medications on the list that you are not currently taking or if there is a dosage or instruction that is different from how you're taking that medication      Patient goals and areas of care:  Continue with Promacta, 50 mg a day  Barriers to care:  None  Patient is able to self-care   ______________________________________________________________________________________    Chief Complaint   Patient presents with   • Follow-up   • ITP     History of Present Illness:    26-year-old male previously referred for evaluation of thrombocytopenia  Mr Laurey Goltz was unaware of any CBC abnormalities up until recently when he needed left shoulder surgery  Patient has been seen by a number of physicians to trying clarify the etiology for the thrombocytopenia  Although the specifics are not entirely clear, 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 (previously seen by Dr Kathern Angelucci in [place])  Mr Laurey Goltz previously underwent left shoulder surgery  Patient received 1 unit of platelets before because the platelet count was borderline  The platelets shyla significantly, no surgical complications including bruising or bleeding issues  Patient recently underwent colonoscopy with Decadron treatment before  Mr Laurey Goltz has been on Promacta, 50 mg a day  Generalized body aches are the same as before, patient tolerates  Patient recently had 3 teeth removed, premedicated with a steroid taper  Platelet count went up nicely, no postoperative complications or bleeding  Patient states feeling otherwise well, no bleeding or bruising elsewhere  No respiratory issues  Routine health maintenance and medical care is up-to-date  No other specific problems with the Promacta  Review of Systems   Constitutional: Negative  HENT: Negative  Eyes: Negative  Respiratory: Negative  Cardiovascular: Negative  Gastrointestinal: Negative  Endocrine: Negative  Genitourinary: Negative  Musculoskeletal: Positive for arthralgias  Skin: Negative  Allergic/Immunologic: Negative  Neurological: Negative for numbness  Hematological: Does not bruise/bleed easily     Psychiatric/Behavioral: The patient is not nervous/anxious  All other systems reviewed and are negative  Patient Active Problem List   Diagnosis   • Thrombocytopenia (HCC)   • Elevated low-density lipoprotein level   • BMI 31 0-31 9,adult   • Obesity (BMI 30-39  9)   • Essential hypertension   • Vasculogenic erectile dysfunction   • Primary osteoarthritis of right shoulder   • COVID-19   • Elevated troponin   • PVC (premature ventricular contraction)   • Stage 3 chronic kidney disease, unspecified whether stage 3a or 3b CKD (HCC)   • History of ITP   • Numbness   • Lower urinary tract symptoms (LUTS)   • Abnormal chest x-ray   • Mild intermittent asthma without complication     Past Medical History:   Diagnosis Date   • Hypertension    • Thrombocytopenia (HCC)     platelet count maintained around 90-Dr  Ismael Actis   • Wears partial dentures     upper     Past Surgical History:   Procedure Laterality Date   • APPENDECTOMY     • CATARACT EXTRACTION Left    • COLONOSCOPY     • COLONOSCOPY N/A 10/17/2018    Procedure: COLONOSCOPY;  Surgeon: Petra Anderson MD;  Location: [facility]; Service: Gastroenterology   • HERNIA REPAIR Right 2013    inguinal   • JOINT REPLACEMENT Right     knee, left shoulder   • KNEE SURGERY Left     Had left knee surgery for football injury   • AR XCAPSL CTRC RMVL INSJ IO LENS PROSTH W/O ECP Left 05/04/2016    Procedure: EXTRACTION EXTRACAPSULAR CATARACT PHACO INTRAOCULAR LENS (IOL);   Surgeon: Sravani Hester MD;  Location: [facility] MAIN OR;  Service: Ophthalmology   • SHOULDER ARTHROSCOPY Left     tendon repair   • TONSILLECTOMY  age 1     Family History   Problem Relation Age of Onset   • Heart disease Father 67        massive MI   • Heart disease Sister         MI   • Heart disease Brother         MI   • No Known Problems Mother    • Mental illness Neg Hx      Social History     Socioeconomic History   • Marital status: /Civil Union     Spouse name: Not on file   • Number of children: Not on file   • Years of education: Not on file   • Highest education level: Not on file   Occupational History   • Not on file   Tobacco Use   • Smoking status: Former     Packs/day: 0 50     Years: 22 00     Pack years: 11 00     Types: Cigarettes     Quit date:      Years since quittin    • Smokeless tobacco: Never   Vaping Use   • Vaping Use: Never used   Substance and Sexual Activity   • Alcohol use: Not Currently   • Drug use: Never   • Sexual activity: Not on file   Other Topics Concern   • Not on file   Social History Narrative   • Not on file     Social Determinants of Health     Financial Resource Strain: Not on file   Food Insecurity: Not on file   Transportation Needs: Not on file   Physical Activity: Not on file   Stress: Not on file   Social Connections: Not on file   Intimate Partner Violence: Not on file   Housing Stability: Not on file       Current Outpatient Medications:   •  albuterol (PROVENTIL HFA,VENTOLIN HFA) 90 mcg/act inhaler, Inhale 2 puffs every 4 (four) hours as needed for wheezing or shortness of breath, Disp: 18 g, Rfl: 0  •  albuterol (Ventolin HFA) 90 mcg/act inhaler, Inhale 2 puffs every 4 (four) hours as needed for wheezing or shortness of breath, Disp: 18 g, Rfl: 5  •  amLODIPine (NORVASC) 5 mg tablet, Take 1 tablet (5 mg total) by mouth daily, Disp: 90 tablet, Rfl: 1  •  ascorbic acid (VITAMIN C) 500 mg tablet, Take 500 mg by mouth every morning , Disp: , Rfl:   •  atorvastatin (LIPITOR) 40 mg tablet, TAKE 1 TABLET DAILY AT BEDTIME, Disp: 90 tablet, Rfl: 1  •  b complex vitamins tablet, Take 1 tablet by mouth every morning , Disp: , Rfl:   •  cholecalciferol (VITAMIN D3) 1,000 units tablet, Take 1,000 Units by mouth every morning, Disp: , Rfl:   •  dexamethasone (DECADRON) 4 mg tablet, Take 10 tablets daily in AM with food for 4 days to equal 40 mg daily, Disp: 40 tablet, Rfl: 0  •  eltrombopag (PROMACTA) 50 MG tablet, Take 1 tablet (50 mg total) by mouth daily Administer on an empty stomach, 1 hour before or 2 hours after a meal , Disp: 30 tablet, Rfl: 11  •  metoprolol succinate (TOPROL-XL) 25 mg 24 hr tablet, TAKE 1 TABLET (25 MG TOTAL) BY MOUTH DAILY, Disp: 90 tablet, Rfl: 3  •  multivitamin (THERAGRAN) TABS, Take 1 tablet by mouth every morning , Disp: , Rfl:   •  sildenafil (VIAGRA) 100 mg tablet, Take 1 tablet (100 mg total) by mouth daily as needed for erectile dysfunction, Disp: 10 tablet, Rfl: 3  •  tamsulosin (FLOMAX) 0 4 mg, Take 1 capsule (0 4 mg total) by mouth daily with dinner, Disp: 90 capsule, Rfl: 3    Allergies   Allergen Reactions   • Dust Mite Extract Sneezing       Vitals:    03/08/23 1259   BP: 120/70   Pulse: 66   Resp: 18   SpO2: 97%     Physical Exam  Constitutional:       Appearance: He is well-developed  Comments: Well-nourished male, no respiratory distress   HENT:      Head: Normocephalic and atraumatic  Right Ear: External ear normal       Left Ear: External ear normal       Nose: Nose normal       Mouth/Throat:      Comments: 3 front upper teeth have been removed, healed well, no bleeding, oral mucosa moist and pink, no petechiae otherwise  Eyes:      Conjunctiva/sclera: Conjunctivae normal       Pupils: Pupils are equal, round, and reactive to light  Cardiovascular:      Rate and Rhythm: Normal rate and regular rhythm  Heart sounds: Normal heart sounds  Pulmonary:      Effort: Pulmonary effort is normal       Breath sounds: Normal breath sounds  Abdominal:      General: Bowel sounds are normal       Palpations: Abdomen is soft  Comments: +bowel sounds, nontender, slightly obese, cannot palpate liver or spleen, no rigidity or rebound   Musculoskeletal:         General: Normal range of motion  Cervical back: Normal range of motion and neck supple  Skin:     General: Skin is warm  Comments: Warm, moist, good color, no petechiae or ecchymoses   Neurological:      Mental Status: He is alert and oriented to person, place, and time        Deep Tendon Reflexes: Reflexes are normal and symmetric  Psychiatric:         Behavior: Behavior normal          Thought Content:  Thought content normal          Judgment: Judgment normal      Extremities:  No lower extremity edema bilaterally, no cords, pulses are 1+, strength in both upper extremity seems to be about the same as before and equal bilaterally, no obvious/significant swelling  Lymphatics:   No adenopathy in the neck, supraclavicular region, axilla and groin bilaterally    Labs        Cardiology        Pathology    11/24/2016 direct platelet antibody:  Anti IgG = positive  11/21/2016 platelet antibody IIB/IIIA, IA/IIA, IB/IX and HLA class I were all negative

## 2023-03-13 DIAGNOSIS — I49.3 PVC (PREMATURE VENTRICULAR CONTRACTION): ICD-10-CM

## 2023-03-13 DIAGNOSIS — R77.8 ELEVATED TROPONIN: ICD-10-CM

## 2023-03-14 RX ORDER — METOPROLOL SUCCINATE 25 MG/1
25 TABLET, EXTENDED RELEASE ORAL DAILY
Qty: 90 TABLET | Refills: 3 | Status: SHIPPED | OUTPATIENT
Start: 2023-03-14 | End: 2023-03-21

## 2023-03-15 ENCOUNTER — APPOINTMENT (OUTPATIENT)
Dept: LAB | Facility: HOSPITAL | Age: 73
End: 2023-03-15

## 2023-03-15 DIAGNOSIS — I25.10 CAD IN NATIVE ARTERY: ICD-10-CM

## 2023-03-15 DIAGNOSIS — I21.19 INFERIOR MI (HCC): ICD-10-CM

## 2023-03-17 LAB
CHOLEST SERPL-MCNC: 104 MG/DL (ref 100–199)
HDL SERPL-SCNC: 24.9 UMOL/L
HDLC SERPL-MCNC: 42 MG/DL
LDL SERPL QN: 20.2 NM
LDL SERPL QN: 442 NMOL/L
LDL SERPL-SCNC: 654 NMOL/L
LDLC SERPL CALC-MCNC: 47 MG/DL (ref 0–99)
LP-IR SCORE SERPL: 32
TRIGL SERPL-MCNC: 68 MG/DL (ref 0–149)

## 2023-03-21 ENCOUNTER — OFFICE VISIT (OUTPATIENT)
Dept: CARDIOLOGY CLINIC | Facility: CLINIC | Age: 73
End: 2023-03-21

## 2023-03-21 VITALS
OXYGEN SATURATION: 96 % | HEART RATE: 64 BPM | WEIGHT: 243 LBS | SYSTOLIC BLOOD PRESSURE: 120 MMHG | DIASTOLIC BLOOD PRESSURE: 70 MMHG | BODY MASS INDEX: 29.59 KG/M2 | HEIGHT: 76 IN

## 2023-03-21 DIAGNOSIS — N18.30 STAGE 3 CHRONIC KIDNEY DISEASE, UNSPECIFIED WHETHER STAGE 3A OR 3B CKD (HCC): ICD-10-CM

## 2023-03-21 DIAGNOSIS — I21.19 INFERIOR MI (HCC): ICD-10-CM

## 2023-03-21 DIAGNOSIS — I25.10 CAD IN NATIVE ARTERY: Primary | ICD-10-CM

## 2023-03-21 DIAGNOSIS — Z86.2 HISTORY OF ITP: ICD-10-CM

## 2023-03-21 NOTE — PROGRESS NOTES
[facility]  [address]   [place], [address]  Cardiology Follow-up    Lilliam Hernandez  [id number]  1950      Consult for:  PVCs/non-STEMI  Appreciate consult by: Jose Lackey DO    1  CAD in native artery  POCT ECG      2  Inferior MI ([address] )  POCT ECG      3  History of ITP  POCT ECG      4  Stage 3 chronic kidney disease, unspecified whether stage 3a or 3b CKD (HCC)  POCT ECG         Discussion/Summary:   Suspected prior infarction/CAD- showed pictures of prior nuclear stress test in detail  Patient denies having prior any symptoms- surprised of results  nuclear stress test shows a relatively fixed inferior defect  Suspicion for prior remote myocardial infarction  However, echo with normal EF at rest  Cannot complete rule out false positive/diaphagmatic attenuation  He has a history of thrombocytopenia and been asymptomatic  Did not take rosuvastatin but will take atorvastatin  Will target an LDL below 70    If he has active chest pain consideration for cardiac catheterization vs coronary CT to better evaluate  However given his thrombocytopenia he is not optimal for stents or anti-platelet therapy and conservative therapy is best   Declines any further stress testing  He has been asymptomatic    PVCs-significantly improved    Hypertension- his blood pressure 120/70 without bp meds  He has self discontinued blood pressure medications  He will restart if his blood pressure is elevated    Idiopathic thrombocytopenia- started on therapy by heme oncology    HPI:   61-year-old gentleman with recent hospital admission secondary to [place] and irregular heart rhythm  He was found to have frequent ventricular bigeminy  He had a nuclear stress test which was negative for acute ischemia  He was placed on low-dose beta-blocker  Since discharge he denies having major palpitations  He reports exercising daily      03/04/2022:  He continues to have no symptoms of chest pain or significant palpitations  I reviewed with him his previous nuclear stress test   His event monitor was reviewed  He has a low PVC burden  09/15/2022: He is still exercising vigorously without any major chest pain or shortness of breath  Denies having palpitations  He does not take the statin medication as he felt it was interacting with his hematology medication  I reviewed with the patient his prior nuclear stress test      3/21/2023: He is exercising without any limitations  He denies having chest heaviness  We reviewed through his last blood work  He states his blood pressures have been running normal   He is not taking any blood pressure medication        Past Medical History:   Diagnosis Date   • Hypertension    • Thrombocytopenia (HCC)     platelet count maintained around 90-Dr Zaida Thomason   • Wears partial dentures     upper     Social History     Socioeconomic History   • Marital status: /Civil Union     Spouse name: Not on file   • Number of children: Not on file   • Years of education: Not on file   • Highest education level: Not on file   Occupational History   • Not on file   Tobacco Use   • Smoking status: Former     Packs/day: 0 50     Years: 22 00     Pack years: 11 00     Types: Cigarettes     Quit date:      Years since quittin 2   • Smokeless tobacco: Never   Vaping Use   • Vaping Use: Never used   Substance and Sexual Activity   • Alcohol use: Not Currently   • Drug use: Never   • Sexual activity: Not on file   Other Topics Concern   • Not on file   Social History Narrative   • Not on file     Social Determinants of Health     Financial Resource Strain: Not on file   Food Insecurity: Not on file   Transportation Needs: Not on file   Physical Activity: Not on file   Stress: Not on file   Social Connections: Not on file   Intimate Partner Violence: Not on file   Housing Stability: Not on file      Family History   Problem Relation Age of Onset   • Heart disease Father 67        massive MI   • Heart disease Sister         MI   • Heart disease Brother         MI   • No Known Problems Mother    • Mental illness Neg Hx      Past Surgical History:   Procedure Laterality Date   • APPENDECTOMY     • CATARACT EXTRACTION Left    • COLONOSCOPY     • COLONOSCOPY N/A 10/17/2018    Procedure: COLONOSCOPY;  Surgeon: Johnna White MD;  Location: [place] [address] GI LAB; Service: Gastroenterology   • HERNIA REPAIR Right 2013    inguinal   • JOINT REPLACEMENT Right     knee, left shoulder   • KNEE SURGERY Left     Had left knee surgery for football injury   • MS XCAPSL CTRC RMVL INSJ IO LENS PROSTH W/O ECP Left 05/04/2016    Procedure: EXTRACTION EXTRACAPSULAR CATARACT PHACO INTRAOCULAR LENS (IOL);   Surgeon: Burton August MD;  Location: [facility] MAIN OR;  Service: Ophthalmology   • SHOULDER ARTHROSCOPY Left     tendon repair   • TONSILLECTOMY  age 1       Current Outpatient Medications:   •  albuterol (PROVENTIL HFA,VENTOLIN HFA) 90 mcg/act inhaler, Inhale 2 puffs every 4 (four) hours as needed for wheezing or shortness of breath, Disp: 18 g, Rfl: 0  •  albuterol (Ventolin HFA) 90 mcg/act inhaler, Inhale 2 puffs every 4 (four) hours as needed for wheezing or shortness of breath, Disp: 18 g, Rfl: 5  •  amLODIPine (NORVASC) 5 mg tablet, Take 1 tablet (5 mg total) by mouth daily, Disp: 90 tablet, Rfl: 1  •  ascorbic acid (VITAMIN C) 500 mg tablet, Take 500 mg by mouth every morning , Disp: , Rfl:   •  atorvastatin (LIPITOR) 40 mg tablet, TAKE 1 TABLET DAILY AT BEDTIME, Disp: 90 tablet, Rfl: 1  •  b complex vitamins tablet, Take 1 tablet by mouth every morning , Disp: , Rfl:   •  cholecalciferol (VITAMIN D3) 1,000 units tablet, Take 1,000 Units by mouth every morning, Disp: , Rfl:   •  dexamethasone (DECADRON) 4 mg tablet, Take 10 tablets daily in AM with food for 4 days to equal 40 mg daily, Disp: 40 tablet, Rfl: 0  •  eltrombopag (PROMACTA) 50 MG tablet, Take 1 tablet (50 mg total) by mouth daily Administer on an empty stomach, 1 hour before or 2 hours after a meal , Disp: 30 tablet, Rfl: 11  •  multivitamin (THERAGRAN) TABS, Take 1 tablet by mouth every morning , Disp: , Rfl:   •  sildenafil (VIAGRA) 100 mg tablet, Take 1 tablet (100 mg total) by mouth daily as needed for erectile dysfunction, Disp: 10 tablet, Rfl: 3  •  tamsulosin (FLOMAX) 0 4 mg, Take 1 capsule (0 4 mg total) by mouth daily with dinner, Disp: 90 capsule, Rfl: 3  Allergies   Allergen Reactions   • Dust Mite Extract Sneezing     Vitals:    03/21/23 1442   BP: 120/70   BP Location: Right arm   Patient Position: Sitting   Cuff Size: Standard   Pulse: 64   SpO2: 96%   Weight: 110 kg (243 lb)   Height: 6' 4" (1 93 m)       Review of Systems:   Review of Systems   Constitutional: Negative  HENT: Negative  Eyes: Negative  Respiratory: Negative  Cardiovascular: Negative for palpitations  Gastrointestinal: Negative  Endocrine: Negative  Genitourinary: Negative  Musculoskeletal: Negative  Skin: Negative  Allergic/Immunologic: Negative  Neurological: Negative  Hematological: Negative  Psychiatric/Behavioral: Negative  Vitals:    03/21/23 1442   BP: 120/70   BP Location: Right arm   Patient Position: Sitting   Cuff Size: Standard   Pulse: 64   SpO2: 96%   Weight: 110 kg (243 lb)   Height: 6' 4" (1 93 m)     Physical Examination:   Physical Exam  Constitutional:       General: He is not in acute distress  Appearance: He is well-developed  He is not diaphoretic  HENT:      Head: Normocephalic and atraumatic  Right Ear: External ear normal       Left Ear: External ear normal    Eyes:      General: No scleral icterus  Right eye: No discharge  Left eye: No discharge  Conjunctiva/sclera: Conjunctivae normal       Pupils: Pupils are equal, round, and reactive to light  Neck:      Thyroid: No thyromegaly  Vascular: No JVD  Trachea: No tracheal deviation  Cardiovascular:      Rate and Rhythm: Normal rate and regular rhythm  Heart sounds: No murmur heard  No friction rub  Gallop present  Pulmonary:      Effort: Pulmonary effort is normal  No respiratory distress  Breath sounds: Normal breath sounds  No stridor  No wheezing or rales  Chest:      Chest wall: No tenderness  Abdominal:      General: Bowel sounds are normal  There is no distension  Palpations: Abdomen is soft  There is no mass  Tenderness: There is no abdominal tenderness  There is no guarding or rebound  Musculoskeletal:         General: No tenderness or deformity  Normal range of motion  Cervical back: Normal range of motion and neck supple  Skin:     General: Skin is warm and dry  Coloration: Skin is not pale  Findings: No erythema or rash  Neurological:      Mental Status: He is alert and oriented to person, place, and time  Cranial Nerves: No cranial nerve deficit  Motor: No abnormal muscle tone  Coordination: Coordination normal       Deep Tendon Reflexes: Reflexes are normal and symmetric  Reflexes normal    Psychiatric:         Behavior: Behavior normal          Thought Content:  Thought content normal          Judgment: Judgment normal          Labs:     Lab Results   Component Value Date    WBC 12 59 (H) 02/27/2023    HGB 14 7 02/27/2023    HCT 44 8 02/27/2023    MCV 90 02/27/2023    RDW 14 6 02/27/2023     02/27/2023     BMP:  Lab Results   Component Value Date    SODIUM 139 06/19/2022    K 4 3 06/19/2022     06/19/2022    CO2 24 06/19/2022    BUN 24 06/19/2022    CREATININE 1 22 06/19/2022    GLUC 121 06/19/2022    GLUF 111 (H) 04/21/2022    CALCIUM 8 9 06/19/2022    CORRECTEDCA 9 0 04/21/2022    EGFR 58 06/19/2022    MG 1 6 06/19/2022     LFT:  Lab Results   Component Value Date    AST 23 06/19/2022    ALT 28 06/19/2022    ALKPHOS 116 06/19/2022    TP 8 0 06/19/2022    ALB 3 9 06/19/2022      Lab Results   Component Value Date    VBD6PYYBBZCT 1 891 04/21/2022     No results found for: HGBA1C  Lipid Profile:   Lab Results   Component Value Date    CHOLESTEROL 104 03/15/2023    HDL 42 03/15/2023    LDLCALC 47 03/15/2023    TRIG 88 09/06/2022     Lab Results   Component Value Date    CHOLESTEROL 104 03/15/2023    CHOLESTEROL 136 09/06/2022     No results found for: CKTOTAL, CKMB, CKMBINDEX, TROPONINI  Lab Results   Component Value Date    NTBNP 290 (H) 06/19/2022      Recent Results (from the past 672 hour(s))   CBC and differential    Collection Time: 02/27/23  6:58 AM   Result Value Ref Range    WBC 12 59 (H) 4 31 - 10 16 Thousand/uL    RBC 4 97 3 88 - 5 62 Million/uL    Hemoglobin 14 7 12 0 - 17 0 g/dL    Hematocrit 44 8 36 5 - 49 3 %    MCV 90 82 - 98 fL    MCH 29 6 26 8 - 34 3 pg    MCHC 32 8 31 4 - 37 4 g/dL    RDW 14 6 11 6 - 15 1 %    MPV 11 1 8 9 - 12 7 fL    Platelets 766 452 - 684 Thousands/uL    nRBC 0 /100 WBCs    Neutrophils Relative 69 43 - 75 %    Immat GRANS % 1 0 - 2 %    Lymphocytes Relative 20 14 - 44 %    Monocytes Relative 10 4 - 12 %    Eosinophils Relative 0 0 - 6 %    Basophils Relative 0 0 - 1 %    Neutrophils Absolute 8 66 (H) 1 85 - 7 62 Thousands/µL    Immature Grans Absolute 0 13 0 00 - 0 20 Thousand/uL    Lymphocytes Absolute 2 47 0 60 - 4 47 Thousands/µL    Monocytes Absolute 1 29 (H) 0 17 - 1 22 Thousand/µL    Eosinophils Absolute 0 02 0 00 - 0 61 Thousand/µL    Basophils Absolute 0 02 0 00 - 0 10 Thousands/µL   Lipoprotein NMR    Collection Time: 03/15/23  7:50 AM   Result Value Ref Range    LDL-P 654 <1000 nmol/L    Total LDL-Chol 47 0 - 99 mg/dL    HDL Cholesterol 42 >39 mg/dL    Cholesterol, Total 104 100 - 199 mg/dL    HDL-P(Total) 24 9 (L) >=30 5 umol/L    LDL Size 20 2 (L) >20 5 nm    Small LDL-P 442 <=527 nmol/L    LP-IR Score 32 <=45    Triglycerides 68 0 - 149 mg/dL       Imaging & Testing   I have personally reviewed pertinent reports        Cardiac Testing     Results for orders placed during the hospital encounter of 12/30/21    NM myocardial perfusion spect (rx stress and/or rest)    Interpretation Summary  •  Perfusion: There is a left ventricular perfusion defect that is medium in size with moderate reduction in uptake present in the entire inferior and inferolateral location(s) that is partially reversible  Defect is present at stress and rest and is not present on the apical portion of rest imaging  Findings consistent with infarct with edgar-infarct ischemia although an diaphragm attenuation artifact cannot be rule out  •  Stress ECG: The stress ECG is negative for ischemia after pharmacologic stress  No ST deviation is noted  •  Stress Function: Left ventricular function post-stress is normal  Post-stress ejection fraction is 48 %  EKG: Personally reviewed  Tessa German MD [facility]  [phone]  Please call with any questions or suggestions    Counseling :  A description of the counseling:   Goals and Barriers:  Patient's ability to self care:  Medication side effect reviewed with patient in detail and all their questions answered  "Portions of the record may have been created with voice recognition software  Occasional wrong word or "sound a like" substitutions may have occurred due to the inherent limitations of voice recognition software  Read the chart carefully and recognize, using context, where substitutions have occurred   Please call if you have any questions  "

## 2023-04-25 DIAGNOSIS — R39.9 LOWER URINARY TRACT SYMPTOMS (LUTS): ICD-10-CM

## 2023-04-25 RX ORDER — TAMSULOSIN HYDROCHLORIDE 0.4 MG/1
CAPSULE ORAL
Qty: 90 CAPSULE | Refills: 1 | Status: SHIPPED | OUTPATIENT
Start: 2023-04-25

## 2023-05-04 ENCOUNTER — TELEPHONE (OUTPATIENT)
Dept: HEMATOLOGY ONCOLOGY | Facility: CLINIC | Age: 73
End: 2023-05-04

## 2023-05-04 NOTE — TELEPHONE ENCOUNTER
I called [facility] regarding an appointment that they have scheduled with Dr Osiel Bernal scheduled on 07/10/23 I left a voicemail explaining to patient that the provider will be out of the office on this date and will need to reschedule the visit  I encouraged patient to call [facility] at [phone] to reschedule  A [employer] message has been sent to patient relaying the above information and advising patient to call [facility] and reschedule their appointment

## 2023-05-12 NOTE — PRE-PROCEDURE INSTRUCTIONS
Pre-Surgery Instructions:   Medication Instructions   • albuterol (Ventolin HFA) 90 mcg/act inhaler Uses PRN- OK to take day of surgery   • ascorbic acid (VITAMIN C) 500 mg tablet Hold day of surgery  • atorvastatin (LIPITOR) 40 mg tablet Hold day of surgery  • b complex vitamins tablet Hold day of surgery  • cholecalciferol (VITAMIN D3) 1,000 units tablet Hold day of surgery  • eltrombopag (PROMACTA) 50 MG tablet Hold day of surgery  • multivitamin (THERAGRAN) TABS Hold day of surgery  • sildenafil (VIAGRA) 100 mg tablet Hold day of surgery  • tamsulosin (FLOMAX) 0 4 mg Hold day of surgery  Medication instructions for day surgery reviewed  Please use only a sip of water to take your instructed medications  Avoid all over the counter vitamins, supplements and NSAIDS for one week prior to surgery per anesthesia guidelines  Tylenol is ok to take as needed  You will receive a call one business day prior to surgery with an arrival time and hospital directions  If your surgery is scheduled on a Monday, the hospital will be calling you on the Friday prior to your surgery  If you have not heard from anyone by 8pm, please call the hospital supervisor through the hospital  at [phone]  Alida Files [phone])  Do not eat or drink anything after midnight the night before your surgery, including candy, mints, lifesavers, or chewing gum  Do not drink alcohol 24hrs before your surgery  Try not to smoke at least 24hrs before your surgery  Follow the pre surgery showering instructions as listed in the [facility] Surgical Experience Booklet” or otherwise provided by your surgeon's office  Do not shave the surgical area 24 hours before surgery  Do not apply any lotions, creams, including makeup, cologne, deodorant, or perfumes after showering on the day of your surgery  No contact lenses, eye make-up, or artificial eyelashes   Remove nail polish, including gel polish, and any artificial, gel, or acrylic nails if possible  Remove all jewelry including rings and body piercing jewelry  Wear causal clothing that is easy to take on and off  Consider your type of surgery  Keep any valuables, jewelry, piercings at home  Please bring any specially ordered equipment (sling, braces) if indicated  Arrange for a responsible person to drive you to and from the hospital on the day of your surgery  Visitor Guidelines discussed  Call the surgeon's office with any new illnesses, exposures, or additional questions prior to surgery  Please reference your [facility] Surgical Experience Booklet” for additional information to prepare for your upcoming surgery

## 2023-05-22 ENCOUNTER — ANESTHESIA EVENT (OUTPATIENT)
Dept: PERIOP | Facility: AMBULARY SURGERY CENTER | Age: 73
End: 2023-05-22

## 2023-05-22 ENCOUNTER — HOSPITAL ENCOUNTER (OUTPATIENT)
Facility: AMBULARY SURGERY CENTER | Age: 73
Setting detail: OUTPATIENT SURGERY
Discharge: HOME | End: 2023-05-22
Attending: OPHTHALMOLOGY | Admitting: OPHTHALMOLOGY

## 2023-05-22 ENCOUNTER — ANESTHESIA (OUTPATIENT)
Dept: PERIOP | Facility: AMBULARY SURGERY CENTER | Age: 73
End: 2023-05-22

## 2023-05-22 VITALS
TEMPERATURE: 97.4 F | RESPIRATION RATE: 20 BRPM | DIASTOLIC BLOOD PRESSURE: 66 MMHG | HEART RATE: 55 BPM | OXYGEN SATURATION: 97 % | SYSTOLIC BLOOD PRESSURE: 135 MMHG

## 2023-05-22 DIAGNOSIS — H25.11 AGE-RELATED NUCLEAR CATARACT OF RIGHT EYE: Primary | ICD-10-CM

## 2023-05-22 DEVICE — ACRYSOF(R) IQ ASPHERIC NATURAL IOL, SINGLE-PIECE ACRYLIC FOLDABLE PCL, UV WITH BLUE LIGHTFILTER, 13.0MM LENGTH, 6.0MM ANTERIORASYMMETRIC BICONVEX OPTIC, PLANAR HAPTICS.
Type: IMPLANTABLE DEVICE | Site: EYE | Status: FUNCTIONAL
Brand: ACRYSOF®

## 2023-05-22 RX ORDER — CYCLOPENTOLATE HYDROCHLORIDE 10 MG/ML
1 SOLUTION/ DROPS OPHTHALMIC
Status: COMPLETED | OUTPATIENT
Start: 2023-05-22 | End: 2023-05-22

## 2023-05-22 RX ORDER — TETRACAINE HYDROCHLORIDE 5 MG/ML
SOLUTION OPHTHALMIC AS NEEDED
Status: DISCONTINUED | OUTPATIENT
Start: 2023-05-22 | End: 2023-05-22 | Stop reason: HOSPADM

## 2023-05-22 RX ORDER — KETOROLAC TROMETHAMINE 5 MG/ML
1 SOLUTION OPHTHALMIC 4 TIMES DAILY
Qty: 5 ML | Refills: 0
Start: 2023-05-22

## 2023-05-22 RX ORDER — GATIFLOXACIN 5 MG/ML
1 SOLUTION/ DROPS OPHTHALMIC 2 TIMES DAILY
Qty: 3 ML | Refills: 0
Start: 2023-05-22

## 2023-05-22 RX ORDER — MIDAZOLAM HYDROCHLORIDE 2 MG/2ML
INJECTION, SOLUTION INTRAMUSCULAR; INTRAVENOUS AS NEEDED
Status: DISCONTINUED | OUTPATIENT
Start: 2023-05-22 | End: 2023-05-22

## 2023-05-22 RX ORDER — KETOROLAC TROMETHAMINE 5 MG/ML
1 SOLUTION OPHTHALMIC
Status: COMPLETED | OUTPATIENT
Start: 2023-05-22 | End: 2023-05-22

## 2023-05-22 RX ORDER — LIDOCAINE HYDROCHLORIDE 20 MG/ML
1 JELLY TOPICAL
Status: COMPLETED | OUTPATIENT
Start: 2023-05-22 | End: 2023-05-22

## 2023-05-22 RX ORDER — TETRACAINE HYDROCHLORIDE 5 MG/ML
1 SOLUTION OPHTHALMIC ONCE
Status: COMPLETED | OUTPATIENT
Start: 2023-05-22 | End: 2023-05-22

## 2023-05-22 RX ORDER — PHENYLEPHRINE HCL 2.5 %
1 DROPS OPHTHALMIC (EYE)
Status: COMPLETED | OUTPATIENT
Start: 2023-05-22 | End: 2023-05-22

## 2023-05-22 RX ORDER — GATIFLOXACIN 5 MG/ML
SOLUTION/ DROPS OPHTHALMIC AS NEEDED
Status: DISCONTINUED | OUTPATIENT
Start: 2023-05-22 | End: 2023-05-22 | Stop reason: HOSPADM

## 2023-05-22 RX ORDER — BALANCED SALT SOLUTION 6.4; .75; .48; .3; 3.9; 1.7 MG/ML; MG/ML; MG/ML; MG/ML; MG/ML; MG/ML
SOLUTION OPHTHALMIC AS NEEDED
Status: DISCONTINUED | OUTPATIENT
Start: 2023-05-22 | End: 2023-05-22 | Stop reason: HOSPADM

## 2023-05-22 RX ADMIN — LIDOCAINE HYDROCHLORIDE 1 APPLICATION.: 20 JELLY TOPICAL at 10:15

## 2023-05-22 RX ADMIN — KETOROLAC TROMETHAMINE 1 DROP: 5 SOLUTION OPHTHALMIC at 11:00

## 2023-05-22 RX ADMIN — CYCLOPENTOLATE HYDROCHLORIDE 1 DROP: 10 SOLUTION OPHTHALMIC at 10:30

## 2023-05-22 RX ADMIN — PHENYLEPHRINE HYDROCHLORIDE 1 DROP: 25 SOLUTION/ DROPS OPHTHALMIC at 10:45

## 2023-05-22 RX ADMIN — KETOROLAC TROMETHAMINE 1 DROP: 5 SOLUTION OPHTHALMIC at 10:15

## 2023-05-22 RX ADMIN — PHENYLEPHRINE HYDROCHLORIDE 1 DROP: 25 SOLUTION/ DROPS OPHTHALMIC at 10:30

## 2023-05-22 RX ADMIN — KETOROLAC TROMETHAMINE 1 DROP: 5 SOLUTION OPHTHALMIC at 10:45

## 2023-05-22 RX ADMIN — CYCLOPENTOLATE HYDROCHLORIDE 1 DROP: 10 SOLUTION OPHTHALMIC at 10:45

## 2023-05-22 RX ADMIN — PHENYLEPHRINE HYDROCHLORIDE 1 DROP: 25 SOLUTION/ DROPS OPHTHALMIC at 10:15

## 2023-05-22 RX ADMIN — MIDAZOLAM 2 MG: 1 INJECTION INTRAMUSCULAR; INTRAVENOUS at 11:13

## 2023-05-22 RX ADMIN — LIDOCAINE HYDROCHLORIDE 1 APPLICATION.: 20 JELLY TOPICAL at 10:30

## 2023-05-22 RX ADMIN — CYCLOPENTOLATE HYDROCHLORIDE 1 DROP: 10 SOLUTION OPHTHALMIC at 10:15

## 2023-05-22 RX ADMIN — LIDOCAINE HYDROCHLORIDE 1 APPLICATION.: 20 JELLY TOPICAL at 10:45

## 2023-05-22 RX ADMIN — PHENYLEPHRINE HYDROCHLORIDE 1 DROP: 25 SOLUTION/ DROPS OPHTHALMIC at 11:00

## 2023-05-22 RX ADMIN — CYCLOPENTOLATE HYDROCHLORIDE 1 DROP: 10 SOLUTION OPHTHALMIC at 11:00

## 2023-05-22 RX ADMIN — TETRACAINE HYDROCHLORIDE 1 DROP: 5 SOLUTION OPHTHALMIC at 10:15

## 2023-05-22 RX ADMIN — KETOROLAC TROMETHAMINE 1 DROP: 5 SOLUTION OPHTHALMIC at 10:30

## 2023-05-22 NOTE — DISCHARGE INSTR - AVS FIRST PAGE
Dr Alyssa Coulter Cataract Instructions    Activity:     1  No Driving until instructed   2  Keep shield on until seen tomorrow except when administering drops   3  No heavy lifting   4  No water in eye     Diet:     1  Resume normal diet    Normal Symptoms:     1  Mild Headache   2  Scratchy or picky feeling around eye    Call the office if:     1  You have any questions or concerns   2  If eye pain is not relieved by extra strength tylenol    Office phone number:  [phone]      Next appointment:     1  See Dr Alyssa Coulter at his office tomorrow as scheduled   __________________________________________________________   2  Bring blue eye kit with you and eyedrops to the office    A new set of comprehensive instructions will be given and reviewed with you during your office visit tomorrow

## 2023-05-22 NOTE — ANESTHESIA PREPROCEDURE EVALUATION
Procedure:  EXTRACTION EXTRACAPSULAR CATARACT PHACO INTRAOCULAR LENS (IOL) (Right: Eye)    Relevant Problems   CARDIO   (+) Essential hypertension   (+) PVC (premature ventricular contraction)      /RENAL   (+) Stage 3 chronic kidney disease, unspecified whether stage 3a or 3b CKD (HCC)      HEMATOLOGY   (+) Thrombocytopenia (HCC)      MUSCULOSKELETAL   (+) Primary osteoarthritis of right shoulder      NEURO/PSYCH   (+) History of ITP      PULMONARY   (+) Mild intermittent asthma without complication             Anesthesia Plan  ASA Score- 3     Anesthesia Type- IV sedation with anesthesia with ASA Monitors  Additional Monitors:   Airway Plan:           Plan Factors-    Chart reviewed  Induction- intravenous  Postoperative Plan-     Informed Consent- Anesthetic plan and risks discussed with patient  I personally reviewed this patient with the CRNA  Discussed and agreed on the Anesthesia Plan with the CRNA  Katie Horton

## 2023-05-22 NOTE — ANESTHESIA POSTPROCEDURE EVALUATION
Post-Op Assessment Note    CV Status:  Stable    Pain management: adequate     Mental Status:  Alert and awake   Hydration Status:  Euvolemic   PONV Controlled:  Controlled   Airway Patency:  Patent      Post Op Vitals Reviewed: Yes      Staff: CRNA         No notable events documented      BP   122/64   Temp     Pulse  54   Resp   12   SpO2   95

## 2023-05-22 NOTE — PERIOPERATIVE NURSING NOTE
Patient received into [facility] via stretcher from OR  Patient assisted into chair, awake and alert  Denies pain or discomfort  Right eye shield intact without drainage

## 2023-05-25 ENCOUNTER — APPOINTMENT (OUTPATIENT)
Dept: LAB | Facility: HOSPITAL | Age: 73
End: 2023-05-25
Attending: INTERNAL MEDICINE

## 2023-06-14 DIAGNOSIS — I25.10 CAD IN NATIVE ARTERY: ICD-10-CM

## 2023-06-15 RX ORDER — ATORVASTATIN CALCIUM 40 MG/1
TABLET, FILM COATED ORAL
Qty: 90 TABLET | Refills: 1 | Status: SHIPPED | OUTPATIENT
Start: 2023-06-15

## 2023-06-30 ENCOUNTER — OFFICE VISIT (OUTPATIENT)
Dept: FAMILY MEDICINE CLINIC | Facility: CLINIC | Age: 73
End: 2023-06-30
Payer: COMMERCIAL

## 2023-06-30 VITALS
OXYGEN SATURATION: 98 % | HEART RATE: 64 BPM | WEIGHT: 244 LBS | DIASTOLIC BLOOD PRESSURE: 80 MMHG | RESPIRATION RATE: 18 BRPM | TEMPERATURE: 97.8 F | SYSTOLIC BLOOD PRESSURE: 140 MMHG | BODY MASS INDEX: 29.71 KG/M2 | HEIGHT: 76 IN

## 2023-06-30 DIAGNOSIS — H10.31 ACUTE CONJUNCTIVITIS OF RIGHT EYE, UNSPECIFIED ACUTE CONJUNCTIVITIS TYPE: Primary | ICD-10-CM

## 2023-06-30 RX ORDER — GATIFLOXACIN 5 MG/ML
1 SOLUTION/ DROPS OPHTHALMIC 2 TIMES DAILY
Qty: 3 ML | Refills: 0 | Status: SHIPPED | OUTPATIENT
Start: 2023-06-30

## 2023-06-30 NOTE — PROGRESS NOTES
Name: Livia Diane      : [id number]      MRN: [id number]  Encounter Provider: Stu Aguilera MD  Encounter Date: 2023   Encounter department: [address] [place] [address]     1  Acute conjunctivitis of right eye, unspecified acute conjunctivitis type  -     gatifloxacin (ZYMAXID) 0 5 %; Administer 1 drop to the right eye 2 (two) times a day           Subjective      HPI   2 day history of redness, discharge and crusting of the right eye  No vision changes, pain in the eye, any other URI symptoms  Review of Systems   Constitutional: Negative  HENT: Negative  Eyes: Negative  Respiratory: Negative  Cardiovascular: Negative  Gastrointestinal: Negative  Endocrine: Negative  Genitourinary: Negative  Musculoskeletal: Negative  Neurological: Negative  Hematological: Negative  Psychiatric/Behavioral: Negative  Current Outpatient Medications on File Prior to Visit   Medication Sig   • albuterol (Ventolin HFA) 90 mcg/act inhaler Inhale 2 puffs every 4 (four) hours as needed for wheezing or shortness of breath   • ascorbic acid (VITAMIN C) 500 mg tablet Take 500 mg by mouth every morning  • atorvastatin (LIPITOR) 40 mg tablet TAKE 1 TABLET DAILY AT BEDTIME   • b complex vitamins tablet Take 1 tablet by mouth every morning  • cholecalciferol (VITAMIN D3) 1,000 units tablet Take 1,000 Units by mouth every morning   • eltrombopag (PROMACTA) 50 MG tablet Take 1 tablet (50 mg total) by mouth daily Administer on an empty stomach, 1 hour before or 2 hours after a meal    • ketorolac (ACULAR) 0 5 % ophthalmic solution Administer 1 drop to the right eye 4 (four) times a day   • multivitamin (THERAGRAN) TABS Take 1 tablet by mouth every morning     • sildenafil (VIAGRA) 100 mg tablet Take 1 tablet (100 mg total) by mouth daily as needed for erectile dysfunction   • tamsulosin (FLOMAX) 0 4 mg TAKE 1 CAPSULE DAILY WITH DINNER   • [DISCONTINUED] gatifloxacin (ZYMAXID) "0 5 % Administer 1 drop to the right eye 2 (two) times a day       Objective     /80   Pulse 64   Temp 97 8 °F (36 6 °C)   Resp 18   Ht 6' 4\" (1 93 m)   Wt 111 kg (244 lb)   SpO2 98%   BMI 29 70 kg/m²     Physical Exam  Constitutional:       General: He is not in acute distress  Appearance: He is well-developed  He is not diaphoretic  HENT:      Head: Normocephalic and atraumatic  Eyes:      General: Lids are normal  No scleral icterus  Right eye: No discharge  Left eye: No discharge  Extraocular Movements: Extraocular movements intact  Conjunctiva/sclera:      Right eye: Right conjunctiva is injected  No chemosis, exudate or hemorrhage  Left eye: Left conjunctiva is not injected  No chemosis, exudate or hemorrhage  Pulmonary:      Effort: Pulmonary effort is normal    Musculoskeletal:      Cervical back: Normal range of motion  Skin:     General: Skin is warm  Neurological:      Mental Status: He is alert and oriented to person, place, and time  Psychiatric:         Behavior: Behavior normal          Thought Content:  Thought content normal          Judgment: Judgment normal        Elgin Benedict MD  "

## 2023-07-10 ENCOUNTER — TELEPHONE (OUTPATIENT)
Dept: HEMATOLOGY ONCOLOGY | Facility: MEDICAL CENTER | Age: 73
End: 2023-07-10

## 2023-07-10 ENCOUNTER — APPOINTMENT (OUTPATIENT)
Dept: LAB | Facility: HOSPITAL | Age: 73
End: 2023-07-10
Payer: COMMERCIAL

## 2023-07-10 ENCOUNTER — TELEPHONE (OUTPATIENT)
Dept: HEMATOLOGY ONCOLOGY | Facility: CLINIC | Age: 73
End: 2023-07-10

## 2023-07-10 NOTE — TELEPHONE ENCOUNTER
I spoke with the patient in regards to rescheduling his appt today at 1:00pm due to the provider being out of the office. Informed patient the next available is 9/1/23 at 12:20pm. Patient accepted. Informed patient that I will have the Nurse Zak Padilla review his labs when they result and if needed we will bring him in sooner. Patient understood.

## 2023-07-10 NOTE — TELEPHONE ENCOUNTER
Platelet count reviewed with patient  Patient remains on promacta as ordered and offers no complaints  Appt rescheduled previously  Patient has my TEAMs number for contact

## 2023-07-24 DIAGNOSIS — R39.9 LOWER URINARY TRACT SYMPTOMS (LUTS): ICD-10-CM

## 2023-07-24 RX ORDER — TAMSULOSIN HYDROCHLORIDE 0.4 MG/1
0.4 CAPSULE ORAL
Qty: 90 CAPSULE | Refills: 1 | Status: SHIPPED | OUTPATIENT
Start: 2023-07-24

## 2023-08-23 ENCOUNTER — OFFICE VISIT (OUTPATIENT)
Dept: URGENT CARE | Facility: CLINIC | Age: 73
End: 2023-08-23
Payer: COMMERCIAL

## 2023-08-23 VITALS
WEIGHT: 236 LBS | TEMPERATURE: 99.5 F | HEART RATE: 81 BPM | OXYGEN SATURATION: 98 % | BODY MASS INDEX: 28.73 KG/M2 | RESPIRATION RATE: 14 BRPM

## 2023-08-23 DIAGNOSIS — J40 BRONCHITIS: Primary | ICD-10-CM

## 2023-08-23 LAB
SARS-COV-2 AG UPPER RESP QL IA: NEGATIVE
VALID CONTROL: NORMAL

## 2023-08-23 PROCEDURE — 87811 SARS-COV-2 COVID19 W/OPTIC: CPT | Performed by: PHYSICIAN ASSISTANT

## 2023-08-23 PROCEDURE — 99203 OFFICE O/P NEW LOW 30 MIN: CPT | Performed by: PHYSICIAN ASSISTANT

## 2023-08-23 RX ORDER — AZITHROMYCIN 250 MG/1
TABLET, FILM COATED ORAL
Qty: 6 TABLET | Refills: 0 | Status: SHIPPED | OUTPATIENT
Start: 2023-08-23 | End: 2023-08-27

## 2023-08-23 RX ORDER — BENZONATATE 200 MG/1
200 CAPSULE ORAL 3 TIMES DAILY PRN
Qty: 20 CAPSULE | Refills: 0 | Status: SHIPPED | OUTPATIENT
Start: 2023-08-23

## 2023-08-23 NOTE — PROGRESS NOTES
[facility]        NAME: Ivette Stacy is a 74 y.o. male  : 1950    MRN: [id number]  DATE: 2023  TIME: 9:31 AM    Assessment and Plan   Bronchitis [J40]  1. Bronchitis  Poct Covid 19 Rapid Antigen Test    azithromycin (ZITHROMAX) 250 mg tablet    benzonatate (TESSALON) 200 MG capsule        Discussed importance of maintaining adequate nutrition. Advised OTC plain Mucinex. Discussed strict return to care precautions as well as red flag symptoms which should prompt immediate ED referral. Pt verbalized understanding and is in agreement with plan. Please follow up with your primary care provider within the next week. Please remember that your visit today was with an urgent care provider and should not replace follow up with your primary care provider for chronic medical issues or annual physicals. Patient Instructions       Follow up with PCP in 3-5 days. Proceed to  ER if symptoms worsen. Chief Complaint     Chief Complaint   Patient presents with   • Cold Like Symptoms     Pt presents with productive cough, started last Friday         History of Present Illness       Ivette Stacy is a(n) 74 y.o. male presenting with URI symptoms x 5 days  Past medical history: HTN, CKD3, thrombocytopenia  Congestion: yes  Sore throat: no  Cough: yes  Sputum production: yes, "a lot"  Fever: no  Body aches: no  Loss of smell/taste: no  GI symptoms: no  Known sick contacts: yes, was around his grandkids that were sick  OTC meds tried: [place] cold/flu        Review of Systems   Review of Systems   Constitutional: Negative for chills, diaphoresis, fatigue and fever. HENT: Positive for congestion. Negative for ear pain, postnasal drip, rhinorrhea, sinus pain, sneezing, sore throat and trouble swallowing. Eyes: Negative for pain and redness. Respiratory: Positive for cough. Negative for chest tightness, shortness of breath and wheezing.     Cardiovascular: Negative for chest pain and leg swelling. Gastrointestinal: Negative for diarrhea, nausea and vomiting. Musculoskeletal: Negative for myalgias. Neurological: Negative for dizziness, weakness and headaches.          Current Medications       Current Outpatient Medications:   •  albuterol (Ventolin HFA) 90 mcg/act inhaler, Inhale 2 puffs every 4 (four) hours as needed for wheezing or shortness of breath, Disp: 18 g, Rfl: 5  •  ascorbic acid (VITAMIN C) 500 mg tablet, Take 500 mg by mouth every morning., Disp: , Rfl:   •  atorvastatin (LIPITOR) 40 mg tablet, TAKE 1 TABLET DAILY AT BEDTIME, Disp: 90 tablet, Rfl: 1  •  azithromycin (ZITHROMAX) 250 mg tablet, Take 2 tablets today then 1 tablet daily x 4 days, Disp: 6 tablet, Rfl: 0  •  b complex vitamins tablet, Take 1 tablet by mouth every morning., Disp: , Rfl:   •  benzonatate (TESSALON) 200 MG capsule, Take 1 capsule (200 mg total) by mouth 3 (three) times a day as needed for cough, Disp: 20 capsule, Rfl: 0  •  cholecalciferol (VITAMIN D3) 1,000 units tablet, Take 1,000 Units by mouth every morning, Disp: , Rfl:   •  eltrombopag (PROMACTA) 50 MG tablet, Take 1 tablet (50 mg total) by mouth daily Administer on an empty stomach, 1 hour before or 2 hours after a meal., Disp: 30 tablet, Rfl: 11  •  multivitamin (THERAGRAN) TABS, Take 1 tablet by mouth every morning., Disp: , Rfl:   •  sildenafil (VIAGRA) 100 mg tablet, Take 1 tablet (100 mg total) by mouth daily as needed for erectile dysfunction, Disp: 10 tablet, Rfl: 3  •  tamsulosin (FLOMAX) 0.4 mg, Take 1 capsule (0.4 mg total) by mouth daily with dinner, Disp: 90 capsule, Rfl: 1  •  gatifloxacin (ZYMAXID) 0.5 %, Administer 1 drop to the right eye 2 (two) times a day (Patient not taking: Reported on 8/23/2023), Disp: 3 mL, Rfl: 0  •  ketorolac (ACULAR) 0.5 % ophthalmic solution, Administer 1 drop to the right eye 4 (four) times a day (Patient not taking: Reported on 8/23/2023), Disp: 5 mL, Rfl: 0    Current Allergies     Allergies as of 08/23/2023 - Reviewed 08/23/2023   Allergen Reaction Noted   • Dust mite extract Sneezing 08/30/2018            The following portions of the patient's history were reviewed and updated as appropriate: allergies, current medications, past family history, past medical history, past social history, past surgical history and problem list.     Past Medical History:   Diagnosis Date   • Hypertension     Hx. No meds currently. Cardiology stopped HTN meds   • Thrombocytopenia (HCC)     platelet count maintained around 90-Dr. Junaid Emmanuel   • Wears partial dentures     upper       Past Surgical History:   Procedure Laterality Date   • APPENDECTOMY     • COLONOSCOPY     • COLONOSCOPY N/A 10/17/2018    Procedure: COLONOSCOPY;  Surgeon: Mita Smith MD;  Location: [address] [place] [address] GI LAB; Service: Gastroenterology   • HERNIA REPAIR Right 2013    inguinal   • JOINT REPLACEMENT Right     knee, left shoulder   • KNEE SURGERY Left     Had left knee surgery for football injury   • OK XCAPSL CTRC RMVL INSJ IO LENS PROSTH W/O ECP Left 05/04/2016    Procedure: EXTRACTION EXTRACAPSULAR CATARACT PHACO INTRAOCULAR LENS (IOL); Surgeon: Giovany Cutler MD;  [facility] MAIN OR;  Service: Ophthalmology   • OK XCAPSL CTRC RMVL INSJ IO LENS PROSTH W/O ECP Right 5/22/2023    Procedure: EXTRACTION EXTRACAPSULAR CATARACT PHACO INTRAOCULAR LENS (IOL); Surgeon: Giovany Cutler MD;  Location: [facility] OR;  Service: Ophthalmology   • SHOULDER ARTHROSCOPY Left     tendon repair   • TONSILLECTOMY  age 1       Family History   Problem Relation Age of Onset   • Heart disease Father 67        massive MI   • Heart disease Sister         MI   • Heart disease Brother         MI   • No Known Problems Mother    • Mental illness Neg Hx          Medications have been verified.         Objective   Pulse 81   Temp 99.5 °F (37.5 °C)   Resp 14   Wt 107 kg (236 lb)   SpO2 98%   BMI 28.73 kg/m²        Physical Exam     Physical Exam  Vitals and nursing note reviewed. Constitutional:       General: He is not in acute distress. Appearance: Normal appearance. He is not toxic-appearing. HENT:      Head: Normocephalic and atraumatic. Nose: Congestion present. Mouth/Throat:      Mouth: Mucous membranes are moist.      Pharynx: Oropharynx is clear. No oropharyngeal exudate or posterior oropharyngeal erythema. Eyes:      Conjunctiva/sclera: Conjunctivae normal.      Pupils: Pupils are equal, round, and reactive to light. Cardiovascular:      Rate and Rhythm: Normal rate and regular rhythm. Heart sounds: Normal heart sounds. Pulmonary:      Effort: Pulmonary effort is normal. No respiratory distress. Breath sounds: Rhonchi (rhonchi present LLL which improves with coughing) present. No wheezing or rales. Musculoskeletal:      Cervical back: Normal range of motion and neck supple. Skin:     General: Skin is warm and dry. Capillary Refill: Capillary refill takes less than 2 seconds. Neurological:      Mental Status: He is alert and oriented to person, place, and time.    Psychiatric:         Behavior: Behavior normal.

## 2023-08-25 ENCOUNTER — RA CDI HCC (OUTPATIENT)
Dept: OTHER | Facility: HOSPITAL | Age: 73
End: 2023-08-25

## 2023-08-25 NOTE — PROGRESS NOTES
[address] [facility] coding opportunities       Chart reviewed, no opportunity found: [address]        Patients Insurance     Medicare Insurance: [employer] Advantage

## 2023-08-28 ENCOUNTER — APPOINTMENT (OUTPATIENT)
Dept: LAB | Facility: HOSPITAL | Age: 73
End: 2023-08-28
Attending: INTERNAL MEDICINE
Payer: COMMERCIAL

## 2023-08-28 ENCOUNTER — OFFICE VISIT (OUTPATIENT)
Dept: FAMILY MEDICINE CLINIC | Facility: CLINIC | Age: 73
End: 2023-08-28
Payer: COMMERCIAL

## 2023-08-28 VITALS
OXYGEN SATURATION: 97 % | BODY MASS INDEX: 28.49 KG/M2 | HEIGHT: 76 IN | WEIGHT: 234 LBS | HEART RATE: 80 BPM | TEMPERATURE: 97.6 F | SYSTOLIC BLOOD PRESSURE: 124 MMHG | RESPIRATION RATE: 18 BRPM | DIASTOLIC BLOOD PRESSURE: 70 MMHG

## 2023-08-28 DIAGNOSIS — Z87.891 FORMER SMOKER: ICD-10-CM

## 2023-08-28 DIAGNOSIS — R63.4 WEIGHT LOSS: ICD-10-CM

## 2023-08-28 DIAGNOSIS — R09.81 HEAD CONGESTION: ICD-10-CM

## 2023-08-28 DIAGNOSIS — Z86.2 HISTORY OF ITP: ICD-10-CM

## 2023-08-28 DIAGNOSIS — Z12.2 SCREENING FOR LUNG CANCER: ICD-10-CM

## 2023-08-28 DIAGNOSIS — Z12.5 SCREENING FOR PROSTATE CANCER: ICD-10-CM

## 2023-08-28 DIAGNOSIS — I10 ESSENTIAL HYPERTENSION: Primary | ICD-10-CM

## 2023-08-28 PROBLEM — U07.1 COVID-19: Status: RESOLVED | Noted: 2021-12-15 | Resolved: 2023-08-28

## 2023-08-28 PROCEDURE — 99214 OFFICE O/P EST MOD 30 MIN: CPT | Performed by: FAMILY MEDICINE

## 2023-08-28 RX ORDER — MONTELUKAST SODIUM 10 MG/1
10 TABLET ORAL
Qty: 30 TABLET | Refills: 3 | Status: SHIPPED | OUTPATIENT
Start: 2023-08-28

## 2023-08-28 RX ORDER — CETIRIZINE HYDROCHLORIDE 10 MG/1
10 TABLET ORAL DAILY
Qty: 30 TABLET | Refills: 3 | Status: SHIPPED | OUTPATIENT
Start: 2023-08-28

## 2023-08-28 NOTE — PROGRESS NOTES
Assessment/Plan:    1. Essential hypertension  Assessment & Plan:  stable    Orders:  -     Comprehensive metabolic panel; Future  -     Lipid Panel with Direct LDL reflex; Future  -     TSH, 3rd generation; Future  -     T4, free; Future  -     PSA, Total Screen; Future    2. Weight loss  -     Comprehensive metabolic panel; Future  -     Lipid Panel with Direct LDL reflex; Future  -     TSH, 3rd generation; Future  -     T4, free; Future  -     PSA, Total Screen; Future  -     CT lung screening program; Future; Expected date: 08/28/2023    3. History of ITP  -     Comprehensive metabolic panel; Future  -     Lipid Panel with Direct LDL reflex; Future  -     TSH, 3rd generation; Future  -     T4, free; Future  -     PSA, Total Screen; Future    4. Screening for prostate cancer  -     PSA, Total Screen; Future    5. Former smoker  -     CT lung screening program; Future; Expected date: 08/28/2023    6. Screening for lung cancer  -     CT lung screening program; Future; Expected date: 08/28/2023    7. BMI 28.0-28.9,adult    8. Head congestion  -     cetirizine (ZyrTEC) 10 mg tablet; Take 1 tablet (10 mg total) by mouth daily  -     montelukast (SINGULAIR) 10 mg tablet; Take 1 tablet (10 mg total) by mouth daily at bedtime        Advising on the possibility of COPD given his shitory, pt feels this starts with his trees budding    Patient Instructions     Weight Management   AMBULATORY CARE:   Why it is important to manage your weight:  Being overweight increases your risk of health conditions such as heart disease, high blood pressure, type 2 diabetes, and certain types of cancer. It can also increase your risk for osteoarthritis, sleep apnea, and other respiratory problems. Aim for a slow, steady weight loss. Even a small amount of weight loss can lower your risk of health problems.   Risks of being overweight:  Extra weight can cause many health problems, including the following:  Diabetes (high blood sugar level)    High blood pressure or high cholesterol    Heart disease    Stroke    Gallbladder or liver disease    Cancer of the colon, breast, prostate, liver, or kidney    Sleep apnea    Arthritis or gout    Screening  is done to check for health conditions before you have signs or symptoms. If you are 28to 79years old, your blood sugar level may be checked every 3 years for signs of prediabetes or diabetes. Your healthcare provider will check your blood pressure at each visit. High blood pressure can lead to a stroke or other problems. Your provider may check for signs of heart disease, cancer, or other health problems. How to lose weight safely:  A safe and healthy way to lose weight is to eat fewer calories and get regular exercise. You can lose up about 1 pound a week by decreasing the number of calories you eat by 500 calories each day. You can decrease calories by eating smaller portion sizes or by cutting out high-calorie foods. Read labels to find out how many calories are in the foods you eat. You can also burn calories with exercise such as walking, swimming, or biking. You will be more likely to keep weight off if you make these changes part of your lifestyle. Exercise at least 30 minutes per day on most days of the week. You can also fit in more physical activity by taking the stairs instead of the elevator or parking farther away from stores. Ask your healthcare provider about the best exercise plan for you. Healthy meal plan for weight management:  A healthy meal plan includes a variety of foods, contains fewer calories, and helps you stay healthy. A healthy meal plan includes the following:     Eat whole-grain foods more often. A healthy meal plan should contain fiber. Fiber is the part of grains, fruits, and vegetables that is not broken down by your body. Whole-grain foods are healthy and provide extra fiber in your diet.  Some examples of whole-grain foods are whole-wheat breads and pastas, oatmeal, brown rice, and bulgur. Eat a variety of vegetables every day. Include dark, leafy greens such as spinach, kale, matthew greens, and mustard greens. Eat yellow and orange vegetables such as carrots, sweet potatoes, and winter squash. Eat a variety of fruits every day. Choose fresh or canned fruit (canned in its own juice or light syrup) instead of juice. Fruit juice has very little or no fiber. Eat low-fat dairy foods. Drink fat-free (skim) milk or 1% milk. Eat fat-free yogurt and low-fat cottage cheese. Try low-fat cheeses such as mozzarella and other reduced-fat cheeses. Choose meat and other protein foods that are low in fat. Choose beans or other legumes such as split peas or lentils. Choose fish, skinless poultry (chicken or turkey), or lean cuts of red meat (beef or pork). Before you cook meat or poultry, cut off any visible fat. Use less fat and oil. Try baking foods instead of frying them. Add less fat, such as margarine, sour cream, regular salad dressing and mayonnaise to foods. Eat fewer high-fat foods. Some examples of high-fat foods include french fries, doughnuts, ice cream, and cakes. Eat fewer sweets. Limit foods and drinks that are high in sugar. This includes candy, cookies, regular soda, and sweetened drinks. Ways to decrease calories:   Eat smaller portions. Use a small plate with smaller servings. Do not eat second helpings. When you eat at a restaurant, ask for a box and place half of your meal in the box before you eat. Share an entrée with someone else. Replace high-calorie snacks with healthy, low-calorie snacks. Choose fresh fruit, vegetables, fat-free rice cakes, or air-popped popcorn instead of potato chips, nuts, or chocolate. Choose water or calorie-free drinks instead of soda or sweetened drinks. Do not shop for groceries when you are hungry. You may be more likely to make unhealthy food choices.  Take a grocery list of healthy foods and shop after you have eaten. Eat regular meals. Do not skip meals. Skipping meals can lead to overeating later in the day. This can make it harder for you to lose weight. Eat a healthy snack in place of a meal if you do not have time to eat a regular meal. Talk with a dietitian to help you create a meal plan and schedule that is right for you. Other things to consider as you try to lose weight:   Be aware of situations that may give you the urge to overeat, such as eating while watching television. Find ways to avoid these situations. For example, read a book, go for a walk, or do crafts. Meet with a weight loss support group or friends who are also trying to lose weight. This may help you stay motivated to continue working on your weight loss goals. © Copyright [employer] 2022 Information is for End User's use only and may not be sold, redistributed or otherwise used for commercial purposes. The above information is an  only. It is not intended as medical advice for individual conditions or treatments. Talk to your doctor, nurse or pharmacist before following any medical regimen to see if it is safe and effective for you. No follow-ups on file. Subjective:      Patient ID: Sameer Martinez is a 74 y.o. male. Chief Complaint   Patient presents with   • Follow-up     Bronchitis  [id number]       Pt is scheduled for follow up bronchitis    Pt states a year and a half ago pt was dx with covid, no symptoms? States he lost some weight never gained it abck, then he had RSV lost weight and never gained it back. Pt just dx with bronchitis - losty a few labs again    So he is slowly losing weight  States he has just not been eating like he used to    Essentially pt is not trying to loose jenna.     So since covid sdtarted opt has lost about 30 lbs      The following portions of the patient's history were reviewed and updated as appropriate: allergies, current medications, past family history, past medical history, past social history, past surgical history and problem list.    Review of Systems   Constitutional:        Weight loss     HENT: Positive for congestion. Current Outpatient Medications   Medication Sig Dispense Refill   • albuterol (Ventolin HFA) 90 mcg/act inhaler Inhale 2 puffs every 4 (four) hours as needed for wheezing or shortness of breath 18 g 5   • ascorbic acid (VITAMIN C) 500 mg tablet Take 500 mg by mouth every morning. • atorvastatin (LIPITOR) 40 mg tablet TAKE 1 TABLET DAILY AT BEDTIME 90 tablet 1   • b complex vitamins tablet Take 1 tablet by mouth every morning. • benzonatate (TESSALON) 200 MG capsule Take 1 capsule (200 mg total) by mouth 3 (three) times a day as needed for cough 20 capsule 0   • cetirizine (ZyrTEC) 10 mg tablet Take 1 tablet (10 mg total) by mouth daily 30 tablet 3   • cholecalciferol (VITAMIN D3) 1,000 units tablet Take 1,000 Units by mouth every morning     • eltrombopag (PROMACTA) 50 MG tablet Take 1 tablet (50 mg total) by mouth daily Administer on an empty stomach, 1 hour before or 2 hours after a meal. 30 tablet 11   • montelukast (SINGULAIR) 10 mg tablet Take 1 tablet (10 mg total) by mouth daily at bedtime 30 tablet 3   • multivitamin (THERAGRAN) TABS Take 1 tablet by mouth every morning. • sildenafil (VIAGRA) 100 mg tablet Take 1 tablet (100 mg total) by mouth daily as needed for erectile dysfunction 10 tablet 3   • tamsulosin (FLOMAX) 0.4 mg Take 1 capsule (0.4 mg total) by mouth daily with dinner 90 capsule 1     No current facility-administered medications for this visit. Objective:    /70   Pulse 80   Temp 97.6 °F (36.4 °C)   Resp 18   Ht 6' 4" (1.93 m)   Wt 106 kg (234 lb)   SpO2 97%   BMI 28.48 kg/m²        Physical Exam  Constitutional:       Appearance: He is normal weight. Pulmonary:      Breath sounds: Wheezing present.                 Ricarda Melendez, DO  BMI Counseling: Body mass index is 28.48 kg/m². The BMI is above normal. Nutrition recommendations include reducing portion sizes.

## 2023-08-28 NOTE — PATIENT INSTRUCTIONS
Weight Management   AMBULATORY CARE:   Why it is important to manage your weight:  Being overweight increases your risk of health conditions such as heart disease, high blood pressure, type 2 diabetes, and certain types of cancer. It can also increase your risk for osteoarthritis, sleep apnea, and other respiratory problems. Aim for a slow, steady weight loss. Even a small amount of weight loss can lower your risk of health problems. Risks of being overweight:  Extra weight can cause many health problems, including the following:  • Diabetes (high blood sugar level)    • High blood pressure or high cholesterol    • Heart disease    • Stroke    • Gallbladder or liver disease    • Cancer of the colon, breast, prostate, liver, or kidney    • Sleep apnea    • Arthritis or gout    Screening  is done to check for health conditions before you have signs or symptoms. If you are 28to 79years old, your blood sugar level may be checked every 3 years for signs of prediabetes or diabetes. Your healthcare provider will check your blood pressure at each visit. High blood pressure can lead to a stroke or other problems. Your provider may check for signs of heart disease, cancer, or other health problems. How to lose weight safely:  A safe and healthy way to lose weight is to eat fewer calories and get regular exercise. • You can lose up about 1 pound a week by decreasing the number of calories you eat by 500 calories each day. You can decrease calories by eating smaller portion sizes or by cutting out high-calorie foods. Read labels to find out how many calories are in the foods you eat. • You can also burn calories with exercise such as walking, swimming, or biking. You will be more likely to keep weight off if you make these changes part of your lifestyle. Exercise at least 30 minutes per day on most days of the week.  You can also fit in more physical activity by taking the stairs instead of the elevator or parking farther away from stores. Ask your healthcare provider about the best exercise plan for you. Healthy meal plan for weight management:  A healthy meal plan includes a variety of foods, contains fewer calories, and helps you stay healthy. A healthy meal plan includes the following:     • Eat whole-grain foods more often. A healthy meal plan should contain fiber. Fiber is the part of grains, fruits, and vegetables that is not broken down by your body. Whole-grain foods are healthy and provide extra fiber in your diet. Some examples of whole-grain foods are whole-wheat breads and pastas, oatmeal, brown rice, and bulgur. • Eat a variety of vegetables every day. Include dark, leafy greens such as spinach, kale, matthew greens, and mustard greens. Eat yellow and orange vegetables such as carrots, sweet potatoes, and winter squash. • Eat a variety of fruits every day. Choose fresh or canned fruit (canned in its own juice or light syrup) instead of juice. Fruit juice has very little or no fiber. • Eat low-fat dairy foods. Drink fat-free (skim) milk or 1% milk. Eat fat-free yogurt and low-fat cottage cheese. Try low-fat cheeses such as mozzarella and other reduced-fat cheeses. • Choose meat and other protein foods that are low in fat. Choose beans or other legumes such as split peas or lentils. Choose fish, skinless poultry (chicken or turkey), or lean cuts of red meat (beef or pork). Before you cook meat or poultry, cut off any visible fat. • Use less fat and oil. Try baking foods instead of frying them. Add less fat, such as margarine, sour cream, regular salad dressing and mayonnaise to foods. Eat fewer high-fat foods. Some examples of high-fat foods include french fries, doughnuts, ice cream, and cakes. • Eat fewer sweets. Limit foods and drinks that are high in sugar. This includes candy, cookies, regular soda, and sweetened drinks. Ways to decrease calories:   • Eat smaller portions. ? Use a small plate with smaller servings. ? Do not eat second helpings. ? When you eat at a restaurant, ask for a box and place half of your meal in the box before you eat. ? Share an entrée with someone else. • Replace high-calorie snacks with healthy, low-calorie snacks. ? Choose fresh fruit, vegetables, fat-free rice cakes, or air-popped popcorn instead of potato chips, nuts, or chocolate. ? Choose water or calorie-free drinks instead of soda or sweetened drinks. • Do not shop for groceries when you are hungry. You may be more likely to make unhealthy food choices. Take a grocery list of healthy foods and shop after you have eaten. • Eat regular meals. Do not skip meals. Skipping meals can lead to overeating later in the day. This can make it harder for you to lose weight. Eat a healthy snack in place of a meal if you do not have time to eat a regular meal. Talk with a dietitian to help you create a meal plan and schedule that is right for you. Other things to consider as you try to lose weight:   • Be aware of situations that may give you the urge to overeat, such as eating while watching television. Find ways to avoid these situations. For example, read a book, go for a walk, or do crafts. • Meet with a weight loss support group or friends who are also trying to lose weight. This may help you stay motivated to continue working on your weight loss goals. © Copyright Sarah Clemons 2022 Information is for End User's use only and may not be sold, redistributed or otherwise used for commercial purposes. The above information is an  only. It is not intended as medical advice for individual conditions or treatments. Talk to your doctor, nurse or pharmacist before following any medical regimen to see if it is safe and effective for you.

## 2023-08-31 ENCOUNTER — LAB (OUTPATIENT)
Dept: LAB | Facility: HOSPITAL | Age: 73
End: 2023-08-31
Payer: COMMERCIAL

## 2023-08-31 DIAGNOSIS — Z12.5 SCREENING FOR PROSTATE CANCER: ICD-10-CM

## 2023-08-31 DIAGNOSIS — I10 ESSENTIAL HYPERTENSION: ICD-10-CM

## 2023-08-31 DIAGNOSIS — Z86.2 HISTORY OF ITP: ICD-10-CM

## 2023-08-31 DIAGNOSIS — R63.4 WEIGHT LOSS: ICD-10-CM

## 2023-08-31 LAB
ALBUMIN SERPL BCP-MCNC: 3.7 G/DL (ref 3.5–5)
ALP SERPL-CCNC: 85 U/L (ref 34–104)
ALT SERPL W P-5'-P-CCNC: 16 U/L (ref 7–52)
ANION GAP SERPL CALCULATED.3IONS-SCNC: 8 MMOL/L
AST SERPL W P-5'-P-CCNC: 18 U/L (ref 13–39)
BILIRUB SERPL-MCNC: 1.09 MG/DL (ref 0.2–1)
BUN SERPL-MCNC: 25 MG/DL (ref 5–25)
CALCIUM SERPL-MCNC: 8.8 MG/DL (ref 8.4–10.2)
CHLORIDE SERPL-SCNC: 106 MMOL/L (ref 96–108)
CHOLEST SERPL-MCNC: 81 MG/DL
CO2 SERPL-SCNC: 22 MMOL/L (ref 21–32)
CREAT SERPL-MCNC: 1.26 MG/DL (ref 0.6–1.3)
GFR SERPL CREATININE-BSD FRML MDRD: 55 ML/MIN/1.73SQ M
GLUCOSE P FAST SERPL-MCNC: 96 MG/DL (ref 65–99)
HDLC SERPL-MCNC: 35 MG/DL
LDLC SERPL CALC-MCNC: 33 MG/DL (ref 0–100)
POTASSIUM SERPL-SCNC: 4.3 MMOL/L (ref 3.5–5.3)
PROT SERPL-MCNC: 6.7 G/DL (ref 6.4–8.4)
PSA SERPL-MCNC: 0.59 NG/ML (ref 0–4)
SODIUM SERPL-SCNC: 136 MMOL/L (ref 135–147)
T4 FREE SERPL-MCNC: 0.81 NG/DL (ref 0.61–1.12)
TRIGL SERPL-MCNC: 67 MG/DL
TSH SERPL DL<=0.05 MIU/L-ACNC: 2.04 UIU/ML (ref 0.45–4.5)

## 2023-08-31 PROCEDURE — 36415 COLL VENOUS BLD VENIPUNCTURE: CPT

## 2023-08-31 PROCEDURE — 80053 COMPREHEN METABOLIC PANEL: CPT

## 2023-08-31 PROCEDURE — 84443 ASSAY THYROID STIM HORMONE: CPT

## 2023-08-31 PROCEDURE — 80061 LIPID PANEL: CPT

## 2023-08-31 PROCEDURE — 84439 ASSAY OF FREE THYROXINE: CPT

## 2023-08-31 PROCEDURE — G0103 PSA SCREENING: HCPCS

## 2023-09-01 ENCOUNTER — OFFICE VISIT (OUTPATIENT)
Dept: HEMATOLOGY ONCOLOGY | Facility: MEDICAL CENTER | Age: 73
End: 2023-09-01
Payer: COMMERCIAL

## 2023-09-01 VITALS
SYSTOLIC BLOOD PRESSURE: 140 MMHG | DIASTOLIC BLOOD PRESSURE: 80 MMHG | WEIGHT: 235.4 LBS | RESPIRATION RATE: 18 BRPM | HEIGHT: 76 IN | HEART RATE: 63 BPM | TEMPERATURE: 97.9 F | OXYGEN SATURATION: 99 % | BODY MASS INDEX: 28.67 KG/M2

## 2023-09-01 DIAGNOSIS — D69.6 THROMBOCYTOPENIA (HCC): Primary | ICD-10-CM

## 2023-09-01 PROCEDURE — 99214 OFFICE O/P EST MOD 30 MIN: CPT | Performed by: INTERNAL MEDICINE

## 2023-09-01 NOTE — PROGRESS NOTES
Juan Suazo  1950  [facility]  [facility] [id number]    DISCUSSION/SUMMARY:    18-year-old male with relatively good past medical history found to have thrombocytopenia approximately 6 years ago. 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 Because of a recent persistent downward platelet trend with associated excessive bruising, patient was started on Promacta. Because of side effects, the dose has been manipulated a number of times. Patient is presently on Promacta 50 mg a day, tolerating it, no severe arthritic complaints. The platelet count is trended below, good/acceptable. The plan is to continue the Promacta at the same dose. Patient is to return in 5 months with repeat blood work before. When patient received the COVID vaccine in 2022, his platelet count dropped significantly (< 10,000). It is difficult to connect the 2 but patient is very reluctant to receive any boosters. Patient subsequently contracted COVID - uncomplicated illness. The plan is to hold off on any COVID booster shots for now but patient can get the flu vaccine from a hematology standpoint. Routine health maintenance medical care is up-to-date. Mr. Gaitan Hopes knows to call the hematology/oncology office if there are any other questions or concerns. Carefully review your medication list and verify that the list is accurate and up-to-date. Please call the hematology/oncology office if there are medications missing from the list, medications on the list that you are not currently taking or if there is a dosage or instruction that is different from how you're taking that medication.     Patient goals and areas of care:  Continue with Promacta, 50 mg a day  Barriers to care:  None  Patient is able to self-care  ______________________________________________________________________________________    Chief Complaint   Patient presents with   • Follow-up     Thrombocytopenia      History of Present Illness:    18-year-old male previously referred for evaluation of thrombocytopenia. Mr. Arnie Flood was unaware of any CBC abnormalities up until recently when he needed left shoulder surgery. Patient has been seen by a number of physicians to trying clarify the etiology for the thrombocytopenia. Although the specifics are not entirely clear, 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 (previously seen by Dr. Mark De Jesus in [facility] many years ago). Mr. Arnie Flood previously underwent left shoulder surgery. Patient received 1 unit of platelets before because the platelet count was borderline. The platelets shyla significantly, no surgical complications including bruising or bleeding issues. Patient recently underwent colonoscopy with Decadron treatment before. Mr. Arnie Flood has been on Promacta, 50 mg a day. Prior generalized body aches are less/better than before. Patient continues to be very active. No bruising or bleeding issues. Previously patient required 3 teeth to be removed. Patient received Decadron preoperatively with good response to platelets. No recent oral problems. Routine health maintenance and medical care is up-to-date. No other specific problems with the Promacta. Review of Systems   Constitutional: Negative. HENT: Negative. Eyes: Negative. Respiratory: Negative. Cardiovascular: Negative. Gastrointestinal: Negative. Endocrine: Negative. Genitourinary: Negative. Musculoskeletal: Negative for arthralgias. Skin: Negative. Allergic/Immunologic: Negative. Neurological: Negative for numbness. Hematological: Does not bruise/bleed easily. Psychiatric/Behavioral: The patient is not nervous/anxious. All other systems reviewed and are negative.      Patient Active Problem List   Diagnosis   • Thrombocytopenia (HCC)   • Elevated low-density lipoprotein level   • BMI 31.0-31.9,adult   • Obesity (BMI 30-39. 9)   • Essential hypertension   • Vasculogenic erectile dysfunction   • Primary osteoarthritis of right shoulder   • Elevated troponin   • PVC (premature ventricular contraction)   • Stage 3 chronic kidney disease, unspecified whether stage 3a or 3b CKD (HCC)   • History of ITP   • Numbness   • Lower urinary tract symptoms (LUTS)   • Abnormal chest x-ray   • Mild intermittent asthma without complication     Past Medical History:   Diagnosis Date   • COVID-19 12/15/2021   • Hypertension     Hx. No meds currently. Cardiology stopped HTN meds   • Thrombocytopenia (HCC)     platelet count maintained around 90-Dr. Guadalupe Roberson   • Wears partial dentures     upper     Past Surgical History:   Procedure Laterality Date   • APPENDECTOMY     • COLONOSCOPY     • COLONOSCOPY N/A 10/17/2018    Procedure: COLONOSCOPY;  Surgeon: Hardik Ramos MD;  Location: [address] [place] [address] GI LAB; Service: Gastroenterology   • HERNIA REPAIR Right 2013    inguinal   • JOINT REPLACEMENT Right     knee, left shoulder   • KNEE SURGERY Left     Had left knee surgery for football injury   • WI XCAPSL CTRC RMVL INSJ IO LENS PROSTH W/O ECP Left 05/04/2016    Procedure: EXTRACTION EXTRACAPSULAR CATARACT PHACO INTRAOCULAR LENS (IOL); Surgeon: Sumaya Tenorio MD;  Location: [facility] OR;  Service: Ophthalmology   • WI XCAPSL CTRC RMVL INSJ IO LENS PROSTH W/O ECP Right 5/22/2023    Procedure: EXTRACTION EXTRACAPSULAR CATARACT PHACO INTRAOCULAR LENS (IOL);   Surgeon: Sumaya Tenorio MD;  Location: [facility] OR;  Service: Ophthalmology   • SHOULDER ARTHROSCOPY Left     tendon repair   • TONSILLECTOMY  age 1     Family History   Problem Relation Age of Onset   • Heart disease Father 67        massive MI   • Heart disease Sister         MI   • Heart disease Brother         MI   • No Known Problems Mother    • Mental illness Neg Hx      Social History     Socioeconomic History   • Marital status: /Civil Union     Spouse name: Not on file   • Number of children: Not on file   • Years of education: Not on file   • Highest education level: Not on file   Occupational History   • Not on file   Tobacco Use   • Smoking status: Former     Packs/day: 0.50     Years: 22.00     Total pack years: 11.00     Types: Cigarettes     Start date:      Quit date:      Years since quittin.6     Passive exposure: Past   • Smokeless tobacco: Never   Vaping Use   • Vaping Use: Never used   Substance and Sexual Activity   • Alcohol use: Not Currently   • Drug use: Never   • Sexual activity: Not on file   Other Topics Concern   • Not on file   Social History Narrative   • Not on file     Social Determinants of Health     Financial Resource Strain: Not on file   Food Insecurity: Not on file   Transportation Needs: Not on file   Physical Activity: Not on file   Stress: Not on file   Social Connections: Not on file   Intimate Partner Violence: Not on file   Housing Stability: Not on file       Current Outpatient Medications:   •  albuterol (Ventolin HFA) 90 mcg/act inhaler, Inhale 2 puffs every 4 (four) hours as needed for wheezing or shortness of breath, Disp: 18 g, Rfl: 5  •  ascorbic acid (VITAMIN C) 500 mg tablet, Take 500 mg by mouth every morning., Disp: , Rfl:   •  atorvastatin (LIPITOR) 40 mg tablet, TAKE 1 TABLET DAILY AT BEDTIME, Disp: 90 tablet, Rfl: 1  •  b complex vitamins tablet, Take 1 tablet by mouth every morning., Disp: , Rfl:   •  benzonatate (TESSALON) 200 MG capsule, Take 1 capsule (200 mg total) by mouth 3 (three) times a day as needed for cough, Disp: 20 capsule, Rfl: 0  •  cetirizine (ZyrTEC) 10 mg tablet, Take 1 tablet (10 mg total) by mouth daily, Disp: 30 tablet, Rfl: 3  •  cholecalciferol (VITAMIN D3) 1,000 units tablet, Take 1,000 Units by mouth every morning, Disp: , Rfl:   •  eltrombopag (PROMACTA) 50 MG tablet, Take 1 tablet (50 mg total) by mouth daily Administer on an empty stomach, 1 hour before or 2 hours after a meal., Disp: 30 tablet, Rfl: 11  •  montelukast (SINGULAIR) 10 mg tablet, Take 1 tablet (10 mg total) by mouth daily at bedtime, Disp: 30 tablet, Rfl: 3  •  multivitamin (THERAGRAN) TABS, Take 1 tablet by mouth every morning., Disp: , Rfl:   •  sildenafil (VIAGRA) 100 mg tablet, Take 1 tablet (100 mg total) by mouth daily as needed for erectile dysfunction, Disp: 10 tablet, Rfl: 3  •  tamsulosin (FLOMAX) 0.4 mg, Take 1 capsule (0.4 mg total) by mouth daily with dinner, Disp: 90 capsule, Rfl: 1    Allergies   Allergen Reactions   • Dust Mite Extract Sneezing       Vitals:    09/01/23 1220   BP: 140/80   Pulse: 63   Resp: 18   Temp: 97.9 °F (36.6 °C)   SpO2: 99%     Physical Exam  Constitutional:       Appearance: He is well-developed. Comments: Well-nourished male, no respiratory distress   HENT:      Head: Normocephalic and atraumatic. Right Ear: External ear normal.      Left Ear: External ear normal.      Nose: Nose normal.   Eyes:      Conjunctiva/sclera: Conjunctivae normal.      Pupils: Pupils are equal, round, and reactive to light. Cardiovascular:      Rate and Rhythm: Normal rate and regular rhythm. Heart sounds: Normal heart sounds. Pulmonary:      Effort: Pulmonary effort is normal.      Breath sounds: Normal breath sounds. Abdominal:      General: Bowel sounds are normal.      Palpations: Abdomen is soft. Comments: +bowel sounds, nontender, slightly obese, cannot palpate liver or spleen, no rigidity or rebound   Musculoskeletal:         General: Normal range of motion. Cervical back: Normal range of motion and neck supple. Skin:     General: Skin is warm. Comments: Warm, moist, good color, no petechiae or ecchymoses   Neurological:      Mental Status: He is alert and oriented to person, place, and time. Deep Tendon Reflexes: Reflexes are normal and symmetric. Psychiatric:         Behavior: Behavior normal.         Thought Content:  Thought content normal.         Judgment: Judgment normal. Extremities:  No lower extremity edema bilaterally, no cords, pulses are 1+, strength in both upper extremity seems to be about the same as before and equal bilaterally, no obvious/significant swelling  Lymphatics:   No adenopathy in the neck, supraclavicular region, axilla and groin bilaterally    Labs    8/31/2023 PSA = 0.59 BUN = 25 creatinine = 1.26 calcium = 8.8 LFTs WNL          Cardiology        Pathology    11/24/2016 direct platelet antibody:  Anti IgG = positive  11/21/2016 platelet antibody IIB/IIIA, IA/IIA, IB/IX and HLA class I were all negative

## 2023-09-06 ENCOUNTER — HOSPITAL ENCOUNTER (OUTPATIENT)
Dept: RADIOLOGY | Facility: HOSPITAL | Age: 73
Discharge: HOME | End: 2023-09-06
Attending: FAMILY MEDICINE
Payer: COMMERCIAL

## 2023-09-06 DIAGNOSIS — Z12.2 SCREENING FOR LUNG CANCER: ICD-10-CM

## 2023-09-06 DIAGNOSIS — R63.4 WEIGHT LOSS: ICD-10-CM

## 2023-09-06 DIAGNOSIS — Z87.891 FORMER SMOKER: ICD-10-CM

## 2023-09-06 PROCEDURE — 71271 CT THORAX LUNG CANCER SCR C-: CPT

## 2023-09-14 NOTE — RESULT ENCOUNTER NOTE
Nodular Findings in lung screen are consist with being benign.   We recommend continuing with yearly screen

## 2023-09-15 DIAGNOSIS — R21 RASH: Primary | ICD-10-CM

## 2023-09-15 RX ORDER — DESOXIMETASONE 2.5 MG/G
1 CREAM TOPICAL 2 TIMES DAILY
COMMUNITY
End: 2023-09-15 | Stop reason: SDUPTHER

## 2023-09-15 NOTE — TELEPHONE ENCOUNTER
Reason for call:   [x] Refill   [] Prior Auth  [] Other:     Office:   [x] [facility]   [] Speciality/Provider -     Medication: Desoximetasone      Dose/Frequency: 0.25%    Quantity: 100 grams    Pharmacy: [id number]    Does the patient have enough for 3 days? [] Yes   [x] No - Send as HP to POD    Is the patient having symptoms?    [] No   [x] Yes -

## 2023-09-15 NOTE — TELEPHONE ENCOUNTER
Reason for call:   [x] Refill   [] Prior Auth  [] Other:     Office:   [x] PCP/Provider -   [] Speciality/Provider -     Medication: Desoximetasone     Dose/Frequency: 0.25% cream  - BID    Quantity: 100grams    Pharmacy: [address] [place] [address] [id number]  [phone]    Does the patient have enough for 3 days?    [] Yes   [x] No - Send as HP to POD

## 2023-10-05 RX ORDER — DESOXIMETASONE 2.5 MG/G
1 CREAM TOPICAL 2 TIMES DAILY
Qty: 100 G | Refills: 0 | Status: SHIPPED | OUTPATIENT
Start: 2023-10-05

## 2023-10-06 RX ORDER — DESOXIMETASONE 2.5 MG/G
1 CREAM TOPICAL 2 TIMES DAILY
Qty: 100 G | Refills: 2 | Status: SHIPPED | OUTPATIENT
Start: 2023-10-06

## 2023-10-22 DIAGNOSIS — R39.9 LOWER URINARY TRACT SYMPTOMS (LUTS): ICD-10-CM

## 2023-10-23 RX ORDER — TAMSULOSIN HYDROCHLORIDE 0.4 MG/1
CAPSULE ORAL
Qty: 90 CAPSULE | Refills: 1 | Status: SHIPPED | OUTPATIENT
Start: 2023-10-23

## 2023-11-12 ENCOUNTER — OFFICE VISIT (OUTPATIENT)
Dept: URGENT CARE | Facility: CLINIC | Age: 73
End: 2023-11-12
Payer: COMMERCIAL

## 2023-11-12 VITALS
SYSTOLIC BLOOD PRESSURE: 128 MMHG | OXYGEN SATURATION: 100 % | HEART RATE: 88 BPM | TEMPERATURE: 97.7 F | RESPIRATION RATE: 16 BRPM | BODY MASS INDEX: 29.47 KG/M2 | WEIGHT: 242 LBS | DIASTOLIC BLOOD PRESSURE: 85 MMHG | HEIGHT: 76 IN

## 2023-11-12 DIAGNOSIS — R09.82 POSTNASAL DRIP: Primary | ICD-10-CM

## 2023-11-12 PROCEDURE — 99213 OFFICE O/P EST LOW 20 MIN: CPT | Performed by: PHYSICIAN ASSISTANT

## 2023-11-12 NOTE — PROGRESS NOTES
[facility]        NAME: Marina Graham is a 74 y.o. male  : 1950    MRN: [id number]  DATE: 2023  TIME: 10:34 AM    Assessment and Plan   Postnasal drip [R09.82]  1. Postnasal drip          Patient Instructions   Postnasal drip causing hoarse voice  Recommend flonase nasal spray and honey/throat lozenges for postnasal drip/cough  Warm salt water gargles  Rest, fluids and supportive care  May benefit from a cool mist humidifier on night stand  Tylenol/ibuprofen as needed for pain/fever    Follow up with PCP in 3-5 days. Proceed to  ER if symptoms worsen. Chief Complaint     Chief Complaint   Patient presents with    Sore Throat     X 3 days, like "frog' on the throat, felt like "bronchitis". Tested negative for covid yesterday using home test kit         History of Present Illness       Brannon Rosario is a 25-year-old male who presents to clinic complaining of hoarse voice x3 days. He denies any sore throat, fever, chills, cough, nasal congestion, rhinorrhea, shortness of breath, nausea, vomiting, taste or smell, recent travel, or exposure anyone known COVID-positive. He states he took a COVID test at home yesterday and was negative. Review of Systems   Review of Systems   Constitutional:  Negative for chills, fatigue and fever. HENT:  Positive for voice change. Negative for congestion, ear pain, rhinorrhea, sinus pressure, sinus pain and sore throat. Respiratory:  Negative for cough and shortness of breath. Gastrointestinal:  Negative for diarrhea, nausea and vomiting. Musculoskeletal:  Negative for myalgias. Neurological:  Negative for headaches.          Current Medications       Current Outpatient Medications:     albuterol (Ventolin HFA) 90 mcg/act inhaler, Inhale 2 puffs every 4 (four) hours as needed for wheezing or shortness of breath, Disp: 18 g, Rfl: 5    ascorbic acid (VITAMIN C) 500 mg tablet, Take 500 mg by mouth every morning., Disp: , Rfl:     b complex vitamins tablet, Take 1 tablet by mouth every morning., Disp: , Rfl:     cetirizine (ZyrTEC) 10 mg tablet, Take 1 tablet (10 mg total) by mouth daily, Disp: 30 tablet, Rfl: 3    desoximetasone (TOPICORT) 0.25 % cream, Apply 1 Application topically 2 (two) times a day (Patient not taking: Reported on 11/12/2023), Disp: 100 g, Rfl: 2    eltrombopag (PROMACTA) 50 MG tablet, Take 1 tablet (50 mg total) by mouth daily Administer on an empty stomach, 1 hour before or 2 hours after a meal., Disp: 30 tablet, Rfl: 11    montelukast (SINGULAIR) 10 mg tablet, Take 1 tablet (10 mg total) by mouth daily at bedtime, Disp: 30 tablet, Rfl: 3    multivitamin (THERAGRAN) TABS, Take 1 tablet by mouth every morning., Disp: , Rfl:     sildenafil (VIAGRA) 100 mg tablet, Take 1 tablet (100 mg total) by mouth daily as needed for erectile dysfunction, Disp: 10 tablet, Rfl: 3    tamsulosin (FLOMAX) 0.4 mg, TAKE 1 CAPSULE DAILY WITH DINNER, Disp: 90 capsule, Rfl: 1    atorvastatin (LIPITOR) 40 mg tablet, TAKE 1 TABLET DAILY AT BEDTIME (Patient not taking: Reported on 11/12/2023), Disp: 90 tablet, Rfl: 1    benzonatate (TESSALON) 200 MG capsule, Take 1 capsule (200 mg total) by mouth 3 (three) times a day as needed for cough (Patient not taking: Reported on 11/12/2023), Disp: 20 capsule, Rfl: 0    cholecalciferol (VITAMIN D3) 1,000 units tablet, Take 1,000 Units by mouth every morning (Patient not taking: Reported on 11/12/2023), Disp: , Rfl:     desoximetasone (TOPICORT) 0.25 % cream, Apply 1 Application topically 2 (two) times a day (Patient not taking: Reported on 11/12/2023), Disp: 100 g, Rfl: 0    Current Allergies     Allergies as of 11/12/2023 - Reviewed 11/12/2023   Allergen Reaction Noted    Dust mite extract Sneezing 08/30/2018            The following portions of the patient's history were reviewed and updated as appropriate: allergies, current medications, past family history, past medical history, past social history, past surgical history and problem list.     Past Medical History:   Diagnosis Date    COVID-19 12/15/2021    Hypertension     Hx. No meds currently. Cardiology stopped HTN meds    Thrombocytopenia ([address])     platelet count maintained around 90-Dr. Watson Randall    Wears partial dentures     upper       Past Surgical History:   Procedure Laterality Date    APPENDECTOMY      COLONOSCOPY      COLONOSCOPY N/A 10/17/2018    Procedure: COLONOSCOPY;  Surgeon: Rainer Colmenares MD;  Location: [address] [place] [address] GI LAB; Service: Gastroenterology    HERNIA REPAIR Right 2013    inguinal    JOINT REPLACEMENT Right     knee, left shoulder    KNEE SURGERY Left     Had left knee surgery for football injury    NM XCAPSL CTRC RMVL INSJ IO LENS PROSTH W/O ECP Left 05/04/2016    Procedure: EXTRACTION EXTRACAPSULAR CATARACT PHACO INTRAOCULAR LENS (IOL); Surgeon: Juan Diego Jimenez MD;  Location: [facility] MAIN OR;  Service: Ophthalmology    NM XCAPSL CTRC RMVL INSJ IO LENS PROSTH W/O ECP Right 5/22/2023    Procedure: EXTRACTION EXTRACAPSULAR CATARACT PHACO INTRAOCULAR LENS (IOL); Surgeon: Juan Diego Jimenez MD;  Location: [facility] MAIN OR;  Service: Ophthalmology    SHOULDER ARTHROSCOPY Left     tendon repair    TONSILLECTOMY  age 1       Family History   Problem Relation Age of Onset    Heart disease Father 67        massive MI    Heart disease Sister         MI    Heart disease Brother         MI    No Known Problems Mother     Mental illness Neg Hx          Medications have been verified. Objective   /85   Pulse 88   Temp 97.7 °F (36.5 °C) (Tympanic)   Resp 16   Ht 6' 4" (1.93 m)   Wt 110 kg (242 lb)   SpO2 100%   BMI 29.46 kg/m²   No LMP for male patient. Physical Exam     Physical Exam  Vitals and nursing note reviewed. Constitutional:       General: He is not in acute distress. Appearance: Normal appearance. He is not ill-appearing.    HENT:      Right Ear: Tympanic membrane, ear canal and external ear normal.      Left Ear: Tympanic membrane, ear canal and external ear normal.      Nose: Nose normal.      Mouth/Throat:      Mouth: Mucous membranes are moist.      Pharynx: No oropharyngeal exudate or posterior oropharyngeal erythema. Cardiovascular:      Rate and Rhythm: Normal rate and regular rhythm. Heart sounds: Normal heart sounds. Pulmonary:      Effort: Pulmonary effort is normal.      Breath sounds: Normal breath sounds. Lymphadenopathy:      Cervical: No cervical adenopathy. Neurological:      Mental Status: He is alert and oriented to person, place, and time.    Psychiatric:         Mood and Affect: Mood normal.         Behavior: Behavior normal.

## 2023-11-17 DIAGNOSIS — D69.6 THROMBOCYTOPENIA (HCC): ICD-10-CM

## 2023-11-17 RX ORDER — ELTROMBOPAG OLAMINE 50 MG/1
TABLET, FILM COATED ORAL
Qty: 30 TABLET | Refills: 10 | Status: SHIPPED | OUTPATIENT
Start: 2023-11-17

## 2024-01-12 ENCOUNTER — TELEPHONE (OUTPATIENT)
Dept: CARDIOLOGY CLINIC | Facility: CLINIC | Age: 74
End: 2024-01-12

## 2024-01-12 NOTE — TELEPHONE ENCOUNTER
Dr Peña's pt- I reached out for  March recall and he does not wish to schedule, will call if needed.

## 2024-01-26 ENCOUNTER — TELEPHONE (OUTPATIENT)
Dept: HEMATOLOGY ONCOLOGY | Facility: CLINIC | Age: 74
End: 2024-01-26

## 2024-01-26 NOTE — TELEPHONE ENCOUNTER
Patient Call    Who are you speaking with? Patient    If it is not the patient, are they listed on an active communication consent form? Yes   What is the reason for this call? Patient is advising his insurance is saying they will no longer cover his medication   Promacta 50 MG tablet     Does this require a call back? Yes   If a call back is required, please list best call back number [phone]   If a call back is required, advise that a message will be forwarded to their care team and someone will return their call as soon as possible.   Did you relay this information to the patient? Yes

## 2024-01-29 ENCOUNTER — APPOINTMENT (OUTPATIENT)
Dept: LAB | Facility: HOSPITAL | Age: 74
End: 2024-01-29
Attending: INTERNAL MEDICINE
Payer: COMMERCIAL

## 2024-01-29 DIAGNOSIS — D69.6 THROMBOCYTOPENIA (HCC): ICD-10-CM

## 2024-01-29 NOTE — TELEPHONE ENCOUNTER
Per Emmanuel at [facility] a new prior auth is needed. Ainsley was included in email chain and she will work on this.

## 2024-01-29 NOTE — TELEPHONE ENCOUNTER
Pt called back but I missed the call. I left him a voicemail advising a prior auth is needed and we will be working on this.

## 2024-01-31 DIAGNOSIS — R21 RASH: ICD-10-CM

## 2024-01-31 RX ORDER — DESOXIMETASONE 2.5 MG/G
1 CREAM TOPICAL 2 TIMES DAILY
Qty: 100 G | Refills: 2 | Status: SHIPPED | OUTPATIENT
Start: 2024-01-31

## 2024-01-31 NOTE — TELEPHONE ENCOUNTER
Reason for call:   [x] Refill   [] Prior Auth  [] Other:     Office:   [x] PCP/Provider -   [] Specialty/Provider -     Medication: TOPICORT    Dose/Frequency: 0.25%    Quantity: 100 G    Pharmacy: [place], [place]    Does the patient have enough for 3 days?   [x] Yes   [] No - Send as HP to POD

## 2024-02-07 ENCOUNTER — OFFICE VISIT (OUTPATIENT)
Dept: HEMATOLOGY ONCOLOGY | Facility: MEDICAL CENTER | Age: 74
End: 2024-02-07
Payer: COMMERCIAL

## 2024-02-07 VITALS
HEART RATE: 66 BPM | BODY MASS INDEX: 30.32 KG/M2 | DIASTOLIC BLOOD PRESSURE: 60 MMHG | TEMPERATURE: 98.2 F | SYSTOLIC BLOOD PRESSURE: 128 MMHG | OXYGEN SATURATION: 96 % | RESPIRATION RATE: 17 BRPM | WEIGHT: 249 LBS | HEIGHT: 76 IN

## 2024-02-07 DIAGNOSIS — N18.30 STAGE 3 CHRONIC KIDNEY DISEASE, UNSPECIFIED WHETHER STAGE 3A OR 3B CKD (HCC): ICD-10-CM

## 2024-02-07 DIAGNOSIS — Z86.2 HISTORY OF ITP: Primary | ICD-10-CM

## 2024-02-07 DIAGNOSIS — D69.3 CHRONIC ITP (IDIOPATHIC THROMBOCYTOPENIA) (HCC): ICD-10-CM

## 2024-02-07 DIAGNOSIS — D69.6 THROMBOCYTOPENIA (HCC): ICD-10-CM

## 2024-02-07 PROCEDURE — 99214 OFFICE O/P EST MOD 30 MIN: CPT | Performed by: INTERNAL MEDICINE

## 2024-02-07 NOTE — PROGRESS NOTES
Sixto Oakes  1950  [facility]  [address] [place] [id number]    DISCUSSION/SUMMARY:    73-year-old male with relatively good past medical history found to have thrombocytopenia approximately 7 years ago.  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  Because of a recent persistent downward platelet trend with associated excessive bruising, patient was started on Promacta.  Because of side effects, the dose has been manipulated a number of times.      Patient is presently on Promacta 50 mg a day, tolerating it, no severe arthritic complaints.  The platelet count is trended below, good/acceptable.  The plan is to continue the Promacta at the same dose.  Patient is to return in 6 months; CBC is drawn every 3 months.    When patient received the COVID vaccine in 2022, his platelet count dropped significantly (< 10,000).  It is difficult to connect the 2 but patient is very reluctant to receive any boosters.  Patient subsequently contracted COVID - uncomplicated illness.  The plan is to hold off on any COVID booster shots for now but patient can get the flu vaccine from a hematology standpoint.    Routine health maintenance medical care is up-to-date.  Mr. Oakes knows to call the hematology/oncology office if there are any other questions or concerns or if he is in need of any invasive procedure or surgery.    Carefully review your medication list and verify that the list is accurate and up-to-date. Please call the hematology/oncology office if there are medications missing from the list, medications on the list that you are not currently taking or if there is a dosage or instruction that is different from how you're taking that medication.    Patient goals and areas of care:  Continue with Promacta, 50 mg a day  Barriers to care:  None  Patient is able to  self-care  ______________________________________________________________________________________    Chief Complaint   Patient presents with    Follow-up    ITP on Promacta     History of Present Illness:    73-year-old male previously referred for evaluation of thrombocytopenia.  Mr. Oakes was unaware of any CBC abnormalities up until recently when he needed left shoulder surgery.  Patient has been seen by a number of physicians to trying clarify the etiology for the thrombocytopenia.  Although the specifics are not entirely clear, 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 (previously seen by Dr. Mansfield in [place] many years ago).    Mr. Oakes previously underwent left shoulder surgery.  Patient received 1 unit of platelets before because the platelet count was borderline.  The platelets shyla significantly, no surgical complications including bruising or bleeding issues. Patient recently underwent colonoscopy with Decadron treatment before.    Mr. Oakes has been on Promacta, 50 mg a day.  Prior generalized body aches are less/better than before.  Patient continues to be very active.  No bruising or bleeding issues.  Routine health maintenance and medical care up-to-date.  No pending procedures.  No other problems with the Promacta.    Review of Systems   Constitutional: Negative.    HENT: Negative.     Eyes: Negative.    Respiratory: Negative.     Cardiovascular: Negative.    Gastrointestinal: Negative.    Endocrine: Negative.    Genitourinary: Negative.    Musculoskeletal:  Negative for arthralgias.   Skin: Negative.    Allergic/Immunologic: Negative.    Neurological:  Negative for numbness.   Hematological:  Does not bruise/bleed easily.   Psychiatric/Behavioral:  The patient is not nervous/anxious.    All other systems reviewed and are negative.     Patient Active Problem List   Diagnosis    Thrombocytopenia (HCC)    Elevated low-density lipoprotein level    BMI 31.0-31.9,adult    Obesity  (BMI 30-39.9)    Essential hypertension    Vasculogenic erectile dysfunction    Primary osteoarthritis of right shoulder    Elevated troponin    PVC (premature ventricular contraction)    Stage 3 chronic kidney disease, unspecified whether stage 3a or 3b CKD (HCC)    History of ITP    Numbness    Lower urinary tract symptoms (LUTS)    Abnormal chest x-ray    Mild intermittent asthma without complication    Chronic ITP (idiopathic thrombocytopenia) (HCC)     Past Medical History:   Diagnosis Date    COVID-19 12/15/2021    Hypertension     Hx. No meds currently. Cardiology stopped HTN meds    Thrombocytopenia (HCC)     platelet count maintained around 90-Dr. Archie Taylor    Wears partial dentures     upper     Past Surgical History:   Procedure Laterality Date    APPENDECTOMY      COLONOSCOPY      COLONOSCOPY N/A 10/17/2018    Procedure: COLONOSCOPY;  Surgeon: Michael Simms MD;  Location: [facility] GI LAB;  Service: Gastroenterology    HERNIA REPAIR Right 2013    inguinal    JOINT REPLACEMENT Right     knee, left shoulder    KNEE SURGERY Left     Had left knee surgery for football injury    NM XCAPSL CTRC RMVL INSJ IO LENS PROSTH W/O ECP Left 05/04/2016    Procedure: EXTRACTION EXTRACAPSULAR CATARACT PHACO INTRAOCULAR LENS (IOL);  Surgeon: Sixto Bucio MD;  Location: [facility] MAIN OR;  Service: Ophthalmology    NM XCAPSL CTRC RMVL INSJ IO LENS PROSTH W/O ECP Right 5/22/2023    Procedure: EXTRACTION EXTRACAPSULAR CATARACT PHACO INTRAOCULAR LENS (IOL);  Surgeon: Sixto Bucio MD;  Location: [facility] MAIN OR;  Service: Ophthalmology    SHOULDER ARTHROSCOPY Left     tendon repair    TONSILLECTOMY  age 2     Family History   Problem Relation Age of Onset    Heart disease Father 72        massive MI    Heart disease Sister         MI    Heart disease Brother         MI    No Known Problems Mother     Mental illness Neg Hx      Social History     Socioeconomic History    Marital status: /Civil Union     Spouse name: Not on  file    Number of children: Not on file    Years of education: Not on file    Highest education level: Not on file   Occupational History    Not on file   Tobacco Use    Smoking status: Former     Current packs/day: 0.00     Average packs/day: 0.5 packs/day for 42.0 years (21.0 ttl pk-yrs)     Types: Cigarettes     Start date:      Quit date:      Years since quittin.1     Passive exposure: Past    Smokeless tobacco: Never   Vaping Use    Vaping status: Never Used   Substance and Sexual Activity    Alcohol use: Not Currently    Drug use: Never    Sexual activity: Not on file   Other Topics Concern    Not on file   Social History Narrative    Not on file     Social Determinants of Health     Financial Resource Strain: Not on file   Food Insecurity: Not on file   Transportation Needs: Not on file   Physical Activity: Not on file   Stress: Not on file   Social Connections: Not on file   Intimate Partner Violence: Not on file   Housing Stability: Not on file       Current Outpatient Medications:     albuterol (Ventolin HFA) 90 mcg/act inhaler, Inhale 2 puffs every 4 (four) hours as needed for wheezing or shortness of breath, Disp: 18 g, Rfl: 5    ascorbic acid (VITAMIN C) 500 mg tablet, Take 500 mg by mouth every morning., Disp: , Rfl:     b complex vitamins tablet, Take 1 tablet by mouth every morning., Disp: , Rfl:     cetirizine (ZyrTEC) 10 mg tablet, Take 1 tablet (10 mg total) by mouth daily, Disp: 30 tablet, Rfl: 3    desoximetasone (TOPICORT) 0.25 % cream, Apply 1 Application topically 2 (two) times a day, Disp: 100 g, Rfl: 2    montelukast (SINGULAIR) 10 mg tablet, Take 1 tablet (10 mg total) by mouth daily at bedtime, Disp: 30 tablet, Rfl: 3    multivitamin (THERAGRAN) TABS, Take 1 tablet by mouth every morning., Disp: , Rfl:     Promacta 50 MG tablet, Take 1 tablet (50 mg total) by mouth daily. Administer on an empty stomach, 1 hour before or 2 hours after a meal., Disp: 30 tablet, Rfl: 10     sildenafil (VIAGRA) 100 mg tablet, Take 1 tablet (100 mg total) by mouth daily as needed for erectile dysfunction, Disp: 10 tablet, Rfl: 3    tamsulosin (FLOMAX) 0.4 mg, TAKE 1 CAPSULE DAILY WITH DINNER, Disp: 90 capsule, Rfl: 1    atorvastatin (LIPITOR) 40 mg tablet, TAKE 1 TABLET DAILY AT BEDTIME (Patient not taking: Reported on 11/12/2023), Disp: 90 tablet, Rfl: 1    benzonatate (TESSALON) 200 MG capsule, Take 1 capsule (200 mg total) by mouth 3 (three) times a day as needed for cough (Patient not taking: Reported on 11/12/2023), Disp: 20 capsule, Rfl: 0    cholecalciferol (VITAMIN D3) 1,000 units tablet, Take 1,000 Units by mouth every morning (Patient not taking: Reported on 11/12/2023), Disp: , Rfl:     desoximetasone (TOPICORT) 0.25 % cream, Apply 1 Application topically 2 (two) times a day (Patient not taking: Reported on 11/12/2023), Disp: 100 g, Rfl: 0    Allergies   Allergen Reactions    Dust Mite Extract Sneezing       Vitals:    02/07/24 1025   BP: 128/60   Pulse: 66   Resp: 17   Temp: 98.2 °F (36.8 °C)   SpO2: 96%     Physical Exam  Constitutional:       Appearance: He is well-developed.      Comments: Well-nourished male, no respiratory distress   HENT:      Head: Normocephalic and atraumatic.      Right Ear: External ear normal.      Left Ear: External ear normal.      Nose: Nose normal.   Eyes:      Conjunctiva/sclera: Conjunctivae normal.      Pupils: Pupils are equal, round, and reactive to light.   Cardiovascular:      Rate and Rhythm: Normal rate and regular rhythm.      Heart sounds: Normal heart sounds.   Pulmonary:      Effort: Pulmonary effort is normal.      Breath sounds: Normal breath sounds.   Abdominal:      General: Bowel sounds are normal.      Palpations: Abdomen is soft.      Comments: +bowel sounds, nontender, slightly obese, cannot palpate liver or spleen, no rigidity or rebound   Musculoskeletal:         General: Normal range of motion.      Cervical back: Normal range of motion  and neck supple.   Skin:     General: Skin is warm.      Comments: Warm, moist, good color, no petechiae or ecchymoses   Neurological:      Mental Status: He is alert and oriented to person, place, and time.      Deep Tendon Reflexes: Reflexes are normal and symmetric.   Psychiatric:         Behavior: Behavior normal.         Thought Content: Thought content normal.         Judgment: Judgment normal.     Extremities:  No lower extremity edema bilaterally, no cords, pulses are 1+, strength in both upper extremity seems to be about the same as before and equal bilaterally, no obvious/significant swelling  Lymphatics:   No adenopathy in the neck, supraclavicular region, axilla and groin bilaterally    Labs        8/31/2023 PSA = 0.59 BUN = 25 creatinine = 1.26 calcium = 8.8 LFTs WNL    Cardiology        Pathology    11/24/2016 direct platelet antibody:  Anti IgG = positive  11/21/2016 platelet antibody IIB/IIIA, IA/IIA, IB/IX and HLA class I were all negative

## 2024-02-09 NOTE — TELEPHONE ENCOUNTER
Patient called requesting refill for Tamsulosin 0.4mg. Patient made aware medication was refilled on 10/23/23 for 90 with 1 refills to [facility]. Patient instructed to contact the pharmacy to obtain refills of medication. Patient verbalized understanding.

## 2024-02-12 ENCOUNTER — DOCUMENTATION (OUTPATIENT)
Dept: HEMATOLOGY ONCOLOGY | Facility: CLINIC | Age: 74
End: 2024-02-12

## 2024-02-12 NOTE — PROGRESS NOTES
Rcvd request from [facility] for additional funding.  Patient has been re-enrolled with [facility] ID# [id number]  CARD# [id number]  BIN# [id number]  PCN# [id number]  [facility]# [id number]  EFF 1-25-24   THRU 1-24-25

## 2024-02-26 ENCOUNTER — OFFICE VISIT (OUTPATIENT)
Dept: URGENT CARE | Facility: CLINIC | Age: 74
End: 2024-02-26
Payer: COMMERCIAL

## 2024-02-26 VITALS
SYSTOLIC BLOOD PRESSURE: 148 MMHG | BODY MASS INDEX: 23.37 KG/M2 | WEIGHT: 192 LBS | HEART RATE: 87 BPM | DIASTOLIC BLOOD PRESSURE: 82 MMHG | OXYGEN SATURATION: 97 % | TEMPERATURE: 96.6 F | RESPIRATION RATE: 14 BRPM

## 2024-02-26 DIAGNOSIS — H10.9 CONJUNCTIVITIS OF BOTH EYES, UNSPECIFIED CONJUNCTIVITIS TYPE: Primary | ICD-10-CM

## 2024-02-26 PROCEDURE — 99213 OFFICE O/P EST LOW 20 MIN: CPT | Performed by: PHYSICIAN ASSISTANT

## 2024-02-26 RX ORDER — OFLOXACIN 3 MG/ML
1 SOLUTION/ DROPS OPHTHALMIC 4 TIMES DAILY
Qty: 5 ML | Refills: 0 | Status: SHIPPED | OUTPATIENT
Start: 2024-02-26

## 2024-02-26 NOTE — PROGRESS NOTES
[facility]        NAME: Sixto Oakes is a 73 y.o. male  : 1950    MRN: [id number]  DATE: 2024  TIME: 8:43 AM    Assessment and Plan   Conjunctivitis of both eyes, unspecified conjunctivitis type [H10.9]  1. Conjunctivitis of both eyes, unspecified conjunctivitis type  ofloxacin (OCUFLOX) 0.3 % ophthalmic solution            Patient Instructions     Patient Instructions   Conjunctivitis   WHAT YOU NEED TO KNOW:   Conjunctivitis, or pink eye, is inflammation of your conjunctiva. The conjunctiva is a thin tissue that covers the front of your eye and the back of your eyelid. The conjunctiva helps protect your eye and keep it moist. The most common cause of conjunctivitis is infection with bacteria or a virus. Allergies or exposure to a chemical may also cause conjunctivitis. Conjunctivitis is easily spread from person to person.       DISCHARGE INSTRUCTIONS:   Return to the emergency department if:   You have worsening eye pain.    The swelling in your eye gets worse, even after treatment.    Your vision suddenly becomes worse, or you cannot see at all.    Call your doctor if:   Your start to notice changes in your vision.    You develop a fever and ear pain.    You have tiny bumps or spots of blood on your eye.    You have questions or concerns about your condition or care.    Medicines:  You may need any of the following:  Allergy medicine  helps decrease itchy, red, swollen eyes caused by allergies. It may be given as a pill, eye drops, or nasal spray.    Antibiotics  may be needed if your conjunctivitis is caused by bacteria. This medicine may be given as a pill, eye drops, or eye ointment.    Take your medicine as directed.  Contact your healthcare provider if you think your medicine is not helping or if you have side effects. Tell your provider if you are allergic to any medicine. Keep a list of the medicines, vitamins, and herbs you take. Include the amounts, and when and why you  take them. Bring the list or the pill bottles to follow-up visits. Carry your medicine list with you in case of an emergency.    Manage your symptoms:   Apply a cool compress.  Wet a washcloth with cold water and place it on your eye. This will help decrease itching and irritation.    Use artificial tears.  This will help lessen your symptoms, including itching or irritation.    Do not wear contact lenses  until treatment is complete and your symptoms are gone.    Flush your eye.  You may need to flush your eye with saline to help decrease your symptoms. Ask for more information on how to flush your eye.    Prevent the spread of conjunctivitis:   Wash your hands with soap and water often.  Wash your hands before and after you touch your eyes. Wash your hands after you use the bathroom, change a child's diaper, or sneeze. Wash your hands before you prepare or eat food.         Avoid contact with others.  Do not share towels or washcloths. Try to stay away from others as much as possible. Ask when you can return to work or school.    Avoid allergens and irritants.  Try to avoid the things that cause your allergies, such as pets, dust, or grass. Stay away from smoke filled areas. Shield your eyes from wind and sun.    Throw away eye makeup.  Bacteria can stay in eye makeup. Throw away your current mascara and other eye makeup. Never share mascara or other eye makeup with anyone.    Follow up with your doctor as directed:  You may be referred to an ophthalmologist for treatment. Write down your questions so you remember to ask them during your visits.  © Copyright [employer] 2023 Information is for End User's use only and may not be sold, redistributed or otherwise used for commercial purposes.  The above information is an  only. It is not intended as medical advice for individual conditions or treatments. Talk to your doctor, nurse or pharmacist before following any medical regimen to see if it is safe and  effective for you.        Follow up with PCP in 3-5 days.  Proceed to  ER if symptoms worsen.    Chief Complaint     Chief Complaint   Patient presents with    Eye Problem     Pt presents with eye discharge         History of Present Illness       The pt is a 73-year-old male presenting today for bilateral eye discharge and erythema of the conjunctiva.  He reports that he was exposed to conjunctivitis by his grandkids.  He admits itching but denies pain.        Review of Systems   Review of Systems   Constitutional:  Negative for activity change, appetite change, chills, diaphoresis and fever.   Eyes:  Positive for discharge, redness and itching. Negative for pain and visual disturbance.   Respiratory:  Negative for cough, chest tightness and shortness of breath.    Cardiovascular:  Negative for chest pain and palpitations.   Gastrointestinal:  Negative for abdominal pain, diarrhea, nausea and vomiting.   Genitourinary:  Negative for dysuria and hematuria.   Musculoskeletal:  Negative for arthralgias, back pain and myalgias.   Skin:  Negative for color change, pallor and rash.   Neurological:  Negative for seizures, syncope and headaches.   All other systems reviewed and are negative.        Current Medications       Current Outpatient Medications:     albuterol (Ventolin HFA) 90 mcg/act inhaler, Inhale 2 puffs every 4 (four) hours as needed for wheezing or shortness of breath, Disp: 18 g, Rfl: 5    ascorbic acid (VITAMIN C) 500 mg tablet, Take 500 mg by mouth every morning., Disp: , Rfl:     b complex vitamins tablet, Take 1 tablet by mouth every morning., Disp: , Rfl:     cetirizine (ZyrTEC) 10 mg tablet, Take 1 tablet (10 mg total) by mouth daily, Disp: 30 tablet, Rfl: 3    desoximetasone (TOPICORT) 0.25 % cream, Apply 1 Application topically 2 (two) times a day, Disp: 100 g, Rfl: 2    montelukast (SINGULAIR) 10 mg tablet, Take 1 tablet (10 mg total) by mouth daily at bedtime, Disp: 30 tablet, Rfl: 3     multivitamin (THERAGRAN) TABS, Take 1 tablet by mouth every morning., Disp: , Rfl:     ofloxacin (OCUFLOX) 0.3 % ophthalmic solution, Administer 1 drop to both eyes 4 (four) times a day, Disp: 5 mL, Rfl: 0    Promacta 50 MG tablet, Take 1 tablet (50 mg total) by mouth daily. Administer on an empty stomach, 1 hour before or 2 hours after a meal., Disp: 30 tablet, Rfl: 10    sildenafil (VIAGRA) 100 mg tablet, Take 1 tablet (100 mg total) by mouth daily as needed for erectile dysfunction, Disp: 10 tablet, Rfl: 3    tamsulosin (FLOMAX) 0.4 mg, TAKE 1 CAPSULE DAILY WITH DINNER, Disp: 90 capsule, Rfl: 1    atorvastatin (LIPITOR) 40 mg tablet, TAKE 1 TABLET DAILY AT BEDTIME (Patient not taking: Reported on 11/12/2023), Disp: 90 tablet, Rfl: 1    benzonatate (TESSALON) 200 MG capsule, Take 1 capsule (200 mg total) by mouth 3 (three) times a day as needed for cough (Patient not taking: Reported on 11/12/2023), Disp: 20 capsule, Rfl: 0    cholecalciferol (VITAMIN D3) 1,000 units tablet, Take 1,000 Units by mouth every morning (Patient not taking: Reported on 11/12/2023), Disp: , Rfl:     desoximetasone (TOPICORT) 0.25 % cream, Apply 1 Application topically 2 (two) times a day (Patient not taking: Reported on 11/12/2023), Disp: 100 g, Rfl: 0    Current Allergies     Allergies as of 02/26/2024 - Reviewed 02/26/2024   Allergen Reaction Noted    Dust mite extract Sneezing 08/30/2018            The following portions of the patient's history were reviewed and updated as appropriate: allergies, current medications, past family history, past medical history, past social history, past surgical history and problem list.     Past Medical History:   Diagnosis Date    COVID-19 12/15/2021    Hypertension     Hx. No meds currently. Cardiology stopped HTN meds    Thrombocytopenia (HCC)     platelet count maintained around 90-Dr. Archie Taylor    Wears partial dentures     upper       Past Surgical History:   Procedure Laterality Date     APPENDECTOMY      COLONOSCOPY      COLONOSCOPY N/A 10/17/2018    Procedure: COLONOSCOPY;  Surgeon: Michael Simms MD;  Location: [facility] GI LAB;  Service: Gastroenterology    HERNIA REPAIR Right 2013    inguinal    JOINT REPLACEMENT Right     knee, left shoulder    KNEE SURGERY Left     Had left knee surgery for football injury    UT XCAPSL CTRC RMVL INSJ IO LENS PROSTH W/O ECP Left 05/04/2016    Procedure: EXTRACTION EXTRACAPSULAR CATARACT PHACO INTRAOCULAR LENS (IOL);  Surgeon: Sixto Bucio MD;  Location: [facility] MAIN OR;  Service: Ophthalmology    UT XCAPSL CTRC RMVL INSJ IO LENS PROSTH W/O ECP Right 5/22/2023    Procedure: EXTRACTION EXTRACAPSULAR CATARACT PHACO INTRAOCULAR LENS (IOL);  Surgeon: Sixto Bucio MD;  Location: [facility] MAIN OR;  Service: Ophthalmology    SHOULDER ARTHROSCOPY Left     tendon repair    TONSILLECTOMY  age 2       Family History   Problem Relation Age of Onset    Heart disease Father 72        massive MI    Heart disease Sister         MI    Heart disease Brother         MI    No Known Problems Mother     Mental illness Neg Hx          Medications have been verified.        Objective   /82   Pulse 87   Temp (!) 96.6 °F (35.9 °C)   Resp 14   Wt 87.1 kg (192 lb)   SpO2 97%   BMI 23.37 kg/m²        Physical Exam     Physical Exam  Vitals and nursing note reviewed.   Constitutional:       General: He is not in acute distress.     Appearance: Normal appearance. He is normal weight. He is not ill-appearing, toxic-appearing or diaphoretic.   HENT:      Head: Normocephalic and atraumatic.      Nose: Nose normal. No congestion or rhinorrhea.      Mouth/Throat:      Mouth: Mucous membranes are moist.      Pharynx: Oropharynx is clear. No oropharyngeal exudate or posterior oropharyngeal erythema.   Eyes:      General:         Right eye: No discharge.         Left eye: No discharge.      Conjunctiva/sclera:      Right eye: Right conjunctiva is injected.      Left eye: Left conjunctiva is  injected.   Cardiovascular:      Rate and Rhythm: Normal rate and regular rhythm.      Heart sounds: Normal heart sounds. No murmur heard.     No friction rub. No gallop.   Pulmonary:      Effort: Pulmonary effort is normal. No respiratory distress.      Breath sounds: Normal breath sounds. No stridor. No wheezing, rhonchi or rales.   Chest:      Chest wall: No tenderness.   Musculoskeletal:      Cervical back: Normal range of motion.   Lymphadenopathy:      Cervical: No cervical adenopathy.   Skin:     General: Skin is warm and dry.      Capillary Refill: Capillary refill takes less than 2 seconds.   Neurological:      Mental Status: He is alert.

## 2024-03-20 ENCOUNTER — OFFICE VISIT (OUTPATIENT)
Dept: FAMILY MEDICINE CLINIC | Facility: CLINIC | Age: 74
End: 2024-03-20
Payer: COMMERCIAL

## 2024-03-20 VITALS
BODY MASS INDEX: 30.8 KG/M2 | WEIGHT: 253 LBS | HEART RATE: 69 BPM | RESPIRATION RATE: 16 BRPM | DIASTOLIC BLOOD PRESSURE: 70 MMHG | TEMPERATURE: 98.5 F | SYSTOLIC BLOOD PRESSURE: 124 MMHG

## 2024-03-20 DIAGNOSIS — M79.2 NEURITIS: Primary | ICD-10-CM

## 2024-03-20 PROCEDURE — 99213 OFFICE O/P EST LOW 20 MIN: CPT | Performed by: FAMILY MEDICINE

## 2024-03-20 PROCEDURE — G2211 COMPLEX E/M VISIT ADD ON: HCPCS | Performed by: FAMILY MEDICINE

## 2024-03-20 NOTE — PROGRESS NOTES
Assessment/Plan:    1. Neuritis  -     EMG 1 Limb; Future            There are no Patient Instructions on file for this visit.    No follow-ups on file.    Subjective:      Patient ID: Sixto Oakes is a 73 y.o. male.    Chief Complaint   Patient presents with   • Hand Pain     Right hand              sas/cma       Pt states the first three diguits on rt hand tingle and bunr and states they burn tingle and wake him up  If he wakes up and flapps them it goes away.  If he lies down it comes back.  Happens when he he does work on tools.          The following portions of the patient's history were reviewed and updated as appropriate: allergies, current medications, past family history, past medical history, past social history, past surgical history and problem list.    Review of Systems   Constitutional:  Negative for activity change, appetite change, chills, diaphoresis, fatigue, fever and unexpected weight change.   HENT:  Negative for congestion, dental problem, ear pain, mouth sores, sinus pressure, sinus pain, sore throat and trouble swallowing.    Eyes:  Negative for photophobia, discharge and itching.   Respiratory:  Negative for apnea, chest tightness and shortness of breath.    Cardiovascular:  Negative for chest pain, palpitations and leg swelling.   Gastrointestinal:  Negative for abdominal distention, abdominal pain, blood in stool, nausea and vomiting.   Endocrine: Negative for cold intolerance, heat intolerance, polydipsia, polyphagia and polyuria.   Genitourinary:  Negative for difficulty urinating.   Musculoskeletal:  Negative for arthralgias.   Skin:  Negative for color change and wound.   Neurological:  Positive for numbness. Negative for dizziness, syncope, speech difficulty and headaches.   Hematological:  Negative for adenopathy.   Psychiatric/Behavioral:  Negative for agitation and behavioral problems.          Current Outpatient Medications   Medication Sig Dispense Refill   • albuterol  (Ventolin HFA) 90 mcg/act inhaler Inhale 2 puffs every 4 (four) hours as needed for wheezing or shortness of breath 18 g 5   • ascorbic acid (VITAMIN C) 500 mg tablet Take 500 mg by mouth every morning.     • b complex vitamins tablet Take 1 tablet by mouth every morning.     • cholecalciferol (VITAMIN D3) 1,000 units tablet Take 1,000 Units by mouth every morning     • desoximetasone (TOPICORT) 0.25 % cream Apply 1 Application topically 2 (two) times a day 100 g 0   • desoximetasone (TOPICORT) 0.25 % cream Apply 1 Application topically 2 (two) times a day 100 g 2   • montelukast (SINGULAIR) 10 mg tablet Take 1 tablet (10 mg total) by mouth daily at bedtime 30 tablet 3   • multivitamin (THERAGRAN) TABS Take 1 tablet by mouth every morning.     • ofloxacin (OCUFLOX) 0.3 % ophthalmic solution Administer 1 drop to both eyes 4 (four) times a day 5 mL 0   • Promacta 50 MG tablet Take 1 tablet (50 mg total) by mouth daily. Administer on an empty stomach, 1 hour before or 2 hours after a meal. 30 tablet 10   • sildenafil (VIAGRA) 100 mg tablet Take 1 tablet (100 mg total) by mouth daily as needed for erectile dysfunction 10 tablet 3   • tamsulosin (FLOMAX) 0.4 mg TAKE 1 CAPSULE DAILY WITH DINNER 90 capsule 1     No current facility-administered medications for this visit.       Objective:    /70   Pulse 69   Temp 98.5 °F (36.9 °C)   Resp 16   Wt 115 kg (253 lb)   BMI 30.80 kg/m²        Physical Exam  Musculoskeletal:      Comments: Thenar wasting Frank Lombardi,

## 2024-05-01 ENCOUNTER — TELEPHONE (OUTPATIENT)
Dept: HEMATOLOGY ONCOLOGY | Facility: MEDICAL CENTER | Age: 74
End: 2024-05-01

## 2024-05-01 ENCOUNTER — APPOINTMENT (OUTPATIENT)
Dept: LAB | Facility: HOSPITAL | Age: 74
End: 2024-05-01
Payer: COMMERCIAL

## 2024-05-01 DIAGNOSIS — D69.6 THROMBOCYTOPENIA (HCC): Primary | ICD-10-CM

## 2024-05-01 DIAGNOSIS — Z86.2 HISTORY OF ITP: ICD-10-CM

## 2024-05-01 LAB
BASOPHILS # BLD AUTO: 0.06 THOUSANDS/ÂΜL (ref 0–0.1)
BASOPHILS NFR BLD AUTO: 1 % (ref 0–1)
EOSINOPHIL # BLD AUTO: 0 THOUSAND/ÂΜL (ref 0–0.61)
EOSINOPHIL NFR BLD AUTO: 0 % (ref 0–6)
ERYTHROCYTE [DISTWIDTH] IN BLOOD BY AUTOMATED COUNT: 14.6 % (ref 11.6–15.1)
HCT VFR BLD AUTO: 45.3 % (ref 36.5–49.3)
HGB BLD-MCNC: 15.2 G/DL (ref 12–17)
IMM GRANULOCYTES # BLD AUTO: 0.03 THOUSAND/UL (ref 0–0.2)
IMM GRANULOCYTES NFR BLD AUTO: 0 % (ref 0–2)
LYMPHOCYTES # BLD AUTO: 1.58 THOUSANDS/ÂΜL (ref 0.6–4.47)
LYMPHOCYTES NFR BLD AUTO: 21 % (ref 14–44)
MCH RBC QN AUTO: 29.4 PG (ref 26.8–34.3)
MCHC RBC AUTO-ENTMCNC: 33.6 G/DL (ref 31.4–37.4)
MCV RBC AUTO: 88 FL (ref 82–98)
MONOCYTES # BLD AUTO: 0.6 THOUSAND/ÂΜL (ref 0.17–1.22)
MONOCYTES NFR BLD AUTO: 8 % (ref 4–12)
NEUTROPHILS # BLD AUTO: 5.21 THOUSANDS/ÂΜL (ref 1.85–7.62)
NEUTS SEG NFR BLD AUTO: 70 % (ref 43–75)
NRBC BLD AUTO-RTO: 0 /100 WBCS
PLATELET # BLD AUTO: 9 THOUSANDS/UL (ref 149–390)
PLATELET BLD QL SMEAR: ABNORMAL
RBC # BLD AUTO: 5.17 MILLION/UL (ref 3.88–5.62)
RBC MORPH BLD: NORMAL
WBC # BLD AUTO: 7.48 THOUSAND/UL (ref 4.31–10.16)

## 2024-05-01 PROCEDURE — 36415 COLL VENOUS BLD VENIPUNCTURE: CPT

## 2024-05-01 PROCEDURE — 85025 COMPLETE CBC W/AUTO DIFF WBC: CPT

## 2024-05-01 NOTE — TELEPHONE ENCOUNTER
Plt 9,000  Patient on promacta 50 mg daily  Denies active bleeding  D/W Dr Taylor  Recheck CBC in one week  Patient verbalizes understanding  Thrombocytopenic precautions reviewed

## 2024-05-07 ENCOUNTER — APPOINTMENT (OUTPATIENT)
Dept: LAB | Facility: HOSPITAL | Age: 74
End: 2024-05-07
Payer: COMMERCIAL

## 2024-05-07 DIAGNOSIS — Z86.2 HISTORY OF ITP: ICD-10-CM

## 2024-05-07 LAB
BASOPHILS # BLD AUTO: 0.09 THOUSANDS/ÂΜL (ref 0–0.1)
BASOPHILS NFR BLD AUTO: 1 % (ref 0–1)
EOSINOPHIL # BLD AUTO: 0 THOUSAND/ÂΜL (ref 0–0.61)
EOSINOPHIL NFR BLD AUTO: 0 % (ref 0–6)
ERYTHROCYTE [DISTWIDTH] IN BLOOD BY AUTOMATED COUNT: 14.8 % (ref 11.6–15.1)
HCT VFR BLD AUTO: 46.3 % (ref 36.5–49.3)
HGB BLD-MCNC: 15.5 G/DL (ref 12–17)
IMM GRANULOCYTES # BLD AUTO: 0.04 THOUSAND/UL (ref 0–0.2)
IMM GRANULOCYTES NFR BLD AUTO: 1 % (ref 0–2)
LYMPHOCYTES # BLD AUTO: 1.75 THOUSANDS/ÂΜL (ref 0.6–4.47)
LYMPHOCYTES NFR BLD AUTO: 22 % (ref 14–44)
MCH RBC QN AUTO: 29.6 PG (ref 26.8–34.3)
MCHC RBC AUTO-ENTMCNC: 33.5 G/DL (ref 31.4–37.4)
MCV RBC AUTO: 89 FL (ref 82–98)
MONOCYTES # BLD AUTO: 0.75 THOUSAND/ÂΜL (ref 0.17–1.22)
MONOCYTES NFR BLD AUTO: 10 % (ref 4–12)
NEUTROPHILS # BLD AUTO: 5.17 THOUSANDS/ÂΜL (ref 1.85–7.62)
NEUTS SEG NFR BLD AUTO: 66 % (ref 43–75)
NRBC BLD AUTO-RTO: 0 /100 WBCS
PLATELET # BLD AUTO: 57 THOUSANDS/UL (ref 149–390)
PMV BLD AUTO: 13 FL (ref 8.9–12.7)
RBC # BLD AUTO: 5.23 MILLION/UL (ref 3.88–5.62)
WBC # BLD AUTO: 7.8 THOUSAND/UL (ref 4.31–10.16)

## 2024-05-07 PROCEDURE — 36415 COLL VENOUS BLD VENIPUNCTURE: CPT

## 2024-05-07 PROCEDURE — 85025 COMPLETE CBC W/AUTO DIFF WBC: CPT

## 2024-05-13 ENCOUNTER — TELEPHONE (OUTPATIENT)
Dept: HEMATOLOGY ONCOLOGY | Facility: MEDICAL CENTER | Age: 74
End: 2024-05-13

## 2024-05-13 DIAGNOSIS — R39.9 LOWER URINARY TRACT SYMPTOMS (LUTS): ICD-10-CM

## 2024-05-14 RX ORDER — TAMSULOSIN HYDROCHLORIDE 0.4 MG/1
CAPSULE ORAL
Qty: 90 CAPSULE | Refills: 1 | Status: SHIPPED | OUTPATIENT
Start: 2024-05-14

## 2024-08-05 ENCOUNTER — APPOINTMENT (OUTPATIENT)
Dept: LAB | Facility: HOSPITAL | Age: 74
End: 2024-08-05
Attending: INTERNAL MEDICINE
Payer: COMMERCIAL

## 2024-08-05 ENCOUNTER — TELEPHONE (OUTPATIENT)
Dept: HEMATOLOGY ONCOLOGY | Facility: MEDICAL CENTER | Age: 74
End: 2024-08-05

## 2024-08-05 DIAGNOSIS — Z86.2 HISTORY OF ITP: ICD-10-CM

## 2024-08-05 LAB
BASOPHILS # BLD AUTO: 0.06 THOUSANDS/ÂΜL (ref 0–0.1)
BASOPHILS NFR BLD AUTO: 1 % (ref 0–1)
EOSINOPHIL # BLD AUTO: 0.04 THOUSAND/ÂΜL (ref 0–0.61)
EOSINOPHIL NFR BLD AUTO: 1 % (ref 0–6)
ERYTHROCYTE [DISTWIDTH] IN BLOOD BY AUTOMATED COUNT: 13.8 % (ref 11.6–15.1)
HCT VFR BLD AUTO: 45.4 % (ref 36.5–49.3)
HGB BLD-MCNC: 15.1 G/DL (ref 12–17)
IMM GRANULOCYTES # BLD AUTO: 0.01 THOUSAND/UL (ref 0–0.2)
IMM GRANULOCYTES NFR BLD AUTO: 0 % (ref 0–2)
LYMPHOCYTES # BLD AUTO: 1.62 THOUSANDS/ÂΜL (ref 0.6–4.47)
LYMPHOCYTES NFR BLD AUTO: 22 % (ref 14–44)
MCH RBC QN AUTO: 29.6 PG (ref 26.8–34.3)
MCHC RBC AUTO-ENTMCNC: 33.3 G/DL (ref 31.4–37.4)
MCV RBC AUTO: 89 FL (ref 82–98)
MONOCYTES # BLD AUTO: 0.68 THOUSAND/ÂΜL (ref 0.17–1.22)
MONOCYTES NFR BLD AUTO: 9 % (ref 4–12)
NEUTROPHILS # BLD AUTO: 4.85 THOUSANDS/ÂΜL (ref 1.85–7.62)
NEUTS SEG NFR BLD AUTO: 67 % (ref 43–75)
NRBC BLD AUTO-RTO: 0 /100 WBCS
PLATELET # BLD AUTO: 20 THOUSANDS/UL (ref 149–390)
PLATELET BLD QL SMEAR: ABNORMAL
RBC # BLD AUTO: 5.1 MILLION/UL (ref 3.88–5.62)
RBC MORPH BLD: NORMAL
WBC # BLD AUTO: 7.26 THOUSAND/UL (ref 4.31–10.16)

## 2024-08-05 PROCEDURE — 85025 COMPLETE CBC W/AUTO DIFF WBC: CPT

## 2024-08-05 PROCEDURE — 36415 COLL VENOUS BLD VENIPUNCTURE: CPT

## 2024-08-05 NOTE — TELEPHONE ENCOUNTER
Plt 20,000  Spoke to patient  Patient with no signs of active bleeding  Is on promacta 50 mg  Pulse decadron has worked in the past   Will d/w Dr Kaur   Patient aware of plan    Decadron 20mg daily times 4 days sent to pharmacy per Dr Kaur  Voicemail left for patient to inform

## 2024-08-05 NOTE — TELEPHONE ENCOUNTER
Spoke with patient informing them that Dr. Taylor had a medical emergency and is on medical leave.  Indicated that accommodations are being made, and that a locum doctor will be coming in to cover towards end of August.  However, locum has not been integrated yet, so scheduling unavailable.  Informed them we would call back in 1-2 weeks once scheduling became available, and detail new appointment.  Patient verbalized understanding, will be added to [facility] [employer] pool on 8/14.    Patient asked for Margie Hartman to contact them about prednisone and low platelet count - indicated I would relay message to nurse.

## 2024-08-06 DIAGNOSIS — D69.6 THROMBOCYTOPENIA (HCC): Primary | ICD-10-CM

## 2024-08-06 RX ORDER — DEXAMETHASONE 4 MG/1
TABLET ORAL
Qty: 20 TABLET | Refills: 0 | Status: SHIPPED | OUTPATIENT
Start: 2024-08-06

## 2024-08-12 ENCOUNTER — APPOINTMENT (OUTPATIENT)
Dept: LAB | Facility: HOSPITAL | Age: 74
End: 2024-08-12
Attending: INTERNAL MEDICINE
Payer: COMMERCIAL

## 2024-08-12 DIAGNOSIS — Z86.2 HISTORY OF ITP: ICD-10-CM

## 2024-08-12 LAB
BASOPHILS # BLD AUTO: 0.04 THOUSANDS/ÂΜL (ref 0–0.1)
BASOPHILS NFR BLD AUTO: 0 % (ref 0–1)
EOSINOPHIL # BLD AUTO: 0.1 THOUSAND/ÂΜL (ref 0–0.61)
EOSINOPHIL NFR BLD AUTO: 1 % (ref 0–6)
ERYTHROCYTE [DISTWIDTH] IN BLOOD BY AUTOMATED COUNT: 14.2 % (ref 11.6–15.1)
HCT VFR BLD AUTO: 46.5 % (ref 36.5–49.3)
HGB BLD-MCNC: 15.4 G/DL (ref 12–17)
IMM GRANULOCYTES # BLD AUTO: 0.05 THOUSAND/UL (ref 0–0.2)
IMM GRANULOCYTES NFR BLD AUTO: 1 % (ref 0–2)
LYMPHOCYTES # BLD AUTO: 2.2 THOUSANDS/ÂΜL (ref 0.6–4.47)
LYMPHOCYTES NFR BLD AUTO: 22 % (ref 14–44)
MCH RBC QN AUTO: 29.6 PG (ref 26.8–34.3)
MCHC RBC AUTO-ENTMCNC: 33.1 G/DL (ref 31.4–37.4)
MCV RBC AUTO: 89 FL (ref 82–98)
MONOCYTES # BLD AUTO: 1.03 THOUSAND/ÂΜL (ref 0.17–1.22)
MONOCYTES NFR BLD AUTO: 10 % (ref 4–12)
NEUTROPHILS # BLD AUTO: 6.54 THOUSANDS/ÂΜL (ref 1.85–7.62)
NEUTS SEG NFR BLD AUTO: 66 % (ref 43–75)
NRBC BLD AUTO-RTO: 0 /100 WBCS
PLATELET # BLD AUTO: 145 THOUSANDS/UL (ref 149–390)
PMV BLD AUTO: 12.2 FL (ref 8.9–12.7)
RBC # BLD AUTO: 5.21 MILLION/UL (ref 3.88–5.62)
WBC # BLD AUTO: 9.96 THOUSAND/UL (ref 4.31–10.16)

## 2024-08-12 PROCEDURE — 85025 COMPLETE CBC W/AUTO DIFF WBC: CPT

## 2024-08-12 PROCEDURE — 36415 COLL VENOUS BLD VENIPUNCTURE: CPT

## 2024-08-13 ENCOUNTER — TELEPHONE (OUTPATIENT)
Dept: HEMATOLOGY ONCOLOGY | Facility: MEDICAL CENTER | Age: 74
End: 2024-08-13

## 2024-08-13 NOTE — TELEPHONE ENCOUNTER
Patient states he has had intermittent dizziness since starting decadron  Patient last dose was 8/10/2024  Patient will monitor and call if symptom do not resolve or worsen as therapy with decadron is complete

## 2024-08-13 NOTE — TELEPHONE ENCOUNTER
Patient would like to ask Margie a question about the medication that he was given.    Please call him back at [phone]

## 2024-08-23 ENCOUNTER — TELEPHONE (OUTPATIENT)
Dept: HEMATOLOGY ONCOLOGY | Facility: MEDICAL CENTER | Age: 74
End: 2024-08-23

## 2024-08-23 NOTE — TELEPHONE ENCOUNTER
Left voice mail explaining that Dr. Mcclure had family emergency, and would have her schedule delayed several weeks.  Indicated Dr. Pollock, another locum, was also hired in the wake, so both will be covering, and we will definitely have availabilities for patients, just that the schedule would be opening up in September (and is still being determined).  Indicated we would call back once that was determined.  Provided [facility] callback number, and indicated appointment for Tuesday would be rescheduled.

## 2024-08-28 ENCOUNTER — TELEPHONE (OUTPATIENT)
Dept: HEMATOLOGY ONCOLOGY | Facility: MEDICAL CENTER | Age: 74
End: 2024-08-28

## 2024-08-28 NOTE — TELEPHONE ENCOUNTER
M for patient informing them that Dr. Bender's schedule has opened up at [facility].    New appointment time of 8:40 on 9/05 scheduled. Provided [facility] callback number.

## 2024-09-04 NOTE — PROGRESS NOTES
73-year-old male with relatively good past medical history found to have thrombocytopenia approximately 7 years ago.  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  Because of a recent persistent downward platelet trend with associated excessive bruising, patient was started on Promacta.  Because of side effects, the dose has been manipulated a number of times.       Patient is presently on Promacta 50 mg a day, tolerating it, no severe arthritic complaints.  The platelet count is trended below, good/acceptable.  The plan is to continue the Promacta at the same dose.  Patient is to return in 6 months; CBC is drawn every 3 months.     When patient received the COVID vaccine in 2022, his platelet count dropped significantly (< 10,000).  It is difficult to connect the 2 but patient is very reluctant to receive any boosters.  Patient subsequently contracted COVID - uncomplicated illness.       8/12/2024 - plt 145. At that time he was on steroids, 5 tabs of 5mg, and he stopped it bse of various side effects. He doe3s noty want to recheck BCB less than 2 months form now. Denied bleeding when brushing teeth, blood in urine or stool, bruising. Still taking 50 mg of Promacta.    PE  Looks wee  Neuro intact\  No bruises, petechia.  Lungs clear  RRR    P. Repeat CBC in 2 months and visit  Hge is very opposed to any treatmetn for low plt as long as it does nt0 bother him. Contniue Promacta.

## 2024-09-05 ENCOUNTER — OFFICE VISIT (OUTPATIENT)
Dept: HEMATOLOGY ONCOLOGY | Facility: MEDICAL CENTER | Age: 74
End: 2024-09-05
Payer: COMMERCIAL

## 2024-09-05 VITALS
HEIGHT: 76 IN | HEART RATE: 70 BPM | SYSTOLIC BLOOD PRESSURE: 130 MMHG | TEMPERATURE: 98 F | WEIGHT: 245 LBS | BODY MASS INDEX: 29.83 KG/M2 | OXYGEN SATURATION: 98 % | RESPIRATION RATE: 15 BRPM | DIASTOLIC BLOOD PRESSURE: 68 MMHG

## 2024-09-05 DIAGNOSIS — D69.3 CHRONIC ITP (IDIOPATHIC THROMBOCYTOPENIA) (HCC): ICD-10-CM

## 2024-09-05 DIAGNOSIS — D69.6 THROMBOCYTOPENIA (HCC): Primary | ICD-10-CM

## 2024-09-05 PROCEDURE — 99213 OFFICE O/P EST LOW 20 MIN: CPT | Performed by: INTERNAL MEDICINE

## 2024-09-05 PROCEDURE — 1159F MED LIST DOCD IN RCRD: CPT | Performed by: INTERNAL MEDICINE

## 2024-10-18 DIAGNOSIS — D69.6 THROMBOCYTOPENIA (HCC): ICD-10-CM

## 2024-10-21 RX ORDER — ELTROMBOPAG OLAMINE 50 MG/1
TABLET, FILM COATED ORAL
Qty: 30 TABLET | Refills: 5 | Status: SHIPPED | OUTPATIENT
Start: 2024-10-21

## 2024-10-28 ENCOUNTER — APPOINTMENT (OUTPATIENT)
Dept: LAB | Facility: HOSPITAL | Age: 74
End: 2024-10-28
Attending: INTERNAL MEDICINE
Payer: COMMERCIAL

## 2024-10-28 DIAGNOSIS — Z86.2 HISTORY OF ITP: ICD-10-CM

## 2024-10-28 LAB
BASOPHILS # BLD AUTO: 0.09 THOUSANDS/ΜL (ref 0–0.1)
BASOPHILS NFR BLD AUTO: 1 % (ref 0–1)
EOSINOPHIL # BLD AUTO: 0 THOUSAND/ΜL (ref 0–0.61)
EOSINOPHIL NFR BLD AUTO: 0 % (ref 0–6)
ERYTHROCYTE [DISTWIDTH] IN BLOOD BY AUTOMATED COUNT: 14.1 % (ref 11.6–15.1)
HCT VFR BLD AUTO: 46.2 % (ref 36.5–49.3)
HGB BLD-MCNC: 15.2 G/DL (ref 12–17)
IMM GRANULOCYTES # BLD AUTO: 0.03 THOUSAND/UL (ref 0–0.2)
IMM GRANULOCYTES NFR BLD AUTO: 0 % (ref 0–2)
LYMPHOCYTES # BLD AUTO: 1.48 THOUSANDS/ΜL (ref 0.6–4.47)
LYMPHOCYTES NFR BLD AUTO: 18 % (ref 14–44)
MCH RBC QN AUTO: 29.4 PG (ref 26.8–34.3)
MCHC RBC AUTO-ENTMCNC: 32.9 G/DL (ref 31.4–37.4)
MCV RBC AUTO: 89 FL (ref 82–98)
MONOCYTES # BLD AUTO: 0.9 THOUSAND/ΜL (ref 0.17–1.22)
MONOCYTES NFR BLD AUTO: 11 % (ref 4–12)
NEUTROPHILS # BLD AUTO: 5.7 THOUSANDS/ΜL (ref 1.85–7.62)
NEUTS SEG NFR BLD AUTO: 70 % (ref 43–75)
NRBC BLD AUTO-RTO: 0 /100 WBCS
PLATELET # BLD AUTO: 47 THOUSANDS/UL (ref 149–390)
PLATELET BLD QL SMEAR: ABNORMAL
PMV BLD AUTO: 13.1 FL (ref 8.9–12.7)
RBC # BLD AUTO: 5.17 MILLION/UL (ref 3.88–5.62)
RBC MORPH BLD: NORMAL
WBC # BLD AUTO: 8.2 THOUSAND/UL (ref 4.31–10.16)

## 2024-10-28 PROCEDURE — 36415 COLL VENOUS BLD VENIPUNCTURE: CPT

## 2024-10-28 PROCEDURE — 85025 COMPLETE CBC W/AUTO DIFF WBC: CPT

## 2024-11-05 DIAGNOSIS — R21 RASH: ICD-10-CM

## 2024-11-06 ENCOUNTER — OFFICE VISIT (OUTPATIENT)
Dept: HEMATOLOGY ONCOLOGY | Facility: MEDICAL CENTER | Age: 74
End: 2024-11-06
Payer: COMMERCIAL

## 2024-11-06 VITALS
OXYGEN SATURATION: 97 % | HEIGHT: 76 IN | RESPIRATION RATE: 15 BRPM | HEART RATE: 70 BPM | WEIGHT: 240 LBS | SYSTOLIC BLOOD PRESSURE: 124 MMHG | BODY MASS INDEX: 29.22 KG/M2 | DIASTOLIC BLOOD PRESSURE: 78 MMHG | TEMPERATURE: 97.6 F

## 2024-11-06 DIAGNOSIS — D69.3 CHRONIC ITP (IDIOPATHIC THROMBOCYTOPENIA) (HCC): Primary | ICD-10-CM

## 2024-11-06 PROCEDURE — 99214 OFFICE O/P EST MOD 30 MIN: CPT | Performed by: INTERNAL MEDICINE

## 2024-11-06 RX ORDER — DESOXIMETASONE 2.5 MG/G
1 CREAM TOPICAL 2 TIMES DAILY
Qty: 100 G | Refills: 2 | Status: SHIPPED | OUTPATIENT
Start: 2024-11-06

## 2024-11-06 NOTE — PROGRESS NOTES
Sixto Oakes  1950  [facility]  [address] [place] [id number]    DISCUSSION/SUMMARY:    74-year-old male with  otherwise good past medical history found to have thrombocytopenia approximately 7+ years ago.  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  Because of a recent persistent downward platelet trend with associated excessive bruising, patient was started on Promacta.  Because of side effects, the dose has been manipulated a number of times.      Patient is presently on Promacta 50 mg a day, tolerating it, no severe arthritic complaints, activities are baseline.  The platelet count is trended below, the most recent value is okay/acceptable.  Patient understands that the platelet count (as it has done in the past) will bounce up and down and that it is important for him just to continue to monitor for excessive bruising or bleeding.    At this time, patient is to return in 6 months; CBC is drawn every 2 months.    When patient received the COVID vaccine in 2022, his platelet count dropped significantly (< 10,000).  It is difficult to connect the 2 but patient is very reluctant to receive any boosters.  Patient subsequently contracted COVID - uncomplicated illness.  The plan is to hold off on any COVID booster shots.    Routine health maintenance medical care is up-to-date.      Mr. Oakes knows to call the hematology/oncology office if there are any other questions or concerns or if he is in need of any invasive procedure or surgery.    Carefully review your medication list and verify that the list is accurate and up-to-date. Please call the hematology/oncology office if there are medications missing from the list, medications on the list that you are not currently taking or if there is a dosage or instruction that is different from how you're taking that medication.    Patient goals and areas of care:  Continue with Promacta, 50 mg a  day  Barriers to care:  None  Patient is able to self-care  ______________________________________________________________________________________    Chief Complaint   Patient presents with    Follow-up    ITP on Promacta     History of Present Illness:    74-year-old male previously referred for evaluation of thrombocytopenia.  Mr. Oakes was unaware of any CBC abnormalities up until recently when he needed left shoulder surgery.  Patient has been seen by a number of physicians to trying clarify the etiology for the thrombocytopenia.  Although the specifics are not entirely clear, 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 (previously seen by Dr. Mansfield in [place] many years ago).    Mr. Oakes has been on Promacta, 50 mg a day.  Prior generalized body aches are the same as before.  Patient continues to be very active.  No bruising or bleeding issues.  Routine health maintenance and medical care is up-to-date.  No pending procedures.  No other problems with the Promacta.    Review of Systems   Constitutional: Negative.    HENT: Negative.     Eyes: Negative.    Respiratory: Negative.     Cardiovascular: Negative.    Gastrointestinal: Negative.    Endocrine: Negative.    Genitourinary: Negative.    Musculoskeletal:  Negative for arthralgias.   Skin: Negative.    Allergic/Immunologic: Negative.    Neurological:  Negative for numbness.   Hematological:  Does not bruise/bleed easily.   Psychiatric/Behavioral:  The patient is not nervous/anxious.    All other systems reviewed and are negative.     Patient Active Problem List   Diagnosis    Thrombocytopenia (HCC)    Elevated low-density lipoprotein level    BMI 31.0-31.9,adult    Obesity (BMI 30-39.9)    Essential hypertension    Vasculogenic erectile dysfunction    Primary osteoarthritis of right shoulder    Elevated troponin    PVC (premature ventricular contraction)    Stage 3 chronic kidney disease, unspecified whether stage 3a or 3b CKD (HCC)     History of ITP    Numbness    Lower urinary tract symptoms (LUTS)    Abnormal chest x-ray    Mild intermittent asthma without complication    Chronic ITP (idiopathic thrombocytopenia) (HCC)     Past Medical History:   Diagnosis Date    COVID-19 12/15/2021    Hypertension     Hx. No meds currently. Cardiology stopped HTN meds    Thrombocytopenia (HCC)     platelet count maintained around 90-Dr. Archie Taylor    Wears partial dentures     upper     Past Surgical History:   Procedure Laterality Date    APPENDECTOMY      COLONOSCOPY      COLONOSCOPY N/A 10/17/2018    Procedure: COLONOSCOPY;  Surgeon: Michael Simms MD;  Location: [facility] GI LAB;  Service: Gastroenterology    HERNIA REPAIR Right 2013    inguinal    JOINT REPLACEMENT Right     knee, left shoulder    KNEE SURGERY Left     Had left knee surgery for football injury    OR XCAPSL CTRC RMVL INSJ IO LENS PROSTH W/O ECP Left 05/04/2016    Procedure: EXTRACTION EXTRACAPSULAR CATARACT PHACO INTRAOCULAR LENS (IOL);  Surgeon: Sixto Bucio MD;  Location: [facility] MAIN OR;  Service: Ophthalmology    OR XCAPSL CTRC RMVL INSJ IO LENS PROSTH W/O ECP Right 5/22/2023    Procedure: EXTRACTION EXTRACAPSULAR CATARACT PHACO INTRAOCULAR LENS (IOL);  Surgeon: Sixto Bucio MD;  Location: [facility] MAIN OR;  Service: Ophthalmology    SHOULDER ARTHROSCOPY Left     tendon repair    TONSILLECTOMY  age 2     Family History   Problem Relation Age of Onset    Heart disease Father 72        massive MI    Heart disease Sister         MI    Heart disease Brother         MI    No Known Problems Mother     Mental illness Neg Hx      Social History     Socioeconomic History    Marital status: /Civil Union     Spouse name: Not on file    Number of children: Not on file    Years of education: Not on file    Highest education level: Not on file   Occupational History    Not on file   Tobacco Use    Smoking status: Former     Current packs/day: 0.00     Average packs/day: 0.5 packs/day for 42.0  years (21.0 ttl pk-yrs)     Types: Cigarettes     Start date:      Quit date:      Years since quittin.8     Passive exposure: Past    Smokeless tobacco: Never   Vaping Use    Vaping status: Never Used   Substance and Sexual Activity    Alcohol use: Not Currently    Drug use: Never    Sexual activity: Not on file   Other Topics Concern    Not on file   Social History Narrative    Not on file     Social Determinants of Health     Financial Resource Strain: Not on file   Food Insecurity: Not on file   Transportation Needs: Not on file   Physical Activity: Not on file   Stress: Not on file   Social Connections: Not on file   Intimate Partner Violence: Not on file   Housing Stability: Not on file       Current Outpatient Medications:     albuterol (Ventolin HFA) 90 mcg/act inhaler, Inhale 2 puffs every 4 (four) hours as needed for wheezing or shortness of breath, Disp: 18 g, Rfl: 5    ascorbic acid (VITAMIN C) 500 mg tablet, Take 500 mg by mouth every morning., Disp: , Rfl:     b complex vitamins tablet, Take 1 tablet by mouth every morning., Disp: , Rfl:     cholecalciferol (VITAMIN D3) 1,000 units tablet, Take 1,000 Units by mouth every morning, Disp: , Rfl:     desoximetasone (TOPICORT) 0.25 % cream, Apply 1 Application topically 2 (two) times a day, Disp: 100 g, Rfl: 0    desoximetasone (TOPICORT) 0.25 % cream, Apply 1 Application topically 2 (two) times a day, Disp: 100 g, Rfl: 2    dexamethasone (DECADRON) 4 mg tablet, 5 tablets daily times 4 days, Disp: 20 tablet, Rfl: 0    montelukast (SINGULAIR) 10 mg tablet, Take 1 tablet (10 mg total) by mouth daily at bedtime, Disp: 30 tablet, Rfl: 3    multivitamin (THERAGRAN) TABS, Take 1 tablet by mouth every morning., Disp: , Rfl:     ofloxacin (OCUFLOX) 0.3 % ophthalmic solution, Administer 1 drop to both eyes 4 (four) times a day, Disp: 5 mL, Rfl: 0    Promacta 50 MG tablet, Take 1 tablet (50 mg total) by mouth once daily. Take on an empty stomach, 1 hour  before or 2 hours after a meal., Disp: 30 tablet, Rfl: 5    sildenafil (VIAGRA) 100 mg tablet, Take 1 tablet (100 mg total) by mouth daily as needed for erectile dysfunction, Disp: 10 tablet, Rfl: 3    tamsulosin (FLOMAX) 0.4 mg, TAKE 1 CAPSULE DAILY WITH DINNER, Disp: 90 capsule, Rfl: 1    Allergies   Allergen Reactions    Dust Mite Extract Sneezing     Vitals:    11/06/24 0849   BP: 124/78   Pulse: 70   Resp: 15   Temp: 97.6 °F (36.4 °C)   SpO2: 97%     Physical Exam  Constitutional:       Appearance: He is well-developed.      Comments: Well-nourished male, no respiratory distress   HENT:      Head: Normocephalic and atraumatic.      Right Ear: External ear normal.      Left Ear: External ear normal.      Nose: Nose normal.   Eyes:      Conjunctiva/sclera: Conjunctivae normal.      Pupils: Pupils are equal, round, and reactive to light.   Cardiovascular:      Rate and Rhythm: Normal rate and regular rhythm.      Heart sounds: Normal heart sounds.   Pulmonary:      Effort: Pulmonary effort is normal.      Breath sounds: Normal breath sounds.   Abdominal:      General: Bowel sounds are normal.      Palpations: Abdomen is soft.      Comments: +bowel sounds, nontender, slightly obese, cannot palpate liver or spleen, no rigidity or rebound   Musculoskeletal:         General: Normal range of motion.      Cervical back: Normal range of motion and neck supple.   Skin:     General: Skin is warm.      Comments: Warm, moist, good color, no petechiae or ecchymoses   Neurological:      Mental Status: He is alert and oriented to person, place, and time.      Deep Tendon Reflexes: Reflexes are normal and symmetric.   Psychiatric:         Behavior: Behavior normal.         Thought Content: Thought content normal.         Judgment: Judgment normal.     Extremities:  No lower extremity edema bilaterally, no cords, pulses are 1+  Lymphatics:   No adenopathy in the neck, supraclavicular region, axilla and groin  bilaterally    Labs          Cardiology        Pathology    11/24/2016 direct platelet antibody:  Anti IgG = positive  11/21/2016 platelet antibody IIB/IIIA, IA/IIA, IB/IX and HLA class I were all negative

## 2024-11-12 DIAGNOSIS — R39.9 LOWER URINARY TRACT SYMPTOMS (LUTS): ICD-10-CM

## 2024-11-13 RX ORDER — TAMSULOSIN HYDROCHLORIDE 0.4 MG/1
CAPSULE ORAL
Qty: 90 CAPSULE | Refills: 0 | Status: SHIPPED | OUTPATIENT
Start: 2024-11-13

## 2024-11-18 ENCOUNTER — TELEPHONE (OUTPATIENT)
Age: 74
End: 2024-11-18

## 2024-11-18 NOTE — TELEPHONE ENCOUNTER
The patient called he has a medicare wellness visit on 12/9/24  does he need to get blood work done before the appointment   please advise thank you

## 2024-11-20 DIAGNOSIS — E78.00 ELEVATED LOW-DENSITY LIPOPROTEIN LEVEL: ICD-10-CM

## 2024-11-20 DIAGNOSIS — I10 ESSENTIAL HYPERTENSION: Primary | ICD-10-CM

## 2024-11-29 ENCOUNTER — RA CDI HCC (OUTPATIENT)
Dept: OTHER | Facility: HOSPITAL | Age: 74
End: 2024-11-29

## 2024-12-09 ENCOUNTER — OFFICE VISIT (OUTPATIENT)
Dept: FAMILY MEDICINE CLINIC | Facility: CLINIC | Age: 74
End: 2024-12-09
Payer: COMMERCIAL

## 2024-12-09 VITALS
DIASTOLIC BLOOD PRESSURE: 70 MMHG | TEMPERATURE: 98.2 F | HEART RATE: 72 BPM | SYSTOLIC BLOOD PRESSURE: 122 MMHG | RESPIRATION RATE: 18 BRPM | WEIGHT: 240 LBS | BODY MASS INDEX: 29.22 KG/M2 | HEIGHT: 76 IN

## 2024-12-09 DIAGNOSIS — R39.9 LOWER URINARY TRACT SYMPTOMS (LUTS): ICD-10-CM

## 2024-12-09 DIAGNOSIS — E78.00 ELEVATED LOW-DENSITY LIPOPROTEIN LEVEL: ICD-10-CM

## 2024-12-09 DIAGNOSIS — I10 ESSENTIAL HYPERTENSION: ICD-10-CM

## 2024-12-09 DIAGNOSIS — Z87.891 FORMER SMOKER: ICD-10-CM

## 2024-12-09 DIAGNOSIS — Z12.5 SCREENING FOR PROSTATE CANCER: ICD-10-CM

## 2024-12-09 DIAGNOSIS — Z00.00 MEDICARE ANNUAL WELLNESS VISIT, SUBSEQUENT: Primary | ICD-10-CM

## 2024-12-09 DIAGNOSIS — N18.30 STAGE 3 CHRONIC KIDNEY DISEASE, UNSPECIFIED WHETHER STAGE 3A OR 3B CKD (HCC): ICD-10-CM

## 2024-12-09 DIAGNOSIS — F17.211 CIGARETTE NICOTINE DEPENDENCE IN REMISSION: ICD-10-CM

## 2024-12-09 PROCEDURE — G0439 PPPS, SUBSEQ VISIT: HCPCS | Performed by: FAMILY MEDICINE

## 2024-12-09 NOTE — ASSESSMENT & PLAN NOTE
Orders:    CBC; Future    Comprehensive metabolic panel; Future    Lipid Panel with Direct LDL reflex; Future    PSA, Total Screen; Future

## 2024-12-09 NOTE — PATIENT INSTRUCTIONS
Medicare Preventive Visit Patient Instructions  Thank you for completing your Welcome to Medicare Visit or Medicare Annual Wellness Visit today. Your next wellness visit will be due in one year (12/10/2025).  The screening/preventive services that you may require over the next 5-10 years are detailed below. Some tests may not apply to you based off risk factors and/or age. Screening tests ordered at today's visit but not completed yet may show as past due. Also, please note that scanned in results may not display below.  Preventive Screenings:  Service Recommendations Previous Testing/Comments   Colorectal Cancer Screening  Colonoscopy    Fecal Occult Blood Test (FOBT)/Fecal Immunochemical Test (FIT)  Fecal DNA/Cologuard Test  Flexible Sigmoidoscopy Age: 45-75 years old   Colonoscopy: every 10 years (May be performed more frequently if at higher risk)  OR  FOBT/FIT: every 1 year  OR  Cologuard: every 3 years  OR  Sigmoidoscopy: every 5 years  Screening may be recommended earlier than age 45 if at higher risk for colorectal cancer. Also, an individualized decision between you and your healthcare provider will decide whether screening between the ages of 76-85 would be appropriate. Colonoscopy: 10/14/2021  FOBT/FIT: Not on file  Cologuard: Not on file  Sigmoidoscopy: Not on file          Prostate Cancer Screening Individualized decision between patient and health care provider in men between ages of 55-69   Medicare will cover every 12 months beginning on the day after your 50th birthday PSA: 0.59 ng/mL     Screening Not Indicated     Hepatitis C Screening Once for adults born between 1945 and 1965  More frequently in patients at high risk for Hepatitis C Hep C Antibody: 04/30/2019    Screening Current   Diabetes Screening 1-2 times per year if you're at risk for diabetes or have pre-diabetes Fasting glucose: 96 mg/dL (8/31/2023)  A1C: No results in last 5 years (No results in last 5 years)      Cholesterol Screening  Once every 5 years if you don't have a lipid disorder. May order more often based on risk factors. Lipid panel: 08/31/2023  Screening Current      Other Preventive Screenings Covered by Medicare:  Abdominal Aortic Aneurysm (AAA) Screening: covered once if your at risk. You're considered to be at risk if you have a family history of AAA or a male between the age of 65-75 who smoking at least 100 cigarettes in your lifetime.  Lung Cancer Screening: covers low dose CT scan once per year if you meet all of the following conditions: (1) Age 55-77; (2) No signs or symptoms of lung cancer; (3) Current smoker or have quit smoking within the last 15 years; (4) You have a tobacco smoking history of at least 20 pack years (packs per day x number of years you smoked); (5) You get a written order from a healthcare provider.  Glaucoma Screening: covered annually if you're considered high risk: (1) You have diabetes OR (2) Family history of glaucoma OR (3)  aged 50 and older OR (4)  American aged 65 and older  Osteoporosis Screening: covered every 2 years if you meet one of the following conditions: (1) Have a vertebral abnormality; (2) On glucocorticoid therapy for more than 3 months; (3) Have primary hyperparathyroidism; (4) On osteoporosis medications and need to assess response to drug therapy.  HIV Screening: covered annually if you're between the age of 15-65. Also covered annually if you are younger than 15 and older than 65 with risk factors for HIV infection. For pregnant patients, it is covered up to 3 times per pregnancy.    Immunizations:  Immunization Recommendations   Influenza Vaccine Annual influenza vaccination during flu season is recommended for all persons aged >= 6 months who do not have contraindications   Pneumococcal Vaccine   * Pneumococcal conjugate vaccine = PCV13 (Prevnar 13), PCV15 (Vaxneuvance), PCV20 (Prevnar 20)  * Pneumococcal polysaccharide vaccine = PPSV23 (Pneumovax)  Adults 19-65 yo with certain risk factors or if 65+ yo  If never received any pneumonia vaccine: recommend Prevnar 20 (PCV20)  Give PCV20 if previously received 1 dose of PCV13 or PPSV23   Hepatitis B Vaccine 3 dose series if at intermediate or high risk (ex: diabetes, end stage renal disease, liver disease)   Respiratory syncytial virus (RSV) Vaccine - COVERED BY MEDICARE PART D  * RSVPreF3 (Arexvy) CDC recommends that adults 60 years of age and older may receive a single dose of RSV vaccine using shared clinical decision-making (SCDM)   Tetanus (Td) Vaccine - COST NOT COVERED BY MEDICARE PART B Following completion of primary series, a booster dose should be given every 10 years to maintain immunity against tetanus. Td may also be given as tetanus wound prophylaxis.   Tdap Vaccine - COST NOT COVERED BY MEDICARE PART B Recommended at least once for all adults. For pregnant patients, recommended with each pregnancy.   Shingles Vaccine (Shingrix) - COST NOT COVERED BY MEDICARE PART B  2 shot series recommended in those 19 years and older who have or will have weakened immune systems or those 50 years and older     Health Maintenance Due:      Topic Date Due   • Lung Cancer Screening  09/06/2024   • Hepatitis C Screening  Completed   • Colorectal Cancer Screening  Discontinued     Immunizations Due:      Topic Date Due   • DTaP,Tdap,and Td Vaccines (1 - Tdap) 04/13/2019   • Influenza Vaccine (1) 09/01/2024   • COVID-19 Vaccine (3 - 2024-25 season) 09/01/2024     Advance Directives   What are advance directives?  Advance directives are legal documents that state your wishes and plans for medical care. These plans are made ahead of time in case you lose your ability to make decisions for yourself. Advance directives can apply to any medical decision, such as the treatments you want, and if you want to donate organs.   What are the types of advance directives?  There are many types of advance directives, and each state  has rules about how to use them. You may choose a combination of any of the following:  Living will:  This is a written record of the treatment you want. You can also choose which treatments you do not want, which to limit, and which to stop at a certain time. This includes surgery, medicine, IV fluid, and tube feedings.   Durable power of  for healthcare (DPAHC):  This is a written record that states who you want to make healthcare choices for you when you are unable to make them for yourself. This person, called a proxy, is usually a family member or a friend. You may choose more than 1 proxy.  Do not resuscitate (DNR) order:  A DNR order is used in case your heart stops beating or you stop breathing. It is a request not to have certain forms of treatment, such as CPR. A DNR order may be included in other types of advance directives.  Medical directive:  This covers the care that you want if you are in a coma, near death, or unable to make decisions for yourself. You can list the treatments you want for each condition. Treatment may include pain medicine, surgery, blood transfusions, dialysis, IV or tube feedings, and a ventilator (breathing machine).  Values history:  This document has questions about your views, beliefs, and how you feel and think about life. This information can help others choose the care that you would choose.  Why are advance directives important?  An advance directive helps you control your care. Although spoken wishes may be used, it is better to have your wishes written down. Spoken wishes can be misunderstood, or not followed. Treatments may be given even if you do not want them. An advance directive may make it easier for your family to make difficult choices about your care.   Fall Prevention    Fall prevention  includes ways to make your home and other areas safer. It also includes ways you can move more carefully to prevent a fall. Health conditions that cause changes in your  blood pressure, vision, or muscle strength and coordination may increase your risk for falls. Medicines may also increase your risk for falls if they make you dizzy, weak, or sleepy.   Fall prevention tips:   Stand or sit up slowly.    Use assistive devices as directed.    Wear shoes that fit well and have soles that .    Wear a personal alarm.    Stay active.    Manage your medical conditions.    Home Safety Tips:  Add items to prevent falls in the bathroom.    Keep paths clear.    Install bright lights in your home.    Keep items you use often on shelves within reach.    Paint or place reflective tape on the edges of your stairs.    Weight Management   Why it is important to manage your weight:  Being overweight increases your risk of health conditions such as heart disease, high blood pressure, type 2 diabetes, and certain types of cancer. It can also increase your risk for osteoarthritis, sleep apnea, and other respiratory problems. Aim for a slow, steady weight loss. Even a small amount of weight loss can lower your risk of health problems.  How to lose weight safely:  A safe and healthy way to lose weight is to eat fewer calories and get regular exercise. You can lose up about 1 pound a week by decreasing the number of calories you eat by 500 calories each day.   Healthy meal plan for weight management:  A healthy meal plan includes a variety of foods, contains fewer calories, and helps you stay healthy. A healthy meal plan includes the following:  Eat whole-grain foods more often.  A healthy meal plan should contain fiber. Fiber is the part of grains, fruits, and vegetables that is not broken down by your body. Whole-grain foods are healthy and provide extra fiber in your diet. Some examples of whole-grain foods are whole-wheat breads and pastas, oatmeal, brown rice, and bulgur.  Eat a variety of vegetables every day.  Include dark, leafy greens such as spinach, kale, matthew greens, and mustard greens.  Eat yellow and orange vegetables such as carrots, sweet potatoes, and winter squash.   Eat a variety of fruits every day.  Choose fresh or canned fruit (canned in its own juice or light syrup) instead of juice. Fruit juice has very little or no fiber.  Eat low-fat dairy foods.  Drink fat-free (skim) milk or 1% milk. Eat fat-free yogurt and low-fat cottage cheese. Try low-fat cheeses such as mozzarella and other reduced-fat cheeses.  Choose meat and other protein foods that are low in fat.  Choose beans or other legumes such as split peas or lentils. Choose fish, skinless poultry (chicken or turkey), or lean cuts of red meat (beef or pork). Before you cook meat or poultry, cut off any visible fat.   Use less fat and oil.  Try baking foods instead of frying them. Add less fat, such as margarine, sour cream, regular salad dressing and mayonnaise to foods. Eat fewer high-fat foods. Some examples of high-fat foods include french fries, doughnuts, ice cream, and cakes.  Eat fewer sweets.  Limit foods and drinks that are high in sugar. This includes candy, cookies, regular soda, and sweetened drinks.  Exercise:  Exercise at least 30 minutes per day on most days of the week. Some examples of exercise include walking, biking, dancing, and swimming. You can also fit in more physical activity by taking the stairs instead of the elevator or parking farther away from stores. Ask your healthcare provider about the best exercise plan for you.      © Copyright [employer] 2018 Information is for End User's use only and may not be sold, redistributed or otherwise used for commercial purposes. All illustrations and images included in CareNotes® are the copyrighted property of [employer] or [employer]

## 2024-12-09 NOTE — ASSESSMENT & PLAN NOTE
Pt advised to cont w flomax  Stay huydrated  BISI - does not reveal a particularly enlarged prostate or and nodules

## 2024-12-09 NOTE — PROGRESS NOTES
Name: Sixto Oakes      : 1950      MRN: [id number]  Encounter Provider: Frank Lombardi, DO  Encounter Date: 2024   Encounter department: [facility]    Assessment & Plan  Medicare annual wellness visit, subsequent         Lower urinary tract symptoms (LUTS)  Pt advised to cont w flomax  Stay huydrated  BISI - does not reveal a particularly enlarged prostate or and nodules         Elevated low-density lipoprotein level    Orders:    CBC; Future    Comprehensive metabolic panel; Future    Lipid Panel with Direct LDL reflex; Future    PSA, Total Screen; Future    Stage 3 chronic kidney disease, unspecified whether stage 3a or 3b CKD (HCC)  Lab Results   Component Value Date    EGFR 55 2023    EGFR 58 2022    EGFR 57 2022    CREATININE 1.26 2023    CREATININE 1.22 2022    CREATININE 1.23 2022       Orders:    CBC; Future    Comprehensive metabolic panel; Future    Lipid Panel with Direct LDL reflex; Future    PSA, Total Screen; Future    Essential hypertension  stable    Orders:    CBC; Future    Comprehensive metabolic panel; Future    Lipid Panel with Direct LDL reflex; Future    PSA, Total Screen; Future    Screening for prostate cancer    Orders:    PSA, Total Screen; Future    Former smoker         Cigarette nicotine dependence in remission    Orders:    CT lung screening program; Future       Preventive health issues were discussed with patient, and age appropriate screening tests were ordered as noted in patient's After Visit Summary. Personalized health advice and appropriate referrals for health education or preventive services given if needed, as noted in patient's After Visit Summary.    History of Present Illness     Pt is sched for an AWV  No labs for today    Pt states if he does not drink a lot of water he has a slow stream.  If he drinks a lot of water no problem , no problem.  Pt does get up once or twice a night       Patient Care  Team:  Frank Lombardi, DO as PCP - General (Family Medicine)  Archie Taylor MD as Consulting Physician (Hematology and Oncology)  Sixto Bucio MD as Consulting Physician (Ophthalmology)  Michael Simms MD as Consulting Physician (Gastroenterology)  Michael Simms MD as Endoscopist  Fletcher Bender MD (Hematology and Oncology)    Review of Systems  Medical History Reviewed by provider this encounter:  Tobacco  Allergies  Meds  Problems  Med Hx  Surg Hx  Fam Hx       Annual Wellness Visit Questionnaire   Sixto is here for his Subsequent Wellness visit. Last Medicare Wellness visit information reviewed, patient interviewed, no change since last AWV.     Health Risk Assessment:   Patient rates overall health as very good. Patient feels that their physical health rating is same. Patient is very satisfied with their life. Eyesight was rated as same. Hearing was rated as same. Patient feels that their emotional and mental health rating is same. Patients states they are sometimes angry. Patient states they are never, rarely unusually tired/fatigued. Pain experienced in the last 7 days has been none. Patient states that he has experienced no weight loss or gain in last 6 months.     Depression Screening:   PHQ-2 Score: 0      Fall Risk Screening:   In the past year, patient has experienced: history of falling in past year    Number of falls: 1  Injured during fall?: No    Feels unsteady when standing or walking?: No    Worried about falling?: Yes      Home Safety:  Patient has trouble with stairs inside or outside of their home. Patient has working smoke alarms and has working carbon monoxide detector. Home safety hazards include: none.     Nutrition:   Current diet is Regular.     Medications:   Patient is currently taking over-the-counter supplements. OTC medications include: see medication list. Patient is able to manage medications.     Activities of Daily Living (ADLs)/Instrumental Activities of  Daily Living (IADLs):   Walk and transfer into and out of bed and chair?: Yes  Dress and groom yourself?: Yes    Bathe or shower yourself?: Yes    Feed yourself? Yes  Do your laundry/housekeeping?: Yes  Manage your money, pay your bills and track your expenses?: Yes  Make your own meals?: Yes    Do your own shopping?: Yes    Previous Hospitalizations:   Any hospitalizations or ED visits within the last 12 months?: No      Advance Care Planning:   Living will: No      Cognitive Screening:   Provider or family/friend/caregiver concerned regarding cognition?: No    PREVENTIVE SCREENINGS      Cardiovascular Screening:    General: Screening Current and Risks and Benefits Discussed    Due for: Lipid Panel      Diabetes Screening:     General: Risks and Benefits Discussed    Due for: Blood Glucose      Colorectal Cancer Screening:     General: Risks and Benefits Discussed and Screening Current      Prostate Cancer Screening:    General: Screening Not Indicated and Risks and Benefits Discussed    Due for: PSA      Osteoporosis Screening:    General: Patient Declines      Abdominal Aortic Aneurysm (AAA) Screening:    Risk factors include: tobacco use        General: Screening Not Indicated      Lung Cancer Screening:     General: Risks and Benefits Discussed    Due for: Low Dose CT (LDCT)      Hepatitis C Screening:    General: Screening Current    Screening, Brief Intervention, and Referral to Treatment (SBIRT)    Screening      AUDIT-C Screenin) How often did you have a drink containing alcohol in the past year? monthly or less  2) How many drinks did you have on a typical day when you were drinking in the past year? 1 to 2  3) How often did you have 6 or more drinks on one occasion in the past year? never    AUDIT-C Score: 1  Interpretation: Score 0-3 (male): Negative screen for alcohol misuse    Single Item Drug Screening:  How often have you used an illegal drug (including marijuana) or a prescription medication  "for non-medical reasons in the past year? never    Single Item Drug Screen Score: 0  Interpretation: Negative screen for possible drug use disorder    Social Drivers of Health     Food Insecurity: No Food Insecurity (12/9/2024)    Hunger Vital Sign     Worried About Running Out of Food in the Last Year: Never true     Ran Out of Food in the Last Year: Never true   Transportation Needs: No Transportation Needs (12/9/2024)    PRAPARE - Transportation     Lack of Transportation (Medical): No     Lack of Transportation (Non-Medical): No   Housing Stability: Low Risk  (12/9/2024)    Housing Stability Vital Sign     Unable to Pay for Housing in the Last Year: No     Number of Times Moved in the Last Year: 0     Homeless in the Last Year: No   Utilities: Not At Risk (12/9/2024)    [facility] Utilities     Threatened with loss of utilities: No     No results found.    Objective   /70   Pulse 72   Temp 98.2 °F (36.8 °C)   Resp 18   Ht 6' 4\" (1.93 m)   Wt 109 kg (240 lb)   BMI 29.21 kg/m²     Physical Exam    "

## 2024-12-09 NOTE — ASSESSMENT & PLAN NOTE
stable    Orders:    CBC; Future    Comprehensive metabolic panel; Future    Lipid Panel with Direct LDL reflex; Future    PSA, Total Screen; Future

## 2024-12-09 NOTE — ASSESSMENT & PLAN NOTE
Lab Results   Component Value Date    EGFR 55 08/31/2023    EGFR 58 06/19/2022    EGFR 57 04/21/2022    CREATININE 1.26 08/31/2023    CREATININE 1.22 06/19/2022    CREATININE 1.23 04/21/2022       Orders:    CBC; Future    Comprehensive metabolic panel; Future    Lipid Panel with Direct LDL reflex; Future    PSA, Total Screen; Future

## 2024-12-12 ENCOUNTER — HOSPITAL ENCOUNTER (OUTPATIENT)
Dept: NEUROLOGY | Facility: HOSPITAL | Age: 74
End: 2024-12-12
Attending: FAMILY MEDICINE
Payer: COMMERCIAL

## 2024-12-12 DIAGNOSIS — M79.2 NEURITIS: ICD-10-CM

## 2024-12-12 PROBLEM — G56.01 RIGHT CARPAL TUNNEL SYNDROME: Status: ACTIVE | Noted: 2024-12-12

## 2024-12-12 PROCEDURE — 95886 MUSC TEST DONE W/N TEST COMP: CPT | Performed by: PSYCHIATRY & NEUROLOGY

## 2024-12-12 PROCEDURE — 95909 NRV CNDJ TST 5-6 STUDIES: CPT | Performed by: PSYCHIATRY & NEUROLOGY

## 2024-12-13 ENCOUNTER — RESULTS FOLLOW-UP (OUTPATIENT)
Dept: FAMILY MEDICINE CLINIC | Facility: CLINIC | Age: 74
End: 2024-12-13

## 2024-12-13 ENCOUNTER — TELEPHONE (OUTPATIENT)
Dept: FAMILY MEDICINE CLINIC | Facility: CLINIC | Age: 74
End: 2024-12-13

## 2024-12-13 DIAGNOSIS — G56.00 CARPAL TUNNEL SYNDROME, UNSPECIFIED LATERALITY: Primary | ICD-10-CM

## 2024-12-13 NOTE — TELEPHONE ENCOUNTER
Please call pt, looks like th emg was abnormal for findings of carpel tunnel.  Next step if the pt wishes would be to be evaluated by ortho for correction.  Order in

## 2024-12-29 ENCOUNTER — OFFICE VISIT (OUTPATIENT)
Dept: URGENT CARE | Facility: CLINIC | Age: 74
End: 2024-12-29
Payer: COMMERCIAL

## 2024-12-29 VITALS
SYSTOLIC BLOOD PRESSURE: 131 MMHG | TEMPERATURE: 97.5 F | DIASTOLIC BLOOD PRESSURE: 74 MMHG | OXYGEN SATURATION: 96 % | BODY MASS INDEX: 29.83 KG/M2 | HEART RATE: 69 BPM | HEIGHT: 76 IN | WEIGHT: 245 LBS

## 2024-12-29 DIAGNOSIS — B96.89 BACTERIAL URI: Primary | ICD-10-CM

## 2024-12-29 DIAGNOSIS — J06.9 BACTERIAL URI: Primary | ICD-10-CM

## 2024-12-29 PROCEDURE — 99213 OFFICE O/P EST LOW 20 MIN: CPT | Performed by: FAMILY MEDICINE

## 2024-12-29 PROCEDURE — G2211 COMPLEX E/M VISIT ADD ON: HCPCS | Performed by: FAMILY MEDICINE

## 2024-12-29 RX ORDER — AZITHROMYCIN 250 MG/1
TABLET, FILM COATED ORAL
Qty: 6 TABLET | Refills: 0 | Status: SHIPPED | OUTPATIENT
Start: 2024-12-29 | End: 2025-01-02

## 2024-12-29 RX ORDER — AZELASTINE 1 MG/ML
1 SPRAY, METERED NASAL 2 TIMES DAILY
Qty: 1 ML | Refills: 0 | Status: SHIPPED | OUTPATIENT
Start: 2024-12-29

## 2024-12-29 NOTE — PROGRESS NOTES
[facility]        NAME: Sixto Oakes is a 74 y.o. male  : 1950    MRN: [id number]  DATE: 2024  TIME: 11:20 AM    Assessment and Plan   Bacterial URI [J06.9, B96.89]  1. Bacterial URI  azelastine (ASTELIN) 0.1 % nasal spray    azithromycin (ZITHROMAX) 250 mg tablet        3 weeks of URI symptoms raises concern for bacterial infection.  Will treat with azithromycin.  Azelastine nasal spray for postnasal drip.    Patient Instructions     Follow up with PCP in 3-5 days.  Proceed to  ER if symptoms worsen.    If tests have been performed at [facility], our office will contact you with results if changes need to be made to the care plan discussed with you at the visit.  [facility]    Chief Complaint     Chief Complaint   Patient presents with    Sinusitis     Sinus pressure started 3 weeks ago- goes off and on- has sore throat- OTC- khoa mujica, day quil and vicks          History of Present Illness       74-year-old male presents today with 3 weeks of URI symptoms.  Denies any obvious fevers, chills, sinus pain, dyspnea, chest pain, abdominal symptoms or headache.    Sinusitis  Associated symptoms include congestion, coughing and a sore throat. Pertinent negatives include no chills, headaches or shortness of breath.       Review of Systems   Review of Systems   Constitutional:  Negative for chills and fever.   HENT:  Positive for congestion, postnasal drip, rhinorrhea and sore throat. Negative for sinus pain.    Respiratory:  Positive for cough. Negative for shortness of breath.    Cardiovascular:  Negative for chest pain.   Gastrointestinal:  Negative for abdominal pain and nausea.   Allergic/Immunologic: Positive for environmental allergies (dust).   Neurological:  Positive for dizziness. Negative for headaches.     Current Medications       Current Outpatient Medications:     albuterol (Ventolin HFA) 90 mcg/act inhaler, Inhale 2 puffs every 4  (four) hours as needed for wheezing or shortness of breath, Disp: 18 g, Rfl: 5    azelastine (ASTELIN) 0.1 % nasal spray, 1 spray into each nostril 2 (two) times a day Use in each nostril as directed, Disp: 1 mL, Rfl: 0    azithromycin (ZITHROMAX) 250 mg tablet, Take 2 tablets today then 1 tablet daily x 4 days, Disp: 6 tablet, Rfl: 0    multivitamin (THERAGRAN) TABS, Take 1 tablet by mouth every morning., Disp: , Rfl:     Promacta 50 MG tablet, Take 1 tablet (50 mg total) by mouth once daily. Take on an empty stomach, 1 hour before or 2 hours after a meal., Disp: 30 tablet, Rfl: 5    sildenafil (VIAGRA) 100 mg tablet, Take 1 tablet (100 mg total) by mouth daily as needed for erectile dysfunction, Disp: 10 tablet, Rfl: 3    tamsulosin (FLOMAX) 0.4 mg, TAKE 1 CAPSULE DAILY WITH DINNER, Disp: 90 capsule, Rfl: 0    ascorbic acid (VITAMIN C) 500 mg tablet, Take 500 mg by mouth every morning. (Patient not taking: Reported on 12/29/2024), Disp: , Rfl:     b complex vitamins tablet, Take 1 tablet by mouth every morning. (Patient not taking: Reported on 12/29/2024), Disp: , Rfl:     cholecalciferol (VITAMIN D3) 1,000 units tablet, Take 1,000 Units by mouth every morning (Patient not taking: Reported on 12/29/2024), Disp: , Rfl:     desoximetasone (TOPICORT) 0.25 % cream, Apply 1 Application topically 2 (two) times a day (Patient not taking: Reported on 12/29/2024), Disp: 100 g, Rfl: 0    desoximetasone (TOPICORT) 0.25 % cream, APPLY TWICE A DAY (Patient not taking: Reported on 12/29/2024), Disp: 100 g, Rfl: 2    dexamethasone (DECADRON) 4 mg tablet, 5 tablets daily times 4 days (Patient not taking: Reported on 12/29/2024), Disp: 20 tablet, Rfl: 0    montelukast (SINGULAIR) 10 mg tablet, Take 1 tablet (10 mg total) by mouth daily at bedtime (Patient not taking: Reported on 12/29/2024), Disp: 30 tablet, Rfl: 3    ofloxacin (OCUFLOX) 0.3 % ophthalmic solution, Administer 1 drop to both eyes 4 (four) times a day (Patient not  taking: Reported on 12/29/2024), Disp: 5 mL, Rfl: 0    Current Allergies     Allergies as of 12/29/2024 - Reviewed 12/29/2024   Allergen Reaction Noted    Dust mite extract Sneezing 08/30/2018            The following portions of the patient's history were reviewed and updated as appropriate: allergies, current medications, past family history, past medical history, past social history, past surgical history and problem list.     Past Medical History:   Diagnosis Date    COVID-19 12/15/2021    Hypertension     Hx. No meds currently. Cardiology stopped HTN meds    Thrombocytopenia (HCC)     platelet count maintained around 90-Dr. Archie Taylor    Wears partial dentures     upper       Past Surgical History:   Procedure Laterality Date    APPENDECTOMY      COLONOSCOPY      COLONOSCOPY N/A 10/17/2018    Procedure: COLONOSCOPY;  Surgeon: Michael Simms MD;  Location: [facility] GI LAB;  Service: Gastroenterology    HERNIA REPAIR Right 2013    inguinal    JOINT REPLACEMENT Right     knee, left shoulder    KNEE SURGERY Left     Had left knee surgery for football injury    MA XCAPSL CTRC RMVL INSJ IO LENS PROSTH W/O ECP Left 05/04/2016    Procedure: EXTRACTION EXTRACAPSULAR CATARACT PHACO INTRAOCULAR LENS (IOL);  Surgeon: Sixto Bucio MD;  Location: [facility] MAIN OR;  Service: Ophthalmology    MA XCAPSL CTRC RMVL INSJ IO LENS PROSTH W/O ECP Right 5/22/2023    Procedure: EXTRACTION EXTRACAPSULAR CATARACT PHACO INTRAOCULAR LENS (IOL);  Surgeon: Sixto Bucio MD;  Location: [facility] MAIN OR;  Service: Ophthalmology    SHOULDER ARTHROSCOPY Left     tendon repair    TONSILLECTOMY  age 2       Family History   Problem Relation Age of Onset    Heart disease Father 72        massive MI    Heart disease Sister         MI    Heart disease Brother         MI    No Known Problems Mother     Mental illness Neg Hx          Medications have been verified.        Objective   /74 (BP Location: Left arm, Patient Position: Sitting, Cuff Size:  "Standard)   Pulse 69   Temp 97.5 °F (36.4 °C)   Ht 6' 4\" (1.93 m)   Wt 111 kg (245 lb)   SpO2 96%   BMI 29.82 kg/m²   No LMP for male patient.       Physical Exam     Physical Exam  Vitals and nursing note reviewed.   Constitutional:       General: He is in acute distress.      Appearance: Normal appearance. He is normal weight. He is not ill-appearing, toxic-appearing or diaphoretic.   HENT:      Head: Normocephalic and atraumatic.      Nose: Congestion and rhinorrhea present.      Comments: Inflamed nasal mucosa     Mouth/Throat:      Mouth: Mucous membranes are moist.      Pharynx: No posterior oropharyngeal erythema.   Eyes:      General:         Right eye: No discharge.         Left eye: No discharge.      Conjunctiva/sclera: Conjunctivae normal.   Cardiovascular:      Rate and Rhythm: Normal rate and regular rhythm.   Pulmonary:      Effort: Pulmonary effort is normal. No respiratory distress.      Breath sounds: Normal breath sounds. No wheezing, rhonchi or rales.   Skin:     General: Skin is warm.      Findings: No erythema.   Neurological:      General: No focal deficit present.      Mental Status: He is alert and oriented to person, place, and time.   Psychiatric:         Mood and Affect: Mood normal.         Behavior: Behavior normal.         Thought Content: Thought content normal.         Judgment: Judgment normal.                   "

## 2025-01-27 ENCOUNTER — TELEPHONE (OUTPATIENT)
Dept: HEMATOLOGY ONCOLOGY | Facility: MEDICAL CENTER | Age: 75
End: 2025-01-27

## 2025-01-27 NOTE — TELEPHONE ENCOUNTER
Patient stopped by with letter from [employer] stating Promacta may not be covered  Will send to PA team to look into coverage  Patient aware of plan

## 2025-01-28 ENCOUNTER — DOCUMENTATION (OUTPATIENT)
Age: 75
End: 2025-01-28

## 2025-01-28 NOTE — PROGRESS NOTES
Received request from clinical for patient to get a PA for Promacta 50 MG daily. Auth has been submitted via cover my meds and this is pending and marked as urgent.    (Key: [id number])    RX card scanned into EPIC.    UPDATE:  This has been approved    Authorization Expiration Date: 12/31/2025   Fair

## 2025-02-04 ENCOUNTER — TELEPHONE (OUTPATIENT)
Age: 75
End: 2025-02-04

## 2025-02-04 NOTE — TELEPHONE ENCOUNTER
Caller: Pravin Oakes    Doctor: Dr. Boyle    Reason for call: Sixto would like to accept the 2/18 appt.  Thank you.     Call back#:[phone]

## 2025-02-04 NOTE — TELEPHONE ENCOUNTER
Patient was calling in regards to a appt that has opened for 2/18/25 , he did not respond he call the office so that appt was taken by someone else, patient has appt scheduled for 3/18/25 at this time

## 2025-02-10 DIAGNOSIS — R39.9 LOWER URINARY TRACT SYMPTOMS (LUTS): ICD-10-CM

## 2025-02-10 NOTE — TELEPHONE ENCOUNTER
Reason for call:   [x] Refill   [] Prior Auth  [] Other:     Office:   [x] PCP/Provider - Frank Lombardi   [] Specialty/Provider -     Medication: tamsulosin (FLOMAX) 0.4 mg     Dose/Frequency: 1 daily    Quantity: 90    Pharmacy: [facility]    Does the patient have enough for 3 days?   [x] Yes   [] No - Send as HP to POD

## 2025-02-11 RX ORDER — TAMSULOSIN HYDROCHLORIDE 0.4 MG/1
0.4 CAPSULE ORAL
Qty: 90 CAPSULE | Refills: 1 | Status: SHIPPED | OUTPATIENT
Start: 2025-02-11

## 2025-02-13 ENCOUNTER — APPOINTMENT (OUTPATIENT)
Dept: LAB | Facility: HOSPITAL | Age: 75
End: 2025-02-13
Attending: INTERNAL MEDICINE
Payer: COMMERCIAL

## 2025-02-13 DIAGNOSIS — D69.3 CHRONIC ITP (IDIOPATHIC THROMBOCYTOPENIA) (HCC): ICD-10-CM

## 2025-02-13 LAB
BASOPHILS # BLD AUTO: 0.06 THOUSANDS/ÂΜL (ref 0–0.1)
BASOPHILS NFR BLD AUTO: 1 % (ref 0–1)
EOSINOPHIL # BLD AUTO: 0 THOUSAND/ÂΜL (ref 0–0.61)
EOSINOPHIL NFR BLD AUTO: 0 % (ref 0–6)
ERYTHROCYTE [DISTWIDTH] IN BLOOD BY AUTOMATED COUNT: 14.6 % (ref 11.6–15.1)
HCT VFR BLD AUTO: 45.7 % (ref 36.5–49.3)
HGB BLD-MCNC: 15.4 G/DL (ref 12–17)
IMM GRANULOCYTES # BLD AUTO: 0.02 THOUSAND/UL (ref 0–0.2)
IMM GRANULOCYTES NFR BLD AUTO: 0 % (ref 0–2)
LYMPHOCYTES # BLD AUTO: 1.42 THOUSANDS/ÂΜL (ref 0.6–4.47)
LYMPHOCYTES NFR BLD AUTO: 18 % (ref 14–44)
MCH RBC QN AUTO: 29.7 PG (ref 26.8–34.3)
MCHC RBC AUTO-ENTMCNC: 33.7 G/DL (ref 31.4–37.4)
MCV RBC AUTO: 88 FL (ref 82–98)
MONOCYTES # BLD AUTO: 0.66 THOUSAND/ÂΜL (ref 0.17–1.22)
MONOCYTES NFR BLD AUTO: 8 % (ref 4–12)
NEUTROPHILS # BLD AUTO: 5.92 THOUSANDS/ÂΜL (ref 1.85–7.62)
NEUTS SEG NFR BLD AUTO: 73 % (ref 43–75)
NRBC BLD AUTO-RTO: 0 /100 WBCS
PLATELET # BLD AUTO: 54 THOUSANDS/UL (ref 149–390)
RBC # BLD AUTO: 5.18 MILLION/UL (ref 3.88–5.62)
WBC # BLD AUTO: 8.08 THOUSAND/UL (ref 4.31–10.16)

## 2025-02-13 PROCEDURE — 85025 COMPLETE CBC W/AUTO DIFF WBC: CPT

## 2025-02-13 PROCEDURE — 36415 COLL VENOUS BLD VENIPUNCTURE: CPT

## 2025-02-17 ENCOUNTER — OFFICE VISIT (OUTPATIENT)
Dept: FAMILY MEDICINE CLINIC | Facility: CLINIC | Age: 75
End: 2025-02-17
Payer: COMMERCIAL

## 2025-02-17 ENCOUNTER — APPOINTMENT (OUTPATIENT)
Dept: RADIOLOGY | Facility: CLINIC | Age: 75
End: 2025-02-17
Payer: COMMERCIAL

## 2025-02-17 VITALS
HEART RATE: 68 BPM | WEIGHT: 242.4 LBS | SYSTOLIC BLOOD PRESSURE: 160 MMHG | DIASTOLIC BLOOD PRESSURE: 88 MMHG | BODY MASS INDEX: 29.52 KG/M2 | HEIGHT: 76 IN | TEMPERATURE: 96.9 F

## 2025-02-17 DIAGNOSIS — N18.30 STAGE 3 CHRONIC KIDNEY DISEASE, UNSPECIFIED WHETHER STAGE 3A OR 3B CKD (HCC): ICD-10-CM

## 2025-02-17 DIAGNOSIS — I10 ESSENTIAL HYPERTENSION: Primary | ICD-10-CM

## 2025-02-17 DIAGNOSIS — E78.00 ELEVATED LOW-DENSITY LIPOPROTEIN LEVEL: ICD-10-CM

## 2025-02-17 DIAGNOSIS — D69.3 CHRONIC ITP (IDIOPATHIC THROMBOCYTOPENIA) (HCC): ICD-10-CM

## 2025-02-17 DIAGNOSIS — Z91.81 HISTORY OF FALLING: ICD-10-CM

## 2025-02-17 DIAGNOSIS — R42 DIZZINESS: ICD-10-CM

## 2025-02-17 DIAGNOSIS — R26.9 ABNORMAL GAIT: ICD-10-CM

## 2025-02-17 DIAGNOSIS — S09.90XA TRAUMATIC INJURY OF HEAD, INITIAL ENCOUNTER: ICD-10-CM

## 2025-02-17 PROCEDURE — G2211 COMPLEX E/M VISIT ADD ON: HCPCS | Performed by: FAMILY MEDICINE

## 2025-02-17 PROCEDURE — 99214 OFFICE O/P EST MOD 30 MIN: CPT | Performed by: FAMILY MEDICINE

## 2025-02-17 PROCEDURE — 72040 X-RAY EXAM NECK SPINE 2-3 VW: CPT

## 2025-02-17 NOTE — ASSESSMENT & PLAN NOTE
Lab Results   Component Value Date    EGFR 55 08/31/2023    EGFR 58 06/19/2022    EGFR 57 04/21/2022    CREATININE 1.26 08/31/2023    CREATININE 1.22 06/19/2022    CREATININE 1.23 04/21/2022

## 2025-02-17 NOTE — PROGRESS NOTES
"Name: Sixto Oakes      : 1950      MRN: [id number]  Encounter Provider: Frank Lombardi, DO  Encounter Date: 2025   Encounter department: [facility]  :  Assessment & Plan  Essential hypertension  Generally BP is ok will follow BP in a month at physical       Stage 3 chronic kidney disease, unspecified whether stage 3a or 3b CKD (HCC)  Lab Results   Component Value Date    EGFR 55 2023    EGFR 58 2022    EGFR 57 2022    CREATININE 1.26 2023    CREATININE 1.22 2022    CREATININE 1.23 2022          Elevated low-density lipoprotein level  Low fat low cholesterol diet  Will follow lipids       Chronic ITP (idiopathic thrombocytopenia) (HCC)  CBC is in the chart       History of falling    Orders:  •  XR spine cervical complete 4 or 5 vw non injury; Future    Traumatic injury of head, initial encounter  Non tender c spine - but has PVMS and dec rom  Orders:  •  XR spine cervical complete 4 or 5 vw non injury; Future    Dizziness    Orders:  •  XR spine cervical complete 4 or 5 vw non injury; Future    Abnormal gait  Clears right away  Orders:  •  XR spine cervical complete 4 or 5 vw non injury; Future           History of Present Illness   Pt is sched for a follow up  PT states he duid not get his labs    PT is not actually here for a follow up - pt states he tripped on steps and states 6 to 8 mionths ago and states he hit his head on the wall.  States since then he frinds that he gets dizzy and he walks funny then it goes away  States it happened two times in the last year  Pt states he has developed soreness in the neck and clicking      Review of Systems   Musculoskeletal:  Positive for arthralgias, gait problem, myalgias, neck pain and neck stiffness.       Objective   /88   Pulse 68   Temp (!) 96.9 °F (36.1 °C)   Ht 6' 4\" (1.93 m)   Wt 110 kg (242 lb 6.4 oz)   BMI 29.51 kg/m²      Physical Exam  Vitals and nursing note reviewed. "   Constitutional:       General: He is not in acute distress.     Appearance: He is well-developed. He is not diaphoretic.   HENT:      Head: Normocephalic and atraumatic.      Right Ear: External ear normal.      Left Ear: External ear normal.      Nose: Nose normal.      Mouth/Throat:      Pharynx: No oropharyngeal exudate.   Eyes:      General: No scleral icterus.        Right eye: No discharge.         Left eye: No discharge.      Pupils: Pupils are equal, round, and reactive to light.   Neck:      Thyroid: No thyromegaly.   Cardiovascular:      Rate and Rhythm: Normal rate.      Heart sounds: Normal heart sounds. No murmur heard.  Pulmonary:      Effort: Pulmonary effort is normal. No respiratory distress.      Breath sounds: Normal breath sounds. No wheezing.   Abdominal:      General: Bowel sounds are normal. There is no distension.      Palpations: Abdomen is soft. There is no mass.      Tenderness: There is no abdominal tenderness. There is no guarding or rebound.   Musculoskeletal:         General: Normal range of motion.      Comments: Pvms c spine  Dec rom c spine     Skin:     General: Skin is warm and dry.      Findings: No erythema or rash.   Neurological:      Mental Status: He is alert.      Coordination: Coordination normal.      Deep Tendon Reflexes: Reflexes normal.   Psychiatric:         Behavior: Behavior normal.

## 2025-02-18 ENCOUNTER — HOSPITAL ENCOUNTER (EMERGENCY)
Facility: HOSPITAL | Age: 75
Discharge: HOME | End: 2025-02-18
Payer: COMMERCIAL

## 2025-02-18 VITALS
DIASTOLIC BLOOD PRESSURE: 92 MMHG | RESPIRATION RATE: 16 BRPM | TEMPERATURE: 98.6 F | HEART RATE: 77 BPM | OXYGEN SATURATION: 96 % | SYSTOLIC BLOOD PRESSURE: 160 MMHG

## 2025-02-18 DIAGNOSIS — I10 HYPERTENSION: Primary | ICD-10-CM

## 2025-02-18 PROCEDURE — 99284 EMERGENCY DEPT VISIT MOD MDM: CPT

## 2025-02-18 PROCEDURE — 93005 ELECTROCARDIOGRAM TRACING: CPT

## 2025-02-18 RX ORDER — AMLODIPINE BESYLATE 5 MG/1
5 TABLET ORAL DAILY
Qty: 30 TABLET | Refills: 0 | Status: SHIPPED | OUTPATIENT
Start: 2025-02-18

## 2025-02-18 RX ORDER — AMLODIPINE BESYLATE 5 MG/1
5 TABLET ORAL ONCE
Status: COMPLETED | OUTPATIENT
Start: 2025-02-18 | End: 2025-02-18

## 2025-02-18 RX ADMIN — AMLODIPINE BESYLATE 5 MG: 5 TABLET ORAL at 20:02

## 2025-02-19 ENCOUNTER — TELEPHONE (OUTPATIENT)
Age: 75
End: 2025-02-19

## 2025-02-19 LAB
ATRIAL RATE: 77 BPM
P AXIS: 30 DEGREES
PR INTERVAL: 174 MS
QRS AXIS: 32 DEGREES
QRSD INTERVAL: 90 MS
QT INTERVAL: 372 MS
QTC INTERVAL: 421 MS
T WAVE AXIS: 61 DEGREES
VENTRICULAR RATE: 77 BPM

## 2025-02-19 PROCEDURE — 93010 ELECTROCARDIOGRAM REPORT: CPT | Performed by: INTERNAL MEDICINE

## 2025-02-19 NOTE — TELEPHONE ENCOUNTER
Patient was in the ER last night.  BP was 200/115.  wanted provider to be aware, ER started him on amlodipine 5mg daily.

## 2025-02-19 NOTE — ED PROVIDER NOTES
Time reflects when diagnosis was documented in both MDM as applicable and the Disposition within this note       Time User Action Codes Description Comment    2/18/2025  8:20 PM Natan Quiroga Add [I10] Hypertension           ED Disposition       ED Disposition   Discharge    Condition   Stable    Date/Time   Tue Feb 18, 2025  7:56 PM    Comment   Sixto VELARDE Oakes discharge to home/self care.                   Assessment & Plan       Medical Decision Making  Amount and/or Complexity of Data Reviewed  ECG/medicine tests:  Decision-making details documented in ED Course.    Risk  Prescription drug management.      73 y/o M with hx of htn presents for evaluation of elevated blood pressure. Pt was seen yesterday by PCP and pt states decision at that time was to try lifestyle management and reassess needs for anti-htn in 1 month. Pt states today he took his blood pressure multiple times with high readings up to 200 systolic. Reports associated posterior neck pain that he states happens when his blood pressure is high. Reports ongoing lightheadedness as well. Pt took a 5mg amlodipine tablet from 2022 today due to his concerns of his htn. Denies vision change, speech change, cp, sob, abd pain, leg swelling.   Pts BP assessed multiple times without intervention in ED. When pt calm and seated /92.   I had a long discussion with the patient and his family regarding his concerns of his blood pressure. He is taking home readings way too frequently. His BP today is not causing any symptoms and needs care in possible dropping too quickly. Due to patient's concerns, will start low dose amlodipine and have him f/u with PCP in the next 1-2 days, especially since this plan contradicts their visit from yesterday.   Pt comfortable with plan and understands RTED precautions.       ED Course as of 02/18/25 2247   Tue Feb 18, 2025 1936 ECG 12 lead  Procedure Note: EKG  Date/Time: 02/18/25 7:36 PM   Interpreted by: Natan Quiroga,  MD  Indications / Diagnosis: htn, dizzy  ECG reviewed by me, the ED Provider: yes   The EKG demonstrates:  Rhythm: normal sinus  Intervals: normal intervals  Axis: normal axis  QRS/Blocks: normal QRS  ST Changes: No acute ST Changes, no STD/ANGY.         Medications   amLODIPine (NORVASC) tablet 5 mg (5 mg Oral Given 2/18/25 2002)       ED Risk Strat Scores                            SBIRT 20yo+      Flowsheet Row Most Recent Value   Initial Alcohol Screen: US AUDIT-C     1. How often do you have a drink containing alcohol? 0 Filed at: 02/18/2025 1902   2. How many drinks containing alcohol do you have on a typical day you are drinking?  0 Filed at: 02/18/2025 1902   Audit-C Score 0 Filed at: 02/18/2025 1902   SRI: How many times in the past year have you...    Used an illegal drug or used a prescription medication for non-medical reasons? Never Filed at: 02/18/2025 1902                            History of Present Illness       Chief Complaint   Patient presents with    Hypertension     Pt reports that he was feeling dizzy and that he checked his BP and noticed that it was high, states that he has some old Amlodipine that he took at home and some aspirin. Reports feeling dizzy.        Past Medical History:   Diagnosis Date    COVID-19 12/15/2021    Hypertension     Hx. No meds currently. Cardiology stopped HTN meds    Thrombocytopenia (HCC)     platelet count maintained around 90-Dr. Archie Taylor    Wears partial dentures     upper      Past Surgical History:   Procedure Laterality Date    APPENDECTOMY      COLONOSCOPY      COLONOSCOPY N/A 10/17/2018    Procedure: COLONOSCOPY;  Surgeon: Michael Simms MD;  Location: [facility] GI LAB;  Service: Gastroenterology    HERNIA REPAIR Right 2013    inguinal    JOINT REPLACEMENT Right     knee, left shoulder    KNEE SURGERY Left     Had left knee surgery for football injury    SC XCAPSL CTRC RMVL INSJ IO LENS PROSTH W/O ECP Left 05/04/2016    Procedure: EXTRACTION EXTRACAPSULAR  CATARACT PHACO INTRAOCULAR LENS (IOL);  Surgeon: Sixto Bucio MD;  Location: [facility] MAIN OR;  Service: Ophthalmology    ID XCAPSL CTRC RMVL INSJ IO LENS PROSTH W/O ECP Right 5/22/2023    Procedure: EXTRACTION EXTRACAPSULAR CATARACT PHACO INTRAOCULAR LENS (IOL);  Surgeon: Sixto Bucio MD;  Location: [facility] MAIN OR;  Service: Ophthalmology    SHOULDER ARTHROSCOPY Left     tendon repair    TONSILLECTOMY  age 2      Family History   Problem Relation Age of Onset    Heart disease Father 72        massive MI    Heart disease Sister         MI    Heart disease Brother         MI    No Known Problems Mother     Mental illness Neg Hx       Social History     Tobacco Use    Smoking status: Former     Current packs/day: 0.00     Average packs/day: 0.5 packs/day for 42.0 years (21.0 ttl pk-yrs)     Types: Cigarettes     Start date: 1968     Quit date: 2010     Years since quitting: 15.1     Passive exposure: Past    Smokeless tobacco: Never   Vaping Use    Vaping status: Never Used   Substance Use Topics    Alcohol use: Not Currently    Drug use: Never      E-Cigarette/Vaping    E-Cigarette Use Never User       E-Cigarette/Vaping Substances    Nicotine No     THC No     CBD No     Flavoring No     Other No     Unknown No       I have reviewed and agree with the history as documented.     HPI  See mdm  Review of Systems   Constitutional:  Negative for chills and fever.   HENT:  Negative for ear pain and sore throat.    Eyes:  Negative for pain and visual disturbance.   Respiratory:  Negative for cough and shortness of breath.    Cardiovascular:  Negative for chest pain and palpitations.   Gastrointestinal:  Negative for abdominal pain and vomiting.   Genitourinary:  Negative for dysuria and hematuria.   Musculoskeletal:  Negative for arthralgias and back pain.   Skin:  Negative for color change and rash.   Neurological:  Positive for light-headedness. Negative for seizures and syncope.   All other systems reviewed and are  negative.          Objective       ED Triage Vitals   Temperature Pulse Blood Pressure Respirations SpO2 Patient Position - Orthostatic VS   02/18/25 1904 02/18/25 1904 02/18/25 1904 02/18/25 1904 02/18/25 1904 02/18/25 1945   98.6 °F (37 °C) 84 (!) 200/105 16 97 % Lying      Temp Source Heart Rate Source BP Location FiO2 (%) Pain Score    02/18/25 1904 02/18/25 1904 02/18/25 1904 -- 02/18/25 1904    Tympanic Monitor Right arm  No Pain      Vitals      Date and Time Temp Pulse SpO2 Resp BP Pain Score FACES Pain Rating User   02/18/25 2002 -- -- -- -- 160/92 -- --    02/18/25 1945 -- 77 96 % 16 175/82 -- --    02/18/25 1904 98.6 °F (37 °C) 84 97 % 16 200/105 No Pain -- CM            Physical Exam  Vitals and nursing note reviewed.   Constitutional:       General: He is not in acute distress.     Appearance: He is well-developed. He is not toxic-appearing.   HENT:      Head: Normocephalic and atraumatic.      Right Ear: External ear normal.      Left Ear: External ear normal.      Nose: Nose normal.      Mouth/Throat:      Pharynx: Oropharynx is clear. No oropharyngeal exudate or posterior oropharyngeal erythema.   Eyes:      Extraocular Movements: Extraocular movements intact.      Conjunctiva/sclera: Conjunctivae normal.      Pupils: Pupils are equal, round, and reactive to light.   Cardiovascular:      Rate and Rhythm: Normal rate and regular rhythm.      Pulses: Normal pulses.      Heart sounds: Normal heart sounds. No murmur heard.     No friction rub. No gallop.   Pulmonary:      Effort: Pulmonary effort is normal. No respiratory distress.      Breath sounds: Normal breath sounds. No wheezing, rhonchi or rales.   Abdominal:      General: Abdomen is flat.      Palpations: Abdomen is soft.      Tenderness: There is no abdominal tenderness. There is no guarding or rebound.   Musculoskeletal:         General: Normal range of motion.      Cervical back: Normal range of motion. No rigidity.      Right lower leg:  No edema.      Left lower leg: No edema.   Skin:     General: Skin is warm and dry.      Capillary Refill: Capillary refill takes less than 2 seconds.   Neurological:      General: No focal deficit present.      Mental Status: He is alert.      Cranial Nerves: No cranial nerve deficit.      Motor: No weakness.      Gait: Gait normal.   Psychiatric:         Mood and Affect: Mood normal.         Results Reviewed       None            No orders to display       Procedures    ED Medication and Procedure Management   Prior to Admission Medications   Prescriptions Last Dose Informant Patient Reported? Taking?   Promacta 50 MG tablet   No No   Sig: Take 1 tablet (50 mg total) by mouth once daily. Take on an empty stomach, 1 hour before or 2 hours after a meal.   albuterol (Ventolin HFA) 90 mcg/act inhaler  Self No No   Sig: Inhale 2 puffs every 4 (four) hours as needed for wheezing or shortness of breath   ascorbic acid (VITAMIN C) 500 mg tablet  Self Yes No   Sig: Take 500 mg by mouth every morning   azelastine (ASTELIN) 0.1 % nasal spray   No No   Si spray into each nostril 2 (two) times a day Use in each nostril as directed   b complex vitamins tablet  Self Yes No   Sig: Take 1 tablet by mouth every morning   cholecalciferol (VITAMIN D3) 1,000 units tablet  Self Yes No   Sig: Take 1,000 Units by mouth every morning   desoximetasone (TOPICORT) 0.25 % cream   No No   Sig: Apply 1 Application topically 2 (two) times a day   montelukast (SINGULAIR) 10 mg tablet  Self No No   Sig: Take 1 tablet (10 mg total) by mouth daily at bedtime   multivitamin (THERAGRAN) TABS  Self Yes No   Sig: Take 1 tablet by mouth every morning.   sildenafil (VIAGRA) 100 mg tablet  Self No No   Sig: Take 1 tablet (100 mg total) by mouth daily as needed for erectile dysfunction   tamsulosin (FLOMAX) 0.4 mg   No No   Sig: Take 1 capsule (0.4 mg total) by mouth daily with dinner      Facility-Administered Medications: None     Discharge Medication  List as of 2/18/2025  8:20 PM        START taking these medications    Details   amLODIPine (NORVASC) 5 mg tablet Take 1 tablet (5 mg total) by mouth daily, Starting Tue 2/18/2025, Normal           CONTINUE these medications which have NOT CHANGED    Details   albuterol (Ventolin HFA) 90 mcg/act inhaler Inhale 2 puffs every 4 (four) hours as needed for wheezing or shortness of breath, Starting Mon 9/12/2022, Normal      ascorbic acid (VITAMIN C) 500 mg tablet Take 500 mg by mouth every morning, Historical Med      azelastine (ASTELIN) 0.1 % nasal spray 1 spray into each nostril 2 (two) times a day Use in each nostril as directed, Starting Sun 12/29/2024, Normal      b complex vitamins tablet Take 1 tablet by mouth every morning, Historical Med      cholecalciferol (VITAMIN D3) 1,000 units tablet Take 1,000 Units by mouth every morning, Historical Med      desoximetasone (TOPICORT) 0.25 % cream Apply 1 Application topically 2 (two) times a day, Starting Thu 10/5/2023, Normal      montelukast (SINGULAIR) 10 mg tablet Take 1 tablet (10 mg total) by mouth daily at bedtime, Starting Mon 8/28/2023, Normal      multivitamin (THERAGRAN) TABS Take 1 tablet by mouth every morning., Historical Med      Promacta 50 MG tablet Take 1 tablet (50 mg total) by mouth once daily. Take on an empty stomach, 1 hour before or 2 hours after a meal., Normal      sildenafil (VIAGRA) 100 mg tablet Take 1 tablet (100 mg total) by mouth daily as needed for erectile dysfunction, Starting u 4/14/2022, Normal      tamsulosin (FLOMAX) 0.4 mg Take 1 capsule (0.4 mg total) by mouth daily with dinner, Starting Tue 2/11/2025, Normal           No discharge procedures on file.  ED SEPSIS DOCUMENTATION   Time reflects when diagnosis was documented in both MDM as applicable and the Disposition within this note       Time User Action Codes Description Comment    2/18/2025  8:20 PM Natan Quiroga Add [I10] Hypertension                  Natan Quiroga,  MD  02/18/25 5618     no

## 2025-02-25 ENCOUNTER — RESULTS FOLLOW-UP (OUTPATIENT)
Dept: FAMILY MEDICINE CLINIC | Facility: CLINIC | Age: 75
End: 2025-02-25

## 2025-02-25 ENCOUNTER — APPOINTMENT (OUTPATIENT)
Dept: LAB | Facility: HOSPITAL | Age: 75
End: 2025-02-25
Attending: FAMILY MEDICINE
Payer: COMMERCIAL

## 2025-02-25 DIAGNOSIS — I10 ESSENTIAL HYPERTENSION: ICD-10-CM

## 2025-02-25 DIAGNOSIS — Z12.5 SCREENING FOR PROSTATE CANCER: ICD-10-CM

## 2025-02-25 DIAGNOSIS — E78.00 ELEVATED LOW-DENSITY LIPOPROTEIN LEVEL: ICD-10-CM

## 2025-02-25 DIAGNOSIS — N18.30 STAGE 3 CHRONIC KIDNEY DISEASE, UNSPECIFIED WHETHER STAGE 3A OR 3B CKD (HCC): ICD-10-CM

## 2025-02-25 LAB
ALBUMIN SERPL BCG-MCNC: 3.9 G/DL (ref 3.5–5)
ALP SERPL-CCNC: 79 U/L (ref 34–104)
ALT SERPL W P-5'-P-CCNC: 12 U/L (ref 7–52)
ANION GAP SERPL CALCULATED.3IONS-SCNC: 7 MMOL/L (ref 4–13)
AST SERPL W P-5'-P-CCNC: 16 U/L (ref 13–39)
BILIRUB SERPL-MCNC: 1.24 MG/DL (ref 0.2–1)
BUN SERPL-MCNC: 22 MG/DL (ref 5–25)
CALCIUM SERPL-MCNC: 8.8 MG/DL (ref 8.4–10.2)
CHLORIDE SERPL-SCNC: 108 MMOL/L (ref 96–108)
CHOLEST SERPL-MCNC: 135 MG/DL (ref ?–200)
CO2 SERPL-SCNC: 23 MMOL/L (ref 21–32)
CREAT SERPL-MCNC: 1.29 MG/DL (ref 0.6–1.3)
ERYTHROCYTE [DISTWIDTH] IN BLOOD BY AUTOMATED COUNT: 14.5 % (ref 11.6–15.1)
GFR SERPL CREATININE-BSD FRML MDRD: 53 ML/MIN/1.73SQ M
GLUCOSE P FAST SERPL-MCNC: 92 MG/DL (ref 65–99)
HCT VFR BLD AUTO: 47.2 % (ref 36.5–49.3)
HDLC SERPL-MCNC: 41 MG/DL
HGB BLD-MCNC: 15.5 G/DL (ref 12–17)
LDLC SERPL CALC-MCNC: 76 MG/DL (ref 0–100)
MCH RBC QN AUTO: 29.4 PG (ref 26.8–34.3)
MCHC RBC AUTO-ENTMCNC: 32.8 G/DL (ref 31.4–37.4)
MCV RBC AUTO: 90 FL (ref 82–98)
PLATELET # BLD AUTO: 84 THOUSANDS/UL (ref 149–390)
PMV BLD AUTO: 13.4 FL (ref 8.9–12.7)
POTASSIUM SERPL-SCNC: 4.2 MMOL/L (ref 3.5–5.3)
PROT SERPL-MCNC: 6.8 G/DL (ref 6.4–8.4)
PSA SERPL-MCNC: 0.34 NG/ML (ref 0–4)
RBC # BLD AUTO: 5.27 MILLION/UL (ref 3.88–5.62)
SODIUM SERPL-SCNC: 138 MMOL/L (ref 135–147)
TRIGL SERPL-MCNC: 88 MG/DL (ref ?–150)
WBC # BLD AUTO: 7.25 THOUSAND/UL (ref 4.31–10.16)

## 2025-02-25 PROCEDURE — 80061 LIPID PANEL: CPT

## 2025-02-25 PROCEDURE — 80053 COMPREHEN METABOLIC PANEL: CPT

## 2025-02-25 PROCEDURE — G0103 PSA SCREENING: HCPCS

## 2025-02-25 PROCEDURE — 36415 COLL VENOUS BLD VENIPUNCTURE: CPT

## 2025-02-25 PROCEDURE — 85027 COMPLETE CBC AUTOMATED: CPT

## 2025-02-27 ENCOUNTER — OFFICE VISIT (OUTPATIENT)
Age: 75
End: 2025-02-27
Payer: COMMERCIAL

## 2025-02-27 VITALS — HEIGHT: 76 IN | HEART RATE: 77 BPM | WEIGHT: 242 LBS | BODY MASS INDEX: 29.47 KG/M2 | RESPIRATION RATE: 16 BRPM

## 2025-02-27 DIAGNOSIS — L03.032 PARONYCHIA OF TOENAIL OF LEFT FOOT: ICD-10-CM

## 2025-02-27 DIAGNOSIS — B35.1 ONYCHOMYCOSIS: ICD-10-CM

## 2025-02-27 DIAGNOSIS — L60.0 INGROWN TOENAIL: ICD-10-CM

## 2025-02-27 DIAGNOSIS — M79.672 LEFT FOOT PAIN: Primary | ICD-10-CM

## 2025-02-27 PROCEDURE — 99203 OFFICE O/P NEW LOW 30 MIN: CPT | Performed by: PODIATRIST

## 2025-02-27 RX ORDER — GRISEOFULVIN 125 MG/1
250 TABLET ORAL DAILY
Qty: 30 TABLET | Refills: 0 | Status: SHIPPED | OUTPATIENT
Start: 2025-02-27 | End: 2025-03-29

## 2025-02-27 NOTE — PROGRESS NOTES
Assessment/Plan: Pain and paronychia of left hallux.  Mycosis of nail.  Ingrown toenail.    Plan.  Chart reviewed.  PCP notes reviewed.  Lab work reviewed.  At this time we will start the patient on hepatic sparing oral antifungal, griseofulvin.  Patient advised on foot hygiene and nail cutting technique.  Watch for signs of infection.  Aftercare instruction given.  Return for follow-up.         Diagnoses and all orders for this visit:    Left foot pain    Paronychia of toenail of left foot    Onychomycosis  -     griseofulvin (ISRAEL-PEG) 250 MG tablet; Take 1 tablet (250 mg total) by mouth daily    Ingrown toenail          Subjective: Patient complains of pain.  His pain in his left big toe.  He is concerned about the toenail.    Allergies   Allergen Reactions    Dust Mite Extract Sneezing         Current Outpatient Medications:     albuterol (Ventolin HFA) 90 mcg/act inhaler, Inhale 2 puffs every 4 (four) hours as needed for wheezing or shortness of breath, Disp: 18 g, Rfl: 5    amLODIPine (NORVASC) 5 mg tablet, Take 1 tablet (5 mg total) by mouth daily, Disp: 30 tablet, Rfl: 0    ascorbic acid (VITAMIN C) 500 mg tablet, Take 500 mg by mouth every morning, Disp: , Rfl:     azelastine (ASTELIN) 0.1 % nasal spray, 1 spray into each nostril 2 (two) times a day Use in each nostril as directed, Disp: 1 mL, Rfl: 0    b complex vitamins tablet, Take 1 tablet by mouth every morning, Disp: , Rfl:     cholecalciferol (VITAMIN D3) 1,000 units tablet, Take 1,000 Units by mouth every morning, Disp: , Rfl:     desoximetasone (TOPICORT) 0.25 % cream, Apply 1 Application topically 2 (two) times a day, Disp: 100 g, Rfl: 0    griseofulvin (ISRAEL-PEG) 250 MG tablet, Take 1 tablet (250 mg total) by mouth daily, Disp: 30 tablet, Rfl: 0    montelukast (SINGULAIR) 10 mg tablet, Take 1 tablet (10 mg total) by mouth daily at bedtime, Disp: 30 tablet, Rfl: 3    multivitamin (THERAGRAN) TABS, Take 1 tablet by mouth every morning., Disp: ,  Rfl:     Promacta 50 MG tablet, Take 1 tablet (50 mg total) by mouth once daily. Take on an empty stomach, 1 hour before or 2 hours after a meal., Disp: 30 tablet, Rfl: 5    sildenafil (VIAGRA) 100 mg tablet, Take 1 tablet (100 mg total) by mouth daily as needed for erectile dysfunction, Disp: 10 tablet, Rfl: 3    tamsulosin (FLOMAX) 0.4 mg, Take 1 capsule (0.4 mg total) by mouth daily with dinner, Disp: 90 capsule, Rfl: 1    Patient Active Problem List   Diagnosis    Thrombocytopenia (HCC)    Elevated low-density lipoprotein level    BMI 31.0-31.9,adult    Obesity (BMI 30-39.9)    Essential hypertension    Vasculogenic erectile dysfunction    Primary osteoarthritis of right shoulder    Elevated troponin    PVC (premature ventricular contraction)    Stage 3 chronic kidney disease, unspecified whether stage 3a or 3b CKD (HCC)    History of ITP    Numbness    Lower urinary tract symptoms (LUTS)    Abnormal chest x-ray    Mild intermittent asthma without complication    Chronic ITP (idiopathic thrombocytopenia) (HCC)    Right carpal tunnel syndrome          Patient ID: Sixto Oakes is a 74 y.o. male.    HPI    The following portions of the patient's history were reviewed and updated as appropriate:     family history includes Heart disease in his brother and sister; Heart disease (age of onset: 72) in his father; No Known Problems in his mother.      reports that he quit smoking about 15 years ago. His smoking use included cigarettes. He started smoking about 57 years ago. He has a 21 pack-year smoking history. He has been exposed to tobacco smoke. He has never used smokeless tobacco. He reports that he does not currently use alcohol. He reports that he does not use drugs.    Vitals:    02/27/25 1523   Pulse: 77   Resp: 16       Review of Systems      Objective:  Patient's shoes and socks removed.   Foot ExamPhysical Exam         General  General Appearance: appears stated age and healthy   Orientation: alert and  oriented to person, place, and time   Affect: appropriate         Right Foot/Ankle      Inspection and Palpation  Ecchymosis: none  Swelling: dorsum   Arch: pes planus  Hammertoes: fifth toe  Skin Exam: dry skin;      Neurovascular  Dorsalis pedis: 2+  Posterior tibial: 2+  Superficial peroneal nerve sensation: diminished  Deep peroneal nerve sensation: diminished        Left Foot/Ankle       Inspection and Palpation  Ecchymosis: none  Swelling: dorsum   Arch: pes planus  Hammertoes: fifth toe  Skin Exam: dry skin;      Neurovascular  Dorsalis pedis: 2+  Posterior tibial: 2+  Superficial peroneal nerve sensation: diminished  Deep peroneal nerve sensation: diminished           Physical Exam  Vitals signs and nursing note reviewed.   Constitutional:       Appearance: Normal appearance.   Cardiovascular:      Pulses:           Dorsalis pedis pulses are 2+ on the right side and 2+ on the left side.        Posterior tibial pulses are 2+ on the right side and 2+ on the left side.      Comments: Q, 9 findings bilateral.  Negative digital hair.  Positive abnormal cooling.  Feet:      Right foot:      Skin integrity: Dry skin present.      Left foot:      Skin integrity: Dry skin present.   Skin:     Comments: Note is made of absence of right hallux nail.  Other 9 nails remaining are dystrophic.  They are thick with distal mycosis noted.  Hallux of the left side demonstrates paronychia.  It is ingrown distally there is malodor.  There was yellowing of the nail.   Neurological:      Mental Status: He is alert.   Psychiatric:         Mood and Affect: Mood normal.         Behavior: Behavior normal.         Thought Content: Thought content normal.         Judgment: Judgment normal.

## 2025-03-06 ENCOUNTER — APPOINTMENT (EMERGENCY)
Dept: RADIOLOGY | Facility: HOSPITAL | Age: 75
End: 2025-03-06
Payer: COMMERCIAL

## 2025-03-06 ENCOUNTER — APPOINTMENT (OUTPATIENT)
Dept: RADIOLOGY | Facility: HOSPITAL | Age: 75
End: 2025-03-06
Payer: COMMERCIAL

## 2025-03-06 ENCOUNTER — HOSPITAL ENCOUNTER (OUTPATIENT)
Facility: HOSPITAL | Age: 75
Setting detail: OBSERVATION
Discharge: HOME | End: 2025-03-07
Admitting: INTERNAL MEDICINE
Payer: COMMERCIAL

## 2025-03-06 DIAGNOSIS — Z86.2 HISTORY OF ITP: ICD-10-CM

## 2025-03-06 DIAGNOSIS — R29.90 STROKE-LIKE SYMPTOMS: ICD-10-CM

## 2025-03-06 DIAGNOSIS — I65.01 OCCLUSION OF RIGHT VERTEBRAL ARTERY: ICD-10-CM

## 2025-03-06 DIAGNOSIS — R55 NEAR SYNCOPE: Primary | ICD-10-CM

## 2025-03-06 LAB
2HR DELTA HS TROPONIN: 1 NG/L
4HR DELTA HS TROPONIN: 2 NG/L
ALBUMIN SERPL BCG-MCNC: 4.1 G/DL (ref 3.5–5)
ALP SERPL-CCNC: 81 U/L (ref 34–104)
ALT SERPL W P-5'-P-CCNC: 12 U/L (ref 7–52)
ANION GAP SERPL CALCULATED.3IONS-SCNC: 12 MMOL/L (ref 4–13)
APTT PPP: 29 SECONDS (ref 23–34)
AST SERPL W P-5'-P-CCNC: 16 U/L (ref 13–39)
BASOPHILS # BLD AUTO: 0.07 THOUSANDS/ÂΜL (ref 0–0.1)
BASOPHILS NFR BLD AUTO: 1 % (ref 0–1)
BILIRUB SERPL-MCNC: 0.98 MG/DL (ref 0.2–1)
BUN SERPL-MCNC: 26 MG/DL (ref 5–25)
CALCIUM SERPL-MCNC: 9.1 MG/DL (ref 8.4–10.2)
CARDIAC TROPONIN I PNL SERPL HS: 4 NG/L (ref ?–50)
CARDIAC TROPONIN I PNL SERPL HS: 5 NG/L (ref ?–50)
CARDIAC TROPONIN I PNL SERPL HS: 6 NG/L (ref ?–50)
CHLORIDE SERPL-SCNC: 107 MMOL/L (ref 96–108)
CO2 SERPL-SCNC: 20 MMOL/L (ref 21–32)
CREAT SERPL-MCNC: 1.41 MG/DL (ref 0.6–1.3)
EOSINOPHIL # BLD AUTO: 0.02 THOUSAND/ÂΜL (ref 0–0.61)
EOSINOPHIL NFR BLD AUTO: 0 % (ref 0–6)
ERYTHROCYTE [DISTWIDTH] IN BLOOD BY AUTOMATED COUNT: 14.5 % (ref 11.6–15.1)
GFR SERPL CREATININE-BSD FRML MDRD: 48 ML/MIN/1.73SQ M
GLUCOSE SERPL-MCNC: 100 MG/DL (ref 65–140)
HCT VFR BLD AUTO: 46.1 % (ref 36.5–49.3)
HGB BLD-MCNC: 15.3 G/DL (ref 12–17)
IMM GRANULOCYTES # BLD AUTO: 0.03 THOUSAND/UL (ref 0–0.2)
IMM GRANULOCYTES NFR BLD AUTO: 0 % (ref 0–2)
INR PPP: 0.93 (ref 0.85–1.19)
LYMPHOCYTES # BLD AUTO: 1.61 THOUSANDS/ÂΜL (ref 0.6–4.47)
LYMPHOCYTES NFR BLD AUTO: 21 % (ref 14–44)
MCH RBC QN AUTO: 29.6 PG (ref 26.8–34.3)
MCHC RBC AUTO-ENTMCNC: 33.2 G/DL (ref 31.4–37.4)
MCV RBC AUTO: 89 FL (ref 82–98)
MONOCYTES # BLD AUTO: 0.8 THOUSAND/ÂΜL (ref 0.17–1.22)
MONOCYTES NFR BLD AUTO: 10 % (ref 4–12)
NEUTROPHILS # BLD AUTO: 5.16 THOUSANDS/ÂΜL (ref 1.85–7.62)
NEUTS SEG NFR BLD AUTO: 68 % (ref 43–75)
NRBC BLD AUTO-RTO: 0 /100 WBCS
PLATELET # BLD AUTO: 112 THOUSANDS/UL (ref 149–390)
PMV BLD AUTO: 12.7 FL (ref 8.9–12.7)
POTASSIUM SERPL-SCNC: 4.5 MMOL/L (ref 3.5–5.3)
PROT SERPL-MCNC: 7.1 G/DL (ref 6.4–8.4)
PROTHROMBIN TIME: 13 SECONDS (ref 12.3–15)
RBC # BLD AUTO: 5.17 MILLION/UL (ref 3.88–5.62)
SODIUM SERPL-SCNC: 139 MMOL/L (ref 135–147)
TSH SERPL DL<=0.05 MIU/L-ACNC: 2.43 UIU/ML (ref 0.45–4.5)
WBC # BLD AUTO: 7.69 THOUSAND/UL (ref 4.31–10.16)

## 2025-03-06 PROCEDURE — 99285 EMERGENCY DEPT VISIT HI MDM: CPT

## 2025-03-06 PROCEDURE — 70551 MRI BRAIN STEM W/O DYE: CPT

## 2025-03-06 PROCEDURE — 70496 CT ANGIOGRAPHY HEAD: CPT

## 2025-03-06 PROCEDURE — 93005 ELECTROCARDIOGRAM TRACING: CPT

## 2025-03-06 PROCEDURE — 99223 1ST HOSP IP/OBS HIGH 75: CPT

## 2025-03-06 PROCEDURE — 87081 CULTURE SCREEN ONLY: CPT | Performed by: INTERNAL MEDICINE

## 2025-03-06 PROCEDURE — 85025 COMPLETE CBC W/AUTO DIFF WBC: CPT

## 2025-03-06 PROCEDURE — 84484 ASSAY OF TROPONIN QUANT: CPT

## 2025-03-06 PROCEDURE — 84443 ASSAY THYROID STIM HORMONE: CPT

## 2025-03-06 PROCEDURE — 80053 COMPREHEN METABOLIC PANEL: CPT

## 2025-03-06 PROCEDURE — 83036 HEMOGLOBIN GLYCOSYLATED A1C: CPT

## 2025-03-06 PROCEDURE — 85610 PROTHROMBIN TIME: CPT

## 2025-03-06 PROCEDURE — 70498 CT ANGIOGRAPHY NECK: CPT

## 2025-03-06 PROCEDURE — 36415 COLL VENOUS BLD VENIPUNCTURE: CPT

## 2025-03-06 PROCEDURE — 85730 THROMBOPLASTIN TIME PARTIAL: CPT

## 2025-03-06 PROCEDURE — 99205 OFFICE O/P NEW HI 60 MIN: CPT | Performed by: PSYCHIATRY & NEUROLOGY

## 2025-03-06 RX ORDER — ATORVASTATIN CALCIUM 40 MG/1
40 TABLET, FILM COATED ORAL EVERY EVENING
Status: DISCONTINUED | OUTPATIENT
Start: 2025-03-06 | End: 2025-03-07 | Stop reason: HOSPADM

## 2025-03-06 RX ORDER — TAMSULOSIN HYDROCHLORIDE 0.4 MG/1
0.4 CAPSULE ORAL
Status: DISCONTINUED | OUTPATIENT
Start: 2025-03-06 | End: 2025-03-06

## 2025-03-06 RX ORDER — ALBUTEROL SULFATE 90 UG/1
2 INHALANT RESPIRATORY (INHALATION) EVERY 4 HOURS PRN
Status: DISCONTINUED | OUTPATIENT
Start: 2025-03-06 | End: 2025-03-07 | Stop reason: HOSPADM

## 2025-03-06 RX ORDER — ASPIRIN 81 MG/1
81 TABLET, CHEWABLE ORAL DAILY
Status: DISCONTINUED | OUTPATIENT
Start: 2025-03-07 | End: 2025-03-07 | Stop reason: HOSPADM

## 2025-03-06 RX ORDER — ENOXAPARIN SODIUM 100 MG/ML
40 INJECTION SUBCUTANEOUS DAILY
Status: DISCONTINUED | OUTPATIENT
Start: 2025-03-06 | End: 2025-03-07 | Stop reason: HOSPADM

## 2025-03-06 RX ORDER — MONTELUKAST SODIUM 10 MG/1
10 TABLET ORAL
Status: DISCONTINUED | OUTPATIENT
Start: 2025-03-06 | End: 2025-03-07 | Stop reason: HOSPADM

## 2025-03-06 RX ADMIN — ENOXAPARIN SODIUM 40 MG: 40 INJECTION SUBCUTANEOUS at 15:39

## 2025-03-06 RX ADMIN — ATORVASTATIN CALCIUM 40 MG: 40 TABLET, FILM COATED ORAL at 18:50

## 2025-03-06 RX ADMIN — IOHEXOL 85 ML: 350 INJECTION, SOLUTION INTRAVENOUS at 11:53

## 2025-03-06 RX ADMIN — MONTELUKAST 10 MG: 10 TABLET, FILM COATED ORAL at 22:00

## 2025-03-06 NOTE — ASSESSMENT & PLAN NOTE
Follows with hematology outpatient  On Promacta 50 mg daily  Platelet count currently stable at 112

## 2025-03-06 NOTE — CONSULTS
Consultation - Neurology   Name: Sixto Oakes 74 y.o. male I MRN: [id number]  Unit/Bed#: ED 10 I Date of Admission: 3/6/2025   Date of Service: 3/6/2025 I Hospital Day: 0   Inpatient consult to Neurology  Consult performed by: Suha Mojica PA-C  Consult ordered by: Martha Lara PA-C        Physician Requesting Evaluation: Zhou Taveras MD   Reason for Evaluation / Principal Problem: Stroke-like symptoms    Assessment & Plan  Stroke-like symptoms  75 yo male with HTN, CKD3, chronic ITP maintained on Promacta, who presents with complaint of generalized weakness, R facial numbness, visual disturbance and feeling like he was going to pass out.    Neuroimaging  3/6/2025 CTA head and neck with and without contrast:   No acute intracranial abnormality.   Occluded nondominant right vertebral artery at its origin with reconstitution in the mid V2 segment. There is normal opacification of the intracranial right vertebral artery. The nondominant left vertebral artery appears normal throughout its course.   Mild right M2 segment stenosis. No additional intracranial stenosis identified. There is no intracranial occlusion.     Etiology of symptoms unclear, but given underlying right factors, will rule out ischemia.     Plan:   - Continue on stroke pathway.   - Check MRI brain without contrast.   - Check echocardiogram with bubble study.   - Monitor on telemetry.   - Check hemoglobin A1c and lipid panel.   - Check orthostatic vital signs.  - Recommend ASA 81 mg QD.   - Atorvastatin 40 mg QPM.   - Allow for permissive hypertension with BP goal <220/110. Treat with PRN antihypertensives.   - Goal normothermia and euglycemia.   - PT/OT  - Stroke education.   - Monitor exam and notify with changes.  Essential hypertension  - Allow for permissive hypertension with BP goal <220/110.   - Treat with PRN antihypertensives.   - Management as per primary team.  Stage 3 chronic kidney disease, unspecified whether stage 3a or 3b CKD  ([facility])  Lab Results   Component Value Date    EGFR 48 03/06/2025    EGFR 53 02/25/2025    EGFR 55 08/31/2023    CREATININE 1.41 (H) 03/06/2025    CREATININE 1.29 02/25/2025    CREATININE 1.26 08/31/2023   - Avoid nephrotoxins.   - Management as per primary team.  Chronic ITP (idiopathic thrombocytopenia) ([facility])  - Follows with hematology oncology team as an outpatient.   - Maintained on Promacta.     Recommendations for outpatient neurological follow up have yet to be determined.    History of Present Illness   Sixto Oakes is a 74 y.o.  male with HTN, CKD3, chronic ITP maintained on Promacta, who presents with complaint of generalized weakness, R facial numbness, visual disturbance and feeling like he was going to pass out.    Patient presented to there ED this morning with complaints of acute onset of generalized weakness, R facial numbness and feeling like he was going to pass out. He notes his vision felt abnormal as well, describes as blurred and having difficulty focusing. He notes symptoms began while in he was grocery shopping and when he got home, he checked his BP and noted low BP 84/50. CTA head and neck was completed and demonstrated chronic right vertebral artery occlusion. He was admitted for further work-up.     Patient notes that he has had these symptoms previously and was seen in the ED a few weeks ago and noted to be hypertensive. He notes that symptoms occur randomly and not when he is in any particular position or changing positions. He notes he is feeling okay right now, but complains of stiff neck. He feels overall generally weak but denies any weakness on any particular side of his body or extremity. He notes that he does not take antiplatelet or anticoagulation. He denies history of stroke or TIA. Denies recent fever, chills or infection. He denies tobacco, ETOH and drug use.     Review of Systems   Constitutional:  Negative for chills, fatigue and fever.   HENT:  Negative for trouble  swallowing.    Eyes:  Negative for visual disturbance.   Respiratory:  Positive for shortness of breath.    Cardiovascular:  Negative for chest pain.   Gastrointestinal:  Negative for abdominal pain, nausea and vomiting.   Genitourinary:  Negative for difficulty urinating and dysuria.   Musculoskeletal:  Positive for neck pain and neck stiffness. Negative for back pain and gait problem.   Skin:  Negative for rash.   Neurological:  Positive for weakness and light-headedness. Negative for dizziness, facial asymmetry, speech difficulty, numbness and headaches.   Psychiatric/Behavioral:  Negative for confusion.       Medications  Scheduled Meds:  Current Facility-Administered Medications   Medication Dose Route Frequency Provider Last Rate    albuterol  2 puff Inhalation Q4H PRN Martha Lara PA-C      [START ON 3/7/2025] aspirin  81 mg Oral Daily Martha Lara PA-C      atorvastatin  40 mg Oral QPM Martha Lara PA-C      eltrombopag  50 mg Oral HS ROXANNA Pena-KATHY      enoxaparin  40 mg Subcutaneous Daily Martha Lara PA-C      montelukast  10 mg Oral HS Martha Lara PA-C       Continuous Infusions:   PRN Meds:.  albuterol    Historical Information   Past Medical History:   Diagnosis Date    COVID-19 12/15/2021    Hypertension     Hx. No meds currently. Cardiology stopped HTN meds    Thrombocytopenia (HCC)     platelet count maintained around 90-Dr. Archie Taylor    Wears partial dentures     upper     Past Surgical History:   Procedure Laterality Date    APPENDECTOMY      COLONOSCOPY      COLONOSCOPY N/A 10/17/2018    Procedure: COLONOSCOPY;  Surgeon: Michael Simms MD;  Location: [facility];  Service: Gastroenterology    HERNIA REPAIR Right 2013    inguinal    JOINT REPLACEMENT Right     knee, left shoulder    KNEE SURGERY Left     Had left knee surgery for football injury    IN XCAPSL CTRC RMVL INSJ IO LENS PROSTH W/O ECP Left 05/04/2016    Procedure: EXTRACTION EXTRACAPSULAR CATARACT PHACO INTRAOCULAR LENS  (IOL);  Surgeon: Sixto Bucio MD;  Location: [facility] MAIN OR;  Service: Ophthalmology    [place] XCAPSL CTRC RMVL INSJ IO LENS PROSTH W/O ECP Right 2023    Procedure: EXTRACTION EXTRACAPSULAR CATARACT PHACO INTRAOCULAR LENS (IOL);  Surgeon: Sixto Bucio MD;  Location: [facility] MAIN OR;  Service: Ophthalmology    SHOULDER ARTHROSCOPY Left     tendon repair    TONSILLECTOMY  age 2     Social History     Tobacco Use    Smoking status: Former     Current packs/day: 0.00     Average packs/day: 0.5 packs/day for 42.0 years (21.0 ttl pk-yrs)     Types: Cigarettes     Start date:      Quit date:      Years since quitting: 15.1     Passive exposure: Past    Smokeless tobacco: Never   Vaping Use    Vaping status: Never Used   Substance and Sexual Activity    Alcohol use: Not Currently    Drug use: Never    Sexual activity: Not on file     E-Cigarette/Vaping    E-Cigarette Use Never User      E-Cigarette/Vaping Substances    Nicotine No     THC No     CBD No     Flavoring No     Other No     Unknown No      Family History   Problem Relation Age of Onset    Heart disease Father 72        massive MI    Heart disease Sister         MI    Heart disease Brother         MI    No Known Problems Mother     Mental illness Neg Hx        Objective :  Temp:  [97.6 °F (36.4 °C)] 97.6 °F (36.4 °C)  HR:  [66-74] 67  BP: (118-166)/(55-74) 166/71  Resp:  [17-20] 17  SpO2:  [94 %-96 %] 94 %  O2 Device: None (Room air)  NIHSS:    1a.Level of Consciousness: 0 = Alert   1b. LOC Questions: 0 = Answers both correctly   1c. LOC Commands: 0 = Obeys both correctly   2. Best Gaze: 0 = Normal   3. Visual: 0 = No visual field loss   4. Facial Palsy: 0=Normal symmetric movement   5a. Motor Right Arm: 0=No drift, limb holds 90 (or 45) degrees for full 10 seconds   5b. Motor Left Arm: 0=No drift, limb holds 90 (or 45) degrees for full 10 seconds   6a. Motor Right Le=No drift, limb holds 90 (or 45) degrees for full 10 seconds   6b. Motor Left  Le=No drift, limb holds 90 (or 45) degrees for full 10 seconds   7. Limb Ataxia:  0=Absent   8. Sensory: 0=Normal; no sensory loss   9. Best Language:  0=No aphasia, normal   10. Dysarthria: 0=Normal articulation   11. Extinction and Inattention (formerly Neglect): 0=No abnormality   Total Score: 0                 Physical Exam  Constitutional:       General: He is not in acute distress.     Appearance: Normal appearance. He is not ill-appearing, toxic-appearing or diaphoretic.   HENT:      Head: Normocephalic and atraumatic.      Mouth/Throat:      Mouth: Mucous membranes are moist.      Pharynx: Oropharynx is clear. No oropharyngeal exudate or posterior oropharyngeal erythema.   Eyes:      General: No scleral icterus.        Right eye: No discharge.         Left eye: No discharge.      Extraocular Movements: EOM normal. No nystagmus.      Conjunctiva/sclera: Conjunctivae normal.   Cardiovascular:      Rate and Rhythm: Normal rate and regular rhythm.   Pulmonary:      Effort: Pulmonary effort is normal. No respiratory distress.      Breath sounds: Normal breath sounds.   Abdominal:      General: There is no distension.      Palpations: Abdomen is soft.      Tenderness: There is no abdominal tenderness.   Musculoskeletal:         General: Normal range of motion.      Cervical back: Normal range of motion and neck supple.      Right lower leg: No edema.      Left lower leg: No edema.   Skin:     General: Skin is warm and dry.      Findings: No erythema or rash.   Neurological:      Mental Status: He is alert and oriented to person, place, and time.      Deep Tendon Reflexes:      Reflex Scores:       Bicep reflexes are 1+ on the right side and 1+ on the left side.       Brachioradialis reflexes are 1+ on the right side and 1+ on the left side.       Patellar reflexes are 1+ on the right side and 1+ on the left side.       Achilles reflexes are 1+ on the right side and 1+ on the left side.  Psychiatric:          Mood and Affect: Mood normal.         Speech: Speech normal.         Behavior: Behavior normal.     Neurological Exam  Mental Status  Alert. Oriented to person, place, time and situation. Oriented to person, place, and time. Speech is normal. Able to name objects, repeat and read. Attention and concentration are normal.    Cranial Nerves  CN II: Right normal visual field. Left normal visual field.  CN III, IV, VI: Extraocular movements intact bilaterally. No nystagmus. Normal saccades.   Right pupil: 4 mm. Round. Reactive to light.   Left pupil: 4 mm. Round. Reactive to light.  CN V:  Right: Facial sensation is normal.  Left: Facial sensation is normal on the left.  CN VII:  Right: There is no facial weakness.  Left: There is no facial weakness.  CN VIII:  Right: Hearing is decreased.  Left: Hearing is decreased.  CN IX, X: Palate elevates symmetrically  CN XI:  Right: Sternocleidomastoid strength is normal.  Left: Sternocleidomastoid strength is normal.  CN XII: Tongue midline without atrophy or fasciculations.    Motor  Normal muscle bulk throughout. No fasciculations present. Normal muscle tone. No abnormal involuntary movements. No pronator drift.  Motor strength 5/5 B/L UE and LE..    Sensory  Light touch is normal in upper and lower extremities. Temperature is normal in upper and lower extremities.  No right-sided hemispatial neglect. No left-sided hemispatial neglect.    Reflexes                                            Right                      Left  Brachioradialis                    1+                         1+  Biceps                                 1+                         1+  Patellar                                1+                         1+  Achilles                                1+                         1+  Right Plantar: downgoing  Left Plantar: equivocal    Coordination  Right: Finger-to-nose normal.Left: Finger-to-nose normal.    Gait    Deferred for safety..        Lab Results: I have  reviewed the following results:  Recent Results (from the past 24 hours)   ECG 12 lead    Collection Time: 03/06/25 10:24 AM   Result Value Ref Range    Ventricular Rate 72 BPM    Atrial Rate  BPM    NH Interval  ms    QRSD Interval 96 ms    QT Interval 382 ms    QTC Interval 418 ms    P Axis  degrees    QRS Axis 33 degrees    T Wave Axis 11 degrees   CBC and differential    Collection Time: 03/06/25 11:01 AM   Result Value Ref Range    WBC 7.69 4.31 - 10.16 Thousand/uL    RBC 5.17 3.88 - 5.62 Million/uL    Hemoglobin 15.3 12.0 - 17.0 g/dL    Hematocrit 46.1 36.5 - 49.3 %    MCV 89 82 - 98 fL    MCH 29.6 26.8 - 34.3 pg    MCHC 33.2 31.4 - 37.4 g/dL    RDW 14.5 11.6 - 15.1 %    MPV 12.7 8.9 - 12.7 fL    Platelets 112 (L) 149 - 390 Thousands/uL    nRBC 0 /100 WBCs    Segmented % 68 43 - 75 %    Immature Grans % 0 0 - 2 %    Lymphocytes % 21 14 - 44 %    Monocytes % 10 4 - 12 %    Eosinophils Relative 0 0 - 6 %    Basophils Relative 1 0 - 1 %    Absolute Neutrophils 5.16 1.85 - 7.62 Thousands/µL    Absolute Immature Grans 0.03 0.00 - 0.20 Thousand/uL    Absolute Lymphocytes 1.61 0.60 - 4.47 Thousands/µL    Absolute Monocytes 0.80 0.17 - 1.22 Thousand/µL    Eosinophils Absolute 0.02 0.00 - 0.61 Thousand/µL    Basophils Absolute 0.07 0.00 - 0.10 Thousands/µL   Protime-INR    Collection Time: 03/06/25 11:01 AM   Result Value Ref Range    Protime 13.0 12.3 - 15.0 seconds    INR 0.93 0.85 - 1.19   APTT    Collection Time: 03/06/25 11:01 AM   Result Value Ref Range    PTT 29 23 - 34 seconds   Comprehensive metabolic panel    Collection Time: 03/06/25 11:01 AM   Result Value Ref Range    Sodium 139 135 - 147 mmol/L    Potassium 4.5 3.5 - 5.3 mmol/L    Chloride 107 96 - 108 mmol/L    CO2 20 (L) 21 - 32 mmol/L    ANION GAP 12 4 - 13 mmol/L    BUN 26 (H) 5 - 25 mg/dL    Creatinine 1.41 (H) 0.60 - 1.30 mg/dL    Glucose 100 65 - 140 mg/dL    Calcium 9.1 8.4 - 10.2 mg/dL    AST 16 13 - 39 U/L    ALT 12 7 - 52 U/L    Alkaline  "Phosphatase 81 34 - 104 U/L    Total Protein 7.1 6.4 - 8.4 g/dL    Albumin 4.1 3.5 - 5.0 g/dL    Total Bilirubin 0.98 0.20 - 1.00 mg/dL    eGFR 48 ml/min/1.73sq m   TSH, 3rd generation with Free T4 reflex    Collection Time: 03/06/25 11:01 AM   Result Value Ref Range    TSH 3RD GENERATON 2.432 0.450 - 4.500 uIU/mL   HS Troponin 0hr (reflex protocol)    Collection Time: 03/06/25 11:01 AM   Result Value Ref Range    hs TnI 0hr 4 \"Refer to ACS Flowchart\"- see link ng/L   HS Troponin I 2hr    Collection Time: 03/06/25  1:27 PM   Result Value Ref Range    hs TnI 2hr 5 \"Refer to ACS Flowchart\"- see link ng/L    Delta 2hr hsTnI 1 <20 ng/L       Imaging  Imaging Results Review: I personally reviewed the following image studies in PACS and associated radiology reports: CTA head and neck with and without contrast- NO acute intracranial abnormality. Occluded nondominant right vertebral artery at its origin with reconstitution in the mid V2 segment. There is normal opacification of the intracranial right vertebral artery. The nondominant left vertebral artery appears normal throughout its course. Mild right M2 segment stenosis. No additional intracranial stenosis identified. There is no intracranial occlusion.    VTE Prophylaxis: Enoxaparin (Lovenox)    "

## 2025-03-06 NOTE — H&P
H&P - Hospitalist   Name: Sixto Oakes 74 y.o. male I MRN: [id number]  Unit/Bed#: ED 10 I Date of Admission: 3/6/2025   Date of Service: 3/6/2025 I Hospital Day: 0     Assessment & Plan  Stroke-like symptoms  Patient reports episode of dizziness associated with right sided facial numbness, blurry vision, and leg weakness while at the store today. When he got home he checked his blood pressure which was in the 80s. He rechecked it a few minutes later and it was in 150s. He reports similar episodes in the past but was worse today.  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, chronic per neurology  Will admit under stroke pathway  MRI brain  ECHO with bubble study  Update HbA1c and lipid panel  Monitor on telemetry  Start aspirin and statin  PT/OT/speech eval  Neurology consulted  Essential hypertension  Hold home amlodipine 5 mg daily to allow permissive hypertension  Patient was seen in ED on 2/18 and started on amlodipine 5 mg daily  Reports his blood pressure has been around 110-120s daily until today  Continue to monitor  Stage 3 chronic kidney disease, unspecified whether stage 3a or 3b CKD ([facility])  Lab Results   Component Value Date    EGFR 48 03/06/2025    EGFR 53 02/25/2025    EGFR 55 08/31/2023    CREATININE 1.41 (H) 03/06/2025    CREATININE 1.29 02/25/2025    CREATININE 1.26 08/31/2023   Baseline Cr appears around 1.2-1.3  Currently stable  Daily bmp  Chronic ITP (idiopathic thrombocytopenia) (HCC)  Follows with hematology outpatient  On Promacta 50 mg daily  Platelet count currently stable at 112  Lower urinary tract symptoms (LUTS)  Hold flomax for now to allow permissive hypertension  Mild intermittent asthma without complication  Not in acute exacerbation  Continue Singulair and albuterol as needed      VTE Pharmacologic Prophylaxis: VTE Score: 4 Moderate Risk (Score 3-4) - Pharmacological DVT Prophylaxis Ordered:  enoxaparin (Lovenox).  Code Status: Prior   Discussion with family: Updated  (multiple family members) at bedside.    Anticipated Length of Stay: Patient will be admitted on an observation basis with an anticipated length of stay of less than 2 midnights secondary to stroke like symptoms, stroke pathway.    History of Present Illness   Chief Complaint: dizziness, facial numbness, weakness    Sixto Oakes is a 74 y.o. male with a PMH of CKD, HTN, chronic ITP, BPH, asthma who presents with an episode of dizziness.  Patient states he went to the gym this morning then went to the store where he had episode of dizziness. He reports associated right sided facial tingling as well as blurry vision and lower extremity weakness. He states symptoms resolved after maybe 10 minutes and was able to drive himself home. He states blood pressure at home was in the 80s and when he rechecked a few minutes later it was in the 150s. He states he has had similar episodes in the past and recently had episode of double vision which resolved on its own after a few minutes. He reports he has been off of blood pressure medications for about 4 years and periodically checked his blood pressure at home which was always normal. He took a baby aspirin when he got home but states he does not take aspirin daily. He presented to ED on 2/18 for similar symptoms and had elevated blood pressure and was started on amlodipine 5 mg daily. He has checked his BP daily since then and states it is usually 110s-120s until today. He denies syncope, chest pain, difficulty speaking/swallowing, shortness of breath, nausea, vomiting, abdominal pain.     At time of evaluation in ED, currently denies any symptoms.    Review of Systems   Constitutional:  Negative for chills and fever.   HENT:  Negative for ear pain and sore throat.    Eyes:  Positive for visual disturbance. Negative for pain.   Respiratory:  Negative for cough and shortness of breath.     Cardiovascular:  Negative for chest pain and palpitations.   Gastrointestinal:  Negative for abdominal pain and vomiting.   Genitourinary:  Negative for dysuria and hematuria.   Musculoskeletal:  Negative for arthralgias and back pain.   Skin:  Negative for color change and rash.   Neurological:  Positive for dizziness, weakness and numbness. Negative for seizures and syncope.   All other systems reviewed and are negative.      Historical Information   Past Medical History:   Diagnosis Date    COVID-19 12/15/2021    Hypertension     Hx. No meds currently. Cardiology stopped HTN meds    Thrombocytopenia (HCC)     platelet count maintained around 90-Dr. Archie Taylor    Wears partial dentures     upper     Past Surgical History:   Procedure Laterality Date    APPENDECTOMY      COLONOSCOPY      COLONOSCOPY N/A 10/17/2018    Procedure: COLONOSCOPY;  Surgeon: Michael Simms MD;  Location: [facility] GI LAB;  Service: Gastroenterology    HERNIA REPAIR Right 2013    inguinal    JOINT REPLACEMENT Right     knee, left shoulder    KNEE SURGERY Left     Had left knee surgery for football injury    AK XCAPSL CTRC RMVL INSJ IO LENS PROSTH W/O ECP Left 05/04/2016    Procedure: EXTRACTION EXTRACAPSULAR CATARACT PHACO INTRAOCULAR LENS (IOL);  Surgeon: Sixto Bucio MD;  Location: [facility] MAIN OR;  Service: Ophthalmology    AK XCAPSL CTRC RMVL INSJ IO LENS PROSTH W/O ECP Right 5/22/2023    Procedure: EXTRACTION EXTRACAPSULAR CATARACT PHACO INTRAOCULAR LENS (IOL);  Surgeon: Sixto Bucio MD;  Location: [facility] MAIN OR;  Service: Ophthalmology    SHOULDER ARTHROSCOPY Left     tendon repair    TONSILLECTOMY  age 2     Social History     Tobacco Use    Smoking status: Former     Current packs/day: 0.00     Average packs/day: 0.5 packs/day for 42.0 years (21.0 ttl pk-yrs)     Types: Cigarettes     Start date: 1968     Quit date: 2010     Years since quitting: 15.1     Passive exposure: Past    Smokeless tobacco: Never   Vaping Use    Vaping  status: Never Used   Substance and Sexual Activity    Alcohol use: Not Currently    Drug use: Never    Sexual activity: Not on file     E-Cigarette/Vaping    E-Cigarette Use Never User      E-Cigarette/Vaping Substances    Nicotine No     THC No     CBD No     Flavoring No     Other No     Unknown No      Family History   Problem Relation Age of Onset    Heart disease Father 72        massive MI    Heart disease Sister         MI    Heart disease Brother         MI    No Known Problems Mother     Mental illness Neg Hx      Social History:  Marital Status: /Civil Union   Occupation:   Patient Pre-hospital Living Situation: Home  Patient Pre-hospital Level of Mobility: walks  Patient Pre-hospital Diet Restrictions: none    Meds/Allergies   I have reviewed home medications with patient personally.  Prior to Admission medications    Medication Sig Start Date End Date Taking? Authorizing Provider   albuterol (Ventolin HFA) 90 mcg/act inhaler Inhale 2 puffs every 4 (four) hours as needed for wheezing or shortness of breath 9/12/22   Luis Bailey DO   amLODIPine (NORVASC) 5 mg tablet Take 1 tablet (5 mg total) by mouth daily 2/18/25   Natan Quiroga MD   ascorbic acid (VITAMIN C) 500 mg tablet Take 500 mg by mouth every morning    Historical Provider, MD   azelastine (ASTELIN) 0.1 % nasal spray 1 spray into each nostril 2 (two) times a day Use in each nostril as directed 12/29/24   Santino Melvin MD   b complex vitamins tablet Take 1 tablet by mouth every morning    Historical Provider, MD   cholecalciferol (VITAMIN D3) 1,000 units tablet Take 1,000 Units by mouth every morning    Historical Provider, MD   desoximetasone (TOPICORT) 0.25 % cream Apply 1 Application topically 2 (two) times a day 10/5/23   Frank Lombardi, DO   griseofulvin (ISRAEL-PEG) 250 MG tablet Take 1 tablet (250 mg total) by mouth daily 2/27/25 3/29/25  Fletcher Boyle DPM   montelukast (SINGULAIR) 10 mg tablet Take 1 tablet (10 mg  total) by mouth daily at bedtime 8/28/23   Frank Lombardi, DO   multivitamin (THERAGRAN) TABS Take 1 tablet by mouth every morning.    Historical Provider, MD   Promacta 50 MG tablet Take 1 tablet (50 mg total) by mouth once daily. Take on an empty stomach, 1 hour before or 2 hours after a meal. 10/21/24   Archie Taylor MD   sildenafil (VIAGRA) 100 mg tablet Take 1 tablet (100 mg total) by mouth daily as needed for erectile dysfunction 4/14/22   Frank Lombardi, DO   tamsulosin (FLOMAX) 0.4 mg Take 1 capsule (0.4 mg total) by mouth daily with dinner 2/11/25   Frank Lombardi, DO     Allergies   Allergen Reactions    Dust Mite Extract Sneezing       Objective :  Temp:  [97.6 °F (36.4 °C)] 97.6 °F (36.4 °C)  HR:  [66-74] 66  BP: (118-157)/(55-74) 118/55  Resp:  [20] 20  SpO2:  [95 %-96 %] 96 %  O2 Device: None (Room air)    Physical Exam  Vitals and nursing note reviewed.   Constitutional:       General: He is not in acute distress.     Appearance: He is well-developed.   Cardiovascular:      Rate and Rhythm: Normal rate and regular rhythm.   Pulmonary:      Effort: Pulmonary effort is normal. No respiratory distress.      Breath sounds: Normal breath sounds.   Abdominal:      Palpations: Abdomen is soft.      Tenderness: There is no abdominal tenderness.   Musculoskeletal:         General: No swelling.   Skin:     General: Skin is warm and dry.      Capillary Refill: Capillary refill takes less than 2 seconds.   Neurological:      General: No focal deficit present.      Mental Status: He is alert and oriented to person, place, and time. Mental status is at baseline.      Motor: No weakness.   Psychiatric:         Mood and Affect: Mood normal.        Lines/Drains:      Lab Results: I have reviewed the following results:  Results from last 7 days   Lab Units 03/06/25  1101   WBC Thousand/uL 7.69   HEMOGLOBIN g/dL 15.3   HEMATOCRIT % 46.1   PLATELETS Thousands/uL 112*   SEGS PCT % 68   LYMPHO PCT % 21   MONO PCT % 10  "  EOS PCT % 0     Results from last 7 days   Lab Units 03/06/25  1101   SODIUM mmol/L 139   POTASSIUM mmol/L 4.5   CHLORIDE mmol/L 107   CO2 mmol/L 20*   BUN mg/dL 26*   CREATININE mg/dL 1.41*   ANION GAP mmol/L 12   CALCIUM mg/dL 9.1   ALBUMIN g/dL 4.1   TOTAL BILIRUBIN mg/dL 0.98   ALK PHOS U/L 81   ALT U/L 12   AST U/L 16   GLUCOSE RANDOM mg/dL 100     Results from last 7 days   Lab Units 03/06/25  1101   INR  0.93         No results found for: \"HGBA1C\"        Imaging Results Review: I reviewed radiology reports from this admission including: CT head.  Other Study Results Review: EKG was reviewed.     Administrative Statements     ** Please Note: This note has been constructed using a voice recognition system. **    "

## 2025-03-06 NOTE — PLAN OF CARE
Problem: PAIN - ADULT  Goal: Verbalizes/displays adequate comfort level or baseline comfort level  Description: Interventions:  - Encourage patient to monitor pain and request assistance  - Assess pain using appropriate pain scale  - Administer analgesics based on type and severity of pain and evaluate response  - Implement non-pharmacological measures as appropriate and evaluate response  - Consider cultural and social influences on pain and pain management  - Notify physician/advanced practitioner if interventions unsuccessful or patient reports new pain  Outcome: Progressing     Problem: INFECTION - ADULT  Goal: Absence or prevention of progression during hospitalization  Description: INTERVENTIONS:  - Assess and monitor for signs and symptoms of infection  - Monitor lab/diagnostic results  - Monitor all insertion sites, i.e. indwelling lines, tubes, and drains  - Monitor endotracheal if appropriate and nasal secretions for changes in amount and color  - [place] appropriate cooling/warming therapies per order  - Administer medications as ordered  - Instruct and encourage patient and family to use good hand hygiene technique  - Identify and instruct in appropriate isolation precautions for identified infection/condition  Outcome: Progressing     Problem: SAFETY ADULT  Goal: Patient will remain free of falls  Description: INTERVENTIONS:  - Educate patient/family on patient safety including physical limitations  - Instruct patient to call for assistance with activity   - Consult OT/PT to assist with strengthening/mobility   - Keep Call bell within reach  - Keep bed low and locked with side rails adjusted as appropriate  - Keep care items and personal belongings within reach  - Initiate and maintain comfort rounds  - Make Fall Risk Sign visible to staff  - Offer Toileting every 2 Hours, in advance of need  - Initiate/Maintain bed alarm  - Obtain necessary fall risk management equipment: yellow socks, bed  alarms  - Apply yellow socks and bracelet for high fall risk patients  - Consider moving patient to room near nurses station  Outcome: Progressing  Goal: Maintains/Returns to pre admission functional level  Description: INTERVENTIONS:  - Perform AM-PAC 6 Click Basic Mobility/ Daily Activity assessment daily.  - Set and communicate daily mobility goal to care team and patient/family/caregiver.   - Collaborate with rehabilitation services on mobility goals if consulted  - Ambulate patient 3 times a day  - Record patient progress and toleration of activity level   Outcome: Progressing     Problem: DISCHARGE PLANNING  Goal: Discharge to home or other facility with appropriate resources  Description: INTERVENTIONS:  - Identify barriers to discharge w/patient and caregiver  - Arrange for needed discharge resources and transportation as appropriate  - Identify discharge learning needs (meds, wound care, etc.)  - Arrange for interpretive services to assist at discharge as needed  - Refer to Case Management Department for coordinating discharge planning if the patient needs post-hospital services based on physician/advanced practitioner order or complex needs related to functional status, cognitive ability, or social support system  Outcome: Progressing     Problem: NEUROSENSORY - ADULT  Goal: Achieves stable or improved neurological status  Description: INTERVENTIONS  - Monitor and report changes in neurological status  - Monitor vital signs such as temperature, blood pressure, glucose, and any other labs ordered   - Initiate measures to prevent increased intracranial pressure  - Monitor for seizure activity and implement precautions if appropriate      Outcome: Progressing  Goal: Achieves maximal functionality and self care  Description: INTERVENTIONS  - Monitor swallowing and airway patency with patient fatigue and changes in neurological status  - Encourage and assist patient to increase activity and self care.   -  Encourage visually impaired, hearing impaired and aphasic patients to use assistive/communication devices  Outcome: Progressing

## 2025-03-06 NOTE — ASSESSMENT & PLAN NOTE
Patient reports episode of dizziness associated with right sided facial numbness, blurry vision, and leg weakness while at the store today. When he got home he checked his blood pressure which was in the 80s. He rechecked it a few minutes later and it was in 150s. He reports similar episodes in the past but was worse today.  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

## 2025-03-06 NOTE — ASSESSMENT & PLAN NOTE
75 yo male with HTN, CKD3, chronic ITP maintained on Promacta, who presents with complaint of generalized weakness, R facial numbness, visual disturbance and feeling like he was going to pass out.    Neuroimaging  3/6/2025 CTA head and neck with and without contrast:   No acute intracranial abnormality.   Occluded nondominant right vertebral artery at its origin with reconstitution in the mid V2 segment. There is normal opacification of the intracranial right vertebral artery. The nondominant left vertebral artery appears normal throughout its course.   Mild right M2 segment stenosis. No additional intracranial stenosis identified. There is no intracranial occlusion.     Etiology of symptoms unclear, but given underlying right factors, will rule out ischemia.     Plan:   - Continue on stroke pathway.   - Check MRI brain without contrast.   - Check echocardiogram with bubble study.   - Monitor on telemetry.   - Check hemoglobin A1c and lipid panel.   - Check orthostatic vital signs.  - Recommend ASA 81 mg QD.   - Atorvastatin 40 mg QPM.   - Allow for permissive hypertension with BP goal <220/110. Treat with PRN antihypertensives.   - Goal normothermia and euglycemia.   - PT/OT  - Stroke education.   - Monitor exam and notify with changes.

## 2025-03-06 NOTE — ASSESSMENT & PLAN NOTE
Lab Results   Component Value Date    EGFR 48 03/06/2025    EGFR 53 02/25/2025    EGFR 55 08/31/2023    CREATININE 1.41 (H) 03/06/2025    CREATININE 1.29 02/25/2025    CREATININE 1.26 08/31/2023   Baseline Cr appears around 1.2-1.3  Currently stable  Daily bmp

## 2025-03-06 NOTE — ASSESSMENT & PLAN NOTE
Hold home amlodipine 5 mg daily to allow permissive hypertension  Patient was seen in ED on 2/18 and started on amlodipine 5 mg daily  Reports his blood pressure has been around 110-120s daily until today  Continue to monitor

## 2025-03-06 NOTE — ASSESSMENT & PLAN NOTE
- Allow for permissive hypertension with BP goal <220/110.   - Treat with PRN antihypertensives.   - Management as per primary team.

## 2025-03-06 NOTE — ASSESSMENT & PLAN NOTE
Lab Results   Component Value Date    EGFR 48 03/06/2025    EGFR 53 02/25/2025    EGFR 55 08/31/2023    CREATININE 1.41 (H) 03/06/2025    CREATININE 1.29 02/25/2025    CREATININE 1.26 08/31/2023   - Avoid nephrotoxins.   - Management as per primary team.

## 2025-03-06 NOTE — ED PROVIDER NOTES
"Time reflects when diagnosis was documented in both MDM as applicable and the Disposition within this note       Time User Action Codes Description Comment    3/6/2025  1:29 PM Natan Quiroga Add [R55] Near syncope     3/6/2025  1:29 PM Natan Quiroga Add [I65.01] Occlusion of right vertebral artery     3/6/2025  2:30 PM Martha Lara Add [R29.90] Stroke-like symptoms           ED Disposition       ED Disposition   Admit    Condition   Stable    Date/Time   Thu Mar 6, 2025  1:29 PM    Comment   Case was discussed with GERMAN and the patient's admission status was agreed to be Admission Status: observation status to the service of Dr. Sabillon.               Assessment & Plan       Medical Decision Making  Amount and/or Complexity of Data Reviewed  Labs: ordered.  Radiology: ordered.  ECG/medicine tests:  Decision-making details documented in ED Course.    Risk  Prescription drug management.  Decision regarding hospitalization.      73 y/o M with pmh htn presenting for evaluation of near syncope. Pt reports feeling in usual state of health this morning, went to gym, normal work out. Went to store and had acute onset generalized weakness, R facial numbness, felt like he was going to pass out. States he was driving home from there \"fighting\" to stay awake to make it home safely. He reports checking his BP as soon as he got home with noted hypotension 84/50. Checked it again shortly after and it was normal. He has no complaints at this time, feels symptoms are resolved. On questioning he noted 2 days ago having a brief episode of double vision which resolved after a few minutes. Denies HA, CP, numbness, weakness at this time. Took 81mg ASA prior to coming.   VSS.  No focal neuro deficits, normal heart rhythm.  Etiology likely pre syncope, possibly from anti HTN medication vs vasovagal vs orthostatic. BP currently normal with no active sx to me suggests likely BP component to sx. Given neuro sx of facial numbness, will " obtain CTA head/neck.   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 D/w neurology, agree chronic, could obtain MRI for further eval. D/w pt and family at bedside to update to all lab and imaging findings. Pt prefers to stay for MRI. Pt to be admitted to medical service for further evaluation.     ED Course as of 03/06/25 1445   Thu Mar 06, 2025   1034 ECG 12 lead  Procedure Note: EKG  Date/Time: 03/06/25 10:34 AM   Interpreted by: Natan Quiroga MD  Indications / Diagnosis: dizziness  ECG reviewed by me, the ED Provider: yes   The EKG demonstrates:  Rhythm: normal sinus  Intervals: normal intervals  Axis: normal axis  QRS/Blocks: normal QRS  ST Changes: No acute ST Changes, no STD/ANGY.         Medications   albuterol (PROVENTIL HFA,VENTOLIN HFA) inhaler 2 puff (has no administration in time range)   montelukast (SINGULAIR) tablet 10 mg (has no administration in time range)   tamsulosin (FLOMAX) capsule 0.4 mg (has no administration in time range)   eltrombopag (PROMACTA) tablet 50 mg (has no administration in time range)   atorvastatin (LIPITOR) tablet 40 mg (has no administration in time range)   aspirin chewable tablet 81 mg (has no administration in time range)   enoxaparin (LOVENOX) subcutaneous injection 40 mg (has no administration in time range)   iohexol (OMNIPAQUE) 350 MG/ML injection (MULTI-DOSE) 85 mL (85 mL Intravenous Given 3/6/25 1153)       ED Risk Strat Scores                            SBIRT 22yo+      Flowsheet Row Most Recent Value   Initial Alcohol Screen: US AUDIT-C     1. How often do you have a drink containing alcohol? 0 Filed at: 03/06/2025 1020   2. How many drinks containing alcohol do you have on a typical day you are drinking?  0 Filed at: 03/06/2025 1020   3a. Male UNDER 65: How often do you have five or more drinks on one occasion? 0 Filed at: 03/06/2025 1020   3b. FEMALE Any Age, or MALE 65+: How often do you have 4 or more drinks on one  occassion? 0 Filed at: 03/06/2025 1020   Audit-C Score 0 Filed at: 03/06/2025 1020   SRI: How many times in the past year have you...    Used an illegal drug or used a prescription medication for non-medical reasons? Never Filed at: 03/06/2025 1020                            History of Present Illness       Chief Complaint   Patient presents with    Dizziness     Patient report he was at the store and started w/ dizziness, blurred vision, right sided facial numbness, and weakness in his legs. Patient reports that he check bp when he arrived home and his bp was 84/50 then after recheck it was in the 150s. Patient reports he has had similar episodes in the past.        Past Medical History:   Diagnosis Date    COVID-19 12/15/2021    Hypertension     Hx. No meds currently. Cardiology stopped HTN meds    Thrombocytopenia (HCC)     platelet count maintained around 90-Dr. Archie Taylor    Wears partial dentures     upper      Past Surgical History:   Procedure Laterality Date    APPENDECTOMY      COLONOSCOPY      COLONOSCOPY N/A 10/17/2018    Procedure: COLONOSCOPY;  Surgeon: Michael Simms MD;  Location: [facility] GI LAB;  Service: Gastroenterology    HERNIA REPAIR Right 2013    inguinal    JOINT REPLACEMENT Right     knee, left shoulder    KNEE SURGERY Left     Had left knee surgery for football injury    MN XCAPSL CTRC RMVL INSJ IO LENS PROSTH W/O ECP Left 05/04/2016    Procedure: EXTRACTION EXTRACAPSULAR CATARACT PHACO INTRAOCULAR LENS (IOL);  Surgeon: Sixto Bucio MD;  Location: [facility] MAIN OR;  Service: Ophthalmology    MN XCAPSL CTRC RMVL INSJ IO LENS PROSTH W/O ECP Right 5/22/2023    Procedure: EXTRACTION EXTRACAPSULAR CATARACT PHACO INTRAOCULAR LENS (IOL);  Surgeon: Sixto Bucio MD;  Location: [facility] MAIN OR;  Service: Ophthalmology    SHOULDER ARTHROSCOPY Left     tendon repair    TONSILLECTOMY  age 2      Family History   Problem Relation Age of Onset    Heart disease Father 72        massive MI    Heart disease  Sister         MI    Heart disease Brother         MI    No Known Problems Mother     Mental illness Neg Hx       Social History     Tobacco Use    Smoking status: Former     Current packs/day: 0.00     Average packs/day: 0.5 packs/day for 42.0 years (21.0 ttl pk-yrs)     Types: Cigarettes     Start date: 1968     Quit date: 2010     Years since quitting: 15.1     Passive exposure: Past    Smokeless tobacco: Never   Vaping Use    Vaping status: Never Used   Substance Use Topics    Alcohol use: Not Currently    Drug use: Never      E-Cigarette/Vaping    E-Cigarette Use Never User       E-Cigarette/Vaping Substances    Nicotine No     THC No     CBD No     Flavoring No     Other No     Unknown No       I have reviewed and agree with the history as documented.     HPI  See mdm  Review of Systems   Constitutional:  Negative for chills and fever.   HENT:  Negative for ear pain and sore throat.    Eyes:  Positive for visual disturbance. Negative for pain.   Respiratory:  Negative for cough and shortness of breath.    Cardiovascular:  Negative for chest pain and palpitations.   Gastrointestinal:  Negative for abdominal pain and vomiting.   Genitourinary:  Negative for dysuria and hematuria.   Musculoskeletal:  Negative for arthralgias and back pain.   Skin:  Negative for color change and rash.   Neurological:  Positive for weakness, light-headedness and numbness. Negative for seizures and syncope.   All other systems reviewed and are negative.          Objective       ED Triage Vitals [03/06/25 1015]   Temperature Pulse Blood Pressure Respirations SpO2 Patient Position - Orthostatic VS   97.6 °F (36.4 °C) 74 157/74 20 95 % Sitting      Temp Source Heart Rate Source BP Location FiO2 (%) Pain Score    Oral Monitor Right arm -- No Pain      Vitals      Date and Time Temp Pulse SpO2 Resp BP Pain Score FACES Pain Rating User   03/06/25 1145 -- 66 96 % 20 118/55 -- -- CRISTIAN   03/06/25 1015 97.6 °F (36.4 °C) 74 95 % 20 157/74 No  Pain -- CG            Physical Exam  Vitals and nursing note reviewed.   Constitutional:       General: He is not in acute distress.     Appearance: He is well-developed. He is not toxic-appearing.   HENT:      Head: Normocephalic and atraumatic.      Right Ear: External ear normal.      Left Ear: External ear normal.      Nose: Nose normal.      Mouth/Throat:      Pharynx: Oropharynx is clear. No oropharyngeal exudate or posterior oropharyngeal erythema.   Eyes:      Extraocular Movements: Extraocular movements intact.      Conjunctiva/sclera: Conjunctivae normal.      Pupils: Pupils are equal, round, and reactive to light.   Cardiovascular:      Rate and Rhythm: Normal rate and regular rhythm.      Pulses: Normal pulses.      Heart sounds: Normal heart sounds. No murmur heard.     No friction rub. No gallop.   Pulmonary:      Effort: Pulmonary effort is normal. No respiratory distress.      Breath sounds: Normal breath sounds. No wheezing, rhonchi or rales.   Abdominal:      General: Abdomen is flat.      Palpations: Abdomen is soft.      Tenderness: There is no abdominal tenderness. There is no guarding or rebound.   Musculoskeletal:         General: Normal range of motion.      Cervical back: Normal range of motion. No rigidity.      Right lower leg: No edema.      Left lower leg: No edema.   Skin:     General: Skin is warm and dry.      Capillary Refill: Capillary refill takes less than 2 seconds.   Neurological:      General: No focal deficit present.      Mental Status: He is alert and oriented to person, place, and time.      Cranial Nerves: No cranial nerve deficit.      Sensory: No sensory deficit.      Motor: No weakness.      Coordination: Coordination normal.   Psychiatric:         Mood and Affect: Mood normal.         Results Reviewed       Procedure Component Value Units Date/Time    HS Troponin I 2hr [[id number]]  (Normal) Collected: 03/06/25 1327    Lab Status: Final result Specimen: Blood from Arm,  Left Updated: 03/06/25 1356     hs TnI 2hr 5 ng/L      Delta 2hr hsTnI 1 ng/L     HS Troponin I 4hr [[id number]]     Lab Status: No result Specimen: Blood     TSH, 3rd generation with Free T4 reflex [[id number]]  (Normal) Collected: 03/06/25 1101    Lab Status: Final result Specimen: Blood from Arm, Left Updated: 03/06/25 1147     TSH 3RD GENERATON 2.432 uIU/mL     HS Troponin 0hr (reflex protocol) [[id number]]  (Normal) Collected: 03/06/25 1101    Lab Status: Final result Specimen: Blood from Arm, Left Updated: 03/06/25 1139     hs TnI 0hr 4 ng/L     Protime-INR [[id number]]  (Normal) Collected: 03/06/25 1101    Lab Status: Final result Specimen: Blood from Arm, Left Updated: 03/06/25 1138     Protime 13.0 seconds      INR 0.93    Narrative:      INR Therapeutic Range    Indication                                             INR Range      Atrial Fibrillation                                               2.0-3.0  Hypercoagulable State                                    2.0.2.3  Left Ventricular Asist Device                            2.0-3.0  Mechanical Heart Valve                                  -    Aortic(with afib, MI, embolism, HF, LA enlargement,    and/or coagulopathy)                                     2.0-3.0 (2.5-3.5)     Mitral                                                             2.5-3.5  Prosthetic/Bioprosthetic Heart Valve               2.0-3.0  Venous thromboembolism (VTE: VT, PE        2.0-3.0    APTT [[id number]]  (Normal) Collected: 03/06/25 1101    Lab Status: Final result Specimen: Blood from Arm, Left Updated: 03/06/25 1138     PTT 29 seconds     Comprehensive metabolic panel [[id number]]  (Abnormal) Collected: 03/06/25 1101    Lab Status: Final result Specimen: Blood from Arm, Left Updated: 03/06/25 1131     Sodium 139 mmol/L      Potassium 4.5 mmol/L      Chloride 107 mmol/L      CO2 20 mmol/L      ANION GAP 12 mmol/L      BUN 26 mg/dL      Creatinine 1.41 mg/dL      Glucose 100 mg/dL       Calcium 9.1 mg/dL      AST 16 U/L      ALT 12 U/L      Alkaline Phosphatase 81 U/L      Total Protein 7.1 g/dL      Albumin 4.1 g/dL      Total Bilirubin 0.98 mg/dL      eGFR 48 ml/min/1.73sq m     Narrative:      National Kidney Disease Foundation guidelines for Chronic Kidney Disease (CKD):     Stage 1 with normal or high GFR (GFR > 90 mL/min/1.73 square meters)    Stage 2 Mild CKD (GFR = 60-89 mL/min/1.73 square meters)    Stage 3A Moderate CKD (GFR = 45-59 mL/min/1.73 square meters)    Stage 3B Moderate CKD (GFR = 30-44 mL/min/1.73 square meters)    Stage 4 Severe CKD (GFR = 15-29 mL/min/1.73 square meters)    Stage 5 End Stage CKD (GFR <15 mL/min/1.73 square meters)  Note: GFR calculation is accurate only with a steady state creatinine    CBC and differential [[id number]]  (Abnormal) Collected: 03/06/25 1101    Lab Status: Final result Specimen: Blood from Arm, Left Updated: 03/06/25 1110     WBC 7.69 Thousand/uL      RBC 5.17 Million/uL      Hemoglobin 15.3 g/dL      Hematocrit 46.1 %      MCV 89 fL      MCH 29.6 pg      MCHC 33.2 g/dL      RDW 14.5 %      MPV 12.7 fL      Platelets 112 Thousands/uL      nRBC 0 /100 WBCs      Segmented % 68 %      Immature Grans % 0 %      Lymphocytes % 21 %      Monocytes % 10 %      Eosinophils Relative 0 %      Basophils Relative 1 %      Absolute Neutrophils 5.16 Thousands/µL      Absolute Immature Grans 0.03 Thousand/uL      Absolute Lymphocytes 1.61 Thousands/µL      Absolute Monocytes 0.80 Thousand/µL      Eosinophils Absolute 0.02 Thousand/µL      Basophils Absolute 0.07 Thousands/µL             CTA head and neck with and without contrast   Final Interpretation by Archie Bryan MD (03/06 1242)      CT Brain:  No acute intracranial abnormality.      CT Angiography:   1. Occluded nondominant right vertebral artery at its origin with reconstitution in the mid V2 segment. There is normal opacification of the intracranial right vertebral artery. The nondominant left  vertebral artery appears normal throughout its course.   2. Mild right M1 segment stenosis. No additional intracranial stenosis identified. There is no intracranial occlusion.         The study was marked in EPIC for immediate notification.         Workstation performed: [id number]         MRI Inpatient Order    (Results Pending)       Procedures    ED Medication and Procedure Management   Prior to Admission Medications   Prescriptions Last Dose Informant Patient Reported? Taking?   Promacta 50 MG tablet   No No   Sig: Take 1 tablet (50 mg total) by mouth once daily. Take on an empty stomach, 1 hour before or 2 hours after a meal.   albuterol (Ventolin HFA) 90 mcg/act inhaler  Self No No   Sig: Inhale 2 puffs every 4 (four) hours as needed for wheezing or shortness of breath   amLODIPine (NORVASC) 5 mg tablet   No No   Sig: Take 1 tablet (5 mg total) by mouth daily   ascorbic acid (VITAMIN C) 500 mg tablet  Self Yes No   Sig: Take 500 mg by mouth every morning   azelastine (ASTELIN) 0.1 % nasal spray   No No   Si spray into each nostril 2 (two) times a day Use in each nostril as directed   b complex vitamins tablet  Self Yes No   Sig: Take 1 tablet by mouth every morning   cholecalciferol (VITAMIN D3) 1,000 units tablet  Self Yes No   Sig: Take 1,000 Units by mouth every morning   desoximetasone (TOPICORT) 0.25 % cream   No No   Sig: Apply 1 Application topically 2 (two) times a day   griseofulvin (ISRAEL-PEG) 250 MG tablet   No No   Sig: Take 1 tablet (250 mg total) by mouth daily   montelukast (SINGULAIR) 10 mg tablet  Self No No   Sig: Take 1 tablet (10 mg total) by mouth daily at bedtime   multivitamin (THERAGRAN) TABS  Self Yes No   Sig: Take 1 tablet by mouth every morning.   sildenafil (VIAGRA) 100 mg tablet  Self No No   Sig: Take 1 tablet (100 mg total) by mouth daily as needed for erectile dysfunction   tamsulosin (FLOMAX) 0.4 mg   No No   Sig: Take 1 capsule (0.4 mg total) by mouth daily with dinner       Facility-Administered Medications: None     Patient's Medications   Discharge Prescriptions    No medications on file     No discharge procedures on file.  ED SEPSIS DOCUMENTATION   Time reflects when diagnosis was documented in both MDM as applicable and the Disposition within this note       Time User Action Codes Description Comment    3/6/2025  1:29 PM Natan Quiroga [R55] Near syncope     3/6/2025  1:29 PM Natan Quiroga [I65.01] Occlusion of right vertebral artery     3/6/2025  2:30 PM Martha Lara Add [R29.90] Stroke-like symptoms                  Natan Quiroga MD  03/06/25 0826

## 2025-03-07 ENCOUNTER — APPOINTMENT (OUTPATIENT)
Dept: NON INVASIVE DIAGNOSTICS | Facility: HOSPITAL | Age: 75
End: 2025-03-07
Payer: COMMERCIAL

## 2025-03-07 ENCOUNTER — TELEPHONE (OUTPATIENT)
Age: 75
End: 2025-03-07

## 2025-03-07 VITALS
DIASTOLIC BLOOD PRESSURE: 77 MMHG | TEMPERATURE: 97.7 F | BODY MASS INDEX: 29.59 KG/M2 | RESPIRATION RATE: 16 BRPM | SYSTOLIC BLOOD PRESSURE: 162 MMHG | WEIGHT: 238 LBS | HEIGHT: 75 IN | OXYGEN SATURATION: 98 % | HEART RATE: 61 BPM

## 2025-03-07 LAB
ANION GAP SERPL CALCULATED.3IONS-SCNC: 10 MMOL/L (ref 4–13)
AORTIC ROOT: 3.7 CM
ATRIAL RATE: 70 BPM
AV LVOT PEAK GRADIENT: 4 MMHG
BSA FOR ECHO PROCEDURE: 2.36 M2
BUN SERPL-MCNC: 21 MG/DL (ref 5–25)
CALCIUM SERPL-MCNC: 8.7 MG/DL (ref 8.4–10.2)
CHLORIDE SERPL-SCNC: 107 MMOL/L (ref 96–108)
CHOLEST SERPL-MCNC: 146 MG/DL (ref ?–200)
CO2 SERPL-SCNC: 21 MMOL/L (ref 21–32)
CREAT SERPL-MCNC: 1.27 MG/DL (ref 0.6–1.3)
DOP CALC LVOT AREA: 3.46 CM2
DOP CALC LVOT DIAMETER: 2.1 CM
E WAVE DECELERATION TIME: 255 MS
E/A RATIO: 0.78
ERYTHROCYTE [DISTWIDTH] IN BLOOD BY AUTOMATED COUNT: 14.4 % (ref 11.6–15.1)
EST. AVERAGE GLUCOSE BLD GHB EST-MCNC: 128 MG/DL
FRACTIONAL SHORTENING: 27 (ref 28–44)
GFR SERPL CREATININE-BSD FRML MDRD: 54 ML/MIN/1.73SQ M
GLUCOSE P FAST SERPL-MCNC: 99 MG/DL (ref 65–99)
GLUCOSE SERPL-MCNC: 99 MG/DL (ref 65–140)
HBA1C MFR BLD: 6.1 %
HCT VFR BLD AUTO: 45.4 % (ref 36.5–49.3)
HDLC SERPL-MCNC: 40 MG/DL
HGB BLD-MCNC: 15.3 G/DL (ref 12–17)
INTERVENTRICULAR SEPTUM IN DIASTOLE (PARASTERNAL SHORT AXIS VIEW): 1.2 CM
INTERVENTRICULAR SEPTUM: 1.2 CM (ref 0.6–1.1)
LAAS-AP2: 17.8 CM2
LAAS-AP4: 21.7 CM2
LDLC SERPL CALC-MCNC: 87 MG/DL (ref 0–100)
LEFT ATRIUM AREA SYSTOLE SINGLE PLANE A4C: 17.9 CM2
LEFT ATRIUM SIZE: 3.2 CM
LEFT ATRIUM VOLUME (MOD BIPLANE): 51 ML
LEFT ATRIUM VOLUME INDEX (MOD BIPLANE): 21.6 ML/M2
LEFT INTERNAL DIMENSION IN SYSTOLE: 3.3 CM (ref 2.1–4)
LEFT VENTRICULAR INTERNAL DIMENSION IN DIASTOLE: 4.5 CM (ref 3.5–6)
LEFT VENTRICULAR POSTERIOR WALL IN END DIASTOLE: 1.3 CM
LEFT VENTRICULAR STROKE VOLUME: 47 ML
LVSV (TEICH): 47 ML
MCH RBC QN AUTO: 29.8 PG (ref 26.8–34.3)
MCHC RBC AUTO-ENTMCNC: 33.7 G/DL (ref 31.4–37.4)
MCV RBC AUTO: 88 FL (ref 82–98)
MV E'TISSUE VEL-LAT: 8 CM/S
MV E'TISSUE VEL-SEP: 8 CM/S
MV PEAK A VEL: 0.9 M/S
MV PEAK E VEL: 70 CM/S
MV STENOSIS PRESSURE HALF TIME: 74 MS
MV VALVE AREA P 1/2 METHOD: 2.97
P AXIS: 30 DEGREES
PLATELET # BLD AUTO: 101 THOUSANDS/UL (ref 149–390)
PMV BLD AUTO: 12.3 FL (ref 8.9–12.7)
POTASSIUM SERPL-SCNC: 4.2 MMOL/L (ref 3.5–5.3)
PR INTERVAL: 178 MS
PV PEAK GRADIENT: 2 MMHG
QRS AXIS: 33 DEGREES
QRS AXIS: 46 DEGREES
QRSD INTERVAL: 90 MS
QRSD INTERVAL: 96 MS
QT INTERVAL: 382 MS
QT INTERVAL: 400 MS
QTC INTERVAL: 418 MS
QTC INTERVAL: 432 MS
RBC # BLD AUTO: 5.14 MILLION/UL (ref 3.88–5.62)
RIGHT ATRIUM AREA SYSTOLE A4C: 20.5 CM2
RIGHT VENTRICLE ID DIMENSION: 3.4 CM
SL CV LEFT ATRIUM LENGTH A2C: 6 CM
SL CV PED ECHO LEFT VENTRICLE DIASTOLIC VOLUME (MOD BIPLANE) 2D: 91 ML
SL CV PED ECHO LEFT VENTRICLE SYSTOLIC VOLUME (MOD BIPLANE) 2D: 44 ML
SODIUM SERPL-SCNC: 138 MMOL/L (ref 135–147)
T WAVE AXIS: 11 DEGREES
T WAVE AXIS: 73 DEGREES
TR MAX PG: 27 MMHG
TR PEAK VELOCITY: 2.6 M/S
TRICUSPID ANNULAR PLANE SYSTOLIC EXCURSION: 2.1 CM
TRICUSPID VALVE PEAK REGURGITATION VELOCITY: 2.6 M/S
TRIGL SERPL-MCNC: 97 MG/DL (ref ?–150)
VENTRICULAR RATE: 70 BPM
VENTRICULAR RATE: 72 BPM
WBC # BLD AUTO: 7.07 THOUSAND/UL (ref 4.31–10.16)

## 2025-03-07 PROCEDURE — 97161 PT EVAL LOW COMPLEX 20 MIN: CPT

## 2025-03-07 PROCEDURE — 93306 TTE W/DOPPLER COMPLETE: CPT

## 2025-03-07 PROCEDURE — 99239 HOSP IP/OBS DSCHRG MGMT >30: CPT | Performed by: NURSE PRACTITIONER

## 2025-03-07 PROCEDURE — 80061 LIPID PANEL: CPT

## 2025-03-07 PROCEDURE — 85027 COMPLETE CBC AUTOMATED: CPT

## 2025-03-07 PROCEDURE — 93010 ELECTROCARDIOGRAM REPORT: CPT | Performed by: INTERNAL MEDICINE

## 2025-03-07 PROCEDURE — 93306 TTE W/DOPPLER COMPLETE: CPT | Performed by: INTERNAL MEDICINE

## 2025-03-07 PROCEDURE — 80048 BASIC METABOLIC PNL TOTAL CA: CPT

## 2025-03-07 PROCEDURE — 99215 OFFICE O/P EST HI 40 MIN: CPT | Performed by: PHYSICIAN ASSISTANT

## 2025-03-07 RX ORDER — ATORVASTATIN CALCIUM 40 MG/1
40 TABLET, FILM COATED ORAL EVERY EVENING
Qty: 30 TABLET | Refills: 0 | Status: SHIPPED | OUTPATIENT
Start: 2025-03-07 | End: 2025-03-19 | Stop reason: SDUPTHER

## 2025-03-07 RX ORDER — ASPIRIN 81 MG/1
81 TABLET, CHEWABLE ORAL DAILY
Qty: 30 TABLET | Refills: 0 | Status: SHIPPED | OUTPATIENT
Start: 2025-03-08 | End: 2025-03-19 | Stop reason: SDUPTHER

## 2025-03-07 RX ADMIN — ASPIRIN 81 MG CHEWABLE TABLET 81 MG: 81 TABLET CHEWABLE at 07:48

## 2025-03-07 RX ADMIN — ENOXAPARIN SODIUM 40 MG: 40 INJECTION SUBCUTANEOUS at 07:48

## 2025-03-07 NOTE — TELEPHONE ENCOUNTER
The pharmacy only had/can get 20 days worth of the medicine, he wasn't sure if that would be enough or if you wanted to prescribe something else for the remaining 10 days for the patient.

## 2025-03-07 NOTE — OCCUPATIONAL THERAPY NOTE
OCCUPATIONAL THERAPY       03/07/25 1127   OT Last Visit   OT Visit Date 03/07/25   Note Type   Note type Screen   Additional Comments Patient is independent with ADLS and ambulation.  No further skilled OT or adaptive equipment needs at this time.  Discharge recommendations home.   Licensure   [place] License Number  Trisha Muir MS, OTR/L, [place] Lic# [id number]

## 2025-03-07 NOTE — TELEPHONE ENCOUNTER
Caller: [facility]     Doctor and/or Office: Dr. Boyle     #: [phone]     Escalation: Medication Pharmacist called in and stated they can only get 20 pills of the Griseofulvin he will not be able to get the other 10 pills  Please advise thank you

## 2025-03-07 NOTE — DISCHARGE INSTR - AVS FIRST PAGE
You are admitted with blurred vision double vision and right sided facial numbness which has had happening off and on for the past few months    We performed a's CAT scan that evaluated the arteries in your head and neck and we did find that the right vertebral artery has occlusion    MRI was performed which was negative for stroke    We discussed with your oncologist regarding placing you on an antiplatelet medication, he is okay with you being on aspirin 81 mg daily    We are also recommending that you take an antilipid medication called atorvastatin 40 mg on a daily basis to help prevent formation of cholesterol/plaque in your arteries.    Please follow-up with your primary care physician 1 to 2 weeks postdischarge    Please follow-up with Dr. Pelayo or her advanced practice provider in the clinic in 8 to 10 weeks    It has been a pleasure taking care of you!

## 2025-03-07 NOTE — ASSESSMENT & PLAN NOTE
Follows with hematology outpatient  On Promacta 50 mg daily  Platelet count currently stable at 101  Continue to follow with outpatient hematology

## 2025-03-07 NOTE — CASE MANAGEMENT
Case Management Assessment & Discharge Planning Note    Patient name Sixto Oakes  Location ICU 07/ICU 07 MRN [id number]  : 1950 Date 3/7/2025       Current Admission Date: 3/6/2025  Current Admission Diagnosis:Stroke-like symptoms   Patient Active Problem List    Diagnosis Date Noted Date Diagnosed    Stroke-like symptoms 2025     Right carpal tunnel syndrome 2024     Chronic ITP (idiopathic thrombocytopenia) (HCC) 2024     Abnormal chest x-ray 2022     Mild intermittent asthma without complication 2022     Lower urinary tract symptoms (LUTS) 2022     Numbness 2022     History of ITP 2022     Stage 3 chronic kidney disease, unspecified whether stage 3a or 3b CKD (HCC) 2022     PVC (premature ventricular contraction) 2021     Elevated troponin 12/15/2021     Primary osteoarthritis of right shoulder 12/10/2020     Vasculogenic erectile dysfunction 2020     BMI 31.0-31.9,adult 2020     Obesity (BMI 30-39.9) 2020     Essential hypertension 2020     Elevated low-density lipoprotein level 05/15/2019     Thrombocytopenia (HCC) 2018       LOS (days): 0  Geometric Mean LOS (GMLOS) (days):   Days to GMLOS:     OBJECTIVE:              Current admission status: Observation  Referral Reason: Stroke    Preferred Pharmacy:   [facility] - [address] [address]  Phone: [phone] Fax: [phone]    [facility] - [place], [place] - [address]  [address]  [address]  [place] [address]  Phone: [phone] Fax: [phone]    [facility] - [place], [place] [facility] [place] [facility] [address]  [place] [address]  Phone: [phone] Fax: [phone]    Primary Care Provider: Frank Lombardi, DO    [facility]  Secondary Insurance:     ASSESSMENT:  Active Health Care Proxies     There are no active Health Care Proxies on file.  Readmission Root Cause  30 Day Readmission: No    Patient Information  Admitted from:: Home  Mental Status: Alert  During Assessment patient was accompanied by: Spouse  Assessment information provided by:: Patient, Spouse  Primary Caregiver: Self  Support Systems: Family members  County of Residence: [place]  What [facility] do you live in?: [place], [place]  Home entry access options. Select all that apply.: Stairs  Number of steps to enter home.: 7  Type of Current Residence: 2 story home  Upon entering residence, is there a bedroom on the main floor (no further steps)?: Yes  Upon entering residence, is there a bathroom on the main floor (no further steps)?: Yes  Living Arrangements: Lives w/ Spouse/significant other    Activities of Daily Living Prior to Admission  Functional Status: Independent  Completes ADLs independently?: Yes  Ambulates independently?: Yes  Does patient use assisted devices?: No  Does patient currently own DME?: No  Does patient currently have HHC?: No     Patient Information Continued  Income Source: Pension/longterm  Does patient have prescription coverage?: Yes  Does patient receive dialysis treatments?: No     Means of Transportation  Means of Transport to Appts:: Drives Self    DISCHARGE DETAILS:    Discharge planning discussed with:: Patient, Spouse Minerva  Freedom of Choice: Yes  Comments - Freedom of Choice: SW met bedside with patient and spouse to introduce role, complete assessment, and discuss discharge plan. SW reviewed evaluation by therapy with recomendation for discharge home with no rehab needs. Patient and spouse verbalized understanding and agreement with recommendation. Both denied having any questions, concerns, or anticipated discharge needs that SW can assist with at this time.  CM contacted family/caregiver?: Yes  Were Treatment Team discharge recommendations reviewed with patient/caregiver?: Yes  Did patient/caregiver  verbalize understanding of patient care needs?: Yes  Were patient/caregiver advised of the risks associated with not following Treatment Team discharge recommendations?: Yes    Contacts  Patient Contacts: Minerva Oakes (spouse)  Relationship to Patient:: Family  Contact Method: In Person  Reason/Outcome: Continuity of Care, Emergency Contact, Discharge Planning    Requested Home Health Care         Is the patient interested in HHC at discharge?: No    DME Referral Provided  Referral made for DME?: No    Other Referral/Resources/Interventions Provided:  Interventions: None Indicated     Treatment Team Recommendation: Home  Discharge Destination Plan:: Home  Transport at Discharge : Family

## 2025-03-07 NOTE — ASSESSMENT & PLAN NOTE
Lab Results   Component Value Date    EGFR 54 03/07/2025    EGFR 48 03/06/2025    EGFR 53 02/25/2025    CREATININE 1.27 03/07/2025    CREATININE 1.41 (H) 03/06/2025    CREATININE 1.29 02/25/2025   Baseline Cr appears around 1.2-1.3  Currently stable  Follow-up outpatient PCP 1 to 2 weeks postdischarge

## 2025-03-07 NOTE — ASSESSMENT & PLAN NOTE
Patient reports episode of dizziness associated with right sided facial numbness, blurry vision, and leg weakness while at the store today. When he got home he checked his blood pressure which was in the 80s. He rechecked it a few minutes later and it was in 150s. He reports similar episodes in the past but was worse today.  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 daily on discharge  Monitor on telemetry, no events noted discontinue  Start aspirin and statin, continue with aspirin 81 mg p.o. daily and atorvastatin 40 mg daily on discharge, discussed with neurology and hematology as patient has history of ITP  PT/OT/speech eval, no needs identified  Neurology follow-up in clinic 8 to 10 weeks postdischarge  Patient is discharged to home today

## 2025-03-07 NOTE — UTILIZATION REVIEW
Initial Clinical Review    Admission: Date/Time/Statement:   Admission Orders (From admission, onward)       Ordered        03/06/25 1336  Place in Observation  Once                          Orders Placed This Encounter   Procedures    Place in Observation     Standing Status:   Standing     Number of Occurrences:   1     Level of Care:   Level 2 Stepdown / HOT [14]     ED Arrival Information       Expected   -    Arrival   3/6/2025 10:10    Acuity   Urgent              Means of arrival   Walk-In    Escorted by   Spouse    Service   Hospitalist    Admission type   Emergency              Arrival complaint   Facial Numbness / Dizziness             Chief Complaint   Patient presents with    Dizziness     Patient report he was at the store and started w/ dizziness, blurred vision, right sided facial numbness, and weakness in his legs. Patient reports that he check bp when he arrived home and his bp was 84/50 then after recheck it was in the 150s. Patient reports he has had similar episodes in the past.        Initial Presentation:   74 yom to ER from home. Reports at the store & started with dizziness, blurred vision, right sided facial numbness, and weakness in his legs; lasted hour then resolved. Patient reports that he check bp when he arrived home and his bp was 84/50 then after recheck it was in the 150s. Hx similar episodes, CKD, HTN, chronic ITP, BPH, asthma. Presents/exam: NAD, CN2-12 intact, power 5/5 throughout , fine touch sensory intact. Finger nose test negative. Lungs: normal bilateral air entry, CVS: S1+S2 , no peripheral edema noted. Abd: NABS , nontender. Soft. Admission CT brain was negative for acute intracranial abnormality. 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 Labs: , CO2 20, BUN 26, Cr 1.41.  Placed in observation status for stroke-like symptoms. Plan: neuro checks, neuro consult, telemetry, therapies, orthostatic BP.    ED  Treatment-Medication Administration from 03/06/2025 1010 to 03/06/2025 1718         Date/Time Order Dose Route Action     03/06/2025 1153 iohexol (OMNIPAQUE) 350 MG/ML injection (MULTI-DOSE) 85 mL 85 mL Intravenous Given     03/06/2025 1539 enoxaparin (LOVENOX) subcutaneous injection 40 mg 40 mg Subcutaneous Given            Scheduled Medications:  aspirin, 81 mg, Oral, Daily  atorvastatin, 40 mg, Oral, QPM  eltrombopag, 50 mg, Oral, HS  enoxaparin, 40 mg, Subcutaneous, Daily  montelukast, 10 mg, Oral, HS    PRN Meds:  albuterol, 2 puff, Inhalation, Q4H PRN      ED Triage Vitals [03/06/25 1015]   Temperature Pulse Respirations Blood Pressure SpO2 Pain Score   97.6 °F (36.4 °C) 74 20 157/74 95 % No Pain     Weight (last 2 days)       Date/Time Weight    03/06/25 1727 108 (238.1)    03/06/25 1700 108 (238.1)    03/06/25 1015 112 (247)            Vital Signs (last 3 days)       Date/Time Temp Pulse Resp BP MAP (mmHg) SpO2 O2 Device Patient Position - Orthostatic VS Lorelei Coma Scale Score Pain    03/07/25 0747 97.5 °F (36.4 °C) 72 18 145/70 100 96 % None (Room air) Sitting -- No Pain    03/07/25 0710 97.6 °F (36.4 °C) -- -- -- -- -- -- -- -- --    03/07/25 0400 97.9 °F (36.6 °C) 64 20 161/75 105 98 % -- -- -- No Pain    03/07/25 0200 -- 60 15 132/63 91 96 % -- -- -- --    03/07/25 0000 97.7 °F (36.5 °C) 58 15 123/58 84 95 % -- -- -- No Pain    03/06/25 2200 -- 63 16 144/90 109 97 % -- -- -- --    03/06/25 2100 -- 59 15 143/75 103 96 % -- -- -- --    03/06/25 2000 -- 64 17 150/78 106 97 % -- -- -- No Pain    03/06/25 1900 -- 74 27 145/75 101 98 % -- -- -- --    03/06/25 1845 -- 82 23 127/57 88 99 % -- Standing - Orthostatic VS -- --    03/06/25 1842 -- 75 26 150/78 106 97 % -- Sitting - Orthostatic VS -- --    03/06/25 1831 -- 68 24 179/84 121 98 % -- Lying - Orthostatic VS -- --    03/06/25 1800 -- 78 26 157/93 118 97 % -- -- -- --    03/06/25 1745 -- 70 21 170/81 117 98 % -- -- -- --    03/06/25 1730 -- 74 26 199/89  128 98 % -- -- -- --    03/06/25 1727 98.5 °F (36.9 °C) 73 18 199/89 128 98 % None (Room air) -- 15 --    03/06/25 1700 -- 81 18 190/90 132 93 % -- Lying -- No Pain    03/06/25 1658 -- 76 -- 190/90 -- -- -- Standing - Orthostatic VS -- --    03/06/25 1656 -- 76 -- 185/103 -- -- -- -- -- --    03/06/25 1654 -- 76 -- 204/92 -- -- -- Lying - Orthostatic VS -- --    03/06/25 1645 -- 75 20 180/93 127 96 % None (Room air) -- -- --    03/06/25 1630 -- 72 31 188/92 132 96 % None (Room air) -- -- --    03/06/25 1615 -- 72 20 184/92 128 96 % -- -- -- --    03/06/25 1600 -- 72 21 181/80 115 97 % -- -- -- --    03/06/25 1545 -- 70 20 179/83 121 96 % None (Room air) -- -- --    03/06/25 1445 -- 67 17 166/71 112 94 % None (Room air) -- -- --    03/06/25 1145 -- 66 20 118/55 78 96 % None (Room air) -- -- --    03/06/25 1030 -- -- -- -- -- -- None (Room air) -- 15 --    03/06/25 1015 97.6 °F (36.4 °C) 74 20 157/74 106 95 % None (Room air) Sitting -- No Pain              Pertinent Labs/Diagnostic Test Results:   Radiology:  MRI brain wo contrast   Final Interpretation by Horace Montiel MD (03/07 0134)      No evidence of acute infarct, intracranial hemorrhage or mass.      Workstation performed: [id number]         CTA head and neck with and without contrast   Final Interpretation by Archie Bryan MD (03/06 1242)      CT Brain:  No acute intracranial abnormality.      CT Angiography:   1. Occluded nondominant right vertebral artery at its origin with reconstitution in the mid V2 segment. There is normal opacification of the intracranial right vertebral artery. The nondominant left vertebral artery appears normal throughout its course.   2. Mild right M1 segment stenosis. No additional intracranial stenosis identified. There is no intracranial occlusion.         The study was marked in EPIC for immediate notification.         Workstation performed: [id number]           Cardiology:  ECG 12 lead    by Interface, Ris Results  "In (03/06 1024)        GI:  No orders to display           Results from last 7 days   Lab Units 03/07/25  0536 03/06/25  1101   WBC Thousand/uL 7.07 7.69   HEMOGLOBIN g/dL 15.3 15.3   HEMATOCRIT % 45.4 46.1   PLATELETS Thousands/uL 101* 112*   TOTAL NEUT ABS Thousands/µL  --  5.16         Results from last 7 days   Lab Units 03/07/25  0536 03/06/25  1101   SODIUM mmol/L 138 139   POTASSIUM mmol/L 4.2 4.5   CHLORIDE mmol/L 107 107   CO2 mmol/L 21 20*   ANION GAP mmol/L 10 12   BUN mg/dL 21 26*   CREATININE mg/dL 1.27 1.41*   EGFR ml/min/1.73sq m 54 48   CALCIUM mg/dL 8.7 9.1     Results from last 7 days   Lab Units 03/06/25  1101   AST U/L 16   ALT U/L 12   ALK PHOS U/L 81   TOTAL PROTEIN g/dL 7.1   ALBUMIN g/dL 4.1   TOTAL BILIRUBIN mg/dL 0.98         Results from last 7 days   Lab Units 03/07/25  0536 03/06/25  1101   GLUCOSE RANDOM mg/dL 99 100             No results found for: \"BETA-HYDROXYBUTYRATE\"                   Results from last 7 days   Lab Units 03/06/25  1837 03/06/25  1327 03/06/25  1101   HS TNI 0HR ng/L  --   --  4   HS TNI 2HR ng/L  --  5  --    HSTNI D2 ng/L  --  1  --    HS TNI 4HR ng/L 6  --   --    HSTNI D4 ng/L 2  --   --          Results from last 7 days   Lab Units 03/06/25  1101   PROTIME seconds 13.0   INR  0.93   PTT seconds 29     Results from last 7 days   Lab Units 03/06/25  1101   TSH 3RD GENERATON uIU/mL 2.432                                                                                                           Past Medical History:   Diagnosis Date    COVID-19 12/15/2021    Hypertension     Hx. No meds currently. Cardiology stopped HTN meds    Thrombocytopenia (HCC)     platelet count maintained around 90-Dr. 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 male    Network Utilization Review Department  ATTENTION: Please call with any questions or concerns to [phone] and carefully listen to the prompts so that you are directed to the right person. All voicemails are confidential.   For Discharge needs, contact Care Management DC Support Team at [phone] opt. 2  Send all requests for admission clinical reviews, approved or denied determinations and any other requests to dedicated fax number below belonging to the campus where the patient is receiving treatment. List of dedicated fax numbers for the Facilities:  FACILITY NAME UR FAX NUMBER   ADMISSION DENIALS (Administrative/Medical Necessity) [phone]   DISCHARGE SUPPORT TEAM (NETWORK) [phone]   PARENT CHILD HEALTH (Maternity/NICU/Pediatrics) [phone]   [facility] [phone]   [facility] [phone]   [facility] [phone]   [facility] [phone]   [facility] [phone]   [facility] [phone]   [facility] [phone]   [facility] [phone]   [facility] [phone]   [facility] [phone]   [facility] [phone]   [facility] [phone]

## 2025-03-07 NOTE — ASSESSMENT & PLAN NOTE
Management per neurology.    CTA shows occluded right vertebral artery, mild right M1 segment stenosis.    Felt to be recurrent TIA versus ischemia.    Baby aspirin recommended by neurology.

## 2025-03-07 NOTE — PHYSICAL THERAPY NOTE
PT EVALUATION       03/07/25 0838   PT Last Visit   PT Visit Date 03/07/25   Note Type   Note type Evaluation   Pain Assessment   Pain Assessment Tool 0-10   Pain Score No Pain   Patient's Stated Pain Goal No pain   Multiple Pain Sites No   Restrictions/Precautions   Weight Bearing Precautions Per Order No   Home Living   Type of Home House   Home Layout Two level;Performs ADLs on one level;Able to live on main level with bedroom/bathroom;Stairs to enter with rails  (7 ANGY)   Bathroom Toilet Standard   Additional Comments No DME, IND PTA   Prior Function   Level of [place] Independent with ADLs;Independent with functional mobility;Independent with IADLS   Lives With Spouse   Receives Help From Family   IADLs Independent with meal prep;Independent with driving;Independent with medication management   Falls in the last 6 months 0   Vocational Retired   General   Additional Pertinent History Pt is a 74 year-old male who was admitted to the hospital on 3/6/25 due to weakness, dizziness, facial numbness. Pt reports all symptoms resolved within 1 hour. CT/MRI negative for acute infarct. CT angio revealed 1. Occluded nondominant right vertebral artery at its origin with reconstitution in the mid V2 segment. There is normal opacification of the intracranial right vertebral artery. The nondominant left vertebral artery appears normal throughout its course.  2. Mild right M1 segment stenosis. No additional intracranial stenosis identified. There is no intracranial occlusion.   Family/Caregiver Present Yes  (dtr at bedside throughout session)   Cognition   Overall Cognitive Status WFL   Arousal/Participation Cooperative   Orientation Level Oriented X4   Following Commands Follows all commands and directions without difficulty   RLE Assessment   RLE Assessment WFL   LLE Assessment   LLE Assessment WFL   Coordination   Movements are Fluid and Coordinated 1   Sensation WFL   Finger to Nose & Finger to Finger  Intact    Heel to Shin Intact   Rapid Alternating Movements Intact   Bed Mobility   Supine to Sit 7  Independent   Sit to Supine 7  Independent   Additional Comments Sitting BP: 110/67 ; Standing /58 with no reports of dizziness   Transfers   Sit to Stand 7  Independent   Stand to Sit 7  Independent   Ambulation/Elevation   Gait pattern   (normal, steady gait)   Gait Assistance 7  Independent   Assistive Device None   Distance 150 feet   Stair Management Assistance Not tested   Balance   Static Sitting Normal   Dynamic Sitting Normal   Static Standing Normal   Dynamic Standing Good   Ambulatory Good   Activity Tolerance   Activity Tolerance Patient tolerated treatment well   Nurse Made Aware yes DIGNA Shannon   Assessment   Prognosis Excellent   Problem List   (N/A - at baseline functional level)   Assessment Patient seen for Physical Therapy evaluation. Patient admitted with Stroke-like symptoms.  Comorbidities affecting patient's physical performance include: arthritis, cardiac disease, CKD, hypertension, and osteoarthritis.  Personal factors affecting patient at time of initial evaluation include: lives in 2 story house and stairs to enter home. Prior to admission, patient was independent with functional mobility without assistive device, independent with ADLS, independent with IADLS, living with spouse in a 2 level home with 7 steps to enter, and ambulating community distances.  Please find objective findings from Physical Therapy assessment regarding body systems outlined above with impairments and limitations including  N/A - at baseline functional level .  The Barthel Index was used as a functional outcome tool presenting with a score of Barthel Index Score: 100 today indicating no limitations of functional mobility and ADLS.  Patient's clinical presentation is currently stable  as seen in patient's presentation of vital sign response. Pt would benefit from continued Physical Therapy treatment to address deficits as  defined above and maximize level of functional mobility. As demonstrated by objective findings, the assigned level of complexity for this evaluation is low.The patient's AM-PAC Basic Mobility Inpatient Short Form Raw Score is 24. A Raw score of greater than 16 suggests the patient may benefit from discharge to home. Please also refer to the recommendation of the Physical Therapist for safe discharge planning.   Goals   Patient Goals to go home   Plan   Treatment/Interventions   (N/A - at baseline functional level)   PT Frequency Other (Comment)  (D/C PT)   Discharge Recommendation   Rehab Resource Intensity Level, PT No post-acute rehabilitation needs   AM-PAC Basic Mobility Inpatient   Turning in Flat Bed Without Bedrails 4   Lying on Back to Sitting on Edge of Flat Bed Without Bedrails 4   Moving Bed to Chair 4   Standing Up From Chair Using Arms 4   Walk in Room 4   Climb 3-5 Stairs With Railing 4   Basic Mobility Inpatient Raw Score 24   Basic Mobility Standardized Score 57.68   [facility] Highest Level Of Mobility   -HL Goal 8: Walk 250 feet or more   -HLM Achieved 7: Walk 25 feet or more   Modified [place] Scale   Modified Arabella Scale 0   Barthel Index   Feeding 10   Bathing 5   Grooming Score 5   Dressing Score 10   Bladder Score 10   Bowels Score 10   Toilet Use Score 10   Transfers (Bed/Chair) Score 15   Mobility (Level Surface) Score 15   Stairs Score 10   Barthel Index Score 100   End of Consult   Patient Position at End of Consult Supine;All needs within reach   Licensure   [place] License Number  Martha Thomas [id number]

## 2025-03-07 NOTE — SPEECH THERAPY NOTE
Consult received.  Records reviewed.  Pt admitted c symptoms concerning for CVA/TIA (now resolved).  Pt passed RN Dysphagia Assessment.  Communication deficits denied.  MRI results negative.  No additional inpatient Speech Pathology evaluation appears indicated at this time.  Please re-consult if additional concerns arise. Thank you.  Martha Box MS CCC-SLP  [place] License [id number]

## 2025-03-07 NOTE — CONSULTS
Consultation - Oncology-Medical   Name: Sixto Oakes 74 y.o. male I MRN: [id number]  Unit/Bed#: ICU 07 I Date of Admission: 3/6/2025   Date of Service: 3/7/2025 I Hospital Day: 0   Inpatient consult to Hematology  Consult performed by: Eden Herbert PA-C  Consult ordered by: KELVIN Lawler        Physician Requesting Evaluation: Saskia Sabillon MD   Reason for Evaluation / Principal Problem:   Patient known to your group here with stroke like symptoms; CTA shows occluded non dominant right vertebral artery; neuro would like patient to be on AP, however would like your opinion since patient has history of ITP and is on Promacta.       Assessment & Plan  Stroke-like symptoms  Management per neurology.    CTA shows occluded right vertebral artery, mild right M1 segment stenosis.    Felt to be recurrent TIA versus ischemia.    Baby aspirin recommended by neurology.  Chronic ITP (idiopathic thrombocytopenia) (HCC)  Patient is on chronic Promacta therapy 50 mg daily for diagnosis of ITP.  His platelet count today is 101,000.  His platelet count has fluctuated in the past. He has acute drops to < 50,000 once per year or so.  Acute drop in 2022 was felt to be related to COVID vaccine. He typically responds well to decadron.    I discussed with Sixto that platelets would need to be monitored closely while on antiplatelet therapy. Patient is still undecided if he plans to continue with the aspirin therapy. He says that he wants to speak with his cardiologist first (OV on 4/1).    From our standpoint, he may proceed with the aspirin 81mg QD with close CBC monitoring.    Discussed with Dr. Taylor.    History of Present Illness   Sixto Oakes is a 74 y.o. male who follows with Dr. Archie Taylor as an outpatient due to history of ITP.  He is maintained on Promacta 50 mg daily.    Patient also has past medical history significant for CKD, hypertension, BPH, asthma.  He presented for evaluation following an episode  of dizziness.  He also had right sided facial tingling as well as blurry vision and lower extremity weakness.    He says that he has had significant fluctuations in his blood pressure over the past few weeks.  His blood pressure yesterday prior to presentation was 84/50.  His blood pressure in the ED was 157/74.    He was admitted for work-up of strokelike symptoms.    CT scan of the brain showed no acute intracranial abnormality.  CTA showed occluded nondominant right vertebral artery at its origin with reconstitution in the mid V2 segment. There is normal opacification of the intracranial right vertebral artery. The nondominant left vertebral artery appears normal throughout its course. Mild right M1 segment stenosis. No additional intracranial stenosis identified. There is no intracranial occlusion.    MRI of the brain shows no evidence of acute infarct, intracranial hemorrhage or mass.    Patient has been evaluated by neurology who recommends 1 AP therapy.  He was started on baby aspirin yesterday.    CBC on admission was significant for WBC 7.69, hemoglobin 15.3, platelets 112.    CBC today is significant for WBC 7.07, hemoglobin 15.3, platelets 101.    Patient currently feels well.  He has significant concern about being placed on antiplatelet therapy due to his history of significant fluctuations in his platelet count.  He denies nausea, vomiting, bowel changes, chest pain, shortness of breath.  No bleeding or bruising.    Review of Systems   Constitutional:  Negative for activity change, appetite change, fatigue and fever.   HENT:  Negative for nosebleeds.    Respiratory:  Negative for cough, choking and shortness of breath.    Cardiovascular:  Negative for chest pain, palpitations and leg swelling.   Gastrointestinal:  Negative for abdominal distention, abdominal pain, anal bleeding, blood in stool, constipation, diarrhea, nausea and vomiting.   Endocrine: Negative for cold intolerance.   Genitourinary:   Negative for hematuria.   Musculoskeletal:  Negative for myalgias.   Skin:  Negative for color change, pallor and rash.   Allergic/Immunologic: Negative for immunocompromised state.   Neurological:  Negative for headaches.   Hematological:  Negative for adenopathy. Does not bruise/bleed easily.   All other systems reviewed and are negative.    Oncology History:   Cancer Staging   No matching staging information was found for the patient.    Oncology History    No history exists.     Current Facility-Administered Medications   Medication Dose Route Frequency Provider Last Rate Last Admin    albuterol (PROVENTIL HFA,VENTOLIN HFA) inhaler 2 puff  2 puff Inhalation Q4H PRN Martha Marco, PA-C        aspirin chewable tablet 81 mg  81 mg Oral Daily Martha Marco, PA-C   81 mg at 03/07/25 0748    atorvastatin (LIPITOR) tablet 40 mg  40 mg Oral QPM Martha Marco, PA-C   40 mg at 03/06/25 1850    eltrombopag (PROMACTA) tablet 50 mg  50 mg Oral HS Martha Marco, PA-C        enoxaparin (LOVENOX) subcutaneous injection 40 mg  40 mg Subcutaneous Daily Martha Marco, PA-C   40 mg at 03/07/25 0748    montelukast (SINGULAIR) tablet 10 mg  10 mg Oral HS Martha Marco, PA-C   10 mg at 03/06/25 2200       Objective :  Temp:  [97.5 °F (36.4 °C)-98.5 °F (36.9 °C)] 97.5 °F (36.4 °C)  HR:  [58-82] 72  BP: (118-204)/() 145/70  Resp:  [15-31] 18  SpO2:  [93 %-99 %] 96 %  O2 Device: None (Room air)    Physical Exam  Constitutional:       General: He is not in acute distress.     Appearance: Normal appearance. He is well-developed.   HENT:      Head: Normocephalic and atraumatic.      Right Ear: External ear normal.      Left Ear: External ear normal.      Nose: Nose normal.   Eyes:      General: No scleral icterus.     Conjunctiva/sclera: Conjunctivae normal.   Cardiovascular:      Rate and Rhythm: Normal rate and regular rhythm.   Pulmonary:      Effort: Pulmonary effort is normal. No respiratory distress.      Breath sounds: Normal breath  sounds.   Abdominal:      General: Abdomen is flat. There is no distension.      Palpations: Abdomen is soft.   Musculoskeletal:      Right lower leg: No edema.      Left lower leg: No edema.   Skin:     Findings: No rash (on exposed skin.).   Neurological:      Mental Status: He is alert and oriented to person, place, and time.   Psychiatric:         Mood and Affect: Mood normal.         Thought Content: Thought content normal.         Judgment: Judgment normal.         Lab Results: I have reviewed the following results:  Lab Results   Component Value Date    K 4.2 03/07/2025     03/07/2025    CO2 21 03/07/2025    BUN 21 03/07/2025    CREATININE 1.27 03/07/2025    GLUF 99 03/07/2025    CALCIUM 8.7 03/07/2025    CORRECTEDCA 9.0 04/21/2022    AST 16 03/06/2025    ALT 12 03/06/2025    ALKPHOS 81 03/06/2025    EGFR 54 03/07/2025     Lab Results   Component Value Date    WBC 7.07 03/07/2025    HGB 15.3 03/07/2025    HCT 45.4 03/07/2025    MCV 88 03/07/2025     (L) 03/07/2025     Lab Results   Component Value Date    NEUTROABS 5.16 03/06/2025       Imaging Results Review: I reviewed radiology reports from this admission including: CT head and MRI brain.  Other Study Results Review: No additional pertinent studies reviewed.    Administrative Statements   I have spent a total time of 75 minutes in caring for this patient on the day of the visit/encounter including Risks and benefits of tx options, Patient and family education, Impressions, and Counseling / Coordination of care.

## 2025-03-07 NOTE — ASSESSMENT & PLAN NOTE
Hold home amlodipine 5 mg daily to allow permissive hypertension  Patient was seen in ED on 2/18 and started on amlodipine 5 mg daily  Reports his blood pressure has been around 110-120s daily until today  Continue to monitor his blood pressure at home and keep diary to bring to his outpatient PCP appointment  Follow-up outpatient PCP 1 to 2 weeks postdischarge

## 2025-03-07 NOTE — DISCHARGE SUMMARY
Discharge Summary - Hospitalist   Name: Sixto Oakes 74 y.o. male I MRN: [id number]  Unit/Bed#: ICU 07 I Date of Admission: 3/6/2025   Date of Service: 3/7/2025 I Hospital Day: 0     Assessment & Plan  Stroke-like symptoms  Patient reports episode of dizziness associated with right sided facial numbness, blurry vision, and leg weakness while at the store today. When he got home he checked his blood pressure which was in the 80s. He rechecked it a few minutes later and it was in 150s. He reports similar episodes in the past but was worse today.  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 daily on discharge  Monitor on telemetry, no events noted discontinue  Start aspirin and statin, continue with aspirin 81 mg p.o. daily and atorvastatin 40 mg daily on discharge, discussed with neurology and hematology as patient has history of ITP  PT/OT/speech eval, no needs identified  Neurology follow-up in clinic 8 to 10 weeks postdischarge  Patient is discharged to home today  Essential hypertension  Hold home amlodipine 5 mg daily to allow permissive hypertension  Patient was seen in ED on 2/18 and started on amlodipine 5 mg daily  Reports his blood pressure has been around 110-120s daily until today  Continue to monitor his blood pressure at home and keep diary to bring to his outpatient PCP appointment  Follow-up outpatient PCP 1 to 2 weeks postdischarge  Stage 3 chronic kidney disease, unspecified whether stage 3a or 3b CKD (HCC)  Lab Results   Component Value Date    EGFR 54 03/07/2025    EGFR 48 03/06/2025    EGFR 53 02/25/2025    CREATININE 1.27 03/07/2025    CREATININE 1.41 (H) 03/06/2025    CREATININE 1.29 02/25/2025   Baseline Cr  appears around 1.2-1.3  Currently stable  Follow-up outpatient PCP 1 to 2 weeks postdischarge  Chronic ITP (idiopathic thrombocytopenia) (HCC)  Follows with hematology outpatient  On Promacta 50 mg daily  Platelet count currently stable at 101  Continue to follow with outpatient hematology  Lower urinary tract symptoms (LUTS)  Held flomax for now to allow permissive hypertension, resume on discharge  Follow-up outpatient PCP 1 to 2 weeks postdischarge  Mild intermittent asthma without complication  Not in acute exacerbation  Continue Singulair and albuterol as needed     Medical Problems       Resolved Problems  Date Reviewed: 3/6/2025   None       Discharging Physician / Practitioner: KELVIN Lawler  PCP: Frank Lombardi, DO  Admission Date:   Admission Orders (From admission, onward)       Ordered        03/06/25 1336  Place in Observation  Once                          Discharge Date: 03/07/25    Consultations During Hospital Stay:  Neurology  Hematology    Procedures Performed:   2D echo showing EF 50 to 55%, no PFO    Significant Findings / Test Results:   CTA of head and neck performed on 3/6/2025 shows CT of brain no acute intracranial abnormality, CT angiography shows occluded nondominant right vertebral artery at its origin with Cx in the mid V2 segment, there is normal opacification of the intracranial right vertebral artery.  The nondominant left vertebral artery appears normal throughout its course.  Mild right M1 segment stenosis no additional intracranial stenosis identified, no intracranial occlusion as per radiology report.  MRI of the brain performed on 3/7/2025 shows no evidence of acute infarct, intracranial hemorrhage or mass as per radiology report.    Incidental Findings:   None      Test Results Pending at Discharge (will require follow up):   None     Outpatient Tests Requested:  None    Complications: None    Reason for Admission: Blurred double vision, numbness and tingling  right side of face    Hospital Course:   Sixto Oakes is a 74 y.o. male patient past medical history CKD, HTN, chronic ITP, BPH and asthma who originally presented to the hospital on 3/6/2025 due to dizziness.  Patient report he went to the gym morning of admission and then went to the store where he had an episode of dizziness.  He had associated right sided facial tingling as well as blurry vision and lower extremity weakness.  He reported this symptoms resolved after about 10 minutes and was able to drive himself home.  Reported his blood pressure at home was in the 80s and when he rechecked it a few minutes later it was in the 150s.  He reports that he had similar episodes in the past and recently had an episode of double vision which resolved on its own after a few minutes.  He reported he had been off blood pressure medications for about 4 years and periodically check blood pressure at home which was always normal.  He took a baby aspirin when he got home but states he does not take aspirin daily.  He presented to the ED for similar episode on 2/18, was found to have elevated blood pressure and was started on amlodipine 5 mg daily at that time.  He had been checking his blood pressure daily since then and states that usually in the 110s-120s until day of admission.  Patient was admitted under the stroke pathway.  CTA of head and neck was performed with no acute intra abnormality of CTA brain, CTA did demonstrate occluded nondominant right vertebral artery with mild right M1 segment stenosis.  MRI of the brain was negative.  2D echo with bubble study showed EF of 50-55% no PFO.  PT/OT and speech were consulted, no needs identified for rehab.  Neurology recommending antiplatelet medication, and like that patient has chronic ITP did discuss with hematology, they indicated they were okay with patient being on baby aspirin daily.  He is to follow-up 1 to 2 weeks with his PCP, follow-up with neurology 8 to 10 weeks  "postdischarge.  He will continue to follow with his outpatient hematology group routinely.  This was discussed with the patient at the bedside with his wife, they verbalized understanding and are agreeable to the plan.  Patient is discharged to home today.          Please see above list of diagnoses and related plan for additional information.     Condition at Discharge: good    Discharge Day Visit / Exam:   Subjective:     Vitals: Blood Pressure: 162/77 (03/07/25 1048)  Pulse: 68 (03/07/25 0927)  Temperature: 97.7 °F (36.5 °C) (03/07/25 1048)  Temp Source: Temporal (03/07/25 1048)  Respirations: 18 (03/07/25 0827)  Height: 6' 3\" (190.5 cm) (03/07/25 0927)  Weight - Scale: 108 kg (238 lb) (03/07/25 0927)  SpO2: 98 % (03/07/25 0827)  Physical Exam  Vitals and nursing note reviewed.   HENT:      Head: Normocephalic.      Nose: Nose normal.   Eyes:      Extraocular Movements: Extraocular movements intact.      Conjunctiva/sclera: Conjunctivae normal.      Pupils: Pupils are equal, round, and reactive to light.   Cardiovascular:      Rate and Rhythm: Normal rate and regular rhythm.      Pulses: Normal pulses.   Pulmonary:      Effort: Pulmonary effort is normal.      Breath sounds: Normal breath sounds.   Abdominal:      General: Bowel sounds are normal. There is no distension.      Palpations: Abdomen is soft.   Genitourinary:     Comments: Voiding spontaneously  Musculoskeletal:         General: Normal range of motion.      Cervical back: Normal range of motion.      Right lower leg: No edema.      Left lower leg: No edema.   Skin:     General: Skin is warm and dry.      Capillary Refill: Capillary refill takes less than 2 seconds.   Neurological:      General: No focal deficit present.      Mental Status: He is alert and oriented to person, place, and time.   Psychiatric:         Mood and Affect: Mood normal.         Behavior: Behavior normal.         Thought Content: Thought content normal.         Judgment: " Judgment normal.          Discussion with Family: Updated  (wife and daughter) at bedside.    Discharge instructions/Information to patient and family:   See after visit summary for information provided to patient and family.      Provisions for Follow-Up Care:  See after visit summary for information related to follow-up care and any pertinent home health orders.      Mobility at time of Discharge:   Basic Mobility Inpatient Raw Score: 24  JH-HLM Goal: 8: Walk 250 feet or more  JH-HLM Achieved: 7: Walk 25 feet or more  HLM Goal achieved. Continue to encourage appropriate mobility.     Disposition:   Home    Planned Readmission: No     Discharge Medications:  See after visit summary for reconciled discharge medications provided to patient and/or family.      Administrative Statements   Discharge Statement:  I have spent a total time of greater than 45 minutes in caring for this patient on the day of the visit/encounter. >30 minutes of time was spent on: Diagnostic results, Counseling / Coordination of care, Documenting in the medical record, Reviewing / ordering tests, medicine, procedures  , and Communicating with other healthcare professionals .    **Please Note: This note may have been constructed using a voice recognition system**

## 2025-03-07 NOTE — CONSULTS
Nutrition consult received. RD provided and reviewed written instructions on Stroke Nutrition Therapy. Pt receptive.

## 2025-03-07 NOTE — QUICK NOTE
His mri brain is negative for ischemia.   Considering atherosclerotic disease, patient should remain on aspirin 81mg for stroke/tia prevention long term. Heme/onc team is ok with aspirin.   Lipitor 40mg upon d/c.   He's not orthostatic. He should check his BP at home 2-3x/week or if feels lightheaded and notify us if low.   F/u in 8-10 weeks with attending or AP.           Moises Pelayo M.D.  [facility]

## 2025-03-07 NOTE — ASSESSMENT & PLAN NOTE
Held flomax for now to allow permissive hypertension, resume on discharge  Follow-up outpatient PCP 1 to 2 weeks postdischarge

## 2025-03-07 NOTE — ASSESSMENT & PLAN NOTE
Patient is on chronic Promacta therapy 50 mg daily for diagnosis of ITP.  His platelet count today is 101,000.  His platelet count has fluctuated in the past. He has acute drops to < 50,000 once per year or so.  Acute drop in 2022 was felt to be related to COVID vaccine. He typically responds well to decadron.    I discussed with Sixto that platelets would need to be monitored closely while on antiplatelet therapy. Patient is still undecided if he plans to continue with the aspirin therapy. He says that he wants to speak with his cardiologist first (OV on 4/1).    From our standpoint, he may proceed with the aspirin 81mg QD with close CBC monitoring.    Discussed with Dr. Taylor.

## 2025-03-08 LAB — MRSA NOSE QL CULT: NORMAL

## 2025-03-10 ENCOUNTER — TELEPHONE (OUTPATIENT)
Age: 75
End: 2025-03-10

## 2025-03-10 ENCOUNTER — TELEPHONE (OUTPATIENT)
Dept: HEMATOLOGY ONCOLOGY | Facility: CLINIC | Age: 75
End: 2025-03-10

## 2025-03-10 NOTE — TELEPHONE ENCOUNTER
I phoned the patient and left a VM message indicating that I was calling from [facility]'s Hematology/Oncology, Dr. Taylor's office, regarding scheduling a sooner appointment with Dr. Taylor based on his recent hospitalization. I provided my direct phone number and the [facility] [place] number as contacts and asked that the patient return the call in order to set up this appointment.

## 2025-03-10 NOTE — TELEPHONE ENCOUNTER
1ST ATTEMPT,     Called pt no answer, LMOM.    Thank you,     Kacey RICHTER/ BRYANT TEJADA/ TIA    DC- HOME- 3/7/2025    ----- Message from Suha Mojica PA-C sent at 3/7/2025  4:39 PM EST -----  Regarding: HFU  Sixto Oakes will need follow-up in 8-10 weeks with neurovascular team for TIA in 60 minute appointment. They will not require outpatient neurological testing.

## 2025-03-10 NOTE — TELEPHONE ENCOUNTER
Pt returning missed call to schedule HFU in 8 -10 weeks he refused all appts offered stating he saw Dr. Pelayo in the hospital and was advised she can see him in [place], Dr. Pelayo does not populate as a neurovascular provider. Please advise if pt can be scheduled to follow up within 8 - 10 weeks with Dr. Pelayo.

## 2025-03-11 NOTE — TELEPHONE ENCOUNTER
Hello Good Day,     Patient is now scheduled with Jersey Engel, pt only wantstanislav suggs, 8/13/2025, 1pm, placed on waiting list     Thank you all,     Kacey

## 2025-03-12 ENCOUNTER — TELEPHONE (OUTPATIENT)
Dept: HEMATOLOGY ONCOLOGY | Facility: CLINIC | Age: 75
End: 2025-03-12

## 2025-03-12 ENCOUNTER — RA CDI HCC (OUTPATIENT)
Dept: OTHER | Facility: HOSPITAL | Age: 75
End: 2025-03-12

## 2025-03-12 NOTE — TELEPHONE ENCOUNTER
I phoned the patient, introduced myself, and indicated that I was calling from [facility]'s Hematology, Dr. Taylor's office, in order to schedule a sooner appointment than his existing May appointment, based on his recent hospitalization. We were able to schedule his HFU on 3/20 at 1040 in the [place] office. Sixto was appreciative of the call.

## 2025-03-13 ENCOUNTER — APPOINTMENT (OUTPATIENT)
Dept: LAB | Facility: HOSPITAL | Age: 75
End: 2025-03-13
Attending: INTERNAL MEDICINE
Payer: COMMERCIAL

## 2025-03-13 DIAGNOSIS — Z86.2 HISTORY OF ITP: ICD-10-CM

## 2025-03-13 LAB
BASOPHILS # BLD AUTO: 0.07 THOUSANDS/ÂΜL (ref 0–0.1)
BASOPHILS NFR BLD AUTO: 1 % (ref 0–1)
EOSINOPHIL # BLD AUTO: 0.08 THOUSAND/ÂΜL (ref 0–0.61)
EOSINOPHIL NFR BLD AUTO: 1 % (ref 0–6)
ERYTHROCYTE [DISTWIDTH] IN BLOOD BY AUTOMATED COUNT: 14.1 % (ref 11.6–15.1)
HCT VFR BLD AUTO: 44.8 % (ref 36.5–49.3)
HGB BLD-MCNC: 15.2 G/DL (ref 12–17)
IMM GRANULOCYTES # BLD AUTO: 0.02 THOUSAND/UL (ref 0–0.2)
IMM GRANULOCYTES NFR BLD AUTO: 0 % (ref 0–2)
LYMPHOCYTES # BLD AUTO: 1.61 THOUSANDS/ÂΜL (ref 0.6–4.47)
LYMPHOCYTES NFR BLD AUTO: 20 % (ref 14–44)
MCH RBC QN AUTO: 29.9 PG (ref 26.8–34.3)
MCHC RBC AUTO-ENTMCNC: 33.9 G/DL (ref 31.4–37.4)
MCV RBC AUTO: 88 FL (ref 82–98)
MONOCYTES # BLD AUTO: 0.77 THOUSAND/ÂΜL (ref 0.17–1.22)
MONOCYTES NFR BLD AUTO: 10 % (ref 4–12)
NEUTROPHILS # BLD AUTO: 5.49 THOUSANDS/ÂΜL (ref 1.85–7.62)
NEUTS SEG NFR BLD AUTO: 68 % (ref 43–75)
NRBC BLD AUTO-RTO: 0 /100 WBCS
PLATELET # BLD AUTO: 102 THOUSANDS/UL (ref 149–390)
PMV BLD AUTO: 12.7 FL (ref 8.9–12.7)
RBC # BLD AUTO: 5.09 MILLION/UL (ref 3.88–5.62)
WBC # BLD AUTO: 8.04 THOUSAND/UL (ref 4.31–10.16)

## 2025-03-13 PROCEDURE — 85025 COMPLETE CBC W/AUTO DIFF WBC: CPT

## 2025-03-13 PROCEDURE — 36415 COLL VENOUS BLD VENIPUNCTURE: CPT

## 2025-03-14 ENCOUNTER — HOSPITAL ENCOUNTER (OUTPATIENT)
Dept: RADIOLOGY | Facility: HOSPITAL | Age: 75
Discharge: HOME | End: 2025-03-14
Attending: FAMILY MEDICINE

## 2025-03-14 DIAGNOSIS — F17.211 CIGARETTE NICOTINE DEPENDENCE IN REMISSION: ICD-10-CM

## 2025-03-18 ENCOUNTER — OFFICE VISIT (OUTPATIENT)
Dept: CARDIOLOGY CLINIC | Facility: CLINIC | Age: 75
End: 2025-03-18
Payer: COMMERCIAL

## 2025-03-18 VITALS
HEART RATE: 71 BPM | SYSTOLIC BLOOD PRESSURE: 140 MMHG | OXYGEN SATURATION: 96 % | DIASTOLIC BLOOD PRESSURE: 84 MMHG | HEIGHT: 75 IN | BODY MASS INDEX: 30.14 KG/M2 | WEIGHT: 242.4 LBS | TEMPERATURE: 97.8 F

## 2025-03-18 DIAGNOSIS — R29.90 STROKE-LIKE SYMPTOMS: ICD-10-CM

## 2025-03-18 DIAGNOSIS — I10 ESSENTIAL HYPERTENSION: ICD-10-CM

## 2025-03-18 DIAGNOSIS — D69.6 THROMBOCYTOPENIA (HCC): ICD-10-CM

## 2025-03-18 DIAGNOSIS — I49.3 PVC (PREMATURE VENTRICULAR CONTRACTION): ICD-10-CM

## 2025-03-18 PROCEDURE — 99214 OFFICE O/P EST MOD 30 MIN: CPT

## 2025-03-18 NOTE — PROGRESS NOTES
Sixto Oakes  1950  [id number]  [facility] [address] [phone]    1. PVC (premature ventricular contraction)        2. Essential hypertension        3. Thrombocytopenia (HCC)        4. 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  Normal diastolic function, normal RV function, no evidence of PFO, no significant valve disease  orthostatic vitals negative  no events noted on telemetry   initiated on atorvastatin 40 mg daily and aspirin 81 mg daily per neurology  IP heme-onc was ok with patient being on aspirin  etiology of symptoms unclear, although per IP neurology's note they are considering atherosclerotic disease   - no recurrent symptoms   - discussion had on obtaining a cardiac event monitor to assess for any cardiac rhythm abnormality however, he deferred at this time since he has been overall feeling well  further discussion can be had with his primary cardiologist at upcoming office visit     Abnormal stress test:  - NM rx stress test (12/2021): there is a left ventricular perfusion defect that is medium in size with moderate reduction in uptake present in the entire inferior and inferolateral location(s) that is partially reversible. Defect is present at stress and rest and is not present on the apical portion of rest imaging. Findings consistent with infarct with edgar-infarct ischemia although an diaphragm attenuation artifact cannot be rule  out  - echo as noted above  - without symptoms of angina  - continue aspirin and statin     Hypertension:  - ED visit on 2/18/2025 for hypertension in which he was started on amlodipine 5 mg daily   - mildly elevated today, 140/84   he self discontinued his amlodipine due to reports of intermittently low blood pressures at home with associated symptoms of dizziness. He monitors his blood pressures at home with a wrist cuff which I ? accuracy of this. There is evidence of several elevated blood pressures per chart review   - encouraged him to obtain an arm blood pressure cuff in order to accurately monitor blood pressures at home. Advised him to contact us if SBPs are consistently >140  he has several office visits coming up in which his blood pressures will be assessed.  Further adjustments to his antihypertensive regimen can be made if blood pressures remain elevated and if patient is agreeable to antihypertensive therapy   - lifestyle modification   - close blood pressure monitoring     Dyslipidemia:  - Lipid Profile:    Latest Reference Range & Units 03/07/25 05:36   Cholesterol See Comment mg/dL 146   Triglycerides See Comment mg/dL 97   HDL >=40 mg/dL 40   LDL Calculated 0 - 100 mg/dL 87   - continue atorvastatin 40 mg daily   - encouraged low cholesterol, mediterranean diet, and annual lipid follow up    Hx of PVCs:  - zio (2/2022): min HR of 53 bpm, max HR of 122 bpm, and avg HR of 72  bpm. Predominant underlying rhythm was Sinus Rhythm. 1 run of  Supraventricular Tachycardia occurred lasting 5 beats with a max rate of  122 bpm (avg 121 bpm). Isolated SVEs were rare (<1.0%), SVE Couplets  were rare (<1.0%), and SVE Triplets were rare (<1.0%). Isolated VEs were  frequent (5.7%, 89559), VE Couplets were rare (<1.0%, 23), and VE  Triplets were rare (<1.0%, 1). Ventricular Trigeminy was present  - denies any symptoms of palpitations  - no significant ectopy noted on exam   - historically not on beta  blockers    Idiopathic thrombocytopenia:  - follows with heme-onc  - on aspirin   - platelets stable at 102    Interval History: Sixto Oakes is a 74 y.o. year old male with history mentioned in problem list who presents to the office today for a hospital follow up.    Since his recent hospital admission and ED visit he has been overall feeling well from a cardiac standpoint. He denies any chest pain/pressure/discomfort or shortness of breath. He denies lower extremity edema, orthopnea, and PND. He denies lightheadedness, dizziness, and syncope. He denies palpitations. He reports daily exercise in which he does heavy lifting and cardio without any significant cardiac limitations. He has had issues with his blood pressures at home with reports of elevated blood pressures which prompted him to report to the ED on 2/18/2025. At that time he was initiated on amlodipine 5 mg daily. Since he started the amlodipine he reports intermittently low blood pressures with associated symptoms of dizziness. He monitors his blood pressures at home with a wrist cuff. He admits to self discontinuing his amlodipine in which he has not had any further issues of low blood pressure or symptoms of dizziness.         He will RTO on 6/16/2025 with Dr. Peña or sooner if necessary. He will call with any concerns.         Medical Problems       Problem List       Thrombocytopenia (HCC)    Elevated low-density lipoprotein level    BMI 31.0-31.9,adult    Obesity (BMI 30-39.9)    Essential hypertension    Vasculogenic erectile dysfunction    Primary osteoarthritis of right shoulder    Elevated troponin    PVC (premature ventricular contraction)    Stage 3 chronic kidney disease, unspecified whether stage 3a or 3b CKD (HCC)    Lab Results   Component Value Date    EGFR 54 03/07/2025    EGFR 48 03/06/2025    EGFR 53 02/25/2025    CREATININE 1.27 03/07/2025    CREATININE 1.41 (H) 03/06/2025    CREATININE 1.29 02/25/2025         History of ITP     Numbness    Lower urinary tract symptoms (LUTS)    Abnormal chest x-ray    Mild intermittent asthma without complication    Chronic ITP (idiopathic thrombocytopenia) (HCC)    Right carpal tunnel syndrome    Stroke-like symptoms        Past Medical History:   Diagnosis Date    COVID-19 12/15/2021    Hypertension     Hx. No meds currently. Cardiology stopped HTN meds    Thrombocytopenia (HCC)     platelet count maintained around 90-Dr. Archie Taylor    Wears partial dentures     upper     Social History     Socioeconomic History    Marital status: /Civil Union     Spouse name: Not on file    Number of children: Not on file    Years of education: Not on file    Highest education level: Not on file   Occupational History    Not on file   Tobacco Use    Smoking status: Former     Current packs/day: 0.00     Average packs/day: 0.5 packs/day for 42.0 years (21.0 ttl pk-yrs)     Types: Cigarettes     Start date: 1968     Quit date: 2010     Years since quitting: 15.2     Passive exposure: Past    Smokeless tobacco: Never   Vaping Use    Vaping status: Never Used   Substance and Sexual Activity    Alcohol use: Not Currently    Drug use: Never    Sexual activity: Not on file   Other Topics Concern    Not on file   Social History Narrative    Not on file     Social Drivers of Health     Financial Resource Strain: Not on file   Food Insecurity: No Food Insecurity (3/6/2025)    Nursing - Inadequate Food Risk Classification     Worried About Running Out of Food in the Last Year: Never true     Ran Out of Food in the Last Year: Never true     Ran Out of Food in the Last Year: Never true   Transportation Needs: No Transportation Needs (3/6/2025)    Nursing - Transportation Risk Classification     Lack of Transportation: Not on file     Lack of Transportation: No   Physical Activity: Not on file   Stress: Not on file   Social Connections: Not on file   Intimate Partner Violence: Unknown (3/6/2025)    Nursing IPS     Feels  Physically and Emotionally Safe: Not on file     Physically Hurt by Someone: Not on file     Humiliated or Emotionally Abused by Someone: Not on file     Physically Hurt by Someone: No     Hurt or Threatened by Someone: No   Housing Stability: Unknown (3/6/2025)    Nursing: Inadequate Housing Risk Classification     Has Housing: Not on file     Worried About Losing Housing: Not on file     Unable to Get Utilities: Not on file     Unable to Pay for Housing in the Last Year: No     Has Housin      Family History   Problem Relation Age of Onset    Heart disease Father 72        massive MI    Heart disease Sister         MI    Heart disease Brother         MI    No Known Problems Mother     Mental illness Neg Hx      Past Surgical History:   Procedure Laterality Date    APPENDECTOMY      COLONOSCOPY      COLONOSCOPY N/A 10/17/2018    Procedure: COLONOSCOPY;  Surgeon: Michael Simms MD;  Location: [facility] GI LAB;  Service: Gastroenterology    HERNIA REPAIR Right 2013    inguinal    JOINT REPLACEMENT Right     knee, left shoulder    KNEE SURGERY Left     Had left knee surgery for football injury    LA XCAPSL CTRC RMVL INSJ IO LENS PROSTH W/O ECP Left 2016    Procedure: EXTRACTION EXTRACAPSULAR CATARACT PHACO INTRAOCULAR LENS (IOL);  Surgeon: Sixto Bucio MD;  Location: [facility] MAIN OR;  Service: Ophthalmology    LA XCAPSL CTRC RMVL INSJ IO LENS PROSTH W/O ECP Right 2023    Procedure: EXTRACTION EXTRACAPSULAR CATARACT PHACO INTRAOCULAR LENS (IOL);  Surgeon: Sixto Bucio MD;  Location: [facility] MAIN OR;  Service: Ophthalmology    SHOULDER ARTHROSCOPY Left     tendon repair    TONSILLECTOMY  age 2       Current Outpatient Medications:     albuterol (Ventolin HFA) 90 mcg/act inhaler, Inhale 2 puffs every 4 (four) hours as needed for wheezing or shortness of breath, Disp: 18 g, Rfl: 5    ascorbic acid (VITAMIN C) 500 mg tablet, Take 500 mg by mouth every morning, Disp: , Rfl:     aspirin 81 mg chewable tablet,  Chew 1 tablet (81 mg total) daily, Disp: 30 tablet, Rfl: 0    atorvastatin (LIPITOR) 40 mg tablet, Take 1 tablet (40 mg total) by mouth every evening, Disp: 30 tablet, Rfl: 0    azelastine (ASTELIN) 0.1 % nasal spray, 1 spray into each nostril 2 (two) times a day Use in each nostril as directed, Disp: 1 mL, Rfl: 0    b complex vitamins tablet, Take 1 tablet by mouth every morning, Disp: , Rfl:     cholecalciferol (VITAMIN D3) 1,000 units tablet, Take 1,000 Units by mouth every morning, Disp: , Rfl:     griseofulvin (ISRAEL-PEG) 250 MG tablet, Take 1 tablet (250 mg total) by mouth daily, Disp: 30 tablet, Rfl: 0    multivitamin (THERAGRAN) TABS, Take 1 tablet by mouth every morning., Disp: , Rfl:     Promacta 50 MG tablet, Take 1 tablet (50 mg total) by mouth once daily. Take on an empty stomach, 1 hour before or 2 hours after a meal., Disp: 30 tablet, Rfl: 5    tamsulosin (FLOMAX) 0.4 mg, Take 1 capsule (0.4 mg total) by mouth daily with dinner, Disp: 90 capsule, Rfl: 1    amLODIPine (NORVASC) 5 mg tablet, Take 1 tablet (5 mg total) by mouth daily (Patient not taking: Reported on 3/18/2025), Disp: 30 tablet, Rfl: 0    desoximetasone (TOPICORT) 0.25 % cream, Apply 1 Application topically 2 (two) times a day (Patient not taking: Reported on 3/18/2025), Disp: 100 g, Rfl: 0    montelukast (SINGULAIR) 10 mg tablet, Take 1 tablet (10 mg total) by mouth daily at bedtime (Patient not taking: Reported on 3/6/2025), Disp: 30 tablet, Rfl: 3    sildenafil (VIAGRA) 100 mg tablet, Take 1 tablet (100 mg total) by mouth daily as needed for erectile dysfunction (Patient not taking: Reported on 3/6/2025), Disp: 10 tablet, Rfl: 3  Allergies   Allergen Reactions    Dust Mite Extract Sneezing       Labs:     Chemistry        Component Value Date/Time    K 4.2 03/07/2025 0536     03/07/2025 0536    CO2 21 03/07/2025 0536    BUN 21 03/07/2025 0536    CREATININE 1.27 03/07/2025 0536        Component Value Date/Time    CALCIUM 8.7  "03/07/2025 0536    ALKPHOS 81 03/06/2025 1101    AST 16 03/06/2025 1101    ALT 12 03/06/2025 1101            No results found for: \"CHOL\"  Lab Results   Component Value Date    HDL 40 03/07/2025    HDL 41 02/25/2025    HDL 35 (L) 08/31/2023     Lab Results   Component Value Date    LDLCALC 87 03/07/2025    LDLCALC 76 02/25/2025    LDLCALC 33 08/31/2023     Lab Results   Component Value Date    TRIG 97 03/07/2025    TRIG 88 02/25/2025    TRIG 67 08/31/2023     No results found for: \"CHOLHDL\"    Imaging: Echo complete w/ contrast if indicated  Result Date: 3/7/2025  Narrative:   Left Ventricle: Left ventricular cavity size is normal. Wall thickness is mildly increased. The left ventricular ejection fraction is 50-55%.  GLS is -16.8%.  Systolic function is normal. Although no diagnostic regional wall motion abnormality was identified, this possibility cannot be completely excluded on the basis of this study. Diastolic function is normal.   Right Ventricle: Systolic function is normal.   Atrial Septum: There was no evidence of interatrial communication with agitated saline contrast and Valsalva maneuver.   Tricuspid Valve: There is trace regurgitation.   Pulmonic Valve: There is trace regurgitation.     MRI brain wo contrast  Result Date: 3/7/2025  Narrative: MRI BRAIN WITHOUT CONTRAST INDICATION: Weakness. COMPARISON:   3/6/2025. TECHNIQUE:  Multiplanar, multisequence imaging of the brain was performed. IMAGE QUALITY:  Diagnostic. FINDINGS: BRAIN PARENCHYMA: Periventricular and subcortical T2/FLAIR hyperintense foci consistent with microangiopathic disease. No restricted diffusion to indicate an acute infarct. No intracranial hemorrhage, mass or mass effect. VENTRICLES: No hydrocephalus or extra-axial collection. SELLA AND PITUITARY GLAND:  Normal. ORBITS: Lens implants. PARANASAL SINUSES: No significant paranasal sinus disease. VASCULATURE:  Evaluation of the major intracranial vasculature demonstrates appropriate " flow voids. CALVARIUM AND SKULL BASE:  Normal. EXTRACRANIAL SOFT TISSUES:  Normal.     Impression: No evidence of acute infarct, intracranial hemorrhage or mass. Workstation performed: [id number]     CTA head and neck with and without contrast  Result Date: 3/6/2025  Narrative: CTA NECK AND BRAIN WITH AND WITHOUT CONTRAST INDICATION: resolved R facial numbness and double vision dizziness, blurred vision, right-sided facial numbness, leg weakness COMPARISON:   None. TECHNIQUE:  Routine CT imaging of the Brain without contrast.Post contrast imaging was performed after administration of iodinated contrast through the neck and brain. Post contrast axial 0.625 mm images timed to opacify the arterial system.  3D rendering was performed on an independent workstation.   MIP reconstructions performed. Coronal and sagittal reconstructions were performed of the non contrast portion of the brain. Radiation dose length product (DLP) for this visit:  1401.69 mGy-cm .  This examination, like all CT scans performed in the [facility], was performed utilizing techniques to minimize radiation dose exposure, including the use of iterative reconstruction and automated exposure control. IV Contrast:  85 mL of iohexol IMAGE QUALITY:   Diagnostic FINDINGS: NONCONTRAST BRAIN PARENCHYMA:No intracranial mass, mass effect or midline shift. No CT signs of acute infarction.  No acute parenchymal hemorrhage. VENTRICLES AND EXTRA-AXIAL SPACES:Normal for the patient's age. VISUALIZED ORBITS: Bilateral cataract surgery PARANASAL SINUSES: Normal. CTA NECK ARCH AND GREAT VESSELS: Visualized arch and great vessels are normal. VERTEBRAL ARTERIES: The nondominant right vertebral artery is occluded at its origin with reconstitution in the mid V2 segment. The dominant left vertebral artery appears normal. RIGHT CAROTID: Mild calcified plaque. Less than 50% stenosis.   No dissection. LEFT CAROTID: Mild mixed plaque. Less than 50% stenosis.    No dissection. NASCET criteria was used to determine the degree of internal carotid artery diameter stenosis. CTA BRAIN: INTERNAL CAROTID ARTERIES: Mild calcified plaque bilaterally. No stenosis or occlusion. ANTERIOR CEREBRAL ARTERY CIRCULATION:  No stenosis or occlusion. MIDDLE CEREBRAL ARTERY CIRCULATION: Mild right M1 segment stenosis. No high-grade stenosis or occlusion. DISTAL VERTEBRAL ARTERIES:  No stenosis or occlusion. BASILAR ARTERY:  No stenosis or occlusion. POSTERIOR CEREBRAL ARTERIES: No stenosis or occlusion. VENOUS STRUCTURES:  Normal. NON VASCULAR ANATOMY BONY STRUCTURES:  No acute osseous abnormality. SOFT TISSUES OF THE NECK:  Normal. THORACIC INLET:  Unremarkable.     Impression: CT Brain:  No acute intracranial abnormality. CT Angiography: 1. Occluded nondominant right vertebral artery at its origin with reconstitution in the mid V2 segment. There is normal opacification of the intracranial right vertebral artery. The nondominant left vertebral artery appears normal throughout its course. 2. Mild right M1 segment stenosis. No additional intracranial stenosis identified. There is no intracranial occlusion. The study was marked in EPIC for immediate notification. Workstation performed: [id number]     XR spine cervical 2 or 3 vw injury  Result Date: 2/23/2025  Narrative: CERVICAL SPINE INDICATION:   History of falling. Unspecified injury of head, initial encounter. Dizziness and giddiness. Unspecified abnormalities of gait and mobility. COMPARISON:  None. VIEWS:  XR SPINE CERVICAL 2 OR 3 VW INJURY Images: 4 FINDINGS: No acute fracture or subluxation. Anatomic alignment. There is moderate facet disease in the mid to lower cervical spine. There is moderate to severe multilevel disc space narrowing with spondylosis worse at C6-C7.. Normal prevertebral soft tissues.  Clear lung apices.     Impression: Moderate-severe spondylosis worse at C6-7. Moderate facet disease as well. Electronically signed:  "02/23/2025 10:34 AM Roberto Montiel MD      ECG:  n/a    Review of Systems   All other systems reviewed and are negative.      Vitals:    03/18/25 1405   BP: 140/84   Pulse: 71   Temp: 97.8 °F (36.6 °C)   SpO2: 96%     Vitals:    03/18/25 1405   Weight: 110 kg (242 lb 6.4 oz)     Height: 6' 3\" (190.5 cm)   Body mass index is 30.3 kg/m².    Physical Exam  Vitals and nursing note reviewed.   Constitutional:       General: He is not in acute distress.     Appearance: Normal appearance. He is not ill-appearing.   HENT:      Head: Normocephalic.      Nose: Nose normal.      Mouth/Throat:      Mouth: Mucous membranes are moist.      Pharynx: Oropharynx is clear.   Cardiovascular:      Rate and Rhythm: Normal rate.      Pulses: Normal pulses.      Heart sounds: Normal heart sounds. No murmur heard.  Pulmonary:      Effort: Pulmonary effort is normal.      Breath sounds: Normal breath sounds.   Musculoskeletal:         General: Normal range of motion.      Cervical back: Normal range of motion.      Right lower leg: No edema.      Left lower leg: No edema.   Skin:     General: Skin is warm and dry.   Neurological:      Mental Status: He is alert and oriented to person, place, and time.   Psychiatric:         Mood and Affect: Mood normal.         Behavior: Behavior normal.        "

## 2025-03-19 ENCOUNTER — OFFICE VISIT (OUTPATIENT)
Dept: FAMILY MEDICINE CLINIC | Facility: CLINIC | Age: 75
End: 2025-03-19
Payer: COMMERCIAL

## 2025-03-19 VITALS
TEMPERATURE: 99.3 F | RESPIRATION RATE: 18 BRPM | HEIGHT: 75 IN | HEART RATE: 68 BPM | BODY MASS INDEX: 30.19 KG/M2 | SYSTOLIC BLOOD PRESSURE: 140 MMHG | WEIGHT: 242.8 LBS | DIASTOLIC BLOOD PRESSURE: 80 MMHG

## 2025-03-19 DIAGNOSIS — E78.00 ELEVATED LOW-DENSITY LIPOPROTEIN LEVEL: ICD-10-CM

## 2025-03-19 DIAGNOSIS — I10 ESSENTIAL HYPERTENSION: Primary | ICD-10-CM

## 2025-03-19 DIAGNOSIS — R29.90 STROKE-LIKE SYMPTOMS: ICD-10-CM

## 2025-03-19 DIAGNOSIS — N18.30 STAGE 3 CHRONIC KIDNEY DISEASE, UNSPECIFIED WHETHER STAGE 3A OR 3B CKD (HCC): ICD-10-CM

## 2025-03-19 PROCEDURE — 99214 OFFICE O/P EST MOD 30 MIN: CPT | Performed by: FAMILY MEDICINE

## 2025-03-19 PROCEDURE — G2211 COMPLEX E/M VISIT ADD ON: HCPCS | Performed by: FAMILY MEDICINE

## 2025-03-19 RX ORDER — ASPIRIN 81 MG/1
81 TABLET, CHEWABLE ORAL DAILY
Qty: 100 TABLET | Refills: 3 | Status: SHIPPED | OUTPATIENT
Start: 2025-03-19 | End: 2026-03-19

## 2025-03-19 RX ORDER — ATORVASTATIN CALCIUM 40 MG/1
40 TABLET, FILM COATED ORAL EVERY EVENING
Qty: 100 TABLET | Refills: 3 | Status: SHIPPED | OUTPATIENT
Start: 2025-03-19 | End: 2026-03-19

## 2025-03-19 NOTE — ASSESSMENT & PLAN NOTE
Orders:    atorvastatin (LIPITOR) 40 mg tablet; Take 1 tablet (40 mg total) by mouth every evening    aspirin 81 mg chewable tablet; Chew 1 tablet (81 mg total) daily    CBC; Future    Comprehensive metabolic panel; Future    Lipid Panel with Direct LDL reflex; Future

## 2025-03-19 NOTE — PROGRESS NOTES
Name: Sixto Oakes      : 1950      MRN: [id number]  Encounter Provider: Frank Lombardi, DO  Encounter Date: 3/19/2025   Encounter department: [facility]  :  Assessment & Plan  Essential hypertension  We will wait for cardio opinion on the BP.  Pt was on 5  of norvasc thinks it lowers his pressure and causes the symptoms.  Pt stopped the med will keep off for now  Pt advised on OMRON BP machine  Orders:    CBC; Future    Comprehensive metabolic panel; Future    Lipid Panel with Direct LDL reflex; Future    Stage 3 chronic kidney disease, unspecified whether stage 3a or 3b CKD (HCC)  Lab Results   Component Value Date    EGFR 54 2025    EGFR 48 2025    EGFR 53 2025    CREATININE 1.27 2025    CREATININE 1.41 (H) 2025    CREATININE 1.29 2025       Orders:    CBC; Future    Comprehensive metabolic panel; Future    Lipid Panel with Direct LDL reflex; Future    Elevated low-density lipoprotein level    Orders:    CBC; Future    Comprehensive metabolic panel; Future    Lipid Panel with Direct LDL reflex; Future    Stroke-like symptoms    Orders:    atorvastatin (LIPITOR) 40 mg tablet; Take 1 tablet (40 mg total) by mouth every evening    aspirin 81 mg chewable tablet; Chew 1 tablet (81 mg total) daily    CBC; Future    Comprehensive metabolic panel; Future    Lipid Panel with Direct LDL reflex; Future           History of Present Illness   Pt is sched for a bp check and lab follow uyp  Pt states he was recently in the hospital with elevated bp - w dizziness  Pt states he went to the ed - had ct scan    During workup they vount a vessle in the head that had a blockage - unsure if that was cauing symptoms.  Pt uses Wrist cuff at home - 140 /82    Pt states he stopped his BP meds on the 6th  The BP value above is off his meds - but again at hiome on cuff    Pt was given script for aspirin and the other is cholesterol        Review of Systems   Constitutional:   "Negative for activity change, appetite change, chills, diaphoresis, fatigue, fever and unexpected weight change.   HENT:  Negative for congestion, dental problem, ear pain, mouth sores, sinus pressure, sinus pain, sore throat and trouble swallowing.    Eyes:  Negative for photophobia, discharge and itching.   Respiratory:  Negative for apnea, chest tightness and shortness of breath.    Cardiovascular:  Negative for chest pain, palpitations and leg swelling.   Gastrointestinal:  Negative for abdominal distention, abdominal pain, blood in stool, nausea and vomiting.   Endocrine: Negative for cold intolerance, heat intolerance, polydipsia, polyphagia and polyuria.   Genitourinary:  Negative for difficulty urinating.   Musculoskeletal:  Negative for arthralgias.   Skin:  Negative for color change and wound.   Neurological:  Positive for light-headedness. Negative for dizziness, syncope, speech difficulty and headaches.   Hematological:  Negative for adenopathy.   Psychiatric/Behavioral:  Negative for agitation and behavioral problems.        Objective   /80 Comment: retake  Pulse 68   Temp 99.3 °F (37.4 °C)   Resp 18   Ht 6' 3\" (1.905 m)   Wt 110 kg (242 lb 12.8 oz)   BMI 30.35 kg/m²      Physical Exam  Vitals and nursing note reviewed.   Constitutional:       General: He is not in acute distress.     Appearance: He is well-developed. He is not diaphoretic.   HENT:      Head: Normocephalic and atraumatic.      Right Ear: External ear normal.      Left Ear: External ear normal.      Nose: Nose normal.      Mouth/Throat:      Pharynx: No oropharyngeal exudate.   Eyes:      General: No scleral icterus.        Right eye: No discharge.         Left eye: No discharge.      Pupils: Pupils are equal, round, and reactive to light.   Neck:      Thyroid: No thyromegaly.   Cardiovascular:      Rate and Rhythm: Normal rate.      Heart sounds: Normal heart sounds. No murmur heard.  Pulmonary:      Effort: Pulmonary effort " is normal. No respiratory distress.      Breath sounds: Normal breath sounds. No wheezing.   Abdominal:      General: Bowel sounds are normal. There is no distension.      Palpations: Abdomen is soft. There is no mass.      Tenderness: There is no abdominal tenderness. There is no guarding or rebound.   Musculoskeletal:         General: Normal range of motion.   Skin:     General: Skin is warm and dry.      Findings: No erythema or rash.   Neurological:      Mental Status: He is alert.      Coordination: Coordination normal.      Deep Tendon Reflexes: Reflexes normal.   Psychiatric:         Behavior: Behavior normal.

## 2025-03-19 NOTE — ASSESSMENT & PLAN NOTE
Orders:    CBC; Future    Comprehensive metabolic panel; Future    Lipid Panel with Direct LDL reflex; Future

## 2025-03-19 NOTE — ASSESSMENT & PLAN NOTE
Lab Results   Component Value Date    EGFR 54 03/07/2025    EGFR 48 03/06/2025    EGFR 53 02/25/2025    CREATININE 1.27 03/07/2025    CREATININE 1.41 (H) 03/06/2025    CREATININE 1.29 02/25/2025       Orders:    CBC; Future    Comprehensive metabolic panel; Future    Lipid Panel with Direct LDL reflex; Future

## 2025-03-19 NOTE — ASSESSMENT & PLAN NOTE
We will wait for cardio opinion on the BP.  Pt was on 5  of norvasc thinks it lowers his pressure and causes the symptoms.  Pt stopped the med will keep off for now  Pt advised on OMRON BP machine  Orders:    CBC; Future    Comprehensive metabolic panel; Future    Lipid Panel with Direct LDL reflex; Future

## 2025-03-20 ENCOUNTER — OFFICE VISIT (OUTPATIENT)
Dept: HEMATOLOGY ONCOLOGY | Facility: MEDICAL CENTER | Age: 75
End: 2025-03-20
Payer: COMMERCIAL

## 2025-03-20 VITALS
BODY MASS INDEX: 30.69 KG/M2 | SYSTOLIC BLOOD PRESSURE: 140 MMHG | WEIGHT: 246.8 LBS | TEMPERATURE: 98.1 F | OXYGEN SATURATION: 97 % | HEART RATE: 71 BPM | DIASTOLIC BLOOD PRESSURE: 80 MMHG | HEIGHT: 75 IN

## 2025-03-20 DIAGNOSIS — Z86.2 HISTORY OF ITP: Primary | ICD-10-CM

## 2025-03-20 PROCEDURE — 99214 OFFICE O/P EST MOD 30 MIN: CPT | Performed by: INTERNAL MEDICINE

## 2025-03-20 NOTE — PROGRESS NOTES
Sixto Oakes  1950  [facility]  [address] [place] [id number]    DISCUSSION/SUMMARY:    30 minutes was spent with patient in direct consultation, physical examination and review of the chart.    74-year-old male with  otherwise good past medical history found to have thrombocytopenia approximately 8 years ago.  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  Because of prior persistent downward platelet trend with associated excessive bruising, patient was started on Promacta.  Because of side effects, the dose has been manipulated a number of times.      Patient is presently on Promacta 50 mg a day, tolerating it, no severe arthritic complaints, activities are baseline.  Platelet count is trended below, actually pretty good.  Unfortunately patient was recently admitted to the ICU with strokelike symptoms.  Blood pressure control is ongoing.  Patient will follow-up with cardiology as directed.  No recent CNS issues.  Patient was started on aspirin.  No bruising or bleeding since starting the aspirin.  The plan is to continue.    Patient has always demonstrated a good understanding of the situation.  Mr. Oakes understands that his platelet count usually runs lower than normal and that the additional of medications (aspirin) that may affect platelet function can increase his risk for excessive bruising or bleeding.  Patient will continue to monitor.    For the time being, patient is to have a CBC every 4 weeks; patient is to return to this office in 4 months.    As discussed previously, when patient received the COVID vaccine in 2022, his platelet count dropped significantly (< 10,000).  It is difficult to connect the 2 but patient is very reluctant to receive any boosters.  Patient subsequently contracted COVID - uncomplicated illness.  The plan is to hold off on any COVID booster shots.    Mr. Oakes knows to call the hematology/oncology office  if there are any other questions or concerns or if he is in need of any invasive procedure or surgery.    Carefully review your medication list and verify that the list is accurate and up-to-date. Please call the hematology/oncology office if there are medications missing from the list, medications on the list that you are not currently taking or if there is a dosage or instruction that is different from how you're taking that medication.    Patient goals and areas of care:  Continue with Promacta, 50 mg a day  Barriers to care:  None  Patient is able to self-care  ______________________________________________________________________________________    Chief Complaint   Patient presents with    Follow-up    ITP     History of Present Illness:    74-year-old male previously referred for evaluation of thrombocytopenia.  Mr. Oakes was unaware of any CBC abnormalities up until recently when he needed left shoulder surgery.  Patient has been seen by a number of physicians to trying clarify the etiology for the thrombocytopenia.  Although the specifics are not entirely clear, 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 (previously seen by Dr. Mansfield in [place] many years ago).  Patient returns for follow-up.    Mr. Oakes has been on Promacta, 50 mg a day.      Patient was recently admitted to the hospital, was in the ICU.  Patient was treated with strokelike symptoms, hypertension, chronic kidney disease.  Patient was recently placed on aspirin 81 mg a day.  Patient states feeling better, no headaches, blurred vision or dizziness.  No respiratory issues.  No problems with excessive bruising or bleeding.  Appetite is okay, weight is stable.  Activities are back to baseline.  No specific problems with the Promacta.    Review of Systems   Constitutional: Negative.    HENT: Negative.     Eyes: Negative.    Respiratory: Negative.     Cardiovascular: Negative.    Gastrointestinal: Negative.    Endocrine:  Negative.    Genitourinary: Negative.    Musculoskeletal:  Negative for arthralgias.   Skin: Negative.    Allergic/Immunologic: Negative.    Neurological:  Negative for numbness.   Hematological:  Does not bruise/bleed easily.   Psychiatric/Behavioral:  The patient is not nervous/anxious.    All other systems reviewed and are negative.     Patient Active Problem List   Diagnosis    Thrombocytopenia (HCC)    Elevated low-density lipoprotein level    BMI 31.0-31.9,adult    Obesity (BMI 30-39.9)    Essential hypertension    Vasculogenic erectile dysfunction    Primary osteoarthritis of right shoulder    Elevated troponin    PVC (premature ventricular contraction)    Stage 3 chronic kidney disease, unspecified whether stage 3a or 3b CKD (HCC)    History of ITP    Numbness    Lower urinary tract symptoms (LUTS)    Abnormal chest x-ray    Mild intermittent asthma without complication    Chronic ITP (idiopathic thrombocytopenia) (HCC)    Right carpal tunnel syndrome    Stroke-like symptoms     Past Medical History:   Diagnosis Date    COVID-19 12/15/2021    Hypertension     Hx. No meds currently. Cardiology stopped HTN meds    Thrombocytopenia (HCC)     platelet count maintained around 90-Dr. Archie Taylor    Wears partial dentures     upper     Past Surgical History:   Procedure Laterality Date    APPENDECTOMY      COLONOSCOPY      COLONOSCOPY N/A 10/17/2018    Procedure: COLONOSCOPY;  Surgeon: Michael Simms MD;  Location: [facility] GI LAB;  Service: Gastroenterology    HERNIA REPAIR Right 2013    inguinal    JOINT REPLACEMENT Right     knee, left shoulder    KNEE SURGERY Left     Had left knee surgery for football injury    AR XCAPSL CTRC RMVL INSJ IO LENS PROSTH W/O ECP Left 05/04/2016    Procedure: EXTRACTION EXTRACAPSULAR CATARACT PHACO INTRAOCULAR LENS (IOL);  Surgeon: Sixto Bucio MD;  Location: [facility] MAIN OR;  Service: Ophthalmology    AR XCAPSL CTRC RMVL INSJ IO LENS PROSTH W/O ECP Right 5/22/2023    Procedure:  EXTRACTION EXTRACAPSULAR CATARACT PHACO INTRAOCULAR LENS (IOL);  Surgeon: Sixto Bucio MD;  Location: [facility] MAIN OR;  Service: Ophthalmology    SHOULDER ARTHROSCOPY Left     tendon repair    TONSILLECTOMY  age 2     Family History   Problem Relation Age of Onset    Heart disease Father 72        massive MI    Heart disease Sister         MI    Heart disease Brother         MI    No Known Problems Mother     Mental illness Neg Hx      Social History     Socioeconomic History    Marital status: /Civil Union     Spouse name: Not on file    Number of children: Not on file    Years of education: Not on file    Highest education level: Not on file   Occupational History    Not on file   Tobacco Use    Smoking status: Former     Current packs/day: 0.00     Average packs/day: 0.5 packs/day for 42.0 years (21.0 ttl pk-yrs)     Types: Cigarettes     Start date: 1968     Quit date: 2010     Years since quitting: 15.2     Passive exposure: Past    Smokeless tobacco: Never   Vaping Use    Vaping status: Never Used   Substance and Sexual Activity    Alcohol use: Not Currently    Drug use: Never    Sexual activity: Not on file   Other Topics Concern    Not on file   Social History Narrative    Not on file     Social Drivers of Health     Financial Resource Strain: Not on file   Food Insecurity: No Food Insecurity (3/6/2025)    Nursing - Inadequate Food Risk Classification     Worried About Running Out of Food in the Last Year: Never true     Ran Out of Food in the Last Year: Never true     Ran Out of Food in the Last Year: Never true   Transportation Needs: No Transportation Needs (3/6/2025)    Nursing - Transportation Risk Classification     Lack of Transportation: Not on file     Lack of Transportation: No   Physical Activity: Not on file   Stress: Not on file   Social Connections: Not on file   Intimate Partner Violence: Unknown (3/6/2025)    Nursing IPS     Feels Physically and Emotionally Safe: Not on file      Physically Hurt by Someone: Not on file     Humiliated or Emotionally Abused by Someone: Not on file     Physically Hurt by Someone: No     Hurt or Threatened by Someone: No   Housing Stability: Unknown (3/6/2025)    Nursing: Inadequate Housing Risk Classification     Has Housing: Not on file     Worried About Losing Housing: Not on file     Unable to Get Utilities: Not on file     Unable to Pay for Housing in the Last Year: No     Has Housin       Current Outpatient Medications:     albuterol (Ventolin HFA) 90 mcg/act inhaler, Inhale 2 puffs every 4 (four) hours as needed for wheezing or shortness of breath, Disp: 18 g, Rfl: 5    ascorbic acid (VITAMIN C) 500 mg tablet, Take 500 mg by mouth every morning, Disp: , Rfl:     aspirin 81 mg chewable tablet, Chew 1 tablet (81 mg total) daily, Disp: 100 tablet, Rfl: 3    atorvastatin (LIPITOR) 40 mg tablet, Take 1 tablet (40 mg total) by mouth every evening, Disp: 100 tablet, Rfl: 3    azelastine (ASTELIN) 0.1 % nasal spray, 1 spray into each nostril 2 (two) times a day Use in each nostril as directed, Disp: 1 mL, Rfl: 0    b complex vitamins tablet, Take 1 tablet by mouth every morning, Disp: , Rfl:     cholecalciferol (VITAMIN D3) 1,000 units tablet, Take 1,000 Units by mouth every morning, Disp: , Rfl:     griseofulvin (ISRAEL-PEG) 250 MG tablet, Take 1 tablet (250 mg total) by mouth daily, Disp: 30 tablet, Rfl: 0    multivitamin (THERAGRAN) TABS, Take 1 tablet by mouth every morning., Disp: , Rfl:     Promacta 50 MG tablet, Take 1 tablet (50 mg total) by mouth once daily. Take on an empty stomach, 1 hour before or 2 hours after a meal., Disp: 30 tablet, Rfl: 5    tamsulosin (FLOMAX) 0.4 mg, Take 1 capsule (0.4 mg total) by mouth daily with dinner, Disp: 90 capsule, Rfl: 1    Allergies   Allergen Reactions    Dust Mite Extract Sneezing     Vitals:    25 1046   BP: 140/80   Pulse: 71   Temp: 98.1 °F (36.7 °C)   SpO2: 97%     Physical Exam  Constitutional:        Appearance: He is well-developed.      Comments: Well-nourished male, no respiratory distress, no signs of pain   HENT:      Head: Normocephalic and atraumatic.      Right Ear: External ear normal.      Left Ear: External ear normal.      Nose: Nose normal.   Eyes:      Conjunctiva/sclera: Conjunctivae normal.      Pupils: Pupils are equal, round, and reactive to light.   Cardiovascular:      Rate and Rhythm: Normal rate and regular rhythm.      Heart sounds: Normal heart sounds.   Pulmonary:      Effort: Pulmonary effort is normal.      Breath sounds: Normal breath sounds.   Abdominal:      General: Bowel sounds are normal.      Palpations: Abdomen is soft.      Comments: +bowel sounds, nontender, slightly obese, cannot palpate liver or spleen, no rigidity or rebound   Musculoskeletal:         General: Normal range of motion.      Cervical back: Normal range of motion and neck supple.   Skin:     General: Skin is warm.      Comments: Warm, slightly dry, no petechiae or ecchymoses   Neurological:      Mental Status: He is alert and oriented to person, place, and time.      Deep Tendon Reflexes: Reflexes are normal and symmetric.   Psychiatric:         Behavior: Behavior normal.         Thought Content: Thought content normal.         Judgment: Judgment normal.     Extremities:  No lower extremity edema bilaterally, no cords, pulses are 1+  Lymphatics:   No adenopathy in the neck, supraclavicular region, axilla and groin bilaterally    Labs        3/7/2025 BUN = 21 creatinine = 1.27    3/6/2020 25T = 13 INR = 0.93  PTT = 29    Cardiology        Imaging    3/6/2025 MRI brain without contrast.  Impression stated no evidence of acute infarct, intracranial hemorrhage or mass.    Pathology    11/24/2016 direct platelet antibody:  Anti IgG = positive  11/21/2016 platelet antibody IIB/IIIA, IA/IIA, IB/IX and HLA class I were all negative

## 2025-03-21 ENCOUNTER — RESULTS FOLLOW-UP (OUTPATIENT)
Dept: FAMILY MEDICINE CLINIC | Facility: CLINIC | Age: 75
End: 2025-03-21

## 2025-04-01 ENCOUNTER — TELEPHONE (OUTPATIENT)
Age: 75
End: 2025-04-01

## 2025-04-01 ENCOUNTER — TELEPHONE (OUTPATIENT)
Dept: PODIATRY | Facility: CLINIC | Age: 75
End: 2025-04-01

## 2025-04-01 DIAGNOSIS — B35.1 ONYCHOMYCOSIS: Primary | ICD-10-CM

## 2025-04-01 DIAGNOSIS — E78.00 ELEVATED LOW-DENSITY LIPOPROTEIN LEVEL: Primary | ICD-10-CM

## 2025-04-01 RX ORDER — TERBINAFINE HYDROCHLORIDE 250 MG/1
TABLET ORAL
Qty: 15 TABLET | Refills: 0 | Status: SHIPPED | OUTPATIENT
Start: 2025-04-01 | End: 2025-05-08

## 2025-04-01 RX ORDER — ROSUVASTATIN CALCIUM 20 MG/1
20 TABLET, COATED ORAL DAILY
Qty: 100 TABLET | Refills: 3 | Status: SHIPPED | OUTPATIENT
Start: 2025-04-01 | End: 2025-04-03

## 2025-04-01 NOTE — TELEPHONE ENCOUNTER
The patient called to report the medication atorvastatin (LIPITOR) 40 mg tablet is giving him side effect  with his hands and joins     The patient would like another medication     Please Advice

## 2025-04-01 NOTE — TELEPHONE ENCOUNTER
Lmom for patient in regards to medication , per dr barker another prescription was called in and patient would need a follow up appt in about 6-7 weeks

## 2025-04-01 NOTE — TELEPHONE ENCOUNTER
Left message on machine for patient to call us back. Please give Dr. Lombardi's message.  CMAKLong

## 2025-04-01 NOTE — TELEPHONE ENCOUNTER
Caller: Sixto Oakes    Doctor: Dr. Fletcher Boyle    Reason for call: Sixto called his pharmacy to get a refill of the Griseofulvin Ultra 250 mg.  The pharmacist said they can no longer get this medication.  Sixto is asking if Dr. Boyle can call in something else for him.    Call back#: [phone] - Please call Sixto to let him know

## 2025-04-01 NOTE — PROGRESS NOTES
Patient unable to obtain griseofulvin as ordered.  His pharmacy is unable to obtain such.  Patient has no elevation of liver enzyme noted on testing.  We will start him on an every other day course of terbinafine.  Return for follow-up.

## 2025-04-03 RX ORDER — EZETIMIBE 10 MG/1
10 TABLET ORAL DAILY
Qty: 90 TABLET | Refills: 1 | Status: SHIPPED | OUTPATIENT
Start: 2025-04-03

## 2025-04-03 NOTE — TELEPHONE ENCOUNTER
Patient is calling in stating the new medication prescribed is still causing him hand swelling and joint pain. He took new medication last night and caused same reaction.  Patient needs a medication that does not have statin in it.    Please advise

## 2025-04-17 ENCOUNTER — TELEPHONE (OUTPATIENT)
Age: 75
End: 2025-04-17

## 2025-04-17 NOTE — TELEPHONE ENCOUNTER
LMOM letting patient know he missed his appointment today and to call us back if he would like to reschedule.

## 2025-04-22 ENCOUNTER — APPOINTMENT (OUTPATIENT)
Dept: LAB | Facility: HOSPITAL | Age: 75
End: 2025-04-22
Attending: INTERNAL MEDICINE
Payer: COMMERCIAL

## 2025-04-22 DIAGNOSIS — R29.90 STROKE-LIKE SYMPTOMS: ICD-10-CM

## 2025-04-22 DIAGNOSIS — I10 ESSENTIAL HYPERTENSION: ICD-10-CM

## 2025-04-22 DIAGNOSIS — Z86.2 HISTORY OF ITP: ICD-10-CM

## 2025-04-22 DIAGNOSIS — N18.30 STAGE 3 CHRONIC KIDNEY DISEASE, UNSPECIFIED WHETHER STAGE 3A OR 3B CKD (HCC): ICD-10-CM

## 2025-04-22 DIAGNOSIS — D69.6 THROMBOCYTOPENIA (HCC): ICD-10-CM

## 2025-04-22 DIAGNOSIS — E78.00 ELEVATED LOW-DENSITY LIPOPROTEIN LEVEL: ICD-10-CM

## 2025-04-22 LAB
BASOPHILS # BLD AUTO: 0.08 THOUSANDS/ÂΜL (ref 0–0.1)
BASOPHILS NFR BLD AUTO: 1 % (ref 0–1)
EOSINOPHIL # BLD AUTO: 0.27 THOUSAND/ÂΜL (ref 0–0.61)
EOSINOPHIL NFR BLD AUTO: 4 % (ref 0–6)
ERYTHROCYTE [DISTWIDTH] IN BLOOD BY AUTOMATED COUNT: 14.4 % (ref 11.6–15.1)
HCT VFR BLD AUTO: 46.7 % (ref 36.5–49.3)
HGB BLD-MCNC: 15.7 G/DL (ref 12–17)
IMM GRANULOCYTES # BLD AUTO: 0.02 THOUSAND/UL (ref 0–0.2)
IMM GRANULOCYTES NFR BLD AUTO: 0 % (ref 0–2)
LYMPHOCYTES # BLD AUTO: 1.57 THOUSANDS/ÂΜL (ref 0.6–4.47)
LYMPHOCYTES NFR BLD AUTO: 21 % (ref 14–44)
MCH RBC QN AUTO: 29.7 PG (ref 26.8–34.3)
MCHC RBC AUTO-ENTMCNC: 33.6 G/DL (ref 31.4–37.4)
MCV RBC AUTO: 88 FL (ref 82–98)
MONOCYTES # BLD AUTO: 0.65 THOUSAND/ÂΜL (ref 0.17–1.22)
MONOCYTES NFR BLD AUTO: 9 % (ref 4–12)
NEUTROPHILS # BLD AUTO: 4.8 THOUSANDS/ÂΜL (ref 1.85–7.62)
NEUTS SEG NFR BLD AUTO: 65 % (ref 43–75)
NRBC BLD AUTO-RTO: 0 /100 WBCS
PLATELET # BLD AUTO: 63 THOUSANDS/UL (ref 149–390)
PMV BLD AUTO: 13.7 FL (ref 8.9–12.7)
RBC # BLD AUTO: 5.29 MILLION/UL (ref 3.88–5.62)
WBC # BLD AUTO: 7.39 THOUSAND/UL (ref 4.31–10.16)

## 2025-04-22 PROCEDURE — 36415 COLL VENOUS BLD VENIPUNCTURE: CPT

## 2025-04-22 PROCEDURE — 85025 COMPLETE CBC W/AUTO DIFF WBC: CPT

## 2025-04-25 ENCOUNTER — TELEPHONE (OUTPATIENT)
Age: 75
End: 2025-04-25

## 2025-04-25 NOTE — TELEPHONE ENCOUNTER
Spoke with patient and let him know I made a note of it in his chart he had spoke to the call center and the other appointment was never canceled.

## 2025-04-25 NOTE — TELEPHONE ENCOUNTER
Caller: Sixto Oakes    Doctor and/or Office: Dr. Boyle/[facility]#: [phone]    Escalation: Appointment/Sixto called to let us know he was not a no show for the appt 4/17/25-he was RS to 5/29/25 due to medication issue. He was given RX on 4/1 and told to be seen 6 weeks later. He assumed the office would CX and RS, but when he was not called he called us to RS. He did call same day as appt to RS, but it was after his appt. Time. Please call him back to let him know if there is any way to remove the no show. Thanks

## 2025-05-19 DIAGNOSIS — R39.9 LOWER URINARY TRACT SYMPTOMS (LUTS): ICD-10-CM

## 2025-05-20 ENCOUNTER — TELEPHONE (OUTPATIENT)
Age: 75
End: 2025-05-20

## 2025-05-20 ENCOUNTER — APPOINTMENT (OUTPATIENT)
Dept: LAB | Facility: HOSPITAL | Age: 75
End: 2025-05-20
Attending: FAMILY MEDICINE
Payer: COMMERCIAL

## 2025-05-20 DIAGNOSIS — Z86.2 HISTORY OF ITP: ICD-10-CM

## 2025-05-20 DIAGNOSIS — I10 ESSENTIAL HYPERTENSION: ICD-10-CM

## 2025-05-20 DIAGNOSIS — N18.30 STAGE 3 CHRONIC KIDNEY DISEASE, UNSPECIFIED WHETHER STAGE 3A OR 3B CKD (HCC): ICD-10-CM

## 2025-05-20 DIAGNOSIS — E78.00 ELEVATED LOW-DENSITY LIPOPROTEIN LEVEL: ICD-10-CM

## 2025-05-20 LAB
ALBUMIN SERPL BCG-MCNC: 3.9 G/DL (ref 3.5–5)
ALP SERPL-CCNC: 86 U/L (ref 34–104)
ALT SERPL W P-5'-P-CCNC: 15 U/L (ref 7–52)
ANION GAP SERPL CALCULATED.3IONS-SCNC: 7 MMOL/L (ref 4–13)
AST SERPL W P-5'-P-CCNC: 22 U/L (ref 13–39)
BASOPHILS # BLD AUTO: 0.08 THOUSANDS/ÂΜL (ref 0–0.1)
BASOPHILS NFR BLD AUTO: 1 % (ref 0–1)
BILIRUB SERPL-MCNC: 1.18 MG/DL (ref 0.2–1)
BUN SERPL-MCNC: 20 MG/DL (ref 5–25)
CALCIUM SERPL-MCNC: 8.9 MG/DL (ref 8.4–10.2)
CHLORIDE SERPL-SCNC: 106 MMOL/L (ref 96–108)
CHOLEST SERPL-MCNC: 138 MG/DL (ref ?–200)
CO2 SERPL-SCNC: 23 MMOL/L (ref 21–32)
CREAT SERPL-MCNC: 1.21 MG/DL (ref 0.6–1.3)
EOSINOPHIL # BLD AUTO: 0.17 THOUSAND/ÂΜL (ref 0–0.61)
EOSINOPHIL NFR BLD AUTO: 2 % (ref 0–6)
ERYTHROCYTE [DISTWIDTH] IN BLOOD BY AUTOMATED COUNT: 14 % (ref 11.6–15.1)
GFR SERPL CREATININE-BSD FRML MDRD: 57 ML/MIN/1.73SQ M
GLUCOSE P FAST SERPL-MCNC: 95 MG/DL (ref 65–99)
HCT VFR BLD AUTO: 47.2 % (ref 36.5–49.3)
HDLC SERPL-MCNC: 37 MG/DL
HGB BLD-MCNC: 15.6 G/DL (ref 12–17)
IMM GRANULOCYTES # BLD AUTO: 0.02 THOUSAND/UL (ref 0–0.2)
IMM GRANULOCYTES NFR BLD AUTO: 0 % (ref 0–2)
LDLC SERPL CALC-MCNC: 85 MG/DL (ref 0–100)
LYMPHOCYTES # BLD AUTO: 1.47 THOUSANDS/ÂΜL (ref 0.6–4.47)
LYMPHOCYTES NFR BLD AUTO: 19 % (ref 14–44)
MCH RBC QN AUTO: 29.5 PG (ref 26.8–34.3)
MCHC RBC AUTO-ENTMCNC: 33.1 G/DL (ref 31.4–37.4)
MCV RBC AUTO: 89 FL (ref 82–98)
MONOCYTES # BLD AUTO: 0.72 THOUSAND/ÂΜL (ref 0.17–1.22)
MONOCYTES NFR BLD AUTO: 10 % (ref 4–12)
NEUTROPHILS # BLD AUTO: 5.15 THOUSANDS/ÂΜL (ref 1.85–7.62)
NEUTS SEG NFR BLD AUTO: 68 % (ref 43–75)
NRBC BLD AUTO-RTO: 0 /100 WBCS
PLATELET # BLD AUTO: 61 THOUSANDS/UL (ref 149–390)
PMV BLD AUTO: 13.5 FL (ref 8.9–12.7)
POTASSIUM SERPL-SCNC: 4.4 MMOL/L (ref 3.5–5.3)
PROT SERPL-MCNC: 6.9 G/DL (ref 6.4–8.4)
RBC # BLD AUTO: 5.28 MILLION/UL (ref 3.88–5.62)
SODIUM SERPL-SCNC: 136 MMOL/L (ref 135–147)
TRIGL SERPL-MCNC: 79 MG/DL (ref ?–150)
WBC # BLD AUTO: 7.61 THOUSAND/UL (ref 4.31–10.16)

## 2025-05-20 PROCEDURE — 80053 COMPREHEN METABOLIC PANEL: CPT

## 2025-05-20 PROCEDURE — 36415 COLL VENOUS BLD VENIPUNCTURE: CPT

## 2025-05-20 PROCEDURE — 80061 LIPID PANEL: CPT

## 2025-05-20 PROCEDURE — 85025 COMPLETE CBC W/AUTO DIFF WBC: CPT

## 2025-05-20 RX ORDER — TAMSULOSIN HYDROCHLORIDE 0.4 MG/1
0.4 CAPSULE ORAL
Qty: 90 CAPSULE | Refills: 1 | Status: SHIPPED | OUTPATIENT
Start: 2025-05-20

## 2025-05-20 NOTE — TELEPHONE ENCOUNTER
Patient called to confirm his upcoming appointment with Dr Lombardi on 6/9/25 and to ask if his test results were available.  He is having trouble getting into his MyChart with the new login process and is awaiting IT's callback to assist him.  In the meantime, he is wondering if Dr Lombardi could advise him about the results.  Thank you!

## 2025-05-27 ENCOUNTER — OFFICE VISIT (OUTPATIENT)
Dept: HEMATOLOGY ONCOLOGY | Facility: MEDICAL CENTER | Age: 75
End: 2025-05-27
Payer: COMMERCIAL

## 2025-05-27 VITALS
HEART RATE: 70 BPM | SYSTOLIC BLOOD PRESSURE: 154 MMHG | BODY MASS INDEX: 29.97 KG/M2 | HEIGHT: 75 IN | WEIGHT: 241 LBS | TEMPERATURE: 98.6 F | RESPIRATION RATE: 18 BRPM | DIASTOLIC BLOOD PRESSURE: 80 MMHG | OXYGEN SATURATION: 93 %

## 2025-05-27 DIAGNOSIS — D69.3 CHRONIC ITP (IDIOPATHIC THROMBOCYTOPENIA) (HCC): Primary | ICD-10-CM

## 2025-05-27 PROCEDURE — 99213 OFFICE O/P EST LOW 20 MIN: CPT | Performed by: INTERNAL MEDICINE

## 2025-05-27 NOTE — PROGRESS NOTES
Sixto Oakes  1950  [facility]  [address] [place] [id number]    DISCUSSION/SUMMARY:    15 minutes was spent with patient in direct consultation, physical examination and review of the chart.    75-year-old male with  otherwise good past medical history found to have thrombocytopenia approximately 8 years ago.  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  Because of prior persistent downward platelet trend with associated excessive bruising, patient was started on Promacta.  Because of side effects, the dose has been manipulated a number of times.      Patient is presently on Promacta 50 mg a day, tolerating it, no severe arthritic complaints, activities are baseline.  The CBC parameters are trended below, platelet count is stable (recently running approximately 60K).  No problems with excessive bruising or bleeding.  Patient denies any pending surgical procedures.  No problems with the aspirin.  The plan is to continue with the above plan.    As discussed previously, patient understands that being on aspirin increases the risk for bleeding.  Patient will continue to monitor.    For the time being, patient is to have a CBC every 4 weeks; patient is to return to this office in 4 months.    As discussed previously, when patient received the COVID vaccine in 2022, his platelet count dropped significantly (< 10,000).  It is difficult to connect the 2 but patient is very reluctant to receive any boosters.  Patient subsequently contracted COVID - uncomplicated illness.  The plan is to hold off on any COVID booster shots.    Mr. Oakes knows to call the hematology/oncology office if there are any other questions or concerns or if he is in need of any invasive procedure or surgery.    Carefully review your medication list and verify that the list is accurate and up-to-date. Please call the hematology/oncology office if there are medications missing  from the list, medications on the list that you are not currently taking or if there is a dosage or instruction that is different from how you're taking that medication.    Patient goals and areas of care:  Continue with Promacta, 50 mg a day  Barriers to care:  None  Patient is able to self-care  ______________________________________________________________________________________    Chief Complaint   Patient presents with    Follow-up    ITP     History of Present Illness: 75-year-old male previously referred for evaluation of thrombocytopenia.  Mr. Oakes was unaware of any CBC abnormalities up until recently when he needed left shoulder surgery.  Patient has been seen by a number of physicians to trying clarify the etiology for the thrombocytopenia.  Although the specifics are not entirely clear, 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 (previously seen by Dr. Mansfield in [place] many years ago).  Patient returns for follow-up.    Mr. Oakes has been on Promacta, 50 mg a day.      Patient was recently admitted to the hospital, was in the ICU.  Patient was treated with strokelike symptoms, hypertension, chronic kidney disease.  Patient was recently placed on aspirin 81 mg a day.      Patient states feeling well, baseline.  No problems with excessive bruising or bleeding.  No recent blood pressure problems, no kidney problems, no CNS issues.  Activities are back to baseline.  No other problems with the Promacta.  Routine health maintenance and medical care is up-to-date.    Review of Systems   Constitutional: Negative.    HENT: Negative.     Eyes: Negative.    Respiratory: Negative.     Cardiovascular: Negative.    Gastrointestinal: Negative.    Endocrine: Negative.    Genitourinary: Negative.    Musculoskeletal:  Negative for arthralgias.   Skin: Negative.    Allergic/Immunologic: Negative.    Neurological:  Negative for numbness.   Hematological:  Does not bruise/bleed easily.    Psychiatric/Behavioral:  The patient is not nervous/anxious.    All other systems reviewed and are negative.     Patient Active Problem List   Diagnosis    Thrombocytopenia (HCC)    Elevated low-density lipoprotein level    BMI 31.0-31.9,adult    Obesity (BMI 30-39.9)    Essential hypertension    Vasculogenic erectile dysfunction    Primary osteoarthritis of right shoulder    Elevated troponin    PVC (premature ventricular contraction)    Stage 3 chronic kidney disease, unspecified whether stage 3a or 3b CKD (HCC)    History of ITP    Numbness    Lower urinary tract symptoms (LUTS)    Abnormal chest x-ray    Mild intermittent asthma without complication    Chronic ITP (idiopathic thrombocytopenia) (HCC)    Right carpal tunnel syndrome    Stroke-like symptoms     Past Medical History:   Diagnosis Date    COVID-19 12/15/2021    Hypertension     Hx. No meds currently. Cardiology stopped HTN meds    Thrombocytopenia (HCC)     platelet count maintained around 90-Dr. Archie Taylor    Wears partial dentures     upper     Past Surgical History:   Procedure Laterality Date    APPENDECTOMY      COLONOSCOPY      COLONOSCOPY N/A 10/17/2018    Procedure: COLONOSCOPY;  Surgeon: Michael Simms MD;  Location: [facility] GI LAB;  Service: Gastroenterology    HERNIA REPAIR Right 2013    inguinal    JOINT REPLACEMENT Right     knee, left shoulder    KNEE SURGERY Left     Had left knee surgery for football injury    UT XCAPSL CTRC RMVL INSJ IO LENS PROSTH W/O ECP Left 05/04/2016    Procedure: EXTRACTION EXTRACAPSULAR CATARACT PHACO INTRAOCULAR LENS (IOL);  Surgeon: Sixto Bucio MD;  Location: [facility] MAIN OR;  Service: Ophthalmology    UT XCAPSL CTRC RMVL INSJ IO LENS PROSTH W/O ECP Right 5/22/2023    Procedure: EXTRACTION EXTRACAPSULAR CATARACT PHACO INTRAOCULAR LENS (IOL);  Surgeon: Sixto Bucio MD;  Location: [facility] MAIN OR;  Service: Ophthalmology    SHOULDER ARTHROSCOPY Left     tendon repair    TONSILLECTOMY  age 2     Family  History   Problem Relation Name Age of Onset    Heart disease Father  72        massive MI    Heart disease Sister          MI    Heart disease Brother          MI    No Known Problems Mother      Mental illness Neg Hx       Social History     Socioeconomic History    Marital status: /Civil Union     Spouse name: Not on file    Number of children: Not on file    Years of education: Not on file    Highest education level: Not on file   Occupational History    Not on file   Tobacco Use    Smoking status: Former     Current packs/day: 0.00     Average packs/day: 0.5 packs/day for 42.0 years (21.0 ttl pk-yrs)     Types: Cigarettes     Start date: 1968     Quit date: 2010     Years since quitting: 15.4     Passive exposure: Past    Smokeless tobacco: Never   Vaping Use    Vaping status: Never Used   Substance and Sexual Activity    Alcohol use: Not Currently    Drug use: Never    Sexual activity: Not on file   Other Topics Concern    Not on file   Social History Narrative    Not on file     Social Drivers of Health     Financial Resource Strain: Not on file   Food Insecurity: No Food Insecurity (3/6/2025)    Nursing - Inadequate Food Risk Classification     Worried About Running Out of Food in the Last Year: Never true     Ran Out of Food in the Last Year: Never true     Ran Out of Food in the Last Year: Never true   Transportation Needs: No Transportation Needs (3/6/2025)    Nursing - Transportation Risk Classification     Lack of Transportation: Not on file     Lack of Transportation: No   Physical Activity: Not on file   Stress: Not on file   Social Connections: Not on file   Intimate Partner Violence: Unknown (3/6/2025)    Nursing IPS     Feels Physically and Emotionally Safe: Not on file     Physically Hurt by Someone: Not on file     Humiliated or Emotionally Abused by Someone: Not on file     Physically Hurt by Someone: No     Hurt or Threatened by Someone: No   Housing Stability: Unknown (3/6/2025)     Nursing: Inadequate Housing Risk Classification     Has Housing: Not on file     Worried About Losing Housing: Not on file     Unable to Get Utilities: Not on file     Unable to Pay for Housing in the Last Year: No     Has Housin       Current Outpatient Medications:     albuterol (Ventolin HFA) 90 mcg/act inhaler, Inhale 2 puffs every 4 (four) hours as needed for wheezing or shortness of breath, Disp: 18 g, Rfl: 5    ascorbic acid (VITAMIN C) 500 mg tablet, Take 500 mg by mouth every morning, Disp: , Rfl:     aspirin 81 mg chewable tablet, Chew 1 tablet (81 mg total) daily, Disp: 100 tablet, Rfl: 3    azelastine (ASTELIN) 0.1 % nasal spray, 1 spray into each nostril 2 (two) times a day Use in each nostril as directed, Disp: 1 mL, Rfl: 0    b complex vitamins tablet, Take 1 tablet by mouth every morning, Disp: , Rfl:     cholecalciferol (VITAMIN D3) 1,000 units tablet, Take 1,000 Units by mouth every morning, Disp: , Rfl:     ezetimibe (ZETIA) 10 mg tablet, Take 1 tablet (10 mg total) by mouth daily, Disp: 90 tablet, Rfl: 1    multivitamin (THERAGRAN) TABS, Take 1 tablet by mouth every morning., Disp: , Rfl:     Promacta 50 MG tablet, Take 1 tablet (50 mg total) by mouth once daily. Take on an empty stomach, 1 hour before or 2 hours after a meal., Disp: 30 tablet, Rfl: 5    tamsulosin (FLOMAX) 0.4 mg, TAKE 1 CAPSULE BY MOUTH DAILY WITH DINNER, Disp: 90 capsule, Rfl: 1    Allergies   Allergen Reactions    Dust Mite Extract Sneezing     Vitals:    25 1403   BP: 154/80   Pulse: 70   Resp: 18   Temp: 98.6 °F (37 °C)   SpO2: 93%     Physical Exam  Constitutional:       Appearance: He is well-developed.      Comments: Well-nourished male, no respiratory distress, no signs of pain   HENT:      Head: Normocephalic and atraumatic.      Right Ear: External ear normal.      Left Ear: External ear normal.      Nose: Nose normal.     Eyes:      Conjunctiva/sclera: Conjunctivae normal.      Pupils: Pupils are equal,  round, and reactive to light.       Cardiovascular:      Rate and Rhythm: Normal rate and regular rhythm.      Heart sounds: Normal heart sounds.   Pulmonary:      Effort: Pulmonary effort is normal.      Breath sounds: Normal breath sounds.   Abdominal:      General: Bowel sounds are normal.      Palpations: Abdomen is soft.      Comments: +bowel sounds, nontender, slightly obese, cannot palpate liver or spleen, no rigidity or rebound     Musculoskeletal:         General: Normal range of motion.      Cervical back: Normal range of motion and neck supple.     Skin:     General: Skin is warm.      Comments: Warm, slightly dry, no petechiae or ecchymoses     Neurological:      Mental Status: He is alert and oriented to person, place, and time.      Deep Tendon Reflexes: Reflexes are normal and symmetric.     Psychiatric:         Behavior: Behavior normal.         Thought Content: Thought content normal.         Judgment: Judgment normal.     Extremities:  No lower extremity edema bilaterally, no cords, pulses are 1+  Lymphatics:   No adenopathy in the neck, supraclavicular region, axilla and groin bilaterally    Labs        3/7/2025 BUN = 21 creatinine = 1.27    3/6/2020 25T = 13 INR = 0.93  PTT = 29    Cardiology        Imaging    3/6/2025 MRI brain without contrast.  Impression stated no evidence of acute infarct, intracranial hemorrhage or mass.    Pathology    11/24/2016 direct platelet antibody:  Anti IgG = positive  11/21/2016 platelet antibody IIB/IIIA, IA/IIA, IB/IX and HLA class I were all negative

## 2025-05-29 ENCOUNTER — RA CDI HCC (OUTPATIENT)
Dept: OTHER | Facility: HOSPITAL | Age: 75
End: 2025-05-29

## 2025-05-30 ENCOUNTER — OFFICE VISIT (OUTPATIENT)
Age: 75
End: 2025-05-30
Payer: COMMERCIAL

## 2025-05-30 VITALS — BODY MASS INDEX: 29.97 KG/M2 | HEIGHT: 75 IN | RESPIRATION RATE: 17 BRPM | WEIGHT: 241 LBS

## 2025-05-30 DIAGNOSIS — B35.1 ONYCHOMYCOSIS: ICD-10-CM

## 2025-05-30 DIAGNOSIS — L60.0 INGROWN TOENAIL: ICD-10-CM

## 2025-05-30 DIAGNOSIS — M79.672 LEFT FOOT PAIN: ICD-10-CM

## 2025-05-30 DIAGNOSIS — L03.032 PARONYCHIA OF TOENAIL OF LEFT FOOT: Primary | ICD-10-CM

## 2025-05-30 PROCEDURE — 99212 OFFICE O/P EST SF 10 MIN: CPT | Performed by: PODIATRIST

## 2025-05-30 NOTE — PROGRESS NOTES
Assessment/Plan: Chronic ingrown toenail left hallux with secondary paronychia.  Resolving mycosis of nail.  Pain upon ambulation.    Pain.  Chart reviewed.  PCP notes reviewed.  Patient evaluated.  Patient was unable to tolerate griseofulvin.  It caused swelling of the hands.  In addition he was unable to tolerate Lamisil.  At this time we recommend topical/OTC antifungal.  Patient will take vitamin B 5 for nail health.  Patient advised on nail cutting instruction.  Aftercare instruction given.  Return as needed.         Diagnoses and all orders for this visit:    Paronychia of toenail of left foot    Ingrown toenail    Left foot pain    Onychomycosis          Subjective: Patient presents for evaluation.  He relates not being able to take griseofulvin.  It caused swelling of his hands.  He presents for evaluation of toenail.    Allergies   Allergen Reactions    Dust Mite Extract Sneezing       Current Medications[1]    Problem List[2]       Patient ID: Sixto Oakes is a 75 y.o. male.    HPI    The following portions of the patient's history were reviewed and updated as appropriate:     family history includes Heart disease in his brother and sister; Heart disease (age of onset: 72) in his father; No Known Problems in his mother.      reports that he quit smoking about 15 years ago. His smoking use included cigarettes. He started smoking about 57 years ago. He has a 21 pack-year smoking history. He has been exposed to tobacco smoke. He has never used smokeless tobacco. He reports that he does not currently use alcohol. He reports that he does not use drugs.    Vitals:    05/30/25 1412   Resp: 17       Review of Systems      Objective:  Patient's shoes and socks removed.   Foot ExamPhysical Exam            General  General Appearance: appears stated age and healthy   Orientation: alert and oriented to person, place, and time   Affect: appropriate         Right Foot/Ankle      Inspection and Palpation  Ecchymosis:  none  Swelling: dorsum   Arch: pes planus  Hammertoes: fifth toe  Skin Exam: dry skin;      Neurovascular  Dorsalis pedis: 2+  Posterior tibial: 2+  Superficial peroneal nerve sensation: diminished  Deep peroneal nerve sensation: diminished        Left Foot/Ankle       Inspection and Palpation  Ecchymosis: none  Swelling: dorsum   Arch: pes planus  Hammertoes: fifth toe  Skin Exam: dry skin;      Neurovascular  Dorsalis pedis: 2+  Posterior tibial: 2+  Superficial peroneal nerve sensation: diminished  Deep peroneal nerve sensation: diminished           Physical Exam  Vitals signs and nursing note reviewed.   Constitutional:       Appearance: Normal appearance.   Cardiovascular:      Pulses:           Dorsalis pedis pulses are 2+ on the right side and 2+ on the left side.        Posterior tibial pulses are 2+ on the right side and 2+ on the left side.      Comments: Q, 9 findings bilateral.  Negative digital hair.  Positive abnormal cooling.  Feet:      Right foot:      Skin integrity: Dry skin present.      Left foot:      Skin integrity: Dry skin present.   Skin:     Comments: Note is made of absence of right hallux nail.  Other 9 nails remaining are dystrophic.  They are thick with distal mycosis noted.  Hallux of the left side demonstrates paronychia.  It is ingrown distally there is malodor.  There was yellowing of the nail.   Neurological:      Mental Status: He is alert.   Psychiatric:         Mood and Affect: Mood normal.         Behavior: Behavior normal.         Thought Content: Thought content normal.         Judgment: Judgment normal.                                    [1]   Current Outpatient Medications:     albuterol (Ventolin HFA) 90 mcg/act inhaler, Inhale 2 puffs every 4 (four) hours as needed for wheezing or shortness of breath, Disp: 18 g, Rfl: 5    ascorbic acid (VITAMIN C) 500 mg tablet, Take 500 mg by mouth every morning, Disp: , Rfl:     aspirin 81 mg chewable tablet, Chew 1 tablet (81 mg  total) daily, Disp: 100 tablet, Rfl: 3    azelastine (ASTELIN) 0.1 % nasal spray, 1 spray into each nostril 2 (two) times a day Use in each nostril as directed, Disp: 1 mL, Rfl: 0    b complex vitamins tablet, Take 1 tablet by mouth every morning, Disp: , Rfl:     cholecalciferol (VITAMIN D3) 1,000 units tablet, Take 1,000 Units by mouth every morning, Disp: , Rfl:     ezetimibe (ZETIA) 10 mg tablet, Take 1 tablet (10 mg total) by mouth daily, Disp: 90 tablet, Rfl: 1    multivitamin (THERAGRAN) TABS, Take 1 tablet by mouth every morning., Disp: , Rfl:     Promacta 50 MG tablet, Take 1 tablet (50 mg total) by mouth once daily. Take on an empty stomach, 1 hour before or 2 hours after a meal., Disp: 30 tablet, Rfl: 5    tamsulosin (FLOMAX) 0.4 mg, TAKE 1 CAPSULE BY MOUTH DAILY WITH DINNER, Disp: 90 capsule, Rfl: 1  [2]   Patient Active Problem List  Diagnosis    Thrombocytopenia (HCC)    Elevated low-density lipoprotein level    BMI 31.0-31.9,adult    Obesity (BMI 30-39.9)    Essential hypertension    Vasculogenic erectile dysfunction    Primary osteoarthritis of right shoulder    Elevated troponin    PVC (premature ventricular contraction)    Stage 3 chronic kidney disease, unspecified whether stage 3a or 3b CKD (HCC)    History of ITP    Numbness    Lower urinary tract symptoms (LUTS)    Abnormal chest x-ray    Mild intermittent asthma without complication    Chronic ITP (idiopathic thrombocytopenia) (HCC)    Right carpal tunnel syndrome    Stroke-like symptoms

## 2025-06-09 ENCOUNTER — OFFICE VISIT (OUTPATIENT)
Dept: FAMILY MEDICINE CLINIC | Facility: CLINIC | Age: 75
End: 2025-06-09
Payer: COMMERCIAL

## 2025-06-09 VITALS
WEIGHT: 239 LBS | SYSTOLIC BLOOD PRESSURE: 130 MMHG | TEMPERATURE: 98.2 F | RESPIRATION RATE: 18 BRPM | HEIGHT: 75 IN | DIASTOLIC BLOOD PRESSURE: 72 MMHG | BODY MASS INDEX: 29.72 KG/M2 | HEART RATE: 68 BPM

## 2025-06-09 DIAGNOSIS — R20.0 NUMBNESS: ICD-10-CM

## 2025-06-09 DIAGNOSIS — E78.00 ELEVATED LOW-DENSITY LIPOPROTEIN LEVEL: ICD-10-CM

## 2025-06-09 DIAGNOSIS — I10 ESSENTIAL HYPERTENSION: Primary | ICD-10-CM

## 2025-06-09 DIAGNOSIS — D69.6 THROMBOCYTOPENIA (HCC): ICD-10-CM

## 2025-06-09 DIAGNOSIS — N18.30 STAGE 3 CHRONIC KIDNEY DISEASE, UNSPECIFIED WHETHER STAGE 3A OR 3B CKD (HCC): ICD-10-CM

## 2025-06-09 DIAGNOSIS — Z12.5 SCREENING FOR PROSTATE CANCER: ICD-10-CM

## 2025-06-09 DIAGNOSIS — J45.20 MILD INTERMITTENT ASTHMA WITHOUT COMPLICATION: ICD-10-CM

## 2025-06-09 PROCEDURE — G2211 COMPLEX E/M VISIT ADD ON: HCPCS | Performed by: FAMILY MEDICINE

## 2025-06-09 PROCEDURE — 99214 OFFICE O/P EST MOD 30 MIN: CPT | Performed by: FAMILY MEDICINE

## 2025-06-09 NOTE — ASSESSMENT & PLAN NOTE
Lab Results   Component Value Date    EGFR 57 05/20/2025    EGFR 54 03/07/2025    EGFR 48 03/06/2025    CREATININE 1.21 05/20/2025    CREATININE 1.27 03/07/2025    CREATININE 1.41 (H) 03/06/2025

## 2025-06-09 NOTE — PROGRESS NOTES
Name: Sixto Oakes      : 1950      MRN: [id number]  Encounter Provider: Frank Lombardi, DO  Encounter Date: 2025   Encounter department: [facility]  :  Assessment & Plan  Essential hypertension  stable       Stage 3 chronic kidney disease, unspecified whether stage 3a or 3b CKD (HCC)  Lab Results   Component Value Date    EGFR 57 2025    EGFR 54 2025    EGFR 48 2025    CREATININE 1.21 2025    CREATININE 1.27 2025    CREATININE 1.41 (H) 2025            Mild intermittent asthma without complication         Elevated low-density lipoprotein level    Orders:  •  Comprehensive metabolic panel; Future  •  Lipid Panel with Direct LDL reflex; Future    Thrombocytopenia (HCC)    Orders:  •  CBC; Future    Screening for prostate cancer    Orders:  •  PSA, Total Screen; Future    Numbness  Seen by hand surgeon offered surgery - pt will hold off for now              History of Present Illness   Pt is sched for a follow up    Pt styates he is only taking flomax and prmacta    Pt states B/l both hand he has swlling in and some pain in the first three digist alexander - Pt saw the hand surgeon - suggested sturgery pt not interested at this time      Review of Systems   Constitutional:  Negative for activity change, appetite change, chills, diaphoresis, fatigue, fever and unexpected weight change.   HENT:  Negative for congestion, dental problem, ear pain, mouth sores, sinus pressure, sinus pain, sore throat and trouble swallowing.    Eyes:  Negative for photophobia, discharge and itching.   Respiratory:  Negative for apnea, chest tightness and shortness of breath.    Cardiovascular:  Negative for chest pain, palpitations and leg swelling.   Gastrointestinal:  Negative for abdominal distention, abdominal pain, blood in stool, nausea and vomiting.   Endocrine: Negative for cold intolerance, heat intolerance, polydipsia, polyphagia and polyuria.   Genitourinary:  Negative  "for difficulty urinating.   Musculoskeletal:  Negative for arthralgias.   Skin:  Negative for color change and wound.   Neurological:  Negative for dizziness, syncope, speech difficulty and headaches.   Hematological:  Negative for adenopathy.   Psychiatric/Behavioral:  Negative for agitation and behavioral problems.        Objective   /72   Pulse 68   Temp 98.2 °F (36.8 °C)   Resp 18   Ht 6' 3\" (1.905 m)   Wt 108 kg (239 lb)   BMI 29.87 kg/m²        CT lung screening program: Result Notes     Frank Lombardi, DO  3/21/2025  3:07 PM EDT       No new findings - repeat lung screening in a year            Study Result    Narrative & Impression   CT CHEST LUNG CANCER SCREENING WITHOUT IV CONTRAST     INDICATION: F17.211: Nicotine dependence, cigarettes, in remission.     COMPARISON: Chest CT 9/6/2023.     TECHNIQUE: Unenhanced CT examination of the chest was performed utilizing a low dose protocol. Multiplanar 2D reformatted images were created from the source data.     Radiation dose length product (DLP) for this visit:  136.24 mGy-cm . This examination, like all CT scans performed in the [facility], was performed utilizing techniques to minimize radiation dose exposure, including the use of iterative   reconstruction and automated exposure control.     FINDINGS:     LUNGS: Unchanged emphysema and mild subpleural interstitial changes scattered throughout both lungs. Nodular thickening along the right major fissure with single nodule measuring up to 9 mm, unchanged. Additional scattered sub-4 mm solid nodules are   unchanged. Patent central airways.     PLEURA: No pleural effusion. No pneumothorax.     HEART/GREAT VESSELS: Normal heart size. Coronary artery calcifications. No thoracic aortic aneurysm.     MEDIASTINUM AND PARRISH: Unremarkable.     CHEST WALL AND LOWER NECK: Unremarkable.     VISUALIZED STRUCTURES IN THE UPPER ABDOMEN: Unremarkable.     OSSEOUS STRUCTURES: No acute fracture or " destructive osseous lesion.     IMPRESSION:     Unchanged appearance of the lungs, including findings suggestive of of early interstitial lung disease.     Nodular thickening along the right major fissure and scattered sub-4 mm pulmonary nodules are unchanged. No new or enlarging suspicious pulmonary nodules..       Lung-RADS2, benign appearance or behavior.  Continue annual screening with LDCT in 12 months       Physical Exam  Vitals and nursing note reviewed.   Constitutional:       General: He is not in acute distress.     Appearance: He is well-developed. He is not diaphoretic.   HENT:      Head: Normocephalic and atraumatic.      Right Ear: External ear normal.      Left Ear: External ear normal.      Nose: Nose normal.      Mouth/Throat:      Pharynx: No oropharyngeal exudate.     Eyes:      General: No scleral icterus.        Right eye: No discharge.         Left eye: No discharge.      Pupils: Pupils are equal, round, and reactive to light.     Neck:      Thyroid: No thyromegaly.     Cardiovascular:      Rate and Rhythm: Normal rate.      Heart sounds: Normal heart sounds. No murmur heard.  Pulmonary:      Effort: Pulmonary effort is normal. No respiratory distress.      Breath sounds: Normal breath sounds. No wheezing.   Abdominal:      General: Bowel sounds are normal. There is no distension.      Palpations: Abdomen is soft. There is no mass.      Tenderness: There is no abdominal tenderness. There is no guarding or rebound.     Musculoskeletal:         General: Normal range of motion.     Skin:     General: Skin is warm and dry.      Findings: No erythema or rash.     Neurological:      Mental Status: He is alert.      Coordination: Coordination normal.      Deep Tendon Reflexes: Reflexes normal.     Psychiatric:         Behavior: Behavior normal.

## 2025-06-10 DIAGNOSIS — D69.6 THROMBOCYTOPENIA (HCC): ICD-10-CM

## 2025-06-10 RX ORDER — ELTROMBOPAG OLAMINE 50 MG/1
TABLET, FILM COATED ORAL
Qty: 30 TABLET | Refills: 10 | Status: SHIPPED | OUTPATIENT
Start: 2025-06-10

## 2025-06-16 ENCOUNTER — TELEPHONE (OUTPATIENT)
Dept: OTHER | Facility: OTHER | Age: 75
End: 2025-06-16

## 2025-06-16 NOTE — TELEPHONE ENCOUNTER
Patient is calling regarding cancelling an appointment.    Date/Time: 6/16/25 at 4:20 pm     Patient was rescheduled: YES [] NO [x]    Patient requesting call back to reschedule: YES [x] NO []    Patient was behind an accident today and missed the appointment, please call to reschedule.

## 2025-06-17 ENCOUNTER — OFFICE VISIT (OUTPATIENT)
Dept: CARDIOLOGY CLINIC | Facility: CLINIC | Age: 75
End: 2025-06-17
Payer: COMMERCIAL

## 2025-06-17 VITALS
BODY MASS INDEX: 29.97 KG/M2 | SYSTOLIC BLOOD PRESSURE: 148 MMHG | HEIGHT: 75 IN | HEART RATE: 84 BPM | WEIGHT: 241 LBS | OXYGEN SATURATION: 98 % | DIASTOLIC BLOOD PRESSURE: 80 MMHG

## 2025-06-17 DIAGNOSIS — I73.9 PAD (PERIPHERAL ARTERY DISEASE) (HCC): ICD-10-CM

## 2025-06-17 DIAGNOSIS — I25.10 CAD IN NATIVE ARTERY: ICD-10-CM

## 2025-06-17 DIAGNOSIS — R29.90 STROKE-LIKE SYMPTOMS: Primary | ICD-10-CM

## 2025-06-17 DIAGNOSIS — T46.6X5A STATIN MYOPATHY: ICD-10-CM

## 2025-06-17 DIAGNOSIS — G72.0 STATIN MYOPATHY: ICD-10-CM

## 2025-06-17 PROCEDURE — 99214 OFFICE O/P EST MOD 30 MIN: CPT | Performed by: INTERNAL MEDICINE

## 2025-06-17 RX ORDER — EZETIMIBE 10 MG/1
10 TABLET ORAL DAILY
Qty: 90 TABLET | Refills: 1 | Status: SHIPPED | OUTPATIENT
Start: 2025-06-17

## 2025-06-17 NOTE — PROGRESS NOTES
[facility]  [address] [place] [address] [address]   [place] [address]  Cardiology Follow-up    Sixto Oakes  [id number]  1950      Consult for:  PVCs/non-STEMI  Appreciate consult by: Frank Lombardi, DO    1. Stroke-like symptoms  ezetimibe (ZETIA) 10 mg tablet    Lipoprotein A (LPA)    Lipoprotein NMR      2. CAD in native artery  ezetimibe (ZETIA) 10 mg tablet    Lipoprotein A (LPA)    Lipoprotein NMR      3. PAD (peripheral artery disease) (HCC)  Lipoprotein A (LPA)    Lipoprotein NMR      4. Statin myopathy             Discussion/Summary:   TIA episode- unclear etiology. No re-occurrence.  We will put him on Zetia therapy.    Suspected prior infarction/CAD- showed pictures of prior nuclear stress test in detail. Patient denies having prior any symptoms- surprised of results. nuclear stress test shows a relatively fixed inferior defect.  Suspicion for prior remote myocardial infarction. However, echo with normal EF at rest. Cannot complete rule out false positive/diaphagmatic attenuation.  He has a history of thrombocytopenia and been asymptomatic. Reports statin intolerance. Trial of zetia. Will target an LDL below 70  . If he has active chest pain consideration for cardiac catheterization vs coronary CT to better evaluate.   Declines any further stress testing.  He has been asymptomatic    Mild PAD/occluded vertebral artery- zetia.  Check lipoprotein a.  Advance lipid panel and follow-up in 3 months.    PVCs-significantly improved    Hypertension- his blood pressure 120/70 without bp meds.  He has self discontinued blood pressure medications.  He will restart if his blood pressure is elevated    Idiopathic thrombocytopenia- started on therapy by heme oncology    Remote smoking history    HPI:   71-year-old gentleman with recent hospital admission secondary to COVID and irregular heart rhythm.  He was found to have frequent ventricular bigeminy.  He had a nuclear stress test  which was negative for acute ischemia.  He was placed on low-dose beta-blocker.  Since discharge he denies having major palpitations.  He reports exercising daily.    03/04/2022:  He continues to have no symptoms of chest pain or significant palpitations.  I reviewed with him his previous nuclear stress test.  His event monitor was reviewed.  He has a low PVC burden.    09/15/2022: He is still exercising vigorously without any major chest pain or shortness of breath.  Denies having palpitations.  He does not take the statin medication as he felt it was interacting with his hematology medication.  I reviewed with the patient his prior nuclear stress test.     3/21/2023: He is exercising without any limitations.  He denies having chest heaviness.  We reviewed through his last blood work.  He states his blood pressures have been running normal.  He is not taking any blood pressure medication.    Visit: He was seen in March for strokelike symptoms.  He had an echocardiogram with bubble study which was negative for PFO.  MRI brain was negative.  He states not having any further symptoms since hospitalization.  His blood pressures have been controlled at home.  He has not been taking statin medication secondary to myopathy and swelling.  He does not want to be on a statin medication.    Past Medical History:   Diagnosis Date    COVID-19 12/15/2021    Hypertension     Hx. No meds currently. Cardiology stopped HTN meds    Thrombocytopenia (HCC)     platelet count maintained around 90-Dr. Archie Taylor    Wears partial dentures     upper     Social History     Socioeconomic History    Marital status: /Civil Union     Spouse name: Not on file    Number of children: Not on file    Years of education: Not on file    Highest education level: Not on file   Occupational History    Not on file   Tobacco Use    Smoking status: Former     Current packs/day: 0.00     Average packs/day: 0.5 packs/day for 42.0 years (21.0 ttl  pk-yrs)     Types: Cigarettes     Start date: 1968     Quit date: 2010     Years since quitting: 15.4     Passive exposure: Past    Smokeless tobacco: Never   Vaping Use    Vaping status: Never Used   Substance and Sexual Activity    Alcohol use: Not Currently    Drug use: Never    Sexual activity: Not on file   Other Topics Concern    Not on file   Social History Narrative    Not on file     Social Drivers of Health     Financial Resource Strain: Not on file   Food Insecurity: No Food Insecurity (3/6/2025)    Nursing - Inadequate Food Risk Classification     Worried About Running Out of Food in the Last Year: Never true     Ran Out of Food in the Last Year: Never true     Ran Out of Food in the Last Year: Never true   Transportation Needs: No Transportation Needs (3/6/2025)    Nursing - Transportation Risk Classification     Lack of Transportation: Not on file     Lack of Transportation: No   Physical Activity: Not on file   Stress: Not on file   Social Connections: Not on file   Intimate Partner Violence: Unknown (3/6/2025)    Nursing IPS     Feels Physically and Emotionally Safe: Not on file     Physically Hurt by Someone: Not on file     Humiliated or Emotionally Abused by Someone: Not on file     Physically Hurt by Someone: No     Hurt or Threatened by Someone: No   Housing Stability: Unknown (3/6/2025)    Nursing: Inadequate Housing Risk Classification     Has Housing: Not on file     Worried About Losing Housing: Not on file     Unable to Get Utilities: Not on file     Unable to Pay for Housing in the Last Year: No     Has Housing: No      Family History   Problem Relation Name Age of Onset    Heart disease Father  72        massive MI    Heart disease Sister          MI    Heart disease Brother          MI    No Known Problems Mother      Mental illness Neg Hx       Past Surgical History:   Procedure Laterality Date    APPENDECTOMY      COLONOSCOPY      COLONOSCOPY N/A 10/17/2018    Procedure: COLONOSCOPY;   Surgeon: Michael Simms MD;  Location: [facility];  Service: Gastroenterology    HERNIA REPAIR Right 2013    inguinal    JOINT REPLACEMENT Right     knee, left shoulder    KNEE SURGERY Left     Had left knee surgery for football injury    MT XCAPSL CTRC RMVL INSJ IO LENS PROSTH W/O ECP Left 05/04/2016    Procedure: EXTRACTION EXTRACAPSULAR CATARACT PHACO INTRAOCULAR LENS (IOL);  Surgeon: Sixto Bucio MD;  Location: [facility];  Service: Ophthalmology    MT XCAPSL CTRC RMVL INSJ IO LENS PROSTH W/O ECP Right 5/22/2023    Procedure: EXTRACTION EXTRACAPSULAR CATARACT PHACO INTRAOCULAR LENS (IOL);  Surgeon: Sixto Bucio MD;  Location: [facility];  Service: Ophthalmology    SHOULDER ARTHROSCOPY Left     tendon repair    TONSILLECTOMY  age 2       Current Outpatient Medications:     albuterol (Ventolin HFA) 90 mcg/act inhaler, Inhale 2 puffs every 4 (four) hours as needed for wheezing or shortness of breath, Disp: 18 g, Rfl: 5    ascorbic acid (VITAMIN C) 500 mg tablet, Take 500 mg by mouth every morning, Disp: , Rfl:     aspirin 81 mg chewable tablet, Chew 1 tablet (81 mg total) daily, Disp: 100 tablet, Rfl: 3    b complex vitamins tablet, Take 1 tablet by mouth every morning, Disp: , Rfl:     cholecalciferol (VITAMIN D3) 1,000 units tablet, Take 1,000 Units by mouth every morning, Disp: , Rfl:     multivitamin (THERAGRAN) TABS, Take 1 tablet by mouth every morning, Disp: , Rfl:     Promacta 50 MG tablet, Take 1 tablet (50 mg) by mouth once daily. Take on an empty stomach, 1 hour before or 2 hours after a meal., Disp: 30 tablet, Rfl: 10    tamsulosin (FLOMAX) 0.4 mg, TAKE 1 CAPSULE BY MOUTH DAILY WITH DINNER, Disp: 90 capsule, Rfl: 1  Allergies   Allergen Reactions    Dust Mite Extract Sneezing     Vitals:    06/17/25 1140 06/17/25 1146 06/17/25 1148   BP: 148/82 148/84 148/80   BP Location: Right arm Right arm Right arm   Patient Position: Supine Sitting Standing   Cuff Size: Standard Standard Standard  "  Pulse: 65 66 84   SpO2: 98%     Weight: 109 kg (241 lb)     Height: 6' 3\" (1.905 m)         Review of Systems:   Review of Systems   Constitutional: Negative.    HENT: Negative.     Eyes: Negative.    Respiratory: Negative.     Cardiovascular:  Negative for palpitations.   Gastrointestinal: Negative.    Endocrine: Negative.    Genitourinary: Negative.    Musculoskeletal: Negative.    Skin: Negative.    Allergic/Immunologic: Negative.    Neurological: Negative.    Hematological: Negative.    Psychiatric/Behavioral: Negative.         Vitals:    06/17/25 1140 06/17/25 1146 06/17/25 1148   BP: 148/82 148/84 148/80   BP Location: Right arm Right arm Right arm   Patient Position: Supine Sitting Standing   Cuff Size: Standard Standard Standard   Pulse: 65 66 84   SpO2: 98%     Weight: 109 kg (241 lb)     Height: 6' 3\" (1.905 m)       Physical Examination:   Physical Exam  Constitutional:       General: He is not in acute distress.     Appearance: He is well-developed. He is not diaphoretic.   HENT:      Head: Normocephalic and atraumatic.      Right Ear: External ear normal.      Left Ear: External ear normal.     Eyes:      General: No scleral icterus.        Right eye: No discharge.         Left eye: No discharge.      Conjunctiva/sclera: Conjunctivae normal.      Pupils: Pupils are equal, round, and reactive to light.     Neck:      Thyroid: No thyromegaly.      Vascular: No JVD.      Trachea: No tracheal deviation.     Cardiovascular:      Rate and Rhythm: Normal rate and regular rhythm.      Heart sounds: No murmur heard.     No friction rub. Gallop present.   Pulmonary:      Effort: Pulmonary effort is normal. No respiratory distress.      Breath sounds: Normal breath sounds. No stridor. No wheezing or rales.   Chest:      Chest wall: No tenderness.   Abdominal:      General: Bowel sounds are normal. There is no distension.      Palpations: Abdomen is soft. There is no mass.      Tenderness: There is no abdominal " "tenderness. There is no guarding or rebound.     Musculoskeletal:         General: No tenderness or deformity. Normal range of motion.      Cervical back: Normal range of motion and neck supple.     Skin:     General: Skin is warm and dry.      Coloration: Skin is not pale.      Findings: No erythema or rash.     Neurological:      Mental Status: He is alert and oriented to person, place, and time.      Cranial Nerves: No cranial nerve deficit.      Motor: No abnormal muscle tone.      Coordination: Coordination normal.      Deep Tendon Reflexes: Reflexes are normal and symmetric. Reflexes normal.     Psychiatric:         Behavior: Behavior normal.         Thought Content: Thought content normal.         Judgment: Judgment normal.         Labs:     Lab Results   Component Value Date    WBC 7.61 05/20/2025    HGB 15.6 05/20/2025    HCT 47.2 05/20/2025    MCV 89 05/20/2025    RDW 14.0 05/20/2025    PLT 61 (L) 05/20/2025     BMP:  Lab Results   Component Value Date    SODIUM 136 05/20/2025    K 4.4 05/20/2025     05/20/2025    CO2 23 05/20/2025    BUN 20 05/20/2025    CREATININE 1.21 05/20/2025    GLUC 99 03/07/2025    GLUF 95 05/20/2025    CALCIUM 8.9 05/20/2025    CORRECTEDCA 9.0 04/21/2022    EGFR 57 05/20/2025    MG 1.6 06/19/2022     LFT:  Lab Results   Component Value Date    AST 22 05/20/2025    ALT 15 05/20/2025    ALKPHOS 86 05/20/2025    TP 6.9 05/20/2025    ALB 3.9 05/20/2025      Lab Results   Component Value Date    TDV9GGKHEYFT 2.432 03/06/2025     Lab Results   Component Value Date    HGBA1C 6.1 (H) 03/06/2025     Lipid Profile:   Lab Results   Component Value Date    CHOLESTEROL 138 05/20/2025    HDL 37 (L) 05/20/2025    LDLCALC 85 05/20/2025    TRIG 79 05/20/2025     Lab Results   Component Value Date    CHOLESTEROL 138 05/20/2025    CHOLESTEROL 146 03/07/2025     No results found for: \"CKTOTAL\", \"CKMB\", \"CKMBINDEX\", \"TROPONINI\"  Lab Results   Component Value Date    NTBNP 290 (H) 06/19/2022    " "  No results found for this or any previous visit (from the past 4 weeks).      Imaging & Testing   I have personally reviewed pertinent reports.      Cardiac Testing     Results for orders placed during the hospital encounter of 12/30/21    NM myocardial perfusion spect (rx stress and/or rest)    Interpretation Summary    Perfusion: There is a left ventricular perfusion defect that is medium in size with moderate reduction in uptake present in the entire inferior and inferolateral location(s) that is partially reversible.  Defect is present at stress and rest and is not present on the apical portion of rest imaging.   Findings consistent with infarct with edgar-infarct ischemia although an diaphragm attenuation artifact cannot be rule out.    Stress ECG: The stress ECG is negative for ischemia after pharmacologic stress. No ST deviation is noted.    Stress Function: Left ventricular function post-stress is normal. Post-stress ejection fraction is 48 %.      EKG: Personally reviewed.        Michael Peña MD [facility]  [phone]  Please call with any questions or suggestions    Counseling :  A description of the counseling:   Goals and Barriers:  Patient's ability to self care:  Medication side effect reviewed with patient in detail and all their questions answered.    \"Portions of the record may have been created with voice recognition software. Occasional wrong word or \"sound a like\" substitutions may have occurred due to the inherent limitations of voice recognition software. Read the chart carefully and recognize, using context, where substitutions have occurred. Please call if you have any questions. \"    "

## 2025-07-07 ENCOUNTER — APPOINTMENT (OUTPATIENT)
Dept: LAB | Facility: HOSPITAL | Age: 75
End: 2025-07-07
Attending: INTERNAL MEDICINE
Payer: COMMERCIAL

## 2025-07-07 DIAGNOSIS — D69.6 THROMBOCYTOPENIA (HCC): ICD-10-CM

## 2025-07-07 DIAGNOSIS — D69.3 CHRONIC ITP (IDIOPATHIC THROMBOCYTOPENIA) (HCC): ICD-10-CM

## 2025-07-07 LAB
BASOPHILS # BLD AUTO: 0.06 THOUSANDS/ÂΜL (ref 0–0.1)
BASOPHILS NFR BLD AUTO: 1 % (ref 0–1)
EOSINOPHIL # BLD AUTO: 0.08 THOUSAND/ÂΜL (ref 0–0.61)
EOSINOPHIL NFR BLD AUTO: 1 % (ref 0–6)
ERYTHROCYTE [DISTWIDTH] IN BLOOD BY AUTOMATED COUNT: 14.4 % (ref 11.6–15.1)
GIANT PLATELETS BLD QL SMEAR: PRESENT
HCT VFR BLD AUTO: 45.2 % (ref 36.5–49.3)
HGB BLD-MCNC: 15.1 G/DL (ref 12–17)
IMM GRANULOCYTES # BLD AUTO: 0.04 THOUSAND/UL (ref 0–0.2)
IMM GRANULOCYTES NFR BLD AUTO: 1 % (ref 0–2)
LG PLATELETS BLD QL SMEAR: PRESENT
LYMPHOCYTES # BLD AUTO: 1.62 THOUSANDS/ÂΜL (ref 0.6–4.47)
LYMPHOCYTES NFR BLD AUTO: 19 % (ref 14–44)
MACROCYTES BLD QL AUTO: PRESENT
MCH RBC QN AUTO: 30.1 PG (ref 26.8–34.3)
MCHC RBC AUTO-ENTMCNC: 33.4 G/DL (ref 31.4–37.4)
MCV RBC AUTO: 90 FL (ref 82–98)
MONOCYTES # BLD AUTO: 0.8 THOUSAND/ÂΜL (ref 0.17–1.22)
MONOCYTES NFR BLD AUTO: 10 % (ref 4–12)
NEUTROPHILS # BLD AUTO: 5.79 THOUSANDS/ÂΜL (ref 1.85–7.62)
NEUTS SEG NFR BLD AUTO: 68 % (ref 43–75)
NRBC BLD AUTO-RTO: 0 /100 WBCS
PLATELET # BLD AUTO: 91 THOUSANDS/UL (ref 149–390)
PLATELET BLD QL SMEAR: ABNORMAL
PMV BLD AUTO: 12.6 FL (ref 8.9–12.7)
RBC # BLD AUTO: 5.02 MILLION/UL (ref 3.88–5.62)
RBC MORPH BLD: PRESENT
WBC # BLD AUTO: 8.39 THOUSAND/UL (ref 4.31–10.16)

## 2025-07-07 PROCEDURE — 85025 COMPLETE CBC W/AUTO DIFF WBC: CPT

## 2025-07-07 PROCEDURE — 36415 COLL VENOUS BLD VENIPUNCTURE: CPT

## 2025-08-11 ENCOUNTER — TELEPHONE (OUTPATIENT)
Age: 75
End: 2025-08-11

## 2025-08-13 ENCOUNTER — OFFICE VISIT (OUTPATIENT)
Age: 75
End: 2025-08-13
Payer: COMMERCIAL

## 2025-08-13 PROBLEM — G45.9 TIA (TRANSIENT ISCHEMIC ATTACK): Status: ACTIVE | Noted: 2025-08-13

## 2025-08-18 DIAGNOSIS — R39.9 LOWER URINARY TRACT SYMPTOMS (LUTS): ICD-10-CM

## 2025-08-18 RX ORDER — TAMSULOSIN HYDROCHLORIDE 0.4 MG/1
0.4 CAPSULE ORAL
Qty: 90 CAPSULE | Refills: 1 | Status: SHIPPED | OUTPATIENT
Start: 2025-08-18

## 2025-08-22 ENCOUNTER — OFFICE VISIT (OUTPATIENT)
Age: 75
End: 2025-08-22
Payer: COMMERCIAL

## 2025-08-22 VITALS — BODY MASS INDEX: 29.97 KG/M2 | HEIGHT: 75 IN | WEIGHT: 241 LBS | RESPIRATION RATE: 17 BRPM

## 2025-08-22 DIAGNOSIS — L03.032 PARONYCHIA OF TOENAIL OF LEFT FOOT: Primary | ICD-10-CM

## 2025-08-22 DIAGNOSIS — M77.41 METATARSALGIA OF BOTH FEET: ICD-10-CM

## 2025-08-22 DIAGNOSIS — L60.0 INGROWN TOENAIL: ICD-10-CM

## 2025-08-22 DIAGNOSIS — M79.672 LEFT FOOT PAIN: ICD-10-CM

## 2025-08-22 DIAGNOSIS — B35.1 ONYCHOMYCOSIS: ICD-10-CM

## 2025-08-22 DIAGNOSIS — M77.42 METATARSALGIA OF BOTH FEET: ICD-10-CM

## 2025-08-22 PROCEDURE — 99212 OFFICE O/P EST SF 10 MIN: CPT | Performed by: PODIATRIST

## (undated) DEVICE — SOLIDIFIER FLUID WASTE CONTROL 1500ML

## (undated) DEVICE — AIRLIFE™  ADULT CUSHION NASAL CANNULA WITH 7 FOOT (2.1 M) CRUSH-RESISTANT OXYGEN TUBING, AND U/CONNECT-IT ADAPTER: Brand: AIRLIFE™

## (undated) DEVICE — TUBING AUX CHANNEL

## (undated) DEVICE — EYE PACK CUSTOM -FINNEGAN

## (undated) DEVICE — TUBING BUBBLE CLEAR 5MM X 100 FT NS

## (undated) DEVICE — GLOVE EXAM NON-STRL NTRL PLUS LRG PF

## (undated) DEVICE — 1200CC GUARDIAN II: Brand: GUARDIAN

## (undated) DEVICE — INTREPID® TRANSFORMER IA HP: Brand: INTREPID®

## (undated) DEVICE — "MH-443 SUCTION VALVE F/EVIS140 EVIS160": Brand: SUCTION VALVE

## (undated) DEVICE — LUBRICANT SURGILUBE TUBE 4 OZ  FLIP TOP

## (undated) DEVICE — DISPOSABLE BIOPSY VALVE MAJ-1555: Brand: SINGLE USE BIOPSY VALVE (STERILE)

## (undated) DEVICE — CLEARCUT® SLIT KNIFE INTREPID MICRO-COAXIAL SYSTEM 2.4 SB: Brand: CLEARCUT®; INTREPID

## (undated) DEVICE — GAUZE SPONGES,16 PLY: Brand: CURITY

## (undated) DEVICE — GLOVE SRG BIOGEL 7.5

## (undated) DEVICE — BRUSH ENDO CLEANING DBL-HEADER

## (undated) DEVICE — B-H IRRIGATING CAN 19GA FLAT ANGLED 8MM: Brand: OPHTHALMIC CANNULA

## (undated) DEVICE — ACTIVE FMS W/ INTREPID* ULTRA SLEEVES, 0.9MM 45° ABS* INTREPID* BALANCED TIP: Brand: ALCON

## (undated) DEVICE — SINGLE-USE BIOPSY FORCEPS: Brand: RADIAL JAW 4

## (undated) DEVICE — SINGLE-USE POLYPECTOMY SNARE: Brand: SENSATION SHORT THROW

## (undated) DEVICE — MICROSURGICAL INSTRUMENT IRR. CYSTITOME 25GA STRAIGHT-REVERSE CUTTING: Brand: ALCON

## (undated) DEVICE — AIR INJECT CANNULA 27GA: Brand: OPHTHALMIC CANNULA

## (undated) DEVICE — TRAP POLY

## (undated) DEVICE — "MH-438 A/W VLVE F/140 EVIS-140": Brand: AIR/WATER VALVE

## (undated) DEVICE — "MAJ-901 WATER CONTAINER SET CV-160/140": Brand: WATER CONTAINER

## (undated) DEVICE — MEDI-VAC YANKAUER SUCTION HANDLE: Brand: CARDINAL HEALTH

## (undated) DEVICE — THE MONARCH® "D" CARTRIDGE IS A SINGLE-USE POLYPROPYLENE CARTRIDGE FOR POSTERIOR CHAMBER IOL DELIVERY: Brand: MONARCH® III